# Patient Record
Sex: MALE | Race: WHITE | NOT HISPANIC OR LATINO
[De-identification: names, ages, dates, MRNs, and addresses within clinical notes are randomized per-mention and may not be internally consistent; named-entity substitution may affect disease eponyms.]

---

## 2017-03-01 ENCOUNTER — MED ADMIN CHARGE (OUTPATIENT)
Age: 52
End: 2017-03-01

## 2017-03-01 ENCOUNTER — APPOINTMENT (OUTPATIENT)
Dept: INTERNAL MEDICINE | Facility: CLINIC | Age: 52
End: 2017-03-01

## 2017-03-02 ENCOUNTER — MEDICATION RENEWAL (OUTPATIENT)
Age: 52
End: 2017-03-02

## 2017-03-02 VITALS
WEIGHT: 226 LBS | DIASTOLIC BLOOD PRESSURE: 94 MMHG | BODY MASS INDEX: 30.65 KG/M2 | RESPIRATION RATE: 14 BRPM | SYSTOLIC BLOOD PRESSURE: 142 MMHG | HEART RATE: 78 BPM

## 2017-03-03 ENCOUNTER — OTHER (OUTPATIENT)
Age: 52
End: 2017-03-03

## 2017-03-03 LAB
25(OH)D3 SERPL-MCNC: 8.2 NG/ML
ALBUMIN SERPL ELPH-MCNC: 4.7 G/DL
ALP BLD-CCNC: 112 U/L
ALT SERPL-CCNC: 68 U/L
ANION GAP SERPL CALC-SCNC: 16 MMOL/L
AST SERPL-CCNC: 78 U/L
BILIRUB SERPL-MCNC: 0.4 MG/DL
BUN SERPL-MCNC: 14 MG/DL
CALCIUM SERPL-MCNC: 9.9 MG/DL
CHLORIDE SERPL-SCNC: 104 MMOL/L
CHOLEST SERPL-MCNC: 201 MG/DL
CHOLEST/HDLC SERPL: 2.2 RATIO
CO2 SERPL-SCNC: 27 MMOL/L
CREAT SERPL-MCNC: 1 MG/DL
GLUCOSE SERPL-MCNC: 102 MG/DL
HBA1C MFR BLD HPLC: 6 %
HCV AB SER QL: NONREACTIVE
HCV S/CO RATIO: 0.19 S/CO
HDLC SERPL-MCNC: 90 MG/DL
LDLC SERPL CALC-MCNC: 85 MG/DL
POTASSIUM SERPL-SCNC: 4.4 MMOL/L
PROT SERPL-MCNC: 7.5 G/DL
PSA SERPL-MCNC: 0.39 NG/ML
SODIUM SERPL-SCNC: 147 MMOL/L
TRIGL SERPL-MCNC: 131 MG/DL

## 2017-03-22 ENCOUNTER — APPOINTMENT (OUTPATIENT)
Dept: INTERNAL MEDICINE | Facility: CLINIC | Age: 52
End: 2017-03-22

## 2017-03-23 VITALS — DIASTOLIC BLOOD PRESSURE: 92 MMHG | SYSTOLIC BLOOD PRESSURE: 142 MMHG

## 2017-04-12 ENCOUNTER — APPOINTMENT (OUTPATIENT)
Dept: INTERNAL MEDICINE | Facility: CLINIC | Age: 52
End: 2017-04-12

## 2017-05-03 ENCOUNTER — APPOINTMENT (OUTPATIENT)
Dept: INTERNAL MEDICINE | Facility: CLINIC | Age: 52
End: 2017-05-03

## 2017-05-03 VITALS — DIASTOLIC BLOOD PRESSURE: 90 MMHG | SYSTOLIC BLOOD PRESSURE: 150 MMHG

## 2017-05-03 DIAGNOSIS — J98.01 ACUTE BRONCHOSPASM: ICD-10-CM

## 2017-05-03 DIAGNOSIS — J06.9 ACUTE UPPER RESPIRATORY INFECTION, UNSPECIFIED: ICD-10-CM

## 2017-05-03 DIAGNOSIS — R74.0 NONSPECIFIC ELEVATION OF LEVELS OF TRANSAMINASE AND LACTIC ACID DEHYDROGENASE [LDH]: ICD-10-CM

## 2017-05-04 ENCOUNTER — APPOINTMENT (OUTPATIENT)
Dept: GASTROENTEROLOGY | Facility: CLINIC | Age: 52
End: 2017-05-04

## 2017-05-04 VITALS
HEIGHT: 72 IN | WEIGHT: 227 LBS | OXYGEN SATURATION: 95 % | SYSTOLIC BLOOD PRESSURE: 150 MMHG | TEMPERATURE: 98.4 F | HEART RATE: 100 BPM | BODY MASS INDEX: 30.75 KG/M2 | DIASTOLIC BLOOD PRESSURE: 90 MMHG | RESPIRATION RATE: 15 BRPM

## 2017-05-04 DIAGNOSIS — Z87.19 PERSONAL HISTORY OF OTHER DISEASES OF THE DIGESTIVE SYSTEM: ICD-10-CM

## 2017-05-04 DIAGNOSIS — K57.90 DIVERTICULOSIS OF INTESTINE, PART UNSPECIFIED, W/OUT PERFORATION OR ABSCESS W/OUT BLEEDING: ICD-10-CM

## 2017-05-04 DIAGNOSIS — M79.89 OTHER SPECIFIED SOFT TISSUE DISORDERS: ICD-10-CM

## 2017-05-04 LAB
ALBUMIN SERPL ELPH-MCNC: 4.4 G/DL
ALP BLD-CCNC: 121 U/L
ALT SERPL-CCNC: 48 U/L
AST SERPL-CCNC: 73 U/L
BILIRUB DIRECT SERPL-MCNC: 0.1 MG/DL
BILIRUB INDIRECT SERPL-MCNC: 0.1 MG/DL
BILIRUB SERPL-MCNC: 0.2 MG/DL
PROT SERPL-MCNC: 7.4 G/DL

## 2017-05-04 RX ORDER — ERGOCALCIFEROL 1.25 MG/1
1.25 MG CAPSULE, LIQUID FILLED ORAL
Qty: 8 | Refills: 0 | Status: DISCONTINUED | COMMUNITY
Start: 2017-03-03 | End: 2017-05-04

## 2017-05-04 RX ORDER — TRAMADOL HYDROCHLORIDE 50 MG/1
50 TABLET, COATED ORAL
Qty: 45 | Refills: 3 | Status: DISCONTINUED | COMMUNITY
Start: 2017-03-01 | End: 2017-05-04

## 2017-05-24 ENCOUNTER — APPOINTMENT (OUTPATIENT)
Dept: INTERNAL MEDICINE | Facility: CLINIC | Age: 52
End: 2017-05-24

## 2017-05-24 VITALS — SYSTOLIC BLOOD PRESSURE: 128 MMHG | DIASTOLIC BLOOD PRESSURE: 86 MMHG

## 2017-05-24 DIAGNOSIS — J30.9 ALLERGIC RHINITIS, UNSPECIFIED: ICD-10-CM

## 2017-06-12 ENCOUNTER — APPOINTMENT (OUTPATIENT)
Dept: ORTHOPEDIC SURGERY | Facility: CLINIC | Age: 52
End: 2017-06-12

## 2017-06-12 VITALS
DIASTOLIC BLOOD PRESSURE: 109 MMHG | HEIGHT: 71 IN | WEIGHT: 220 LBS | SYSTOLIC BLOOD PRESSURE: 158 MMHG | HEART RATE: 108 BPM | BODY MASS INDEX: 30.8 KG/M2

## 2017-06-12 DIAGNOSIS — M25.569 PAIN IN UNSPECIFIED KNEE: ICD-10-CM

## 2017-06-23 ENCOUNTER — APPOINTMENT (OUTPATIENT)
Dept: INTERNAL MEDICINE | Facility: CLINIC | Age: 52
End: 2017-06-23

## 2017-06-23 VITALS — SYSTOLIC BLOOD PRESSURE: 150 MMHG | DIASTOLIC BLOOD PRESSURE: 80 MMHG

## 2017-06-26 ENCOUNTER — APPOINTMENT (OUTPATIENT)
Dept: GASTROENTEROLOGY | Facility: HOSPITAL | Age: 52
End: 2017-06-26

## 2017-06-28 ENCOUNTER — APPOINTMENT (OUTPATIENT)
Dept: GASTROENTEROLOGY | Facility: AMBULATORY MEDICAL SERVICES | Age: 52
End: 2017-06-28

## 2017-07-11 ENCOUNTER — MEDICATION RENEWAL (OUTPATIENT)
Age: 52
End: 2017-07-11

## 2017-08-07 ENCOUNTER — MESSAGE (OUTPATIENT)
Age: 52
End: 2017-08-07

## 2017-08-23 ENCOUNTER — APPOINTMENT (OUTPATIENT)
Dept: GASTROENTEROLOGY | Facility: AMBULATORY MEDICAL SERVICES | Age: 52
End: 2017-08-23

## 2017-09-01 ENCOUNTER — MEDICATION RENEWAL (OUTPATIENT)
Age: 52
End: 2017-09-01

## 2017-09-26 ENCOUNTER — MEDICATION RENEWAL (OUTPATIENT)
Age: 52
End: 2017-09-26

## 2017-10-11 ENCOUNTER — MEDICATION RENEWAL (OUTPATIENT)
Age: 52
End: 2017-10-11

## 2017-10-17 ENCOUNTER — APPOINTMENT (OUTPATIENT)
Dept: PHYSICAL MEDICINE AND REHAB | Facility: CLINIC | Age: 52
End: 2017-10-17
Payer: COMMERCIAL

## 2017-10-17 VITALS
TEMPERATURE: 98.4 F | DIASTOLIC BLOOD PRESSURE: 94 MMHG | SYSTOLIC BLOOD PRESSURE: 159 MMHG | WEIGHT: 225 LBS | BODY MASS INDEX: 32.21 KG/M2 | HEIGHT: 70 IN | OXYGEN SATURATION: 97 % | HEART RATE: 92 BPM

## 2017-10-17 PROCEDURE — 99203 OFFICE O/P NEW LOW 30 MIN: CPT

## 2017-11-02 ENCOUNTER — RX RENEWAL (OUTPATIENT)
Age: 52
End: 2017-11-02

## 2017-11-03 ENCOUNTER — MEDICATION RENEWAL (OUTPATIENT)
Age: 52
End: 2017-11-03

## 2017-11-15 ENCOUNTER — APPOINTMENT (OUTPATIENT)
Dept: GASTROENTEROLOGY | Facility: AMBULATORY MEDICAL SERVICES | Age: 52
End: 2017-11-15

## 2017-11-21 ENCOUNTER — OTHER (OUTPATIENT)
Age: 52
End: 2017-11-21

## 2017-11-29 ENCOUNTER — MEDICATION RENEWAL (OUTPATIENT)
Age: 52
End: 2017-11-29

## 2017-11-29 ENCOUNTER — APPOINTMENT (OUTPATIENT)
Dept: INTERNAL MEDICINE | Facility: CLINIC | Age: 52
End: 2017-11-29

## 2017-11-30 ENCOUNTER — OTHER (OUTPATIENT)
Age: 52
End: 2017-11-30

## 2017-12-27 ENCOUNTER — RX RENEWAL (OUTPATIENT)
Age: 52
End: 2017-12-27

## 2018-01-26 ENCOUNTER — MEDICATION RENEWAL (OUTPATIENT)
Age: 53
End: 2018-01-26

## 2018-02-19 ENCOUNTER — OTHER (OUTPATIENT)
Age: 53
End: 2018-02-19

## 2018-03-08 ENCOUNTER — APPOINTMENT (OUTPATIENT)
Dept: INTERNAL MEDICINE | Facility: CLINIC | Age: 53
End: 2018-03-08
Payer: COMMERCIAL

## 2018-03-08 VITALS
SYSTOLIC BLOOD PRESSURE: 162 MMHG | RESPIRATION RATE: 14 BRPM | DIASTOLIC BLOOD PRESSURE: 94 MMHG | WEIGHT: 228 LBS | BODY MASS INDEX: 32.71 KG/M2 | HEART RATE: 88 BPM

## 2018-03-08 DIAGNOSIS — F10.20 ALCOHOL DEPENDENCE, UNCOMPLICATED: ICD-10-CM

## 2018-03-08 DIAGNOSIS — Z78.9 OTHER SPECIFIED HEALTH STATUS: ICD-10-CM

## 2018-03-08 DIAGNOSIS — M79.671 PAIN IN RIGHT FOOT: ICD-10-CM

## 2018-03-08 DIAGNOSIS — F17.200 NICOTINE DEPENDENCE, UNSPECIFIED, UNCOMPLICATED: ICD-10-CM

## 2018-03-08 DIAGNOSIS — M79.672 PAIN IN RIGHT FOOT: ICD-10-CM

## 2018-03-08 DIAGNOSIS — M54.5 LOW BACK PAIN: ICD-10-CM

## 2018-03-08 DIAGNOSIS — E66.9 OBESITY, UNSPECIFIED: ICD-10-CM

## 2018-03-08 DIAGNOSIS — Z87.891 PERSONAL HISTORY OF NICOTINE DEPENDENCE: ICD-10-CM

## 2018-03-08 DIAGNOSIS — Z12.11 ENCOUNTER FOR SCREENING FOR MALIGNANT NEOPLASM OF COLON: ICD-10-CM

## 2018-03-08 PROCEDURE — G0447 BEHAVIOR COUNSEL OBESITY 15M: CPT

## 2018-03-08 PROCEDURE — G0444 DEPRESSION SCREEN ANNUAL: CPT | Mod: 59

## 2018-03-08 PROCEDURE — 90688 IIV4 VACCINE SPLT 0.5 ML IM: CPT

## 2018-03-08 PROCEDURE — 99406 BEHAV CHNG SMOKING 3-10 MIN: CPT | Mod: 25

## 2018-03-08 PROCEDURE — 90472 IMMUNIZATION ADMIN EACH ADD: CPT

## 2018-03-08 PROCEDURE — 99396 PREV VISIT EST AGE 40-64: CPT | Mod: 25

## 2018-03-08 PROCEDURE — G0008: CPT

## 2018-03-08 PROCEDURE — G0442 ANNUAL ALCOHOL SCREEN 15 MIN: CPT | Mod: 59

## 2018-03-08 PROCEDURE — 90715 TDAP VACCINE 7 YRS/> IM: CPT

## 2018-03-08 RX ORDER — MELOXICAM 15 MG/1
15 TABLET ORAL DAILY
Qty: 60 | Refills: 1 | Status: DISCONTINUED | COMMUNITY
Start: 2017-06-12 | End: 2018-03-08

## 2018-03-08 RX ORDER — GABAPENTIN 300 MG/1
300 CAPSULE ORAL 3 TIMES DAILY
Qty: 90 | Refills: 1 | Status: DISCONTINUED | COMMUNITY
Start: 2017-10-17 | End: 2018-03-08

## 2018-03-08 RX ORDER — SODIUM SULFATE, POTASSIUM SULFATE, MAGNESIUM SULFATE 17.5; 3.13; 1.6 G/ML; G/ML; G/ML
17.5-3.13-1.6 SOLUTION, CONCENTRATE ORAL
Qty: 1 | Refills: 0 | Status: DISCONTINUED | COMMUNITY
Start: 2017-05-04 | End: 2018-03-08

## 2018-03-10 ENCOUNTER — MEDICATION RENEWAL (OUTPATIENT)
Age: 53
End: 2018-03-10

## 2018-03-11 LAB
25(OH)D3 SERPL-MCNC: 10.7 NG/ML
ALBUMIN SERPL ELPH-MCNC: 4.4 G/DL
ALP BLD-CCNC: 129 U/L
ALT SERPL-CCNC: 40 U/L
ANION GAP SERPL CALC-SCNC: 15 MMOL/L
AST SERPL-CCNC: 91 U/L
BILIRUB SERPL-MCNC: 0.3 MG/DL
BUN SERPL-MCNC: 11 MG/DL
CALCIUM SERPL-MCNC: 9.2 MG/DL
CHLORIDE SERPL-SCNC: 96 MMOL/L
CHOLEST SERPL-MCNC: 187 MG/DL
CHOLEST/HDLC SERPL: 2.9 RATIO
CO2 SERPL-SCNC: 27 MMOL/L
CREAT SERPL-MCNC: 0.75 MG/DL
GLUCOSE SERPL-MCNC: 85 MG/DL
HBA1C MFR BLD HPLC: 6 %
HDLC SERPL-MCNC: 64 MG/DL
LDLC SERPL CALC-MCNC: 99 MG/DL
POTASSIUM SERPL-SCNC: 3.9 MMOL/L
PROT SERPL-MCNC: 8 G/DL
PSA SERPL-MCNC: 0.44 NG/ML
SODIUM SERPL-SCNC: 138 MMOL/L
TRIGL SERPL-MCNC: 122 MG/DL

## 2018-03-13 ENCOUNTER — RESULT CHARGE (OUTPATIENT)
Age: 53
End: 2018-03-13

## 2018-03-13 DIAGNOSIS — E55.9 VITAMIN D DEFICIENCY, UNSPECIFIED: ICD-10-CM

## 2018-03-15 ENCOUNTER — MEDICATION RENEWAL (OUTPATIENT)
Age: 53
End: 2018-03-15

## 2018-04-04 ENCOUNTER — APPOINTMENT (OUTPATIENT)
Dept: INTERNAL MEDICINE | Facility: CLINIC | Age: 53
End: 2018-04-04
Payer: COMMERCIAL

## 2018-04-04 DIAGNOSIS — J01.90 ACUTE SINUSITIS, UNSPECIFIED: ICD-10-CM

## 2018-04-04 PROCEDURE — 99214 OFFICE O/P EST MOD 30 MIN: CPT

## 2018-04-05 VITALS — HEART RATE: 82 BPM | DIASTOLIC BLOOD PRESSURE: 78 MMHG | SYSTOLIC BLOOD PRESSURE: 150 MMHG

## 2018-04-09 ENCOUNTER — MEDICATION RENEWAL (OUTPATIENT)
Age: 53
End: 2018-04-09

## 2018-04-15 ENCOUNTER — MEDICATION RENEWAL (OUTPATIENT)
Age: 53
End: 2018-04-15

## 2018-04-16 ENCOUNTER — MEDICATION RENEWAL (OUTPATIENT)
Age: 53
End: 2018-04-16

## 2018-04-21 ENCOUNTER — MEDICATION RENEWAL (OUTPATIENT)
Age: 53
End: 2018-04-21

## 2018-04-24 ENCOUNTER — OTHER (OUTPATIENT)
Age: 53
End: 2018-04-24

## 2018-04-24 RX ORDER — HYDROCODONE BITARTRATE AND ACETAMINOPHEN 5; 300 MG/1; MG/1
5-300 TABLET ORAL
Qty: 42 | Refills: 0 | Status: DISCONTINUED | COMMUNITY
Start: 2018-04-04 | End: 2018-04-24

## 2018-04-24 RX ORDER — AMOXICILLIN AND CLAVULANATE POTASSIUM 875; 125 MG/1; MG/1
875-125 TABLET, COATED ORAL
Qty: 14 | Refills: 0 | Status: DISCONTINUED | COMMUNITY
Start: 2018-04-04 | End: 2018-04-24

## 2018-04-24 RX ORDER — TRAMADOL HYDROCHLORIDE AND ACETAMINOPHEN 37.5; 325 MG/1; MG/1
37.5-325 TABLET, FILM COATED ORAL
Qty: 45 | Refills: 0 | Status: DISCONTINUED | COMMUNITY
Start: 2018-03-10 | End: 2018-04-24

## 2018-04-25 ENCOUNTER — MEDICATION RENEWAL (OUTPATIENT)
Age: 53
End: 2018-04-25

## 2018-05-14 ENCOUNTER — MEDICATION RENEWAL (OUTPATIENT)
Age: 53
End: 2018-05-14

## 2018-05-31 ENCOUNTER — OTHER (OUTPATIENT)
Age: 53
End: 2018-05-31

## 2018-05-31 ENCOUNTER — APPOINTMENT (OUTPATIENT)
Dept: INTERNAL MEDICINE | Facility: CLINIC | Age: 53
End: 2018-05-31
Payer: COMMERCIAL

## 2018-05-31 VITALS — RESPIRATION RATE: 14 BRPM | SYSTOLIC BLOOD PRESSURE: 140 MMHG | DIASTOLIC BLOOD PRESSURE: 90 MMHG | HEART RATE: 78 BPM

## 2018-05-31 DIAGNOSIS — R60.9 EDEMA, UNSPECIFIED: ICD-10-CM

## 2018-05-31 PROCEDURE — 99213 OFFICE O/P EST LOW 20 MIN: CPT

## 2018-05-31 RX ORDER — OXYCODONE HYDROCHLORIDE 10 MG/1
10 TABLET, FILM COATED, EXTENDED RELEASE ORAL
Qty: 30 | Refills: 0 | Status: DISCONTINUED | COMMUNITY
Start: 2018-03-08 | End: 2018-05-31

## 2018-06-11 ENCOUNTER — MEDICATION RENEWAL (OUTPATIENT)
Age: 53
End: 2018-06-11

## 2018-06-11 RX ORDER — OXYCODONE HYDROCHLORIDE 30 MG/1
30 TABLET ORAL EVERY 4 HOURS
Qty: 84 | Refills: 0 | Status: DISCONTINUED | COMMUNITY
Start: 2018-05-31 | End: 2018-06-11

## 2018-06-12 ENCOUNTER — MEDICATION RENEWAL (OUTPATIENT)
Age: 53
End: 2018-06-12

## 2018-06-12 ENCOUNTER — APPOINTMENT (OUTPATIENT)
Dept: INTERNAL MEDICINE | Facility: CLINIC | Age: 53
End: 2018-06-12

## 2018-07-05 ENCOUNTER — OTHER (OUTPATIENT)
Age: 53
End: 2018-07-05

## 2018-07-07 ENCOUNTER — MEDICATION RENEWAL (OUTPATIENT)
Age: 53
End: 2018-07-07

## 2018-08-05 ENCOUNTER — MEDICATION RENEWAL (OUTPATIENT)
Age: 53
End: 2018-08-05

## 2018-08-27 ENCOUNTER — MEDICATION RENEWAL (OUTPATIENT)
Age: 53
End: 2018-08-27

## 2018-08-29 ENCOUNTER — OTHER (OUTPATIENT)
Age: 53
End: 2018-08-29

## 2018-08-31 ENCOUNTER — OTHER (OUTPATIENT)
Age: 53
End: 2018-08-31

## 2018-09-03 ENCOUNTER — EMERGENCY (EMERGENCY)
Facility: HOSPITAL | Age: 53
LOS: 1 days | Discharge: HOME | End: 2018-09-03
Attending: EMERGENCY MEDICINE
Payer: COMMERCIAL

## 2018-09-03 VITALS
RESPIRATION RATE: 18 BRPM | TEMPERATURE: 99 F | SYSTOLIC BLOOD PRESSURE: 141 MMHG | DIASTOLIC BLOOD PRESSURE: 82 MMHG | OXYGEN SATURATION: 98 % | HEART RATE: 107 BPM | WEIGHT: 225.09 LBS

## 2018-09-03 VITALS
RESPIRATION RATE: 17 BRPM | OXYGEN SATURATION: 100 % | HEART RATE: 104 BPM | DIASTOLIC BLOOD PRESSURE: 105 MMHG | SYSTOLIC BLOOD PRESSURE: 155 MMHG

## 2018-09-03 LAB
ALBUMIN SERPL ELPH-MCNC: 4.9 G/DL — SIGNIFICANT CHANGE UP (ref 3.3–5)
ALP SERPL-CCNC: 151 U/L — HIGH (ref 40–120)
ALT FLD-CCNC: 48 U/L — HIGH (ref 10–45)
ANION GAP SERPL CALC-SCNC: 18 MMOL/L — HIGH (ref 5–17)
APTT BLD: 30.7 SEC — SIGNIFICANT CHANGE UP (ref 27.5–37.4)
AST SERPL-CCNC: 101 U/L — HIGH (ref 10–40)
BILIRUB SERPL-MCNC: 0.6 MG/DL — SIGNIFICANT CHANGE UP (ref 0.2–1.2)
BLD GP AB SCN SERPL QL: NEGATIVE — SIGNIFICANT CHANGE UP
BUN SERPL-MCNC: 8 MG/DL — SIGNIFICANT CHANGE UP (ref 7–23)
CALCIUM SERPL-MCNC: 9.4 MG/DL — SIGNIFICANT CHANGE UP (ref 8.4–10.5)
CHLORIDE SERPL-SCNC: 98 MMOL/L — SIGNIFICANT CHANGE UP (ref 96–108)
CO2 SERPL-SCNC: 25 MMOL/L — SIGNIFICANT CHANGE UP (ref 22–31)
CREAT SERPL-MCNC: 0.69 MG/DL — SIGNIFICANT CHANGE UP (ref 0.5–1.3)
GLUCOSE SERPL-MCNC: 111 MG/DL — HIGH (ref 70–99)
HCT VFR BLD CALC: 45.6 % — SIGNIFICANT CHANGE UP (ref 39–50)
HGB BLD-MCNC: 15.5 G/DL — SIGNIFICANT CHANGE UP (ref 13–17)
INR BLD: 1.05 RATIO — SIGNIFICANT CHANGE UP (ref 0.88–1.16)
MCHC RBC-ENTMCNC: 33.7 PG — SIGNIFICANT CHANGE UP (ref 27–34)
MCHC RBC-ENTMCNC: 34 GM/DL — SIGNIFICANT CHANGE UP (ref 32–36)
MCV RBC AUTO: 99.3 FL — SIGNIFICANT CHANGE UP (ref 80–100)
PLATELET # BLD AUTO: 189 K/UL — SIGNIFICANT CHANGE UP (ref 150–400)
POTASSIUM SERPL-MCNC: 4.1 MMOL/L — SIGNIFICANT CHANGE UP (ref 3.5–5.3)
POTASSIUM SERPL-SCNC: 4.1 MMOL/L — SIGNIFICANT CHANGE UP (ref 3.5–5.3)
PROT SERPL-MCNC: 8.5 G/DL — HIGH (ref 6–8.3)
PROTHROM AB SERPL-ACNC: 11.4 SEC — SIGNIFICANT CHANGE UP (ref 9.8–12.7)
RBC # BLD: 4.59 M/UL — SIGNIFICANT CHANGE UP (ref 4.2–5.8)
RBC # FLD: 12.4 % — SIGNIFICANT CHANGE UP (ref 10.3–14.5)
RH IG SCN BLD-IMP: POSITIVE — SIGNIFICANT CHANGE UP
SODIUM SERPL-SCNC: 141 MMOL/L — SIGNIFICANT CHANGE UP (ref 135–145)
WBC # BLD: 9.7 K/UL — SIGNIFICANT CHANGE UP (ref 3.8–10.5)
WBC # FLD AUTO: 9.7 K/UL — SIGNIFICANT CHANGE UP (ref 3.8–10.5)

## 2018-09-03 PROCEDURE — 99283 EMERGENCY DEPT VISIT LOW MDM: CPT

## 2018-09-03 PROCEDURE — 86850 RBC ANTIBODY SCREEN: CPT

## 2018-09-03 PROCEDURE — 86901 BLOOD TYPING SEROLOGIC RH(D): CPT

## 2018-09-03 PROCEDURE — 99284 EMERGENCY DEPT VISIT MOD MDM: CPT

## 2018-09-03 PROCEDURE — 85730 THROMBOPLASTIN TIME PARTIAL: CPT

## 2018-09-03 PROCEDURE — 86900 BLOOD TYPING SEROLOGIC ABO: CPT

## 2018-09-03 PROCEDURE — 85027 COMPLETE CBC AUTOMATED: CPT

## 2018-09-03 PROCEDURE — 36415 COLL VENOUS BLD VENIPUNCTURE: CPT

## 2018-09-03 PROCEDURE — 80053 COMPREHEN METABOLIC PANEL: CPT

## 2018-09-03 PROCEDURE — 85610 PROTHROMBIN TIME: CPT

## 2018-09-03 RX ORDER — OXYCODONE HYDROCHLORIDE 5 MG/1
20 TABLET ORAL ONCE
Qty: 0 | Refills: 0 | Status: DISCONTINUED | OUTPATIENT
Start: 2018-09-03 | End: 2018-09-03

## 2018-09-03 RX ORDER — DIPHENHYDRAMINE HCL 50 MG
50 CAPSULE ORAL ONCE
Qty: 0 | Refills: 0 | Status: COMPLETED | OUTPATIENT
Start: 2018-09-03 | End: 2018-09-03

## 2018-09-03 RX ADMIN — OXYCODONE HYDROCHLORIDE 20 MILLIGRAM(S): 5 TABLET ORAL at 17:23

## 2018-09-03 RX ADMIN — Medication 50 MILLIGRAM(S): at 17:23

## 2018-09-03 RX ADMIN — OXYCODONE HYDROCHLORIDE 20 MILLIGRAM(S): 5 TABLET ORAL at 17:52

## 2018-09-03 NOTE — ED PROVIDER NOTE - PLAN OF CARE
1. follow up with GI doctor this week  2. follow up with PMD in 24-48 hours.  3. sitz baths every 4 hours as needed for pain relief, increase fluid and fiber in diet, tucks pads after BMs for pain relief.  4. if you are having increased pain, bleeding, fatigue, dizziness, fevers then return to ED

## 2018-09-03 NOTE — ED SUB INTERN NOTE - MEDICAL DECISION MAKING DETAILS
Mr. Paulino is a 52 yo male who presents with a 1 day history of bright red blood per rectum. Given physical exam demonstrating hemorrhoids, this is most likely cause for bleeding. Hgb is 15.5 so worried less for occult bleeding such as malignancy. Lack of abdominal pain is less suggestive of diverticulitis.    CBC, CMP, PT/INR

## 2018-09-03 NOTE — ED ADULT NURSE NOTE - OBJECTIVE STATEMENT
53M pt AxOx3 ambulatory to ED c/o rectal bleeding while having BM today. Pt states bleeding only occurred during BM and he was straining. Pt denies abd pain/N/V/D. PMHx HTN, sciatica. On assessment, pt is well in appearance, admits to being anxious while being in ED. Resp even, unlabored. Abd soft/NT/ND. NAD noted. . #20G RAC, labs drawn and sent. MD at bedside for eval. Daughter at bedside. Will continue to monitor.

## 2018-09-03 NOTE — ED PROVIDER NOTE - OBJECTIVE STATEMENT
Mr. Paulino is a 54 yo male who presents with a 1 day history of bright red blood per rectum. Reports having a loose bowel movement today and noticed drops of blood in the water and on the toilet paper. Did not see any blood in the stool. Had one loose bowel movement yesterday with no noticeable blood. NO prior history of GI bleeds. Pt reports drinking a 6 pack of beer a day. Denies use of chronic NSAIDs. No N/V or constipation. Denies headache, chest pain, SOB, and abdominal pain. Had colonoscopy 6 years ago and was told he has diverticulosis. Mr. Paulino is a 54 yo male who presents with a 1 day history of bright red blood per rectum. Reports having a loose bowel movement today and noticed drops of blood in the water and on the toilet paper. Did not see any blood in the stool. Had one loose bowel movement yesterday with no noticeable blood. NO prior history of GI bleeds. Pt reports drinking a 6 pack of beer a day. Denies use of chronic NSAIDs. No N/V or constipation, no abdominal pain. Denies headache, chest pain, SOB, and abdominal pain. Had colonoscopy 6 years ago and was told he has diverticulosis.

## 2018-09-03 NOTE — ED PROVIDER NOTE - PROGRESS NOTE DETAILS
pt clearly anxious which could account for tachycardia, pt is otherwise stable no dizziness, fatgiue, weakness, is eager to go home. pt clearly anxious which could account for tachycardia, pt is otherwise stable no dizziness, fatgiue, weakness, is eager to go home.    ATTENDING MD [place]: pt well appearing. abs stable, hemorrhoidal bleed. Do not suspect major bleed given minimal blood on exam and obviously bleeding hemorrhoids. PT advised on abnormal LFTs, need for PCP/GI f/u. suspect from regular drinking, but have no clinical concern for variceal bleed or UGIB. pt appears stable for DC. advised on warning signs and sympmtomf or which to return to the ED.

## 2018-09-03 NOTE — ED PROVIDER NOTE - CARE PLAN
Assessment and plan of treatment:	1. follow up with GI doctor this week  2. follow up with PMD in 24-48 hours.  3. sitz baths every 4 hours as needed for pain relief, increase fluid and fiber in diet, tucks pads after BMs for pain relief.  4. if you are having increased pain, bleeding, fatigue, dizziness, fevers then return to ED Principal Discharge DX:	Bleeding hemorrhoids  Assessment and plan of treatment:	1. follow up with GI doctor this week  2. follow up with PMD in 24-48 hours.  3. sitz baths every 4 hours as needed for pain relief, increase fluid and fiber in diet, tucks pads after BMs for pain relief.  4. if you are having increased pain, bleeding, fatigue, dizziness, fevers then return to ED

## 2018-09-03 NOTE — ED SUB INTERN NOTE - OBJECTIVE STATEMENT FT
Mr. Paulino is a Mr. Paulino is a 54 yo male who presents with a 1 day history of bright red blood per rectum. Reports having a loose bowel movement today and noticed drops of blood in the water and on the toilet paper. Did not see any blood in the stool. Had one loose bowel movement yesterday with no noticeable blood. NO prior history of GI bleeds. Pt reports drinking a 6 pack of beer a day. Denies use of chronic NSAIDs. No N/V or constipation. Denies headache, chest pain, SOB, and abdominal pain. Had colonoscopy 6 years ago and was told he has diverticulosis.

## 2018-09-03 NOTE — ED ADULT NURSE NOTE - NSIMPLEMENTINTERV_GEN_ALL_ED
Implemented All Universal Safety Interventions:  [place] to call system. Call bell, personal items and telephone within reach. Instruct patient to call for assistance. Room bathroom lighting operational. Non-slip footwear when patient is off stretcher. Physically safe environment: no spills, clutter or unnecessary equipment. Stretcher in lowest position, wheels locked, appropriate side rails in place.

## 2018-09-03 NOTE — ED PROVIDER NOTE - ATTENDING CONTRIBUTION TO CARE
ATTENDING MD: I, Israel Fragoso, have seen and evaluated this patient with the advance practice clinician.  I have discussed the history, exam ,and aspects of care with the APC.  I have reviewed the APC's note. I agree with the findings  unless other wise stated in the HPI, PE, MDM, and progress notes.    Charts made in real time. Please forgive typos.     VITALS: reviewed  GEN: NAD, very nervous/anxious appearing not otherwise in distress, A & O x 4  HEAD/EYES: NCAT, PERRL, EOMI, anicteric sclerae, no conjunctival pallor  ENT: mucus membranes moist, oropharynx WNL, trachea midline, no JVD  RESP: lungs CTA with equal breath sounds bilaterally, chest wall nontender and atraumatic  CV: heart with slightly tachycardic, reg rhythm S1, S2, no murmur; distal pulses intact and symmetric bilaterally  ABDOMEN: normoactive bowel sounds, soft, nondistended, nontender, no palpable masses, no sequellae of cirrhosis  RECTAL: large distended external hemorrhoid which red blood squeezed from. ARON is nontende riwth seeral internal hemorrhoids, trace dark red blood and brown stool on exam.  : no CVAT  MSK: extremities atraumatic and nontender, no edema, no asymmetry.   SKIN: warm, dry, no rash, no bruising, no cyanosis. color appropriate for ethnicity  NEURO: alert, mentating appropriately, no facial asymmetry. gross sensation, motor, coordination are intact  PSYCH: Affect appropriate     MDM: probable hemorrhoidal bleed, will check CBC> Pt very anxious as wife  of major GI bleed from long-term medication use. Suspect tachycardia is from anxiety as pt appears well, is otherwise asymptomatic. Will give home chronic pain meds and a benadryl for anxiety. No clinical concern for UGIB given lack of pain, hx of prior, and no stigmata of cirrhosis.

## 2018-09-04 ENCOUNTER — CHART COPY (OUTPATIENT)
Age: 53
End: 2018-09-04

## 2018-09-10 ENCOUNTER — OTHER (OUTPATIENT)
Age: 53
End: 2018-09-10

## 2018-09-10 RX ORDER — KETOROLAC TROMETHAMINE 10 MG/1
10 TABLET, FILM COATED ORAL 3 TIMES DAILY
Qty: 15 | Refills: 0 | Status: DISCONTINUED | COMMUNITY
Start: 2018-07-05 | End: 2018-09-10

## 2018-09-10 RX ORDER — OXYCODONE 20 MG/1
20 TABLET ORAL EVERY 4 HOURS
Qty: 180 | Refills: 0 | Status: DISCONTINUED | COMMUNITY
Start: 2017-03-22 | End: 2018-09-10

## 2018-09-13 ENCOUNTER — APPOINTMENT (OUTPATIENT)
Dept: GASTROENTEROLOGY | Facility: CLINIC | Age: 53
End: 2018-09-13
Payer: COMMERCIAL

## 2018-09-13 ENCOUNTER — CHART COPY (OUTPATIENT)
Age: 53
End: 2018-09-13

## 2018-09-13 VITALS
WEIGHT: 236 LBS | RESPIRATION RATE: 16 BRPM | HEIGHT: 70 IN | HEART RATE: 105 BPM | SYSTOLIC BLOOD PRESSURE: 120 MMHG | OXYGEN SATURATION: 98 % | BODY MASS INDEX: 33.79 KG/M2 | DIASTOLIC BLOOD PRESSURE: 76 MMHG | TEMPERATURE: 98 F

## 2018-09-13 DIAGNOSIS — R58 HEMORRHAGE, NOT ELSEWHERE CLASSIFIED: ICD-10-CM

## 2018-09-13 DIAGNOSIS — Z12.11 ENCOUNTER FOR SCREENING FOR MALIGNANT NEOPLASM OF COLON: ICD-10-CM

## 2018-09-13 PROBLEM — M54.30 SCIATICA, UNSPECIFIED SIDE: Chronic | Status: ACTIVE | Noted: 2018-09-03

## 2018-09-13 PROBLEM — I10 ESSENTIAL (PRIMARY) HYPERTENSION: Chronic | Status: ACTIVE | Noted: 2018-09-03

## 2018-09-13 PROCEDURE — 99214 OFFICE O/P EST MOD 30 MIN: CPT

## 2018-09-24 ENCOUNTER — MEDICATION RENEWAL (OUTPATIENT)
Age: 53
End: 2018-09-24

## 2018-09-24 DIAGNOSIS — G47.00 INSOMNIA, UNSPECIFIED: ICD-10-CM

## 2018-11-07 ENCOUNTER — APPOINTMENT (OUTPATIENT)
Dept: GASTROENTEROLOGY | Facility: AMBULATORY MEDICAL SERVICES | Age: 53
End: 2018-11-07
Payer: COMMERCIAL

## 2018-11-07 PROCEDURE — 45385 COLONOSCOPY W/LESION REMOVAL: CPT

## 2018-11-23 ENCOUNTER — OTHER (OUTPATIENT)
Age: 53
End: 2018-11-23

## 2019-02-28 ENCOUNTER — APPOINTMENT (OUTPATIENT)
Dept: INTERNAL MEDICINE | Facility: CLINIC | Age: 54
End: 2019-02-28
Payer: COMMERCIAL

## 2019-02-28 ENCOUNTER — NON-APPOINTMENT (OUTPATIENT)
Age: 54
End: 2019-02-28

## 2019-02-28 VITALS
SYSTOLIC BLOOD PRESSURE: 120 MMHG | HEIGHT: 71 IN | DIASTOLIC BLOOD PRESSURE: 80 MMHG | TEMPERATURE: 98.6 F | BODY MASS INDEX: 35 KG/M2 | HEART RATE: 95 BPM | WEIGHT: 250 LBS | OXYGEN SATURATION: 95 %

## 2019-02-28 DIAGNOSIS — M54.16 RADICULOPATHY, LUMBAR REGION: ICD-10-CM

## 2019-02-28 DIAGNOSIS — R73.03 PREDIABETES.: ICD-10-CM

## 2019-02-28 DIAGNOSIS — Z23 ENCOUNTER FOR IMMUNIZATION: ICD-10-CM

## 2019-02-28 PROCEDURE — G0008: CPT

## 2019-02-28 PROCEDURE — G0447 BEHAVIOR COUNSEL OBESITY 15M: CPT

## 2019-02-28 PROCEDURE — G0444 DEPRESSION SCREEN ANNUAL: CPT

## 2019-02-28 PROCEDURE — 99386 PREV VISIT NEW AGE 40-64: CPT | Mod: 25

## 2019-02-28 PROCEDURE — 90674 CCIIV4 VAC NO PRSV 0.5 ML IM: CPT

## 2019-02-28 PROCEDURE — 93000 ELECTROCARDIOGRAM COMPLETE: CPT

## 2019-02-28 RX ORDER — DULOXETINE HYDROCHLORIDE 30 MG/1
30 CAPSULE, DELAYED RELEASE PELLETS ORAL
Qty: 90 | Refills: 3 | Status: DISCONTINUED | COMMUNITY
Start: 2018-05-31 | End: 2019-02-28

## 2019-02-28 RX ORDER — SODIUM SULFATE, POTASSIUM SULFATE, MAGNESIUM SULFATE 17.5; 3.13; 1.6 G/ML; G/ML; G/ML
17.5-3.13-1.6 SOLUTION, CONCENTRATE ORAL
Qty: 1 | Refills: 0 | Status: DISCONTINUED | COMMUNITY
Start: 2018-09-13 | End: 2019-02-28

## 2019-02-28 RX ORDER — FLURBIPROFEN 100 MG
100 TABLET ORAL TWICE DAILY
Qty: 20 | Refills: 0 | Status: DISCONTINUED | COMMUNITY
Start: 2018-09-10 | End: 2019-02-28

## 2019-02-28 NOTE — HEALTH RISK ASSESSMENT
[26-29] : 26-81 [No falls in past year] : Patient reported no falls in the past year [Patient reported colonoscopy was abnormal] : Patient reported colonoscopy was abnormal [HIV test declined] : HIV test declined [Hepatitis C test offered] : Hepatitis C test offered [None] : None [Employed] : employed [] :  [] : No [YearQuit] : 2019 [ColonoscopyDate] : 11/18 [ColonoscopyComments] : - TA removed, repeat 3 years advised

## 2019-02-28 NOTE — COUNSELING
[Weight management counseling provided] : Weight management [Healthy eating counseling provided] : healthy eating [Activity counseling provided] : activity [Discussed Risks and Advised to Quit Smoking] : Discussed risks and advised to quit smoking [Discussed Cessation Strategies] : cessation strategies were discussed [Quit Smoking] : Quit Smoking [ - Behavioral Counseling for Obesity (Face-to-Face for 15 Minutes)] : Behavioral Counseling for Obesity (Face-to-Face for 15 Minutes) [ - Annual Depression Screening] : Annual Depression Screening

## 2019-02-28 NOTE — DATA REVIEWED
[FreeTextEntry1] : EKG - NSR @ 96, nml axis, possible LAE, no ST or t wave significant abnormalities

## 2019-02-28 NOTE — PLAN
[FreeTextEntry1] : discussed w pt\par \par reviewed his prior hx and prior labs \par \par discussed in detail for 15 min re concern for worsening obesity and need for diet control, exercise weight loss. discussed options such as mediterranean diet, weight watchers, [facility] weight management center referral. he plans to implement strategies discussed and declines formal nutritional consult for now. \par \par cont current rx for HTN\par \par discussed chronic lumbar pain at length. he may need spinal imaging but he feels he cannot undergo MRI due to claustrophobia. may consider CT. referred to pain management. advised will not renew any opioids at this time. consider PT. \par \par counseled on complete smoking cessation, he quit few weeks ago, advised on importance and risks. may consider CT chest screening for lung cancer in the future \par \par influenza vaccine discussed and administered today\par \par colonoscopy UTD\par \par check routine labs \par \par RTO 3 months for f/u of obesity management, HTN, IFG , smoking and chronic lumbar pain or call earlier prn \par

## 2019-02-28 NOTE — PHYSICAL EXAM
[No Acute Distress] : no acute distress [Well Nourished] : well nourished [Well Developed] : well developed [Well-Appearing] : well-appearing [Normal Voice/Communication] : normal voice/communication [Normal Sclera/Conjunctiva] : normal sclera/conjunctiva [PERRL] : pupils equal round and reactive to light [Normal Outer Ear/Nose] : the outer ears and nose were normal in appearance [Normal Oropharynx] : the oropharynx was normal [Normal TMs] : both tympanic membranes were normal [No JVD] : no jugular venous distention [Supple] : supple [No Lymphadenopathy] : no lymphadenopathy [Thyroid Normal, No Nodules] : the thyroid was normal and there were no nodules present [No Respiratory Distress] : no respiratory distress  [Clear to Auscultation] : lungs were clear to auscultation bilaterally [No Accessory Muscle Use] : no accessory muscle use [Normal Rate] : normal rate  [Regular Rhythm] : with a regular rhythm [Normal S1, S2] : normal S1 and S2 [No Murmur] : no murmur heard [No Carotid Bruits] : no carotid bruits [No Abdominal Bruit] : a ~M bruit was not heard ~T in the abdomen [No Varicosities] : no varicosities [Pedal Pulses Present] : the pedal pulses are present [No Edema] : there was no peripheral edema [No Extremity Clubbing/Cyanosis] : no extremity clubbing/cyanosis [Soft] : abdomen soft [Non Tender] : non-tender [Non-distended] : non-distended [No HSM] : no HSM [Normal Bowel Sounds] : normal bowel sounds [Declined Rectal Exam] : declined rectal exam [Normal Supraclavicular Nodes] : no supraclavicular lymphadenopathy [Normal Posterior Cervical Nodes] : no posterior cervical lymphadenopathy [Normal Anterior Cervical Nodes] : no anterior cervical lymphadenopathy [No CVA Tenderness] : no CVA  tenderness [No Spinal Tenderness] : no spinal tenderness [No Joint Swelling] : no joint swelling [Grossly Normal Strength/Tone] : grossly normal strength/tone [No Rash] : no rash [Normal Gait] : normal gait [Coordination Grossly Intact] : coordination grossly intact [No Focal Deficits] : no focal deficits [Normal Affect] : the affect was normal [Alert and Oriented x3] : oriented to person, place, and time [Normal Mood] : the mood was normal [Normal Insight/Judgement] : insight and judgment were intact [de-identified] : massively obese  [de-identified] : lipoma mid upper back

## 2019-02-28 NOTE — HISTORY OF PRESENT ILLNESS
[de-identified] : 52 y/o man presents for initial visit to establish primary care w new internist. he feels well overall currently. previous workups with his PMD Dr Bailey reviewed. \par \par chronic lumbar pain w radiculopathy on R side, has taken narcotics in the past and had issues with dependence. also has had excessive alcohol intake problems in the past. has tried Lyrica and duloxetine but has not had significant relief and his PMD declined to prescribe additional narcotics. he saw pain management 2 years ago - but he cannot tolerate MRI due to claustrophobia including stand up/open MRI. \par HTN controlled on rx\par worsening obesity over past few months - he is surprised that he has gained significant weight\par IFG on diet control \par \par family hx of colon cancer and colonoscopy recently updated in 2018 w Dr Reeder, benign polyp removed \par \par long hx of heavy smoking - however he stopped completely on his own 3 weeks ago and is determined to quit permanently. declines rx.

## 2019-03-01 LAB
APPEARANCE: CLEAR
BILIRUBIN URINE: NEGATIVE
BLOOD URINE: NEGATIVE
COLOR: NORMAL
GLUCOSE QUALITATIVE U: NEGATIVE
KETONES URINE: NEGATIVE
LEUKOCYTE ESTERASE URINE: NEGATIVE
NITRITE URINE: NEGATIVE
PH URINE: 6
PROTEIN URINE: NEGATIVE
SPECIFIC GRAVITY URINE: 1.01
UROBILINOGEN URINE: NORMAL

## 2019-03-05 ENCOUNTER — OTHER (OUTPATIENT)
Age: 54
End: 2019-03-05

## 2019-03-05 ENCOUNTER — APPOINTMENT (OUTPATIENT)
Dept: INTERNAL MEDICINE | Facility: CLINIC | Age: 54
End: 2019-03-05
Payer: COMMERCIAL

## 2019-03-05 PROCEDURE — 36415 COLL VENOUS BLD VENIPUNCTURE: CPT

## 2019-03-19 LAB
ALBUMIN SERPL ELPH-MCNC: 4.9 G/DL
ALP BLD-CCNC: 151 U/L
ALT SERPL-CCNC: 44 U/L
ANION GAP SERPL CALC-SCNC: 19 MMOL/L
AST SERPL-CCNC: 107 U/L
BASOPHILS # BLD AUTO: 0.07 K/UL
BASOPHILS NFR BLD AUTO: 0.7 %
BILIRUB SERPL-MCNC: 0.9 MG/DL
BUN SERPL-MCNC: 11 MG/DL
CALCIUM SERPL-MCNC: 9.4 MG/DL
CHLORIDE SERPL-SCNC: 97 MMOL/L
CHOLEST SERPL-MCNC: 201 MG/DL
CHOLEST/HDLC SERPL: 2.9 RATIO
CO2 SERPL-SCNC: 25 MMOL/L
CREAT SERPL-MCNC: 0.71 MG/DL
EOSINOPHIL # BLD AUTO: 0.09 K/UL
EOSINOPHIL NFR BLD AUTO: 0.9 %
GLUCOSE SERPL-MCNC: 113 MG/DL
HBA1C MFR BLD HPLC: 6.6 %
HCT VFR BLD CALC: 47.2 %
HDLC SERPL-MCNC: 70 MG/DL
HGB BLD-MCNC: 15.4 G/DL
IMM GRANULOCYTES NFR BLD AUTO: 0.4 %
LDLC SERPL CALC-MCNC: 108 MG/DL
LYMPHOCYTES # BLD AUTO: 1.5 K/UL
LYMPHOCYTES NFR BLD AUTO: 15.2 %
MAN DIFF?: NORMAL
MCHC RBC-ENTMCNC: 32.6 GM/DL
MCHC RBC-ENTMCNC: 32.6 PG
MCV RBC AUTO: 100 FL
MONOCYTES # BLD AUTO: 1.16 K/UL
MONOCYTES NFR BLD AUTO: 11.7 %
NEUTROPHILS # BLD AUTO: 7.04 K/UL
NEUTROPHILS NFR BLD AUTO: 71.1 %
PLATELET # BLD AUTO: 160 K/UL
POTASSIUM SERPL-SCNC: 3.5 MMOL/L
PROT SERPL-MCNC: 8.4 G/DL
PSA SERPL-MCNC: 0.28 NG/ML
RBC # BLD: 4.72 M/UL
RBC # FLD: 14.4 %
SODIUM SERPL-SCNC: 141 MMOL/L
T4 FREE SERPL-MCNC: 1.2 NG/DL
TRIGL SERPL-MCNC: 116 MG/DL
TSH SERPL-ACNC: 4.28 UIU/ML
WBC # FLD AUTO: 9.9 K/UL

## 2019-05-08 ENCOUNTER — LABORATORY RESULT (OUTPATIENT)
Age: 54
End: 2019-05-08

## 2019-05-08 ENCOUNTER — APPOINTMENT (OUTPATIENT)
Dept: INTERNAL MEDICINE | Facility: CLINIC | Age: 54
End: 2019-05-08
Payer: COMMERCIAL

## 2019-05-08 VITALS
SYSTOLIC BLOOD PRESSURE: 140 MMHG | TEMPERATURE: 99.2 F | BODY MASS INDEX: 35 KG/M2 | HEIGHT: 71 IN | HEART RATE: 112 BPM | OXYGEN SATURATION: 98 % | WEIGHT: 250 LBS | DIASTOLIC BLOOD PRESSURE: 86 MMHG

## 2019-05-08 DIAGNOSIS — R10.13 EPIGASTRIC PAIN: ICD-10-CM

## 2019-05-08 PROCEDURE — 99214 OFFICE O/P EST MOD 30 MIN: CPT | Mod: 25

## 2019-05-08 PROCEDURE — 36415 COLL VENOUS BLD VENIPUNCTURE: CPT

## 2019-05-08 NOTE — REVIEW OF SYSTEMS
[Fever] : no fever [Chills] : no chills [Fatigue] : no fatigue [Chest Pain] : no chest pain [Lower Ext Edema] : no lower extremity edema [Shortness Of Breath] : no shortness of breath [Abdominal Pain] : abdominal pain [Nausea] : no nausea [Constipation] : no constipation [Diarrhea] : no diarrhea [Heartburn] : no heartburn [Melena] : no melena [Dysuria] : no dysuria [Hematuria] : no hematuria [Frequency] : no frequency [Skin Rash] : no skin rash [Headache] : no headache [Dizziness] : no dizziness

## 2019-05-08 NOTE — ASSESSMENT
[FreeTextEntry1] : Acute epigastric pain x 1 day. He started drinking heavily again consisting of 6-7 martini's daily. DDx includes pancreatitis, gastritis, PUD, and cholelithiasis. As he does not appear toxic on exam, will find check labs including CBC, CMP, ESR, CRP, amylase, and lipase. Start trial of Omeprazole 40 mg QAM. Strongly advised to taper off the alcohol intake. If pain intensifies, will send for CT A/P for further evaluation. He may also go to the emergency room if symptomatically worsens. \par \par BP acceptable on Norvasc. Low salt diet. Weight loss. \par \par F/u with Dr. Clancy.

## 2019-05-08 NOTE — HISTORY OF PRESENT ILLNESS
[FreeTextEntry8] : Patient comes for an acute visit. \par \par He is here for evaluation of abdominal pain. \par \par He developed epigastric pain this morning after drinking cup of coffee. Denies radiation to back. Denies fever, chills, N/V, or constipation. Last BM this morning and described as "loose." He has no change in appetite. No recent travel or sick contacts. He drinks 5 glasses of earl after work daily. He has had elevated more elevated AST level on recent blood work.

## 2019-05-09 ENCOUNTER — APPOINTMENT (OUTPATIENT)
Dept: INTERNAL MEDICINE | Facility: CLINIC | Age: 54
End: 2019-05-09

## 2019-05-11 ENCOUNTER — EMERGENCY (EMERGENCY)
Facility: HOSPITAL | Age: 54
LOS: 1 days | Discharge: HOME | End: 2019-05-11
Attending: EMERGENCY MEDICINE
Payer: COMMERCIAL

## 2019-05-11 VITALS
OXYGEN SATURATION: 95 % | HEIGHT: 71 IN | HEART RATE: 116 BPM | SYSTOLIC BLOOD PRESSURE: 150 MMHG | WEIGHT: 240.08 LBS | RESPIRATION RATE: 20 BRPM | DIASTOLIC BLOOD PRESSURE: 97 MMHG

## 2019-05-11 VITALS
RESPIRATION RATE: 20 BRPM | TEMPERATURE: 99 F | DIASTOLIC BLOOD PRESSURE: 84 MMHG | OXYGEN SATURATION: 97 % | SYSTOLIC BLOOD PRESSURE: 129 MMHG | HEART RATE: 103 BPM

## 2019-05-11 PROCEDURE — 99282 EMERGENCY DEPT VISIT SF MDM: CPT | Mod: 25

## 2019-05-11 PROCEDURE — 99283 EMERGENCY DEPT VISIT LOW MDM: CPT | Mod: 25

## 2019-05-11 PROCEDURE — 30901 CONTROL OF NOSEBLEED: CPT

## 2019-05-11 PROCEDURE — 30901 CONTROL OF NOSEBLEED: CPT | Mod: RT

## 2019-05-11 NOTE — ED ADULT NURSE NOTE - OBJECTIVE STATEMENT
53 y/o male PMH HTN presents to ED c/o epistaxis since this afternoon. Pt states nosebleed began suddenly this afternoon, was able to control bleeding at the time. A few hours later, nosebleed returned and he was unable to stop bleeding. Upon arrival, pt is bleeding from R nare. He denies SOB, feeling like he is swallowing/choking on blood, pain, SOB, chest pain, headache, visual changes. NP Yudelka and MD Diop at bedside packing nostril. Son at bedside.

## 2019-05-11 NOTE — ED PROVIDER NOTE - CLINICAL SUMMARY MEDICAL DECISION MAKING FREE TEXT BOX
DDx: epistaxis. Will provide pressure and observe, may progress to silver nitrate cauterization if bleeding continues. Follow up with ENT.  ATTG: Dr. Diop

## 2019-05-11 NOTE — ED PROVIDER NOTE - PROGRESS NOTE DETAILS
No more bleeding more than 30minutes after cauterization with silvernitrate. No more bleeding more than 30minutes after cauterization with silver nitrate.

## 2019-05-11 NOTE — ED PROVIDER NOTE - NSFOLLOWUPINSTRUCTIONS_ED_ALL_ED_FT
Hydrate.  Do not blow your nose.  Moisturize both nostrils using Vaseline or Bacitracin twice a day.  Keep continue your current medications.  Follow up with ENT Dr. Wagner or Dr. Buenrostro for reevaluation in 2-3days, call Monday for an appointment.  Return for any concerns or worsening symptoms.

## 2019-05-11 NOTE — ED PROVIDER NOTE - CARE PROVIDER_API CALL
Ezra Buenrostro)  Otolaryngology  [address] [place] [address], [address]  [place] [address]  Phone: [phone]  Fax: [phone]  Follow Up Time:     Houston Wagner)  Otolaryngology  [address] [place] [address], [address]  [place] [address]  Phone: [phone]  Fax: [phone]  Follow Up Time:

## 2019-05-11 NOTE — ED PROVIDER NOTE - OBJECTIVE STATEMENT
53yo male pt with PMHx of HTN, ambulatory c/o right nasal bleeding since this morning. Pt stated the bleeding started after rubbing his nose and it resolved itself. He notice active bleeding again this evening after he poked the nostril. Denies taking AC or ASA. Denies prev. nasal bleeding. Denies fever, chills or recent cough/ congestion. Denies headache, dizziness or N/V. Denies sensory changes or weakness to extremities. Denies CP/SOB/ABD pain.

## 2019-05-11 NOTE — ED PROVIDER NOTE - PHYSICAL EXAMINATION
NAD, Hypertensive and Tachycardia. No sinus tender. + Right sided epistaxis from ant septal area, no post nasal bleeding. Neck supple without tenderness. Lungs clear. Neuro- intact.

## 2019-05-11 NOTE — ED PROCEDURE NOTE - ATTENDING CONTRIBUTION TO CARE
I supervised the procedure with the NP. I reviewed the NP's note and agree with the documented findings.

## 2019-05-15 LAB
ALBUMIN SERPL ELPH-MCNC: 4.3 G/DL
ALP BLD-CCNC: 179 U/L
ALT SERPL-CCNC: 48 U/L
AMYLASE/CREAT SERPL: 52 U/L
ANION GAP SERPL CALC-SCNC: 17 MMOL/L
AST SERPL-CCNC: 137 U/L
BASOPHILS # BLD AUTO: 0.06 K/UL
BASOPHILS NFR BLD AUTO: 0.7 %
BILIRUB SERPL-MCNC: 0.9 MG/DL
BUN SERPL-MCNC: 9 MG/DL
CALCIUM SERPL-MCNC: 9.5 MG/DL
CHLORIDE SERPL-SCNC: 96 MMOL/L
CO2 SERPL-SCNC: 25 MMOL/L
CREAT SERPL-MCNC: 0.61 MG/DL
CRP SERPL-MCNC: 3.24 MG/DL
EOSINOPHIL # BLD AUTO: 0.1 K/UL
EOSINOPHIL NFR BLD AUTO: 1.2 %
ERYTHROCYTE [SEDIMENTATION RATE] IN BLOOD BY WESTERGREN METHOD: 80 MM/HR
GLUCOSE SERPL-MCNC: 110 MG/DL
HCT VFR BLD CALC: 43.9 %
HGB BLD-MCNC: 14 G/DL
IMM GRANULOCYTES NFR BLD AUTO: 0.4 %
LPL SERPL-CCNC: 22 U/L
LYMPHOCYTES # BLD AUTO: 1.21 K/UL
LYMPHOCYTES NFR BLD AUTO: 14.7 %
MAN DIFF?: NORMAL
MCHC RBC-ENTMCNC: 31.9 GM/DL
MCHC RBC-ENTMCNC: 33.7 PG
MCV RBC AUTO: 105.5 FL
MONOCYTES # BLD AUTO: 0.88 K/UL
MONOCYTES NFR BLD AUTO: 10.7 %
NEUTROPHILS # BLD AUTO: 5.93 K/UL
NEUTROPHILS NFR BLD AUTO: 72.3 %
PLATELET # BLD AUTO: 191 K/UL
POTASSIUM SERPL-SCNC: 3.7 MMOL/L
PROT SERPL-MCNC: 8.2 G/DL
RBC # BLD: 4.16 M/UL
RBC # FLD: 14.2 %
SODIUM SERPL-SCNC: 138 MMOL/L
WBC # FLD AUTO: 8.21 K/UL

## 2019-05-16 ENCOUNTER — APPOINTMENT (OUTPATIENT)
Dept: INTERNAL MEDICINE | Facility: CLINIC | Age: 54
End: 2019-05-16
Payer: COMMERCIAL

## 2019-05-16 VITALS
DIASTOLIC BLOOD PRESSURE: 80 MMHG | OXYGEN SATURATION: 94 % | HEART RATE: 106 BPM | BODY MASS INDEX: 35 KG/M2 | TEMPERATURE: 98.1 F | SYSTOLIC BLOOD PRESSURE: 134 MMHG | WEIGHT: 250 LBS | HEIGHT: 71 IN

## 2019-05-16 DIAGNOSIS — F43.9 REACTION TO SEVERE STRESS, UNSPECIFIED: ICD-10-CM

## 2019-05-16 DIAGNOSIS — R04.0 EPISTAXIS: ICD-10-CM

## 2019-05-16 DIAGNOSIS — R70.0 ELEVATED ERYTHROCYTE SEDIMENTATION RATE: ICD-10-CM

## 2019-05-16 DIAGNOSIS — R19.06 EPIGASTRIC SWELLING, MASS OR LUMP: ICD-10-CM

## 2019-05-16 PROCEDURE — 99215 OFFICE O/P EST HI 40 MIN: CPT | Mod: 25

## 2019-05-16 PROCEDURE — 36415 COLL VENOUS BLD VENIPUNCTURE: CPT

## 2019-05-16 PROCEDURE — 99408 AUDIT/DAST 15-30 MIN: CPT

## 2019-05-16 RX ORDER — PREGABALIN 50 MG/1
50 CAPSULE ORAL
Qty: 60 | Refills: 3 | Status: DISCONTINUED | COMMUNITY
Start: 2018-06-11 | End: 2019-05-16

## 2019-05-16 NOTE — REVIEW OF SYSTEMS
[Nosebleeds] : nosebleeds [Negative] : Heme/Lymph [Fever] : no fever [Chills] : no chills [Nasal Discharge] : no nasal discharge [Chest Pain] : no chest pain [Nausea] : no nausea [Diarrhea] : no diarrhea [Vomiting] : no vomiting [Melena] : no melena [Dysuria] : no dysuria [Joint Swelling] : no joint swelling [Back Pain] : no back pain

## 2019-05-16 NOTE — PHYSICAL EXAM
[No Acute Distress] : no acute distress [Normal Voice/Communication] : normal voice/communication [No Respiratory Distress] : no respiratory distress  [Clear to Auscultation] : lungs were clear to auscultation bilaterally [No Accessory Muscle Use] : no accessory muscle use [Soft] : abdomen soft [Normal Bowel Sounds] : normal bowel sounds [Normal Supraclavicular Nodes] : no supraclavicular lymphadenopathy [Normal Posterior Cervical Nodes] : no posterior cervical lymphadenopathy [Normal Anterior Cervical Nodes] : no anterior cervical lymphadenopathy [No CVA Tenderness] : no CVA  tenderness [Grossly Normal Strength/Tone] : grossly normal strength/tone [Normal Gait] : normal gait [Normal Mood] : the mood was normal [Normal Insight/Judgement] : insight and judgment were intact [de-identified] : possible mild scleral icterus  [de-identified] : massively obese abd. non tender , noted significantly enlarged liver. also noted a firm mass/lump in mid epigastric region

## 2019-05-16 NOTE — HISTORY OF PRESENT ILLNESS
[FreeTextEntry8] : presents for f/u visit to review recent labs done w Dr Munguia last week after episode of abdominal pain. he reports his pain improved after one dose of omeprazole, has resolved. \par he confirms he is back to drinking at least 3-4 drinks/margerhitas per night. he is drinking currently to deal w stress and anxiety from work.\par he has known significant fatty liver for many years, prior abd US 7 years ago showed this as well as prior abd CT. \par \par labs showed normal amylase and lipase. moderate transaminase elevations and alk phos trending higher than recent baseline noted. CBC w normal Hgb w elevated MCV, normal platelets. \par noted highly elevated ESR level of 80 \par colonoscopy completed in 11/8 w Dr Reeder, benign polyp \par \par he reports a visit to [facility] ER 4-5 days ago w persistent nosebleed. cauterized in the ER and it improved but he notes multiple repeat episodes at home over past few days. no active bleeding currently. \par \par HTN controlled on rx

## 2019-05-16 NOTE — ASSESSMENT
[FreeTextEntry1] : discussed in detail w pt \par \par reviewed labs in detail\par chronic transaminase elevation w alk phos trending up \par noted highly elevated ESR w normal amylase lipase \par his epigastric pain has improved w PPI \par he may have a gastric ulcer or gastritis and would advise GI evaluation for consideration of EGD \par due to his alcoholism and continued alcohol use and recent epistaxis, he should evaluate this further. need to check PT/INR to ensure no coagulopathy due to liver disease. he is not anemic to suggest GI bleeding. \par recheck CBC and ESR. I am concerned about a possible mass/lump in mid abdomen, though his body habitus makes it difficult to assess accurately. \par will therefore order CT abd w contrast for further evaluation and to check liver status. enlarged on exam. check AFP level as well due to his elevated ESR. \par \par counseled at length for 15 min re his continued alcohol use and risks to his health . he is aware of all alcohol counseling options, AA, etc. he says he drinks due to stress after work and requests rx to help w this. explained most rx would not be compatible w his liver issues. suggested though he can try hydroxyzine for anxiety/stress which lowell not affect liver function \par \par advised appt w GI and ENT , referrals given \par \par f/u in 1-2 weeks for review of his progress, will review his labs and CT abd when available. call earlier prn if any worsening concerns 
same name as above

## 2019-05-16 NOTE — COUNSELING
[Weight management counseling provided] : Weight management [ - Behavioral Counseling for Alcohol Misuse (Face-to-Face for 15 Minutes)] : Behavioral Counseling for Alcohol Misuse (Face-to-Face for 15 Minutes)

## 2019-05-24 ENCOUNTER — FORM ENCOUNTER (OUTPATIENT)
Age: 54
End: 2019-05-24

## 2019-05-25 ENCOUNTER — APPOINTMENT (OUTPATIENT)
Dept: CT IMAGING | Facility: IMAGING CENTER | Age: 54
End: 2019-05-25
Payer: COMMERCIAL

## 2019-05-25 ENCOUNTER — OUTPATIENT (OUTPATIENT)
Dept: OUTPATIENT SERVICES | Facility: HOSPITAL | Age: 54
LOS: 1 days | End: 2019-05-25
Payer: COMMERCIAL

## 2019-05-25 DIAGNOSIS — R04.0 EPISTAXIS: ICD-10-CM

## 2019-05-25 DIAGNOSIS — R19.06 EPIGASTRIC SWELLING, MASS OR LUMP: ICD-10-CM

## 2019-05-25 DIAGNOSIS — K70.0 ALCOHOLIC FATTY LIVER: ICD-10-CM

## 2019-05-25 PROCEDURE — 74177 CT ABD & PELVIS W/CONTRAST: CPT | Mod: 26

## 2019-05-25 PROCEDURE — 74177 CT ABD & PELVIS W/CONTRAST: CPT

## 2019-05-30 ENCOUNTER — APPOINTMENT (OUTPATIENT)
Dept: INTERNAL MEDICINE | Facility: CLINIC | Age: 54
End: 2019-05-30
Payer: COMMERCIAL

## 2019-05-30 VITALS
WEIGHT: 250 LBS | HEIGHT: 71 IN | HEART RATE: 106 BPM | SYSTOLIC BLOOD PRESSURE: 154 MMHG | TEMPERATURE: 98.8 F | OXYGEN SATURATION: 97 % | DIASTOLIC BLOOD PRESSURE: 80 MMHG | BODY MASS INDEX: 35 KG/M2

## 2019-05-30 DIAGNOSIS — K70.30 ALCOHOLIC CIRRHOSIS OF LIVER W/OUT ASCITES: ICD-10-CM

## 2019-05-30 DIAGNOSIS — K52.3 INDETERMINATE COLITIS: ICD-10-CM

## 2019-05-30 LAB
AFP-TM SERPL-MCNC: 2.4 NG/ML
ALBUMIN SERPL ELPH-MCNC: 4.4 G/DL
ALP BLD-CCNC: 166 U/L
ALT SERPL-CCNC: 30 U/L
ANION GAP SERPL CALC-SCNC: 18 MMOL/L
AST SERPL-CCNC: 97 U/L
BASOPHILS # BLD AUTO: 0.05 K/UL
BASOPHILS NFR BLD AUTO: 0.6 %
BILIRUB SERPL-MCNC: 0.7 MG/DL
BUN SERPL-MCNC: 9 MG/DL
CALCIUM SERPL-MCNC: 9.5 MG/DL
CHLORIDE SERPL-SCNC: 100 MMOL/L
CO2 SERPL-SCNC: 26 MMOL/L
CREAT SERPL-MCNC: 0.75 MG/DL
CRP SERPL-MCNC: 3.07 MG/DL
EOSINOPHIL # BLD AUTO: 0.08 K/UL
EOSINOPHIL NFR BLD AUTO: 1 %
ERYTHROCYTE [SEDIMENTATION RATE] IN BLOOD BY WESTERGREN METHOD: 76 MM/HR
GGT SERPL-CCNC: 427 U/L
GLUCOSE SERPL-MCNC: 105 MG/DL
HCT VFR BLD CALC: 41.4 %
HGB BLD-MCNC: 13.3 G/DL
IMM GRANULOCYTES NFR BLD AUTO: 0.4 %
INR PPP: 1.14 RATIO
LYMPHOCYTES # BLD AUTO: 1.25 K/UL
LYMPHOCYTES NFR BLD AUTO: 15.2 %
MAN DIFF?: NORMAL
MCHC RBC-ENTMCNC: 32.1 GM/DL
MCHC RBC-ENTMCNC: 33.7 PG
MCV RBC AUTO: 104.8 FL
MONOCYTES # BLD AUTO: 0.8 K/UL
MONOCYTES NFR BLD AUTO: 9.7 %
NEUTROPHILS # BLD AUTO: 6 K/UL
NEUTROPHILS NFR BLD AUTO: 73.1 %
PLATELET # BLD AUTO: 172 K/UL
POTASSIUM SERPL-SCNC: 3.7 MMOL/L
PROT SERPL-MCNC: 8.1 G/DL
PT BLD: 12.9 SEC
RBC # BLD: 3.95 M/UL
RBC # FLD: 14.7 %
SODIUM SERPL-SCNC: 143 MMOL/L
WBC # FLD AUTO: 8.21 K/UL

## 2019-05-30 PROCEDURE — 99215 OFFICE O/P EST HI 40 MIN: CPT

## 2019-06-02 NOTE — REVIEW OF SYSTEMS
[Fever] : no fever [Fatigue] : no fatigue [Chills] : no chills [Nausea] : no nausea [Diarrhea] : no diarrhea [Vomiting] : no vomiting [Heartburn] : no heartburn [Melena] : no melena [Dysuria] : no dysuria [Anxiety] : anxiety [Depression] : no depression [Negative] : Heme/Lymph

## 2019-06-02 NOTE — ASSESSMENT
[FreeTextEntry1] : discussed w pt in detail \par reviewed all lab and imaging results \par \par counseled and educated on finding of cirrhotic features of liver likely due to alcohol use. advised on absolute need for cessation of alcohol use. he is aware of all supportive programs for this but declines. explained risks of further liver damage w continued alcohol use. long discussion re implication of liver cirrhosis. \par noted small liver lesion, would plan to order MRI but he advises he cannot tolerate MRI due to claustrophobia. may consider open MRI but unclear if abdominal images will be adequate. \par will refer to hepatology for initial consult, consider MRI imaging. gave options for hepatology consult. \par noted AFP normal as well as INR. \par \par unclear if the finding of mild colitis is contributing to abd bloating or discomfort. no diarrhea, has normal bowel movements currently. will give course of ciprofloxacin for 7 days only and monitor for response. he will need further GI/hepatology eval and advised to make appt ASAP. \par cont current rx \par advised he may try low dose hydroxyzine prn for anxiety, has not tried it as yet. \par \par RTO 2 weeks for f/u for monitoring of his progress and hepatology recommendations , call or return earlier prn if any new concerns

## 2019-06-02 NOTE — HISTORY OF PRESENT ILLNESS
[de-identified] : presents for f/u visit for review in detail of recent labs and CT imaging. as suspected his liver is significantly enlarged and there are definitive cirrhotic features, splenomegaly also noted on CT. also noted mild colitis. a small indeterminate liver lesion was also noted, MRI is considered for further characterization. \par he reports his upper abd pain improved but has persistent bloating sensation and girth of his abdomen. there was minimal ascites noted on CT. platelets, INR and AFP were normal on lab testing, these issues were explained to pt. \par he has been trying to be completely abstinent from any alcohol use but he says this is difficult. he is aware for all supportive programs such as AA and others.\par HTN controlled on rx

## 2019-06-02 NOTE — PHYSICAL EXAM
[No Acute Distress] : no acute distress [Normal Voice/Communication] : normal voice/communication [Supple] : supple [No JVD] : no jugular venous distention [No Respiratory Distress] : no respiratory distress  [Clear to Auscultation] : lungs were clear to auscultation bilaterally [No Accessory Muscle Use] : no accessory muscle use [Normal Rate] : normal rate  [Normal S1, S2] : normal S1 and S2 [Regular Rhythm] : with a regular rhythm [No Murmur] : no murmur heard [No Carotid Bruits] : no carotid bruits [No Edema] : there was no peripheral edema [Normal Supraclavicular Nodes] : no supraclavicular lymphadenopathy [Normal Posterior Cervical Nodes] : no posterior cervical lymphadenopathy [Normal Anterior Cervical Nodes] : no anterior cervical lymphadenopathy [Grossly Normal Strength/Tone] : grossly normal strength/tone [Normal Gait] : normal gait [Normal Insight/Judgement] : insight and judgment were intact [Alert and Oriented x3] : oriented to person, place, and time [Normal Mood] : the mood was normal [de-identified] : no scleral icterus

## 2019-06-03 ENCOUNTER — MEDICATION RENEWAL (OUTPATIENT)
Age: 54
End: 2019-06-03

## 2019-06-20 ENCOUNTER — APPOINTMENT (OUTPATIENT)
Dept: INTERNAL MEDICINE | Facility: CLINIC | Age: 54
End: 2019-06-20

## 2019-06-25 ENCOUNTER — APPOINTMENT (OUTPATIENT)
Dept: OTOLARYNGOLOGY | Facility: CLINIC | Age: 54
End: 2019-06-25

## 2019-10-14 ENCOUNTER — INPATIENT (INPATIENT)
Facility: HOSPITAL | Age: 54
LOS: 4 days | Discharge: HOME | DRG: 897 | End: 2019-10-19
Attending: STUDENT IN AN ORGANIZED HEALTH CARE EDUCATION/TRAINING PROGRAM | Admitting: HOSPITALIST
Payer: COMMERCIAL

## 2019-10-14 VITALS
WEIGHT: 177.91 LBS | TEMPERATURE: 98 F | SYSTOLIC BLOOD PRESSURE: 161 MMHG | DIASTOLIC BLOOD PRESSURE: 96 MMHG | RESPIRATION RATE: 18 BRPM | HEART RATE: 105 BPM | HEIGHT: 71 IN | OXYGEN SATURATION: 94 %

## 2019-10-14 DIAGNOSIS — R41.82 ALTERED MENTAL STATUS, UNSPECIFIED: ICD-10-CM

## 2019-10-14 LAB
ALBUMIN SERPL ELPH-MCNC: 4.5 G/DL — SIGNIFICANT CHANGE UP (ref 3.3–5)
ALP SERPL-CCNC: 245 U/L — HIGH (ref 40–120)
ALT FLD-CCNC: 26 U/L — SIGNIFICANT CHANGE UP (ref 10–45)
AMMONIA BLD-MCNC: 57 UMOL/L — HIGH (ref 11–55)
ANION GAP SERPL CALC-SCNC: 25 MMOL/L — HIGH (ref 5–17)
APPEARANCE UR: CLEAR — SIGNIFICANT CHANGE UP
AST SERPL-CCNC: 141 U/L — HIGH (ref 10–40)
BACTERIA # UR AUTO: NEGATIVE — SIGNIFICANT CHANGE UP
BILIRUB SERPL-MCNC: 1.2 MG/DL — SIGNIFICANT CHANGE UP (ref 0.2–1.2)
BILIRUB UR-MCNC: NEGATIVE — SIGNIFICANT CHANGE UP
BUN SERPL-MCNC: 8 MG/DL — SIGNIFICANT CHANGE UP (ref 7–23)
CALCIUM SERPL-MCNC: 8.7 MG/DL — SIGNIFICANT CHANGE UP (ref 8.4–10.5)
CHLORIDE SERPL-SCNC: 95 MMOL/L — LOW (ref 96–108)
CO2 SERPL-SCNC: 25 MMOL/L — SIGNIFICANT CHANGE UP (ref 22–31)
COLOR SPEC: YELLOW — SIGNIFICANT CHANGE UP
CREAT SERPL-MCNC: 0.66 MG/DL — SIGNIFICANT CHANGE UP (ref 0.5–1.3)
DIFF PNL FLD: ABNORMAL
EPI CELLS # UR: 0 — SIGNIFICANT CHANGE UP
ETHANOL SERPL-MCNC: 452 MG/DL — HIGH (ref 0–10)
GAS PNL BLDV: SIGNIFICANT CHANGE UP
GLUCOSE SERPL-MCNC: 112 MG/DL — HIGH (ref 70–99)
GLUCOSE UR QL: NEGATIVE — SIGNIFICANT CHANGE UP
HCT VFR BLD CALC: 45.8 % — SIGNIFICANT CHANGE UP (ref 39–50)
HGB BLD-MCNC: 16 G/DL — SIGNIFICANT CHANGE UP (ref 13–17)
HYALINE CASTS # UR AUTO: 0 /LPF — SIGNIFICANT CHANGE UP (ref 0–7)
KETONES UR-MCNC: NEGATIVE — SIGNIFICANT CHANGE UP
LEUKOCYTE ESTERASE UR-ACNC: NEGATIVE — SIGNIFICANT CHANGE UP
MAGNESIUM SERPL-MCNC: 2.3 MG/DL — SIGNIFICANT CHANGE UP (ref 1.6–2.6)
MCHC RBC-ENTMCNC: 33.5 PG — SIGNIFICANT CHANGE UP (ref 27–34)
MCHC RBC-ENTMCNC: 34.9 GM/DL — SIGNIFICANT CHANGE UP (ref 32–36)
MCV RBC AUTO: 96 FL — SIGNIFICANT CHANGE UP (ref 80–100)
NITRITE UR-MCNC: NEGATIVE — SIGNIFICANT CHANGE UP
NRBC # BLD: 0 /100 WBCS — SIGNIFICANT CHANGE UP (ref 0–0)
OSMOLALITY SERPL: 400 MOSMOL/KG — HIGH (ref 275–300)
OSMOLALITY UR: 368 MOS/KG — SIGNIFICANT CHANGE UP (ref 300–900)
PH UR: 6.5 — SIGNIFICANT CHANGE UP (ref 5–8)
PHOSPHATE SERPL-MCNC: 3.4 MG/DL — SIGNIFICANT CHANGE UP (ref 2.5–4.5)
PLATELET # BLD AUTO: SIGNIFICANT CHANGE UP (ref 150–400)
POTASSIUM SERPL-MCNC: 4.3 MMOL/L — SIGNIFICANT CHANGE UP (ref 3.5–5.3)
POTASSIUM SERPL-SCNC: 4.3 MMOL/L — SIGNIFICANT CHANGE UP (ref 3.5–5.3)
PROT SERPL-MCNC: 8.9 G/DL — HIGH (ref 6–8.3)
PROT UR-MCNC: 100 — SIGNIFICANT CHANGE UP
RBC # BLD: 4.77 M/UL — SIGNIFICANT CHANGE UP (ref 4.2–5.8)
RBC # FLD: 15.3 % — HIGH (ref 10.3–14.5)
RBC CASTS # UR COMP ASSIST: 12 /HPF — HIGH (ref 0–4)
SODIUM SERPL-SCNC: 145 MMOL/L — SIGNIFICANT CHANGE UP (ref 135–145)
SP GR SPEC: 1.01 — SIGNIFICANT CHANGE UP (ref 1.01–1.02)
UROBILINOGEN FLD QL: ABNORMAL
WBC # BLD: 7.13 K/UL — SIGNIFICANT CHANGE UP (ref 3.8–10.5)
WBC # FLD AUTO: 7.13 K/UL — SIGNIFICANT CHANGE UP (ref 3.8–10.5)
WBC UR QL: 1 /HPF — SIGNIFICANT CHANGE UP (ref 0–5)

## 2019-10-14 PROCEDURE — 99223 1ST HOSP IP/OBS HIGH 75: CPT | Mod: GC

## 2019-10-14 PROCEDURE — 99285 EMERGENCY DEPT VISIT HI MDM: CPT

## 2019-10-14 PROCEDURE — 72125 CT NECK SPINE W/O DYE: CPT | Mod: 26

## 2019-10-14 PROCEDURE — 93010 ELECTROCARDIOGRAM REPORT: CPT | Mod: 59

## 2019-10-14 PROCEDURE — 70450 CT HEAD/BRAIN W/O DYE: CPT | Mod: 26

## 2019-10-14 RX ORDER — FOLIC ACID 0.8 MG
1 TABLET ORAL DAILY
Refills: 0 | Status: DISCONTINUED | OUTPATIENT
Start: 2019-10-14 | End: 2019-10-19

## 2019-10-14 RX ORDER — SODIUM CHLORIDE 9 MG/ML
1000 INJECTION, SOLUTION INTRAVENOUS
Refills: 0 | Status: DISCONTINUED | OUTPATIENT
Start: 2019-10-14 | End: 2019-10-16

## 2019-10-14 RX ORDER — THIAMINE MONONITRATE (VIT B1) 100 MG
500 TABLET ORAL DAILY
Refills: 0 | Status: COMPLETED | OUTPATIENT
Start: 2019-10-14 | End: 2019-10-17

## 2019-10-14 RX ORDER — ACETAMINOPHEN 500 MG
650 TABLET ORAL ONCE
Refills: 0 | Status: COMPLETED | OUTPATIENT
Start: 2019-10-14 | End: 2019-10-14

## 2019-10-14 RX ORDER — SODIUM CHLORIDE 9 MG/ML
1000 INJECTION, SOLUTION INTRAVENOUS ONCE
Refills: 0 | Status: COMPLETED | OUTPATIENT
Start: 2019-10-14 | End: 2019-10-14

## 2019-10-14 RX ADMIN — Medication 100 MILLIGRAM(S): at 23:19

## 2019-10-14 RX ADMIN — SODIUM CHLORIDE 1000 MILLILITER(S): 9 INJECTION, SOLUTION INTRAVENOUS at 21:48

## 2019-10-14 RX ADMIN — Medication 650 MILLIGRAM(S): at 23:13

## 2019-10-14 RX ADMIN — SODIUM CHLORIDE 200 MILLILITER(S): 9 INJECTION, SOLUTION INTRAVENOUS at 20:14

## 2019-10-14 NOTE — H&P ADULT - PROBLEM SELECTOR PLAN 5
- EKG shows twi in anterolateral leads (v2-v5)  - not endorsing chest pain currently or chest pain/dyspnea w/ exertion   - outpatient f/u   - f/u A1c and lipid profile in AM - EKG shows twi in anterolateral leads (v2-v5)  - not endorsing chest pain currently or chest pain/dyspnea w/ exertion   -first trop negative. trend second set.   - outpatient f/u for possible stress test if negative.   - f/u A1c and lipid profile in AM - EKG shows twi in anterolateral leads (v2-v5)  - not endorsing chest pain currently or chest pain/dyspnea w/ exertion   -trend cardiac enzymes  - outpatient f/u for possible stress test if negative.   - f/u A1c and lipid profile in AM

## 2019-10-14 NOTE — ED PROVIDER NOTE - OBJECTIVE STATEMENT
53yo M with hx of ETOH abuse, oxycodone abuse presents with altered mental status. Was in normal state of health on Friday. Over the last 3 days, patient has been becoming increasingly slow to respond and looks "sleepy" per family members. Has been drinking 1 pint a day since 2012. Has not drank any more than usual. Usually drinks 1 pint and still is able to hold a conversation. Unable to do so today. Signs of head trauma, patient denies falling.

## 2019-10-14 NOTE — ED ADULT NURSE NOTE - OBJECTIVE STATEMENT
as per pt family, pt "is not acting like himself. he lives with my brother and my grandmother and my brother told me he has been off for the past couple days. today when I went to go see him, he wasn't responsive to what I was saying and wasn't responding to what I was saying. he has in the past abused alcohol and oxycodone in the past." pt AOx2 at present. pt denies any complaints at present. pt following commands appropriately. pt family states, "he had a fall, I don't know when but he has a scar on his forehead"

## 2019-10-14 NOTE — H&P ADULT - HISTORY OF PRESENT ILLNESS
55yo M with hx of ETOH abuse, oxycodone abuse presents with altered mental status. Pt has a hx of ETOH withdrawal and DT requiring MICU in 2013.      Pt follows w/ Dr. Louise Clancy outpatient who has been attempting to manage his ETOH abuse; ordered an CT abd/pelvis w/ IV contrast which was completed on 05/25/19 and was significant for hepatic cirrhosis with gross hepatic enlargement and evidence of portal hypertension as well as splenomegaly.  There was also an indeterminate lesion in segment 7 of the liver for which further characterization by MRI was suggested and mild diffuse colitis.  Pt was unable to tolerate MRI b/c of claustrophobia. AFP tested outpatient and neg.  Pt was referred to hepatology for initial consult 55yo M with hx of ETOH abuse w/ withdrawal and DT requiring MICU in 2013 now w/ cirrhosis and portal HTN, past oxycodone abuse now resolved who presents with altered mental status. Pt accompanied by his 2 children.  The daughter reports that she had tried calling him on the phone but did not get an answer prompting her to go check on him; when she arrived to his house she found him unresponsive, intoxicated and starring blankly prompting her to take him the ED.  On the way to the ED he was in and out of consciousness.  Per pt, he fell and hit his head yesterday and is endorsing a headache but denies visual changes, N/V.  Pt reports drinking a 1/5th of tequilla a day, used to drink beer but has stopped and used to abuse oxy that was prescribed to him for sciatica but he has stopped that 2 years ago.  Pt's drinking problem started after his wife passed away in 2012 and he admits that depression is a component of his drinking problem along w/ job stress as a .        Pt follows w/ Dr. Louise Clancy outpatient who has been attempting to manage his ETOH abuse; ordered an CT abd/pelvis w/ IV contrast which was completed on 05/25/19 and was significant for hepatic cirrhosis with gross hepatic enlargement and evidence of portal hypertension as well as splenomegaly.  There was also an indeterminate lesion in segment 7 of the liver for which further characterization by MRI was suggested and mild diffuse colitis.  Pt was unable to tolerate MRI b/c of claustrophobia. AFP tested outpatient and neg.  Pt was referred to hepatology for initial consult but has not gone yet.    ROS neg for infectious symptoms including cough, sore throat, diarrhea, dysuria.  Pt denies ever experiencing hematemesis, hematochezia, melena or hematuria.

## 2019-10-14 NOTE — H&P ADULT - NSICDXPASTMEDICALHX_GEN_ALL_CORE_FT
PAST MEDICAL HISTORY:  Active smoker     ETOH abuse     Grief reaction     HTN (hypertension)     Sciatica

## 2019-10-14 NOTE — ED PROVIDER NOTE - CLINICAL SUMMARY MEDICAL DECISION MAKING FREE TEXT BOX
r/o SDH, unknown falls in the past - laceration on forehead. r/o alcoholic vs starvation ketoacidosis vs liver failure.

## 2019-10-14 NOTE — H&P ADULT - NSHPSOCIALHISTORY_GEN_ALL_CORE
lives w/ son and mother.  Drinks 1/5th of alcohol a day; started after wife passed away in 2012.  Used to abuse oxycodone that was prescribed for sciatica but quit 2 years ago.  Never IV drug abuse.  Works a

## 2019-10-14 NOTE — ED ADULT TRIAGE NOTE - CHIEF COMPLAINT QUOTE
AMS. patient is an alcoholic. daughter states patient has not been answering her calls in 4 days. went to home today and patient was altered. patient states he drank 1 pint tequila today AMS. patient is an alcoholic. daughter states patient has not been answering her calls in 4 days. went to home today and patient was altered. patient states he drank 1 pint tequila today. laceration noted to forehead. patient states he might have fallen yesterday at work as a . denies depression, SI, HI AMS. patient is an alcoholic. daughter states patient has not been answering her calls in 4 days. went to home today and patient was altered. patient states he drank 1 pint tequila today. laceration noted to forehead. patient states he might have fallen yesterday at work as a . denies depression, SI, HI. FS= 98 in ED

## 2019-10-14 NOTE — H&P ADULT - NSHPPHYSICALEXAM_GEN_ALL_CORE
Vital Signs Last 24 Hrs  T(C): 37.2 (14 Oct 2019 22:41), Max: 37.2 (14 Oct 2019 22:41)  T(F): 99 (14 Oct 2019 22:41), Max: 99 (14 Oct 2019 22:41)  HR: 101 (14 Oct 2019 22:41) (92 - 105)  BP: 135/87 (14 Oct 2019 22:41) (124/77 - 161/96)  BP(mean): --  RR: 20 (14 Oct 2019 22:41) (18 - 20)  SpO2: 94% (14 Oct 2019 22:41) (94% - 95%)    I&O's Summary      PHYSICAL EXAM:  GENERAL: NAD, well-developed  HEAD:  Atraumatic, Normocephalic  EYES: EOMI, PERRLA, conjunctiva and sclera clear  NECK: Supple, No JVD  CHEST/LUNG: Clear to auscultation bilaterally; No wheeze  HEART: Regular rate and rhythm; No murmurs, rubs, or gallops  ABDOMEN: Soft, Nontender, Nondistended; Bowel sounds present  EXTREMITIES:  2+ Peripheral Pulses, No clubbing, cyanosis, or edema  PSYCH: AAOx3  NEUROLOGY: non-focal  SKIN: No rashes or lesions Vital Signs Last 24 Hrs  T(C): 37.2 (14 Oct 2019 22:41), Max: 37.2 (14 Oct 2019 22:41)  T(F): 99 (14 Oct 2019 22:41), Max: 99 (14 Oct 2019 22:41)  HR: 101 (14 Oct 2019 22:41) (92 - 105)  BP: 135/87 (14 Oct 2019 22:41) (124/77 - 161/96)  BP(mean): --  RR: 20 (14 Oct 2019 22:41) (18 - 20)  SpO2: 94% (14 Oct 2019 22:41) (94% - 95%)    I&O's Summary      PHYSICAL EXAM:  GENERAL: NAD, "hung over"  HEAD:  Atraumatic, Normocephalic  EYES: EOMI, PERRLA, conjunctiva and sclera clear  NECK: Supple, No JVD  CHEST/LUNG: Clear to auscultation bilaterally; No wheeze  HEART: Regular rate and rhythm; No murmurs, rubs, or gallops  ABDOMEN: Soft, Nontender, distended w/ + fluid wave and dullness to percussion; Bowel sounds present  EXTREMITIES:  2+ Peripheral Pulses, No clubbing, cyanosis, or edema  PSYCH: normal affect   NEUROLOGY: non-focal, AAO x 3, no asterixes, no tremor   SKIN: small scratch on forehead

## 2019-10-14 NOTE — ED PROVIDER NOTE - ATTENDING CONTRIBUTION TO CARE
55 yo brought in for AMS. Patient has a history of known alcohol and opioid abuse. No fevers. Patient has been living at home for a little while family has been a away and when they returned they found him to be grossly confused. 55 yo brought in for AMS. Patient has a history of known alcohol and opioid abuse. No fevers. Patient has been living at home for a little while family has been a away and when they returned they found him to be grossly confused.  concern for alcohol intoxication from abuse  no si/hi as per family and patient   will get iv, cbc, cmp, alcohol level, c/s, ekg, ct head, social work, and likely admit  Will follow up on labs, analgesia, imaging, reassess and disposition to the inpatient team as clinically indicated.   Based on patient's history and physical exam, as well as the results of today's workup, I feel that patient warrants admission to the hospital for further workup/evaluation and continued management. I discussed the findings of today's workup with the patient and addressed the patient's questions and concerns. The patient was agreeable with admission. Our team spoke with the inpatient receiving team who accepted the patient for admission and subsequently took over the patient's care at the time of admission.

## 2019-10-14 NOTE — H&P ADULT - PROBLEM SELECTOR PLAN 3
- Decompensated; evidence of ascites on CT abd in May and + fluid wave on physical exam  - abdominal US ordered  - hepatology consult placed   - f/u hep C screening

## 2019-10-14 NOTE — H&P ADULT - NSHPLABSRESULTS_GEN_ALL_CORE
LABS:  CAPILLARY BLOOD GLUCOSE      POCT Blood Glucose.: 125 mg/dL (14 Oct 2019 18:53)  POCT Blood Glucose.: 98 mg/dL (14 Oct 2019 18:33)                          16.0   7.13  )-----------( Clumped    ( 14 Oct 2019 19:43 )             45.8     Auto Eosinophil # x     / Auto Eosinophil % x     / Auto Neutrophil # x     / Auto Neutrophil % x     / BANDS % x        Hgb Trend: 16.0<--  10-14    145  |  95<L>  |  8   ----------------------------<  112<H>  4.3   |  25  |  0.66    Ca    8.7      14 Oct 2019 19:43  Mg     2.3     10-14  Phos  3.4     10-14  TPro  8.9<H>  /  Alb  4.5  /  TBili  1.2  /  DBili  0.1  /  AST  141<H>  /  ALT  26  /  AlkPhos  245<H>  10-14    Creatinine Trend: 0.66<--      Urinalysis Basic - ( 14 Oct 2019 21:07 )    Color: Yellow / Appearance: Clear / S.011 / pH: x  Gluc: x / Ketone: Negative  / Bili: Negative / Urobili: 3 mg/dL   Blood: x / Protein: 100 / Nitrite: Negative   Leuk Esterase: Negative / RBC: 12 /hpf / WBC 1 /HPF   Sq Epi: x / Non Sq Epi: 0 / Bacteria: Negative    Ammonia, Serum: 57 umol/L (10.14.19 @ 20:24)    Alcohol, Blood: 396: TOXIC CONCENTRATIONS:  Flushing, Slowing of  Reflexes, Impaired Visual Acuity:     Depression of CNS:    > 100  Fatalities Reported:       > 400  These ranges are intended as general guidelines.  Alcohol metabolism can vary widely among individuals.              This test is approved for clinical and not for forensic  purposes. mg/dL (13 @ 22:08)    Urine tox screen negative     < from: CT Abdomen and Pelvis w/ IV Cont (19 @ 13:44) >          ABG:   VBG: ( 21:07 ) - VBG - pH: 7.44  | pCO2: 44    | pO2: 62    | Lactate: 4.3    IMPRESSION:     Hepatic cirrhosis with gross hepatic enlargement and evidence of portal   hypertension.    Indeterminate lesion in segment 7 of the liver for which further   characterization by MRI is suggested.    Mild diffuse colitis.      < end of copied text >    < from: CT Head No Cont (10.14.19 @ 20:08) >    IMPRESSION:    CT brain: No acute intracranial hemorrhage or mass effect. No displaced   calvarial fracture.    CT cervical spine: No acute fracture to maxillofacial dictation. No   prevertebral soft tissue swelling. Degenerative changes.    < end of copied text > LABS:  CAPILLARY BLOOD GLUCOSE      POCT Blood Glucose.: 125 mg/dL (14 Oct 2019 18:53)  POCT Blood Glucose.: 98 mg/dL (14 Oct 2019 18:33)                          16.0   7.13  )-----------( Clumped    ( 14 Oct 2019 19:43 )             45.8     Auto Eosinophil # x     / Auto Eosinophil % x     / Auto Neutrophil # x     / Auto Neutrophil % x     / BANDS % x        Hgb Trend: 16.0<--  10-14    145  |  95<L>  |  8   ----------------------------<  112<H>  4.3   |  25  |  0.66    Ca    8.7      14 Oct 2019 19:43  Mg     2.3     10-14  Phos  3.4     10-14  TPro  8.9<H>  /  Alb  4.5  /  TBili  1.2  /  DBili  0.1  /  AST  141<H>  /  ALT  26  /  AlkPhos  245<H>  10-14    Creatinine Trend: 0.66<--      Urinalysis Basic - ( 14 Oct 2019 21:07 )    Color: Yellow / Appearance: Clear / S.011 / pH: x  Gluc: x / Ketone: Negative  / Bili: Negative / Urobili: 3 mg/dL   Blood: x / Protein: 100 / Nitrite: Negative   Leuk Esterase: Negative / RBC: 12 /hpf / WBC 1 /HPF   Sq Epi: x / Non Sq Epi: 0 / Bacteria: Negative    Ammonia, Serum: 57 umol/L (10.14.19 @ 20:24)    Alcohol, Blood: 396: TOXIC CONCENTRATIONS:  Flushing, Slowing of  Reflexes, Impaired Visual Acuity:     Depression of CNS:    > 100  Fatalities Reported:       > 400  These ranges are intended as general guidelines.  Alcohol metabolism can vary widely among individuals.              This test is approved for clinical and not for forensic  purposes. mg/dL (13 @ 22:08)    Urine tox screen negative     < from: CT Abdomen and Pelvis w/ IV Cont (19 @ 13:44) >          ABG:   VBG: ( 21:07 ) - VBG - pH: 7.44  | pCO2: 44    | pO2: 62    | Lactate: 4.3    IMPRESSION:     Hepatic cirrhosis with gross hepatic enlargement and evidence of portal   hypertension.    Indeterminate lesion in segment 7 of the liver for which further   characterization by MRI is suggested.    Mild diffuse colitis.      < end of copied text >    < from: CT Head No Cont (10.14.19 @ 20:08) >    IMPRESSION:    CT brain: No acute intracranial hemorrhage or mass effect. No displaced   calvarial fracture.    CT cervical spine: No acute fracture to maxillofacial dictation. No   prevertebral soft tissue swelling. Degenerative changes.    < end of copied text >    EKG: normal rate and rhythm, TX int < 200, , Qtc 502, TWI V2-V5

## 2019-10-14 NOTE — ED PROVIDER NOTE - PHYSICAL EXAMINATION
Gen: Slow to respond, 1cm old laceration over forehead  Head: NCAT  HEENT: PERRL, MMM, normal conjunctiva, anicteric, neck supple  Lung: CTAB, no adventitious sounds  CV: RRR, no murmurs, rubs or gallops  Abd: soft, NTND, no rebound or guarding, no CVAT; +fluid wave  MSK: No edema, no visible deformities  Neuro: No focal neurologic deficits. CN II-XII grossly intact. 5/5 strength and normal sensation in all extremities.  Skin: Warm and dry, no evidence of rash

## 2019-10-14 NOTE — H&P ADULT - NSHPREVIEWOFSYSTEMS_GEN_ALL_CORE
REVIEW OF SYSTEMS:  CONSTITUTIONAL: No fever/chills, weight loss, or fatigue  EYES: No eye pain, visual disturbances, or discharge  ENMT:  No difficulty hearing, tinnitus, vertigo; No sinus or throat pain  NECK: No pain or stiffness  RESPIRATORY: No cough, wheezing, chills or hemoptysis; No shortness of breath  CARDIOVASCULAR: No chest pain, palpitations, dizziness, or leg swelling  GASTROINTESTINAL: No abdominal or epigastric pain. No diarrhea or constipation. No nausea, vomiting, or hematemesis. No melena or hematochezia.  GENITOURINARY: No dysuria, frequency, hematuria, or incontinence  NEUROLOGICAL: No headaches, memory loss, loss of strength, numbness, or tremors  MUSCULOSKELETAL: No joint pain or swelling; No muscle, back, or extremity pain  SKIN: No itching, burning, rashes, or lesions   PSYCHIATRIC: No depression, anxiety, mood swings, or difficulty sleeping REVIEW OF SYSTEMS:  CONSTITUTIONAL: No fever/chills, weight loss, or fatigue  EYES: No eye pain, visual disturbances, or discharge  ENMT:  No difficulty hearing, tinnitus, vertigo; No sinus or throat pain  NECK: No pain or stiffness  RESPIRATORY: No cough, wheezing, chills or hemoptysis; No shortness of breath  CARDIOVASCULAR: No chest pain, palpitations, dizziness, or leg swelling  GASTROINTESTINAL: No abdominal or epigastric pain. No diarrhea or constipation. No nausea, vomiting, or hematemesis. No melena or hematochezia.  GENITOURINARY: No dysuria, frequency, hematuria, or incontinence  NEUROLOGICAL: + headache, no memory loss, loss of strength, numbness, or tremors  MUSCULOSKELETAL: No joint pain or swelling; No muscle, back, or extremity pain  SKIN: No itching, burning, rashes, or lesions   PSYCHIATRIC: + depression and anxiety

## 2019-10-14 NOTE — ED ADULT NURSE NOTE - CHIEF COMPLAINT QUOTE
AMS. patient is an alcoholic. daughter states patient has not been answering her calls in 4 days. went to home today and patient was altered. patient states he drank 1 pint tequila today. laceration noted to forehead. patient states he might have fallen yesterday at work as a . denies depression, SI, HI. FS= 98 in ED

## 2019-10-14 NOTE — H&P ADULT - ASSESSMENT
53yo M with hx of ETOH abuse w/ withdrawal and DT requiring MICU in 2013 now w/ cirrhosis and portal HTN, past oxycodone abuse now resolved admitted for CIWA protocol

## 2019-10-14 NOTE — H&P ADULT - PROBLEM SELECTOR PLAN 2
- social work consult  - consider starting on anti-depressants if pt amenable - social work consult; pt endorses some depressive sx that started after the death of spouse.   - consider starting on anti-depressants if pt amenable

## 2019-10-14 NOTE — ED PROVIDER NOTE - CARE PLAN
Principal Discharge DX:	Altered mental status Principal Discharge DX:	Alcohol withdrawal  Secondary Diagnosis:	Alcohol abuse  Secondary Diagnosis:	Altered mental status associated with intoxication

## 2019-10-14 NOTE — H&P ADULT - PROBLEM SELECTOR PLAN 1
- s/p 1 dose of librium in ED  - started on Ativan high risk taper b/c of cirrhosis   -  thiamine x 3 days then PO daily, c/w folic acid and multi-vitamin daily   - social work consult

## 2019-10-14 NOTE — H&P ADULT - PROBLEM SELECTOR PLAN 6
- DVT ppx: lovenox 40 qd  - Diet: DASH/TLC     Shelbie Lam MD  Internal Medicine, PGY-2  Night Admit, [phone]

## 2019-10-15 DIAGNOSIS — R94.31 ABNORMAL ELECTROCARDIOGRAM [ECG] [EKG]: ICD-10-CM

## 2019-10-15 DIAGNOSIS — F43.21 ADJUSTMENT DISORDER WITH DEPRESSED MOOD: ICD-10-CM

## 2019-10-15 DIAGNOSIS — F10.239 ALCOHOL DEPENDENCE WITH WITHDRAWAL, UNSPECIFIED: ICD-10-CM

## 2019-10-15 DIAGNOSIS — F10.10 ALCOHOL ABUSE, UNCOMPLICATED: ICD-10-CM

## 2019-10-15 DIAGNOSIS — D69.6 THROMBOCYTOPENIA, UNSPECIFIED: ICD-10-CM

## 2019-10-15 DIAGNOSIS — Z29.9 ENCOUNTER FOR PROPHYLACTIC MEASURES, UNSPECIFIED: ICD-10-CM

## 2019-10-15 DIAGNOSIS — K70.30 ALCOHOLIC CIRRHOSIS OF LIVER WITHOUT ASCITES: ICD-10-CM

## 2019-10-15 DIAGNOSIS — F33.8 OTHER RECURRENT DEPRESSIVE DISORDERS: ICD-10-CM

## 2019-10-15 DIAGNOSIS — I10 ESSENTIAL (PRIMARY) HYPERTENSION: ICD-10-CM

## 2019-10-15 LAB
AFP-TM SERPL-MCNC: 2.9 NG/ML — SIGNIFICANT CHANGE UP
ALBUMIN SERPL ELPH-MCNC: 3.9 G/DL — SIGNIFICANT CHANGE UP (ref 3.3–5)
ALBUMIN SERPL ELPH-MCNC: 4 G/DL — SIGNIFICANT CHANGE UP (ref 3.3–5)
ALP SERPL-CCNC: 209 U/L — HIGH (ref 40–120)
ALP SERPL-CCNC: 216 U/L — HIGH (ref 40–120)
ALT FLD-CCNC: 21 U/L — SIGNIFICANT CHANGE UP (ref 10–45)
ALT FLD-CCNC: 24 U/L — SIGNIFICANT CHANGE UP (ref 10–45)
ANION GAP SERPL CALC-SCNC: 23 MMOL/L — HIGH (ref 5–17)
ANION GAP SERPL CALC-SCNC: 23 MMOL/L — HIGH (ref 5–17)
APTT BLD: 33.8 SEC — SIGNIFICANT CHANGE UP (ref 27.5–36.3)
AST SERPL-CCNC: 108 U/L — HIGH (ref 10–40)
AST SERPL-CCNC: 109 U/L — HIGH (ref 10–40)
BILIRUB DIRECT SERPL-MCNC: 0.8 MG/DL — HIGH (ref 0–0.2)
BILIRUB INDIRECT FLD-MCNC: 0.9 MG/DL — SIGNIFICANT CHANGE UP (ref 0.2–1)
BILIRUB SERPL-MCNC: 1.6 MG/DL — HIGH (ref 0.2–1.2)
BILIRUB SERPL-MCNC: 1.7 MG/DL — HIGH (ref 0.2–1.2)
BUN SERPL-MCNC: 10 MG/DL — SIGNIFICANT CHANGE UP (ref 7–23)
BUN SERPL-MCNC: 9 MG/DL — SIGNIFICANT CHANGE UP (ref 7–23)
CALCIUM SERPL-MCNC: 8.2 MG/DL — LOW (ref 8.4–10.5)
CALCIUM SERPL-MCNC: 8.3 MG/DL — LOW (ref 8.4–10.5)
CEA SERPL-MCNC: 3.9 NG/ML — HIGH (ref 0–3.8)
CHLORIDE SERPL-SCNC: 93 MMOL/L — LOW (ref 96–108)
CHLORIDE SERPL-SCNC: 94 MMOL/L — LOW (ref 96–108)
CHOLEST SERPL-MCNC: 168 MG/DL — SIGNIFICANT CHANGE UP (ref 10–199)
CK MB BLD-MCNC: 1.5 % — SIGNIFICANT CHANGE UP (ref 0–3.5)
CK MB CFR SERPL CALC: 2 NG/ML — SIGNIFICANT CHANGE UP (ref 0–6.7)
CK SERPL-CCNC: 132 U/L — SIGNIFICANT CHANGE UP (ref 30–200)
CO2 SERPL-SCNC: 20 MMOL/L — LOW (ref 22–31)
CO2 SERPL-SCNC: 25 MMOL/L — SIGNIFICANT CHANGE UP (ref 22–31)
CREAT SERPL-MCNC: 0.61 MG/DL — SIGNIFICANT CHANGE UP (ref 0.5–1.3)
CREAT SERPL-MCNC: 0.68 MG/DL — SIGNIFICANT CHANGE UP (ref 0.5–1.3)
GAS PNL BLDV: SIGNIFICANT CHANGE UP
GGT SERPL-CCNC: 641 U/L — HIGH (ref 9–50)
GLUCOSE SERPL-MCNC: 91 MG/DL — SIGNIFICANT CHANGE UP (ref 70–99)
GLUCOSE SERPL-MCNC: 92 MG/DL — SIGNIFICANT CHANGE UP (ref 70–99)
HBA1C BLD-MCNC: 5.7 % — HIGH (ref 4–5.6)
HCT VFR BLD CALC: 40.1 % — SIGNIFICANT CHANGE UP (ref 39–50)
HCV AB S/CO SERPL IA: 0.19 S/CO — SIGNIFICANT CHANGE UP (ref 0–0.99)
HCV AB SERPL-IMP: SIGNIFICANT CHANGE UP
HDLC SERPL-MCNC: 84 MG/DL — SIGNIFICANT CHANGE UP
HGB BLD-MCNC: 13.7 G/DL — SIGNIFICANT CHANGE UP (ref 13–17)
INR BLD: 1.14 RATIO — SIGNIFICANT CHANGE UP (ref 0.88–1.16)
LIPID PNL WITH DIRECT LDL SERPL: 67 MG/DL — SIGNIFICANT CHANGE UP
MAGNESIUM SERPL-MCNC: 1.8 MG/DL — SIGNIFICANT CHANGE UP (ref 1.6–2.6)
MCHC RBC-ENTMCNC: 33.4 PG — SIGNIFICANT CHANGE UP (ref 27–34)
MCHC RBC-ENTMCNC: 34.2 GM/DL — SIGNIFICANT CHANGE UP (ref 32–36)
MCV RBC AUTO: 97.8 FL — SIGNIFICANT CHANGE UP (ref 80–100)
PHOSPHATE SERPL-MCNC: 2.5 MG/DL — SIGNIFICANT CHANGE UP (ref 2.5–4.5)
PLATELET # BLD AUTO: 64 K/UL — LOW (ref 150–400)
POTASSIUM SERPL-MCNC: 3.1 MMOL/L — LOW (ref 3.5–5.3)
POTASSIUM SERPL-MCNC: 3.4 MMOL/L — LOW (ref 3.5–5.3)
POTASSIUM SERPL-SCNC: 3.1 MMOL/L — LOW (ref 3.5–5.3)
POTASSIUM SERPL-SCNC: 3.4 MMOL/L — LOW (ref 3.5–5.3)
PROT SERPL-MCNC: 7.5 G/DL — SIGNIFICANT CHANGE UP (ref 6–8.3)
PROT SERPL-MCNC: 7.6 G/DL — SIGNIFICANT CHANGE UP (ref 6–8.3)
PROTHROM AB SERPL-ACNC: 13.1 SEC — HIGH (ref 10–12.9)
RBC # BLD: 4.1 M/UL — LOW (ref 4.2–5.8)
RBC # FLD: 15.4 % — HIGH (ref 10.3–14.5)
SODIUM SERPL-SCNC: 137 MMOL/L — SIGNIFICANT CHANGE UP (ref 135–145)
SODIUM SERPL-SCNC: 141 MMOL/L — SIGNIFICANT CHANGE UP (ref 135–145)
TOTAL CHOLESTEROL/HDL RATIO MEASUREMENT: 2 RATIO — LOW (ref 3.4–9.6)
TRIGL SERPL-MCNC: 83 MG/DL — SIGNIFICANT CHANGE UP (ref 10–149)
TROPONIN T, HIGH SENSITIVITY RESULT: 10 NG/L — SIGNIFICANT CHANGE UP (ref 0–51)
TROPONIN T, HIGH SENSITIVITY RESULT: 11 NG/L — SIGNIFICANT CHANGE UP (ref 0–51)
WBC # BLD: 7.04 K/UL — SIGNIFICANT CHANGE UP (ref 3.8–10.5)
WBC # FLD AUTO: 7.04 K/UL — SIGNIFICANT CHANGE UP (ref 3.8–10.5)

## 2019-10-15 PROCEDURE — 99221 1ST HOSP IP/OBS SF/LOW 40: CPT

## 2019-10-15 PROCEDURE — 76705 ECHO EXAM OF ABDOMEN: CPT | Mod: 26

## 2019-10-15 PROCEDURE — 90792 PSYCH DIAG EVAL W/MED SRVCS: CPT

## 2019-10-15 RX ORDER — ENOXAPARIN SODIUM 100 MG/ML
40 INJECTION SUBCUTANEOUS DAILY
Refills: 0 | Status: DISCONTINUED | OUTPATIENT
Start: 2019-10-15 | End: 2019-10-15

## 2019-10-15 RX ORDER — ALBUTEROL 90 UG/1
2 AEROSOL, METERED ORAL EVERY 6 HOURS
Refills: 0 | Status: DISCONTINUED | OUTPATIENT
Start: 2019-10-15 | End: 2019-10-19

## 2019-10-15 RX ORDER — POTASSIUM CHLORIDE 20 MEQ
20 PACKET (EA) ORAL ONCE
Refills: 0 | Status: COMPLETED | OUTPATIENT
Start: 2019-10-15 | End: 2019-10-15

## 2019-10-15 RX ORDER — ACETAMINOPHEN 500 MG
650 TABLET ORAL ONCE
Refills: 0 | Status: COMPLETED | OUTPATIENT
Start: 2019-10-15 | End: 2019-10-19

## 2019-10-15 RX ORDER — MAGNESIUM OXIDE 400 MG ORAL TABLET 241.3 MG
400 TABLET ORAL
Refills: 0 | Status: COMPLETED | OUTPATIENT
Start: 2019-10-15 | End: 2019-10-16

## 2019-10-15 RX ADMIN — Medication 105 MILLIGRAM(S): at 12:58

## 2019-10-15 RX ADMIN — Medication 2 MILLIGRAM(S): at 02:32

## 2019-10-15 RX ADMIN — Medication 20 MILLIEQUIVALENT(S): at 09:57

## 2019-10-15 RX ADMIN — Medication 2 MILLIGRAM(S): at 21:57

## 2019-10-15 RX ADMIN — Medication 2 MILLIGRAM(S): at 15:33

## 2019-10-15 RX ADMIN — Medication 1 TABLET(S): at 12:44

## 2019-10-15 RX ADMIN — Medication 1 MILLIGRAM(S): at 23:22

## 2019-10-15 RX ADMIN — SODIUM CHLORIDE 200 MILLILITER(S): 9 INJECTION, SOLUTION INTRAVENOUS at 21:58

## 2019-10-15 RX ADMIN — Medication 2 MILLIGRAM(S): at 14:36

## 2019-10-15 RX ADMIN — ALBUTEROL 2 PUFF(S): 90 AEROSOL, METERED ORAL at 01:51

## 2019-10-15 RX ADMIN — Medication 2 MILLIGRAM(S): at 06:03

## 2019-10-15 RX ADMIN — Medication 2 MILLIGRAM(S): at 18:10

## 2019-10-15 RX ADMIN — Medication 1 MILLIGRAM(S): at 12:44

## 2019-10-15 RX ADMIN — Medication 650 MILLIGRAM(S): at 00:15

## 2019-10-15 RX ADMIN — SODIUM CHLORIDE 200 MILLILITER(S): 9 INJECTION, SOLUTION INTRAVENOUS at 20:46

## 2019-10-15 RX ADMIN — Medication 1 MILLIGRAM(S): at 01:07

## 2019-10-15 RX ADMIN — Medication 2 MILLIGRAM(S): at 09:57

## 2019-10-15 RX ADMIN — Medication 1 MILLIGRAM(S): at 19:59

## 2019-10-15 NOTE — BEHAVIORAL HEALTH ASSESSMENT NOTE - SUMMARY
54  year old man hx of alcoholism no official psychiatric treatment , no hx of aggression, no hx of suicide admitted with altered mental status secondary to ETOH intoxication.  consulted for depression  patient's depression has been intermittent for years, he denies depressive sx in the absence of ETOH use  his issues would be best addressed by entering substance treatment.  agree with ativan taper and CIWA protocol  refer to outpatient substance abuse

## 2019-10-15 NOTE — PROGRESS NOTE ADULT - PROBLEM SELECTOR PLAN 7
- DVT ppx: SCD   - Diet: DASH/TLC     Shelbie Lam MD  Internal Medicine, PGY-2  Night Admit, [phone]

## 2019-10-15 NOTE — BEHAVIORAL HEALTH ASSESSMENT NOTE - RISK ASSESSMENT
Low Acute Suicide Risk elevated chronic risk due to substance abuse and depression. acute risk due to medical problems. hwoever no personal or family hx of suicide attempts denies suicidal ideation has supportive family no guns future oriented. assessed to not be at imminent risk of harm to self or others

## 2019-10-15 NOTE — BEHAVIORAL HEALTH ASSESSMENT NOTE - NSBHCHARTREVIEWIMAGING_PSY_A_CORE FT
IMPRESSION:    CT brain: No acute intracranial hemorrhage or mass effect. No displaced   calvarial fracture.    CT cervical spine: No acute fracture to maxillofacial dictation. No   prevertebral soft tissue swelling. Degenerative changes.

## 2019-10-15 NOTE — PROGRESS NOTE ADULT - PROBLEM SELECTOR PLAN 5
- EKG shows twi in anterolateral leads (v2-v5)  - not endorsing chest pain currently or chest pain/dyspnea w/ exertion   -trend cardiac enzymes  - outpatient f/u for possible stress test if negative.   - A1c = 5.7 - EKG shows twi in anterolateral leads (v2-v5)  - not endorsing chest pain currently or chest pain/dyspnea w/ exertion   -Trop : 11-->10   - outpatient f/u for possible stress test

## 2019-10-15 NOTE — PROGRESS NOTE ADULT - PROBLEM SELECTOR PLAN 1
- s/p 1 dose of librium in ED  - CW Ativan high risk taper b/c of cirrhosis ( currently on Ativan 2 mg oral every 4 hours today)   -  thiamine x 3 days then PO daily, c/w folic acid and multi-vitamin daily   - social work consult  - repeat serum Lactate - s/p 1 dose of librium in ED  - CW Ativan high risk taper b/c of cirrhosis ( currently on Ativan 2 mg oral every 4 hours today)   -  thiamine x 3 days then PO daily, c/w folic acid and multi-vitamin daily   - social work consult  - repeat serum Lactate  - f/up PT /INR

## 2019-10-15 NOTE — PROGRESS NOTE ADULT - ATTENDING COMMENTS
Sadie Narvaez   HOspitalist    Mild febrile episode of 100.1 with no related symptoms  which is most likely from his ETOH/ withdrawal.  Please monitor for any fever recurrence and if pt spikes high grade fever , please [facility]     Sadie Narvaez   HOspitalist   [phone]

## 2019-10-15 NOTE — CONSULT NOTE ADULT - ASSESSMENT
Discharge Instructions:    Ted Felton is a 2 day old  infant, delivered at Gestational Age: 39w6d.    discharged to home, accompanied by mom and dad.    If you have any questions about your baby, please call your baby's doctor    Do not give your baby any medications unless directed by your Baby's Doctor        Baby Teaching    Baby Car Seat  Make sure you have a car seat for the baby. Car seat faces towards the back of the car. If your car seat is not new, make sure it was never in an accident. The car seat should not move more than 1 inch when attached in seat of car.    Safety  The bulb syringe is to clean out the nose and the mouth. You deflate it an place in the side of the mouth and let go. If you catch the side of the cheek, it's OK. Just don't place it straight down the throat because the baby will choke. For the nose - deflate it and insert in nose and then let go. Squeeze out after each use. The bulb syringe may be cleaned with warm soapy water.     No pillows or large stuffed toys in infant's bed. Place infant on back to sleep. May place infant on tummy if infant is awake and you are right by them. If infant falls asleep, turn the infant on back. No smoking around infant - this increases the chance of SIDS. Never ever shake, throw, or punch the baby.    Getting to Know Baby  Baby's head may have a cone shape and bruising on it. This will disappear in the first week or two. The soft spot will close around 12-18 months. The hands and feet can be bluish at times - poor circulation is normal. The hair on their ear and shoulders will wear away. The baby can have a  rash - whiteheads and red blotchy areas - normal. The little white dots on the nose are blocked pores and will go away. Sneezing is normal - they breathe through their nose and this is their way to clear the nose out. The baby does not have a cold. Hiccups will go away on their own. Spitting up is normal. Projectile  Impression:    53 y/o M w/ hx of opioid dependence and EtOH dependence with cirrhosis likely due to alcohol dependence, presenting with altered mental status in setting of alcohol intoxication, and found to be in alcohol withdrawal.    # Cirrhosis: Likely due to alcohol use  Varices: No recent EGD, underwent EGD in 2009 with no varices  Ascites: Small ascites on abdominal U/S on 10/15/19  HE:   HCC: Indeterminate liver lesion on CT A/P w/ IV contrast in 5/2019  MELD-Na: 10 on 10/15/19  # EtOH dependence c/b withdrawal and DTs  # Opioid dependence    Recommendations:  - Check LANRE, AMA, ASMA, alpha 1 antitrypsin, ceruloplasmin, soluble liver antigen, anti LKM-1 Ab, immunoglobulin panel, iron studies, and ferritin  - Check Hepatitis A IgM, Hep B Surface Ag, Hep B Surface Ab, Hep B Core IgM, Hep B Core Ab  - Check triple phase CT A/P w/ IV contrast (can be done as outpatient)  - Will need repeat EGD as outpatient  - Management of alcohol withdrawal per primary team    Corey Garcia MD  Gastroenterology Fellow  Pager number: [phone] / [phone]    Please call Hepatology ([phone]) if there are any additional questions or concerns. Please call on-call GI fellow after 5pm and before 8am, and on weekends. vomiting is not normal. Baby can have vernix on their neck, crease on their legs and labia and it will absorb. Babies who are , , or  can have purplish spots on the buttocks called Mexican spots. These will usually go away by 4 years of age. Stork bites are red blotchy spots on eyelids, forehead or back of neck and go away by 2 years of age. Baby should have 1 wet diaper the first day, 2 wet diapers the second day, etc. By the fifth day, baby should have 5 to 6 wet diapers a day or more. Poop will go from black to brown to green to yellow. May look like it has seeds in it. May have 1 BM or more a day.     Baby Bath  Babies can have a bath every day or every other day - whatever you choose. Wash the face with plain water using a washcloth. Wipe the eyes from the inside out, changing spot on the washcloth every time you wipe out. Wipe off the face and then the ears. Try to get inside the ears with the washcloth. No Qtip in ear as it could puncture the eardrum. Wrap baby up and hold baby in a football hold. Take the infant to the sink. Wet baby's head with warm water (never place infant's head under running water; temperature could change). Add soap to head, suds up and rinse with washcloth. Place infant in bed. Add soap to wash cloth and extend neck to wash in creases of neck - wash arms and under arms - extend fingers down to open hand and wash down abdomen - then rinse same way. Turn the infant on stomach. Then add soap and wash top of neck to top of buttock and rinse. Turn the infant over. Add soap and wash legs, in between toes and creases of legs. Boys clean scrotum and around it. Girls spread labia apart and clean down one side, the other side and straight down the middle.  feet and wash butocks - rinse the same way. Always wash butt last.      Boys, if they are circumcised, you will gently clean the head of the penis with a wet cotton ball or tissue after a day of healing.   Place Vaseline on penis for 4-7 days. When it starts to heal, there will be a yellow film on the head of the penis - leave alone - healing. Any bleeding call your doctor.      The umbilical cord is not cleaned with alcohol any more. Just leave it alone. Will fall off in 1-4 weeks. Any oozing call the doctor.     Baby Temperature   If baby's temperature is 100.0 or higher, call your doctor. Only take baby's temperature when baby is sick. Tell doctor how the temperature was taken.     Jaundice  Infants can have a yellowish complexion - often times the baby's liver is a little immature - normal. A blood test will be taken. Feed frequently. Can last up to 1 week. If infant has jaundice and the count becomes very high, this could cause brain damage. If you have any questions about jaundice, notify your doctor.     Comfort Baby  If the baby is fussy - change the baby, feed the baby, and hold the baby. Try to rest when the baby sleeps. Just remember that the baby will be up in the middle of the night and they don't always go back to sleep.       Breast Feeding  Your breast milk usually comes in 2 to 4 days. They receive the colostrum first. Then in 3 to 4 days the mature milk comes in. You should nurse your baby every 2 to 3 hours until your milk comes in. Wake baby up if sleeping more than 4 hours without nursing. There is no time limit on how long you nurse on each breast. Sometimes baby only eats on one breast. Try to start on the side that the baby did not nurse well on.     Make sure you eat a well balanced diet. What makes you gassy will make the baby gassy. Stay away from a lot of caffeine - pop, and chocolate. A little bit is OK, but not a lot.     Sore nipples can occur due to improper postioning of the baby on the breast. If you are nursing and your nipple is very sore, remove the baby and place the baby back on the breast. Try Lanolin Cream. If no relief, call the Lactation Consultant.     A few problems that  could arise with nursing include becoming engorged.Your breasts will become very firm and you may have difficulty getting the baby on the breast. You may have to express some milk first, and then place the baby on the breast. Usually occurs 3 to 4 days after birth and last 48 hours. Another problem is a blocked duct. That is when you develop a lump on your breast and the breast milk cannot flow through. You will need to apply a hot pack for 20 minutes (warm moist compress) and then place the baby to breast. Try to massage the lump out while nursing. The last problem is a breast infection called Mastitis. Your breast could become reddened, the nipple could become so tender that it's hard to allow the baby to nurse on it, and the nipple could also become reddened. You may have fever and chills and just feel awful. You need to continue nursing and call your doctor for a prescription.     If you store breast milk in the refrigerator, you can leave it there for 5 to 7 days. If you store it in the freezer, you can store it for 6 months. Always label it with date and time. When using to feed the baby, use the oldest breast milk first. Never microwave the milk to thaw it out. Instead place it in a cup or bowl of hot water.       Call the doctor if:  · Fever of 100.0 degrees F or above  · Forceful vomiting (not spitting up)  · Several feedings when infant does not suck  · Watery, runny stools (with mucous, blood or foul odor)  · Infant injury (fall from bed or table, dropped or severely shaken)  · Constant crying  · Any unusual rash  · Yellow color of the skin or eyes  · Has less than 5 wet diapers in a 24 hour period of time after 5 days old  · No stool for 48 hours  · Redness, drainage or foul odor from the umbilical cord and/or circumcision site        Special instructions: In case you need to call the doctor about any of the above:    · Take baby's temperature and write it down  · Know how much and how many feedings the  baby has had that day  · Note amount, color and consistency of urine and stools  · Note any changes in the infants behavior such as being very sleepy, very fussy or less active      Date and time of Delivery: 2019  6:45 AM     Delivery Method: , Spontaneous [258]          APGAR'S  One minute Five minutes   Skin color:       Heart rate:       Grimace:       Muscle tone:       Breathing:       Totals: 9  9      Feeding method: Natural Human Milk  Infant Blood Type: A POSITIVE  Bilirubin No results found for: BILIRUBIN   [place] Screen done: Done   Immunizations:   Most Recent Immunizations   Administered Date(s) Administered   • None   Pended Date(s) Pended   • Hep B, adolescent or pediatric 2019   • Hepatitis B Immune Globulin 2019      [place] Hearing Test Machine: Auditory Brainstem Response (Algo) (19)  [place] Hearing Test Results: Pass L;Refer R (19)  Birth Weight: 7 lb 4 oz (3289 g)    Discharge weight: Weight: 3124 g  Discharge Date: 2019    Help after you leave the hospital:    Ideas for help at home: friends, family members, neighbors, and members of your talib community are all there to help you.        .     Impression:    53 y/o M w/ hx of opioid dependence and EtOH dependence with cirrhosis likely due to alcohol dependence, presenting with altered mental status in setting of alcohol intoxication, and found to be in alcohol withdrawal.    # Cirrhosis: Likely due to alcohol use  Varices: No recent EGD, underwent EGD in 2009 with no varices  Ascites: Small ascites on abdominal U/S on 10/15/19  HE: AAO x 3, no asterixis  HCC: Indeterminate liver lesion on CT A/P w/ IV contrast in 5/2019  MELD-Na: 10 on 10/15/19  # EtOH dependence c/b withdrawal and DTs  # Opioid dependence    Recommendations:  - Check LANRE, AMA, ASMA, alpha 1 antitrypsin, ceruloplasmin, soluble liver antigen, anti LKM-1 Ab, immunoglobulin panel, iron studies, and ferritin  - Check Hepatitis A IgM, Hep B Surface Ag, Hep B Surface Ab, Hep B Core IgM, Hep B Core Ab  - Check triple phase CT A/P w/ IV contrast (can be done as outpatient)  - Will need repeat EGD as outpatient  - Perform diagnostic paracentesis )Check cell count, albumin, protein, LDH, glucose, gram stain and culture) if there is a suitable pocket  - Management of alcohol withdrawal per primary team  - Alcohol rehab    Corey Garcia MD  Gastroenterology Fellow  Pager number: [phone] / [phone]    Please call Hepatology ([phone]) if there are any additional questions or concerns. Please call on-call GI fellow after 5pm and before 8am, and on weekends. Impression:    53 y/o M w/ hx of opioid dependence and EtOH dependence with cirrhosis likely due to alcohol dependence, presenting with altered mental status in setting of alcohol intoxication, and found to be in alcohol withdrawal.    # Cirrhosis: Likely due to alcohol use  Varices: No recent EGD, underwent EGD in 2009 with no varices  Ascites: Small ascites on abdominal U/S on 10/15/19  HE: AAO x 3, no asterixis  HCC: Indeterminate liver lesion on CT A/P w/ IV contrast in 5/2019  MELD-Na: 10 on 10/15/19  # Abnormal liver enzymes: Likely elevated AST due to alcohol use. DF 2.1 on 10/15/19.  # EtOH dependence c/b withdrawal and DTs  # Opioid dependence    Recommendations:  - F/U LANRE, AMA, ASMA, alpha 1 antitrypsin, ceruloplasmin, soluble liver antigen, anti LKM-1 Ab, immunoglobulin panel, iron studies, and ferritin  - F/U heck Hepatitis A IgM, Hep B Surface Ag, Hep B Surface Ab, Hep B Core IgM, Hep B Core Ab  - Check triple phase CT A/P w/ IV contrast vs repeat MRI abdomen w/ IV contrast (can be done as outpatient)  - Will need repeat EGD as outpatient  - Can perform diagnostic paracentesis (Check cell count, albumin, protein, LDH, glucose, gram stain and culture) if there is a suitable pocket and patient has fevers/chills or abdominal pain  - Management of alcohol withdrawal per primary team  - Alcohol rehab  - Outpatient follow-up in Hepatology clinic upon discharge    Corey Garcia MD  Gastroenterology Fellow  Pager number: [phone] / [phone]    Please call Hepatology ([phone]) if there are any additional questions or concerns. Please call on-call GI fellow after 5pm and before 8am, and on weekends. Impression:    55 y/o M w/ hx of opioid dependence and EtOH dependence, overweight (BMI 27) with central obesity, cirrhosis and 4 cm indeterminate liver lesion found on CT in May, presenting with altered mental status in setting of alcohol intoxication, and found to be in alcohol withdrawal.     # Cirrhosis: Likely due to alcohol use, possibly also MORRISON; MELD-Na: 10 on 10/15/19  Varices: No recent EGD, underwent EGD in 2009 with no varices  Ascites: Small ascites on abdominal U/S on 10/15/19  HE: AAO x 3, no asterixis  HCC: Indeterminate liver lesion on CT A/P w/ IV contrast in 5/2019    # Abnormal liver enzymes: Likely elevated AST due to alcohol use. DF 2.1 on 10/15/19.  # EtOH dependence c/b withdrawal and DTs  # Opioid dependence    Recommendations:    - Check triple phase CT A/P w/ IV contrast vs repeat MRI abdomen w/ IV contrast (can be done as outpatient)  - Will need repeat EGD as outpatient  - Can perform diagnostic paracentesis (Check cell count, albumin, protein, LDH, glucose, gram stain and culture) if there is a suitable pocket and patient has fevers/chills or abdominal pain  - Management of alcohol withdrawal per primary team  - Alcohol rehab  - Outpatient follow-up in Hepatology clinic upon discharge    Corey Garcia MD  Gastroenterology Fellow  Pager number: [phone] / [phone]    Please call Hepatology ([phone]) if there are any additional questions or concerns. Please call on-call GI fellow after 5pm and before 8am, and on weekends.

## 2019-10-15 NOTE — BEHAVIORAL HEALTH ASSESSMENT NOTE - HPI (INCLUDE ILLNESS QUALITY, SEVERITY, DURATION, TIMING, CONTEXT, MODIFYING FACTORS, ASSOCIATED SIGNS AND SYMPTOMS)
54  year old man hx of alcoholism no official psychiatric treatment , no hx of aggression, no hx of suicide admitted with altered mental status secondary to ETOH intoxication.  consulted for depression  patient endorses depression only in context of ETOH use for several years, reports depression is intermittent associated with anhedonia denies worthlessness does endorse associated guilt. regarding sleep patterns states "I drink myself to sleep". he denies ever having suicidal or homicidal ideation denies AH and VH denies paranoia.     daughter states patient has been intermittently attending AA for years but never attended consistently. she reports patient made passive SI statements years ago when wife  but not since then and she is not concerned he is suicidal.She feels patient does need substance treatment and wants staff to encourage him to attend.

## 2019-10-15 NOTE — PROGRESS NOTE ADULT - PROBLEM SELECTOR PLAN 2
- social work consult; pt endorses some depressive sx that started after the death of spouse.   - consider starting on anti-depressants if pt amenable - social work consult; pt endorses some depressive sx that started after the death of spouse.   - Psych consult is called

## 2019-10-15 NOTE — BEHAVIORAL HEALTH ASSESSMENT NOTE - NSBHCHARTREVIEWVS_PSY_A_CORE FT
ICU Vital Signs Last 24 Hrs  T(C): 37.6 (15 Oct 2019 10:00), Max: 37.6 (15 Oct 2019 10:00)  T(F): 99.6 (15 Oct 2019 10:00), Max: 99.6 (15 Oct 2019 10:00)  HR: 108 (15 Oct 2019 10:00) (90 - 114)  BP: 133/71 (15 Oct 2019 10:00) (118/70 - 166/85)  BP(mean): --  ABP: --  ABP(mean): --  RR: 19 (15 Oct 2019 10:00) (18 - 21)  SpO2: 94% (15 Oct 2019 10:00) (92% - 95%)

## 2019-10-15 NOTE — CONSULT NOTE ADULT - SUBJECTIVE AND OBJECTIVE BOX
Chief Complaint: Confusion    HPI:    55 y/o M w/ hx of opioid dependence and EtOH dependence with cirrhosis likely due to alcohol dependence, presenting with altered mental status in setting of alcohol intoxication, and found to be in alcohol withdrawal; Hepatology consulted for work-up of cirrhosis.    The patient was noted to have findings concerning for cirrhosis on CT A/P in 2019, including hepatomegaly, splenomegaly, and an indeterminate lesion in the liver. The patient was not able to tolerate a follow-up MRI abdomen due to claustrophobia.    The patient currently denies fevers/chills, nausea/vomiting, and abdominal pain. The patient denies bloody emesis, coffee ground emesis, bloody stools, and black tarry stools.     The patient underwent upper endoscopy and colonoscopy in .     Allergies:  No Known Allergies    Home Medications:    None    Hospital Medications:  acetaminophen   Tablet .. 650 milliGRAM(s) Oral once PRN  ALBUTerol    90 MICROgram(s) HFA Inhaler 2 Puff(s) Inhalation every 6 hours PRN  folic acid 1 milliGRAM(s) Oral daily  LORazepam     Tablet 2 milliGRAM(s) Oral every 4 hours  LORazepam     Tablet   Oral   magnesium oxide 400 milliGRAM(s) Oral two times a day with meals  multivitamin 1 Tablet(s) Oral daily  multivitamin/thiamine/folic acid in sodium chloride 0.9% 1000 milliLiter(s) IV Continuous <Continuous>  thiamine IVPB 500 milliGRAM(s) IV Intermittent daily    PMHX/PSHX:  Sciatica  HTN (hypertension)  ETOH abuse  Grief reaction  Active smoker  S/P Repair of Inguinal Hernia    Family history:  No pertinent family history in first degree relatives    Denies family history of colon cancer/polyps, stomach cancer/polyps, pancreatic cancer/masses, liver cancer/disease, ovarian cancer and endometrial cancer.    Social History:     Tob: Denies  EtOH: Active drinker  Illicit Drugs: Denies    ROS:     General:  No wt loss, fevers, chills, night sweats, fatigue  Eyes:  Good vision, no reported pain  ENT:  No sore throat, pain, runny nose, dysphagia  CV:  No pain, palpitations, hypo/hypertension  Pulm:  No dyspnea, cough, tachypnea, wheezing  GI:  No pain, No nausea, No vomiting, No diarrhea, No constipation, No weight loss, No fever, No pruritis, No rectal bleeding, No tarry stools, No dysphagia,  :  No pain, bleeding, incontinence, nocturia  Muscle:  No pain, weakness  Neuro:  No weakness, tingling, memory problems  Psych:  No fatigue, insomnia, mood problems, depression  Endocrine:  No polyuria, polydipsia, cold/heat intolerance  Heme:  No petechiae, ecchymosis, easy bruisability  Skin:  No rash, tattoos, scars, edema    PHYSICAL EXAM:     GENERAL:  No acute distress  HEENT:  Normocephalic/atraumatic, no scleral icterus  CHEST:  Normal effort, no accessory muscle use  HEART:  Regular rate and rhythm, no murmurs/rubs/gallops  ABDOMEN:  Soft, non-tender, non-distended, normoactive bowel sounds  EXTREMITIES: No cyanosis, clubbing, or edema  SKIN:  No rash/erythema  NEURO:  Alert and oriented x 3, + tremors, + tongue fasciculations    Vital Signs:  Vital Signs Last 24 Hrs  T(C): 37.4 (15 Oct 2019 14:34), Max: 37.8 (15 Oct 2019 12:00)  T(F): 99.3 (15 Oct 2019 14:34), Max: 100.1 (15 Oct 2019 12:00)  HR: 109 (15 Oct 2019 14:34) (90 - 114)  BP: 158/80 (15 Oct 2019 14:34) (118/70 - 166/85)  BP(mean): --  RR: 18 (15 Oct 2019 14:34) (18 - 21)  SpO2: 94% (15 Oct 2019 14:34) (92% - 95%)  Daily Height in cm: 180.34 (14 Oct 2019 18:33)      LABS:                        13.7   7.04  )-----------( 64       ( 15 Oct 2019 08:55 )             40.1     Mean Cell Volume: 97.8 fl (10-15-19 @ 08:55)    10-15    137  |  94<L>  |  9   ----------------------------<  91  3.4<L>   |  20<L>  |  0.61    Ca    8.3<L>      15 Oct 2019 06:13  Phos  2.5     10-15  Mg     1.8     10-15    TPro  7.6  /  Alb  4.0  /  TBili  1.6<H>  /  DBili  x   /  AST  108<H>  /  ALT  24  /  AlkPhos  216<H>  10-15    LIVER FUNCTIONS - ( 15 Oct 2019 08:24 )  Alb: x     / Pro: x     / ALK PHOS: x     / ALT: x     / AST: x     / GGT: 641 U/L       PT/INR - ( 15 Oct 2019 12:24 )   PT: 13.1 sec;   INR: 1.14 ratio         PTT - ( 15 Oct 2019 12:24 )  PTT:33.8 sec  Urinalysis Basic - ( 14 Oct 2019 21:07 )    Color: Yellow / Appearance: Clear / S.011 / pH: x  Gluc: x / Ketone: Negative  / Bili: Negative / Urobili: 3 mg/dL   Blood: x / Protein: 100 / Nitrite: Negative   Leuk Esterase: Negative / RBC: 12 /hpf / WBC 1 /HPF   Sq Epi: x / Non Sq Epi: 0 / Bacteria: Negative      Amylase Serum--      Lipase serum--       [id number]                          13.7   7.04  )-----------( 64       ( 15 Oct 2019 08:55 )             40.1                         16.0   7.13  )-----------( Clumped    ( 14 Oct 2019 19:43 )             45.8       Imaging:    < from: US Abdomen Limited (10.15.19 @ 12:17) >    EXAM:  US ABDOMEN LIMITED                            PROCEDURE DATE:  10/15/2019            INTERPRETATION:  CLINICAL INFORMATION: Abnormal labs. Evaluate for   ascites.    COMPARISON: Correlation is made with prior abdominal CT dated 2019.    TECHNIQUE: All 4 quadrants were scanned..     FINDINGS:    Small volume ascites in the right upper quadrant.    IMPRESSION:     Small volume of ascites in the right upper quadrant..                      CHING GRADY M.D., ATTENDING RADIOLOGIST  This document has been electronically signed. Oct 15 2019 12:03PM      < end of copied text >

## 2019-10-15 NOTE — BEHAVIORAL HEALTH ASSESSMENT NOTE - NSBHCHARTREVIEWLAB_PSY_A_CORE FT
13.7   7.04  )-----------( 64       ( 15 Oct 2019 08:55 )             40.1     10-15    137  |  94<L>  |  9   ----------------------------<  91  3.4<L>   |  20<L>  |  0.61    Ca    8.3<L>      15 Oct 2019 06:13  Phos  2.5     10-15  Mg     1.8     10-15    TPro  7.6  /  Alb  4.0  /  TBili  1.6<H>  /  DBili  x   /  AST  108<H>  /  ALT  24  /  AlkPhos  216<H>  10-15    LIVER FUNCTIONS - ( 15 Oct 2019 08:24 )  Alb: x     / Pro: x     / ALK PHOS: x     / ALT: x     / AST: x     / GGT: 641 U/L         Urinalysis Basic - ( 14 Oct 2019 21:07 )    Color: Yellow / Appearance: Clear / S.011 / pH: x  Gluc: x / Ketone: Negative  / Bili: Negative / Urobili: 3 mg/dL   Blood: x / Protein: 100 / Nitrite: Negative   Leuk Esterase: Negative / RBC: 12 /hpf / WBC 1 /HPF   Sq Epi: x / Non Sq Epi: 0 / Bacteria: Negative

## 2019-10-16 ENCOUNTER — TRANSCRIPTION ENCOUNTER (OUTPATIENT)
Age: 54
End: 2019-10-16

## 2019-10-16 DIAGNOSIS — E87.8 OTHER DISORDERS OF ELECTROLYTE AND FLUID BALANCE, NOT ELSEWHERE CLASSIFIED: ICD-10-CM

## 2019-10-16 LAB
A1AT SERPL-MCNC: 200 MG/DL — SIGNIFICANT CHANGE UP (ref 90–200)
ANION GAP SERPL CALC-SCNC: 17 MMOL/L — SIGNIFICANT CHANGE UP (ref 5–17)
BUN SERPL-MCNC: 6 MG/DL — LOW (ref 7–23)
CALCIUM SERPL-MCNC: 8.7 MG/DL — SIGNIFICANT CHANGE UP (ref 8.4–10.5)
CERULOPLASMIN SERPL-MCNC: 28 MG/DL — SIGNIFICANT CHANGE UP (ref 15–30)
CHLORIDE SERPL-SCNC: 96 MMOL/L — SIGNIFICANT CHANGE UP (ref 96–108)
CO2 SERPL-SCNC: 26 MMOL/L — SIGNIFICANT CHANGE UP (ref 22–31)
CREAT SERPL-MCNC: 0.6 MG/DL — SIGNIFICANT CHANGE UP (ref 0.5–1.3)
FERRITIN SERPL-MCNC: 491 NG/ML — HIGH (ref 30–400)
GLUCOSE SERPL-MCNC: 114 MG/DL — HIGH (ref 70–99)
HAV IGM SER-ACNC: SIGNIFICANT CHANGE UP
HBV CORE AB SER-ACNC: SIGNIFICANT CHANGE UP
HBV CORE IGM SER-ACNC: SIGNIFICANT CHANGE UP
HBV SURFACE AB SER-ACNC: SIGNIFICANT CHANGE UP
HBV SURFACE AG SER-ACNC: SIGNIFICANT CHANGE UP
IGA FLD-MCNC: 915 MG/DL — HIGH (ref 84–499)
IGG FLD-MCNC: 1367 MG/DL — SIGNIFICANT CHANGE UP (ref 610–1660)
IGM SERPL-MCNC: 94 MG/DL — SIGNIFICANT CHANGE UP (ref 35–242)
IRON SATN MFR SERPL: 110 UG/DL — SIGNIFICANT CHANGE UP (ref 45–165)
IRON SATN MFR SERPL: 45 % — SIGNIFICANT CHANGE UP (ref 16–55)
KAPPA LC SER QL IFE: 10.39 MG/DL — HIGH (ref 0.33–1.94)
KAPPA/LAMBDA FREE LIGHT CHAIN RATIO, SERUM: 1.66 RATIO — HIGH (ref 0.26–1.65)
LACTATE BLDV-MCNC: 1.1 MMOL/L — SIGNIFICANT CHANGE UP (ref 0.7–2)
LAMBDA LC SER QL IFE: 6.27 MG/DL — HIGH (ref 0.57–2.63)
LDH SERPL L TO P-CCNC: 267 U/L — HIGH (ref 50–242)
MAGNESIUM SERPL-MCNC: 1.7 MG/DL — SIGNIFICANT CHANGE UP (ref 1.6–2.6)
MITOCHONDRIA AB SER-ACNC: SIGNIFICANT CHANGE UP
PHOSPHATE SERPL-MCNC: 1.6 MG/DL — LOW (ref 2.5–4.5)
POTASSIUM SERPL-MCNC: 2.9 MMOL/L — CRITICAL LOW (ref 3.5–5.3)
POTASSIUM SERPL-MCNC: 3.4 MMOL/L — LOW (ref 3.5–5.3)
POTASSIUM SERPL-SCNC: 2.9 MMOL/L — CRITICAL LOW (ref 3.5–5.3)
POTASSIUM SERPL-SCNC: 3.4 MMOL/L — LOW (ref 3.5–5.3)
PROT SERPL-MCNC: 7.4 G/DL — SIGNIFICANT CHANGE UP (ref 6–8.3)
PROT SERPL-MCNC: 7.4 G/DL — SIGNIFICANT CHANGE UP (ref 6–8.3)
SMOOTH MUSCLE AB SER-ACNC: ABNORMAL
SODIUM SERPL-SCNC: 139 MMOL/L — SIGNIFICANT CHANGE UP (ref 135–145)
TIBC SERPL-MCNC: 244 UG/DL — SIGNIFICANT CHANGE UP (ref 220–430)
UIBC SERPL-MCNC: 134 UG/DL — SIGNIFICANT CHANGE UP (ref 110–370)

## 2019-10-16 PROCEDURE — 99233 SBSQ HOSP IP/OBS HIGH 50: CPT

## 2019-10-16 PROCEDURE — 99222 1ST HOSP IP/OBS MODERATE 55: CPT | Mod: GC

## 2019-10-16 RX ORDER — ACETAMINOPHEN 500 MG
650 TABLET ORAL ONCE
Refills: 0 | Status: COMPLETED | OUTPATIENT
Start: 2019-10-16 | End: 2019-10-16

## 2019-10-16 RX ORDER — POTASSIUM CHLORIDE 20 MEQ
40 PACKET (EA) ORAL ONCE
Refills: 0 | Status: COMPLETED | OUTPATIENT
Start: 2019-10-16 | End: 2019-10-17

## 2019-10-16 RX ORDER — POTASSIUM CHLORIDE 20 MEQ
40 PACKET (EA) ORAL ONCE
Refills: 0 | Status: COMPLETED | OUTPATIENT
Start: 2019-10-16 | End: 2019-10-16

## 2019-10-16 RX ORDER — POTASSIUM PHOSPHATE, MONOBASIC POTASSIUM PHOSPHATE, DIBASIC 236; 224 MG/ML; MG/ML
30 INJECTION, SOLUTION INTRAVENOUS ONCE
Refills: 0 | Status: COMPLETED | OUTPATIENT
Start: 2019-10-16 | End: 2019-10-16

## 2019-10-16 RX ORDER — MAGNESIUM SULFATE 500 MG/ML
1 VIAL (ML) INJECTION DAILY
Refills: 0 | Status: DISCONTINUED | OUTPATIENT
Start: 2019-10-16 | End: 2019-10-19

## 2019-10-16 RX ORDER — POTASSIUM CHLORIDE 20 MEQ
10 PACKET (EA) ORAL
Refills: 0 | Status: COMPLETED | OUTPATIENT
Start: 2019-10-16 | End: 2019-10-16

## 2019-10-16 RX ADMIN — Medication 100 MILLIEQUIVALENT(S): at 12:22

## 2019-10-16 RX ADMIN — Medication 100 MILLIEQUIVALENT(S): at 11:03

## 2019-10-16 RX ADMIN — Medication 40 MILLIEQUIVALENT(S): at 09:27

## 2019-10-16 RX ADMIN — POTASSIUM PHOSPHATE, MONOBASIC POTASSIUM PHOSPHATE, DIBASIC 83.33 MILLIMOLE(S): 236; 224 INJECTION, SOLUTION INTRAVENOUS at 12:57

## 2019-10-16 RX ADMIN — MAGNESIUM OXIDE 400 MG ORAL TABLET 400 MILLIGRAM(S): 241.3 TABLET ORAL at 11:05

## 2019-10-16 RX ADMIN — Medication 1.5 MILLIGRAM(S): at 21:19

## 2019-10-16 RX ADMIN — Medication 100 MILLIEQUIVALENT(S): at 09:27

## 2019-10-16 RX ADMIN — Medication 650 MILLIGRAM(S): at 22:13

## 2019-10-16 RX ADMIN — Medication 2 MILLIGRAM(S): at 01:46

## 2019-10-16 RX ADMIN — Medication 1.5 MILLIGRAM(S): at 01:27

## 2019-10-16 RX ADMIN — Medication 1 TABLET(S): at 09:34

## 2019-10-16 RX ADMIN — Medication 1.5 MILLIGRAM(S): at 13:56

## 2019-10-16 RX ADMIN — Medication 100 GRAM(S): at 11:49

## 2019-10-16 RX ADMIN — Medication 105 MILLIGRAM(S): at 11:59

## 2019-10-16 RX ADMIN — Medication 1.5 MILLIGRAM(S): at 17:06

## 2019-10-16 RX ADMIN — Medication 650 MILLIGRAM(S): at 21:19

## 2019-10-16 RX ADMIN — Medication 1.5 MILLIGRAM(S): at 09:36

## 2019-10-16 RX ADMIN — Medication 1.5 MILLIGRAM(S): at 06:06

## 2019-10-16 RX ADMIN — SODIUM CHLORIDE 200 MILLILITER(S): 9 INJECTION, SOLUTION INTRAVENOUS at 02:57

## 2019-10-16 RX ADMIN — Medication 1 MILLIGRAM(S): at 09:34

## 2019-10-16 RX ADMIN — Medication 2 MILLIGRAM(S): at 06:58

## 2019-10-16 NOTE — PROGRESS NOTE ADULT - PROBLEM SELECTOR PLAN 2
- social work consult; pt endorses some depressive sx that started after the death of spouse.   - Psych rec is noted

## 2019-10-16 NOTE — PROGRESS NOTE ADULT - ATTENDING COMMENTS
Please monitor for any fever recurrence and if pt spikes high grade fever , please Panculture and call IR for a possible diagnostic Paracentesis.  CT of abd with IV contrast is ordered ( TRIPPLE PHASE) as per GI due to cirrhosis and subcapsular liver lesions.  Will discuss with GI about a abnormal Kappa/Lambda ratio , will order SPEP today and might get Hem/onc input if not related taught to be related to the Liver cirrhosis .  WIll also with GI if there is any role to work patient for ??? AMyloidosis     Sadie Narvaez   HOspitalist   [phone]

## 2019-10-16 NOTE — PROVIDER CONTACT NOTE (CRITICAL VALUE NOTIFICATION) - BACKGROUND
admitted for ETOH abuse on [facility] protocol PMH: ETOH abuse, narcotic use, HTN, sciatica

## 2019-10-16 NOTE — PROGRESS NOTE ADULT - PROBLEM SELECTOR PLAN 1
- s/p 1 dose of librium in ED  - CW Ativan high risk taper b/c of cirrhosis   -  thiamine x 3 days then PO daily ( to be changed on 10/17)  - c/w folic acid and multi-vitamin daily   - social work consult  - repeat serum Lactate  - f/up PT /INR in AM

## 2019-10-16 NOTE — PROGRESS NOTE ADULT - PROBLEM SELECTOR PLAN 5
- EKG shows twi in anterolateral leads (v2-v5)  - not endorsing chest pain currently or chest pain/dyspnea w/ exertion   -Trop : 11-->10   - outpatient f/u for possible stress test

## 2019-10-16 NOTE — PROGRESS NOTE ADULT - PROBLEM SELECTOR PLAN 7
- DVT ppx: SCD   - Diet: DASH/TLC     Shelbie Lam MD  Internal Medicine, PGY-2  Night Admit, [phone] Replenished   f/up repeat lab   Daughter is updated

## 2019-10-16 NOTE — DISCHARGE NOTE NURSING/CASE MANAGEMENT/SOCIAL WORK - PATIENT PORTAL LINK FT
You can access the FollowMyHealth Patient Portal offered by [facility] by registering at the following website: [email or web address]. By joining [facility]’s FollowMyHealth portal, you will also be able to view your health information using other applications (apps) compatible with our system.

## 2019-10-16 NOTE — DISCHARGE NOTE NURSING/CASE MANAGEMENT/SOCIAL WORK - NSDCPEWEB_GEN_ALL_CORE
[facility] website --- [email or web address] website --- [email or web address]

## 2019-10-17 LAB
% ALBUMIN: 51.7 % — SIGNIFICANT CHANGE UP
% ALPHA 1: 4.8 % — SIGNIFICANT CHANGE UP
% ALPHA 2: 10.1 % — SIGNIFICANT CHANGE UP
% BETA: 16.3 % — SIGNIFICANT CHANGE UP
% GAMMA: 17.1 % — SIGNIFICANT CHANGE UP
ALBUMIN SERPL ELPH-MCNC: 3.7 G/DL — SIGNIFICANT CHANGE UP (ref 3.3–5)
ALBUMIN SERPL ELPH-MCNC: 3.8 G/DL — SIGNIFICANT CHANGE UP (ref 3.6–5.5)
ALBUMIN/GLOB SERPL ELPH: 1.1 RATIO — SIGNIFICANT CHANGE UP
ALP SERPL-CCNC: 208 U/L — HIGH (ref 40–120)
ALPHA1 GLOB SERPL ELPH-MCNC: 0.4 G/DL — SIGNIFICANT CHANGE UP (ref 0.1–0.4)
ALPHA2 GLOB SERPL ELPH-MCNC: 0.7 G/DL — SIGNIFICANT CHANGE UP (ref 0.5–1)
ALT FLD-CCNC: 20 U/L — SIGNIFICANT CHANGE UP (ref 10–45)
ANA TITR SER: NEGATIVE — SIGNIFICANT CHANGE UP
ANION GAP SERPL CALC-SCNC: 17 MMOL/L — SIGNIFICANT CHANGE UP (ref 5–17)
APTT BLD: 35.4 SEC — SIGNIFICANT CHANGE UP (ref 27.5–36.3)
AST SERPL-CCNC: 101 U/L — HIGH (ref 10–40)
B-GLOBULIN SERPL ELPH-MCNC: 1.2 G/DL — HIGH (ref 0.5–1)
BASOPHILS # BLD AUTO: 0.03 K/UL — SIGNIFICANT CHANGE UP (ref 0–0.2)
BASOPHILS NFR BLD AUTO: 0.5 % — SIGNIFICANT CHANGE UP (ref 0–2)
BILIRUB SERPL-MCNC: 3.1 MG/DL — HIGH (ref 0.2–1.2)
BUN SERPL-MCNC: 6 MG/DL — LOW (ref 7–23)
CALCIUM SERPL-MCNC: 8.8 MG/DL — SIGNIFICANT CHANGE UP (ref 8.4–10.5)
CHLORIDE SERPL-SCNC: 96 MMOL/L — SIGNIFICANT CHANGE UP (ref 96–108)
CO2 SERPL-SCNC: 23 MMOL/L — SIGNIFICANT CHANGE UP (ref 22–31)
CREAT SERPL-MCNC: 0.59 MG/DL — SIGNIFICANT CHANGE UP (ref 0.5–1.3)
EOSINOPHIL # BLD AUTO: 0.05 K/UL — SIGNIFICANT CHANGE UP (ref 0–0.5)
EOSINOPHIL NFR BLD AUTO: 0.8 % — SIGNIFICANT CHANGE UP (ref 0–6)
GAMMA GLOBULIN: 1.3 G/DL — SIGNIFICANT CHANGE UP (ref 0.6–1.6)
GLUCOSE SERPL-MCNC: 96 MG/DL — SIGNIFICANT CHANGE UP (ref 70–99)
HCT VFR BLD CALC: 39.4 % — SIGNIFICANT CHANGE UP (ref 39–50)
HGB BLD-MCNC: 13.6 G/DL — SIGNIFICANT CHANGE UP (ref 13–17)
IMM GRANULOCYTES NFR BLD AUTO: 0.3 % — SIGNIFICANT CHANGE UP (ref 0–1.5)
INR BLD: 1.39 RATIO — HIGH (ref 0.88–1.16)
LYMPHOCYTES # BLD AUTO: 1.18 K/UL — SIGNIFICANT CHANGE UP (ref 1–3.3)
LYMPHOCYTES # BLD AUTO: 17.8 % — SIGNIFICANT CHANGE UP (ref 13–44)
MAGNESIUM SERPL-MCNC: 1.7 MG/DL — SIGNIFICANT CHANGE UP (ref 1.6–2.6)
MCHC RBC-ENTMCNC: 33.3 PG — SIGNIFICANT CHANGE UP (ref 27–34)
MCHC RBC-ENTMCNC: 34.5 GM/DL — SIGNIFICANT CHANGE UP (ref 32–36)
MCV RBC AUTO: 96.6 FL — SIGNIFICANT CHANGE UP (ref 80–100)
MONOCYTES # BLD AUTO: 0.56 K/UL — SIGNIFICANT CHANGE UP (ref 0–0.9)
MONOCYTES NFR BLD AUTO: 8.4 % — SIGNIFICANT CHANGE UP (ref 2–14)
NEUTROPHILS # BLD AUTO: 4.8 K/UL — SIGNIFICANT CHANGE UP (ref 1.8–7.4)
NEUTROPHILS NFR BLD AUTO: 72.2 % — SIGNIFICANT CHANGE UP (ref 43–77)
PHOSPHATE SERPL-MCNC: 2.5 MG/DL — SIGNIFICANT CHANGE UP (ref 2.5–4.5)
PLATELET # BLD AUTO: 49 K/UL — LOW (ref 150–400)
POTASSIUM SERPL-MCNC: 3.4 MMOL/L — LOW (ref 3.5–5.3)
POTASSIUM SERPL-SCNC: 3.4 MMOL/L — LOW (ref 3.5–5.3)
PROT PATTERN SERPL ELPH-IMP: SIGNIFICANT CHANGE UP
PROT SERPL-MCNC: 7.5 G/DL — SIGNIFICANT CHANGE UP (ref 6–8.3)
PROTHROM AB SERPL-ACNC: 16 SEC — HIGH (ref 10–13.1)
RBC # BLD: 4.08 M/UL — LOW (ref 4.2–5.8)
RBC # FLD: 15.7 % — HIGH (ref 10.3–14.5)
SODIUM SERPL-SCNC: 136 MMOL/L — SIGNIFICANT CHANGE UP (ref 135–145)
WBC # BLD: 6.64 K/UL — SIGNIFICANT CHANGE UP (ref 3.8–10.5)
WBC # FLD AUTO: 6.64 K/UL — SIGNIFICANT CHANGE UP (ref 3.8–10.5)

## 2019-10-17 PROCEDURE — 99233 SBSQ HOSP IP/OBS HIGH 50: CPT

## 2019-10-17 PROCEDURE — 74181 MRI ABDOMEN W/O CONTRAST: CPT | Mod: 26

## 2019-10-17 RX ORDER — MAGNESIUM SULFATE 500 MG/ML
2 VIAL (ML) INJECTION ONCE
Refills: 0 | Status: COMPLETED | OUTPATIENT
Start: 2019-10-17 | End: 2019-10-17

## 2019-10-17 RX ORDER — POTASSIUM CHLORIDE 20 MEQ
40 PACKET (EA) ORAL ONCE
Refills: 0 | Status: COMPLETED | OUTPATIENT
Start: 2019-10-17 | End: 2019-10-17

## 2019-10-17 RX ORDER — THIAMINE MONONITRATE (VIT B1) 100 MG
100 TABLET ORAL DAILY
Refills: 0 | Status: DISCONTINUED | OUTPATIENT
Start: 2019-10-18 | End: 2019-10-19

## 2019-10-17 RX ORDER — ACETAMINOPHEN 500 MG
650 TABLET ORAL ONCE
Refills: 0 | Status: COMPLETED | OUTPATIENT
Start: 2019-10-17 | End: 2019-10-17

## 2019-10-17 RX ADMIN — Medication 650 MILLIGRAM(S): at 18:26

## 2019-10-17 RX ADMIN — Medication 100 GRAM(S): at 11:06

## 2019-10-17 RX ADMIN — Medication 1 TABLET(S): at 11:12

## 2019-10-17 RX ADMIN — Medication 40 MILLIEQUIVALENT(S): at 23:18

## 2019-10-17 RX ADMIN — Medication 1 MILLIGRAM(S): at 14:02

## 2019-10-17 RX ADMIN — Medication 1 MILLIGRAM(S): at 05:30

## 2019-10-17 RX ADMIN — Medication 105 MILLIGRAM(S): at 11:38

## 2019-10-17 RX ADMIN — Medication 50 GRAM(S): at 23:19

## 2019-10-17 RX ADMIN — Medication 0.5 MILLIGRAM(S): at 23:52

## 2019-10-17 RX ADMIN — Medication 650 MILLIGRAM(S): at 19:26

## 2019-10-17 RX ADMIN — Medication 1 MILLIGRAM(S): at 09:19

## 2019-10-17 RX ADMIN — Medication 1 MILLIGRAM(S): at 01:00

## 2019-10-17 RX ADMIN — Medication 1 MILLIGRAM(S): at 11:12

## 2019-10-17 RX ADMIN — Medication 40 MILLIEQUIVALENT(S): at 01:00

## 2019-10-17 RX ADMIN — Medication 40 MILLIEQUIVALENT(S): at 14:03

## 2019-10-17 RX ADMIN — Medication 1 MILLIGRAM(S): at 18:08

## 2019-10-17 RX ADMIN — Medication 1 MILLIGRAM(S): at 21:22

## 2019-10-17 NOTE — PROGRESS NOTE ADULT - PROBLEM SELECTOR PLAN 5
- EKG shows twi in anterolateral leads (v2-v5)  - not endorsing chest pain currently or chest pain/dyspnea w/ exertion   - Trop : 11-->10   - outpatient f/u for possible stress test

## 2019-10-17 NOTE — PROGRESS NOTE ADULT - PROBLEM SELECTOR PLAN 1
- s/p 1 dose of librium in ED  - CW Ativan high risk taper b/c of cirrhosis   - s/p  thiamine x 3 days, now po thiamine  - c/w folic acid and multi-vitamin daily   - social work consult  - lactate 1.1

## 2019-10-18 LAB
ANION GAP SERPL CALC-SCNC: 17 MMOL/L — SIGNIFICANT CHANGE UP (ref 5–17)
BUN SERPL-MCNC: 9 MG/DL — SIGNIFICANT CHANGE UP (ref 7–23)
CALCIUM SERPL-MCNC: 8.9 MG/DL — SIGNIFICANT CHANGE UP (ref 8.4–10.5)
CHLORIDE SERPL-SCNC: 97 MMOL/L — SIGNIFICANT CHANGE UP (ref 96–108)
CO2 SERPL-SCNC: 20 MMOL/L — LOW (ref 22–31)
CREAT SERPL-MCNC: 0.62 MG/DL — SIGNIFICANT CHANGE UP (ref 0.5–1.3)
DRUG SCREEN, SERUM: SIGNIFICANT CHANGE UP
GLUCOSE BLDC GLUCOMTR-MCNC: 91 MG/DL — SIGNIFICANT CHANGE UP (ref 70–99)
GLUCOSE SERPL-MCNC: 88 MG/DL — SIGNIFICANT CHANGE UP (ref 70–99)
MAGNESIUM SERPL-MCNC: 2.3 MG/DL — SIGNIFICANT CHANGE UP (ref 1.6–2.6)
POTASSIUM SERPL-MCNC: 4 MMOL/L — SIGNIFICANT CHANGE UP (ref 3.5–5.3)
POTASSIUM SERPL-SCNC: 4 MMOL/L — SIGNIFICANT CHANGE UP (ref 3.5–5.3)
SODIUM SERPL-SCNC: 134 MMOL/L — LOW (ref 135–145)

## 2019-10-18 PROCEDURE — 74177 CT ABD & PELVIS W/CONTRAST: CPT | Mod: 26

## 2019-10-18 PROCEDURE — 99232 SBSQ HOSP IP/OBS MODERATE 35: CPT

## 2019-10-18 RX ORDER — AMLODIPINE BESYLATE 2.5 MG/1
5 TABLET ORAL DAILY
Refills: 0 | Status: DISCONTINUED | OUTPATIENT
Start: 2019-10-18 | End: 2019-10-19

## 2019-10-18 RX ADMIN — Medication 0.5 MILLIGRAM(S): at 02:02

## 2019-10-18 RX ADMIN — Medication 100 GRAM(S): at 11:51

## 2019-10-18 RX ADMIN — Medication 0.5 MILLIGRAM(S): at 06:24

## 2019-10-18 RX ADMIN — Medication 1 MILLIGRAM(S): at 11:52

## 2019-10-18 RX ADMIN — Medication 0.5 MILLIGRAM(S): at 17:04

## 2019-10-18 RX ADMIN — Medication 0.5 MILLIGRAM(S): at 21:30

## 2019-10-18 RX ADMIN — Medication 0.5 MILLIGRAM(S): at 13:32

## 2019-10-18 RX ADMIN — AMLODIPINE BESYLATE 5 MILLIGRAM(S): 2.5 TABLET ORAL at 17:04

## 2019-10-18 RX ADMIN — Medication 1 TABLET(S): at 11:52

## 2019-10-18 RX ADMIN — Medication 100 MILLIGRAM(S): at 11:52

## 2019-10-18 RX ADMIN — Medication 0.5 MILLIGRAM(S): at 09:25

## 2019-10-19 ENCOUNTER — TRANSCRIPTION ENCOUNTER (OUTPATIENT)
Age: 54
End: 2019-10-19

## 2019-10-19 VITALS
HEART RATE: 96 BPM | TEMPERATURE: 98 F | OXYGEN SATURATION: 97 % | RESPIRATION RATE: 18 BRPM | DIASTOLIC BLOOD PRESSURE: 74 MMHG | SYSTOLIC BLOOD PRESSURE: 135 MMHG

## 2019-10-19 LAB
ANION GAP SERPL CALC-SCNC: 17 MMOL/L — SIGNIFICANT CHANGE UP (ref 5–17)
BUN SERPL-MCNC: 10 MG/DL — SIGNIFICANT CHANGE UP (ref 7–23)
CALCIUM SERPL-MCNC: 9.2 MG/DL — SIGNIFICANT CHANGE UP (ref 8.4–10.5)
CHLORIDE SERPL-SCNC: 98 MMOL/L — SIGNIFICANT CHANGE UP (ref 96–108)
CO2 SERPL-SCNC: 20 MMOL/L — LOW (ref 22–31)
CREAT SERPL-MCNC: 0.66 MG/DL — SIGNIFICANT CHANGE UP (ref 0.5–1.3)
GLUCOSE SERPL-MCNC: 99 MG/DL — SIGNIFICANT CHANGE UP (ref 70–99)
HCT VFR BLD CALC: 39.5 % — SIGNIFICANT CHANGE UP (ref 39–50)
HGB BLD-MCNC: 13.4 G/DL — SIGNIFICANT CHANGE UP (ref 13–17)
MCHC RBC-ENTMCNC: 33.3 PG — SIGNIFICANT CHANGE UP (ref 27–34)
MCHC RBC-ENTMCNC: 33.9 GM/DL — SIGNIFICANT CHANGE UP (ref 32–36)
MCV RBC AUTO: 98 FL — SIGNIFICANT CHANGE UP (ref 80–100)
PLATELET # BLD AUTO: 75 K/UL — LOW (ref 150–400)
POTASSIUM SERPL-MCNC: 3.5 MMOL/L — SIGNIFICANT CHANGE UP (ref 3.5–5.3)
POTASSIUM SERPL-SCNC: 3.5 MMOL/L — SIGNIFICANT CHANGE UP (ref 3.5–5.3)
RBC # BLD: 4.03 M/UL — LOW (ref 4.2–5.8)
RBC # FLD: 15.7 % — HIGH (ref 10.3–14.5)
SODIUM SERPL-SCNC: 135 MMOL/L — SIGNIFICANT CHANGE UP (ref 135–145)
SOLUBLE LIVER IGG SER IA-ACNC: < 20.1 UNITS — SIGNIFICANT CHANGE UP
WBC # BLD: 5.37 K/UL — SIGNIFICANT CHANGE UP (ref 3.8–10.5)
WBC # FLD AUTO: 5.37 K/UL — SIGNIFICANT CHANGE UP (ref 3.8–10.5)

## 2019-10-19 PROCEDURE — 83550 IRON BINDING TEST: CPT

## 2019-10-19 PROCEDURE — 84484 ASSAY OF TROPONIN QUANT: CPT

## 2019-10-19 PROCEDURE — 83036 HEMOGLOBIN GLYCOSYLATED A1C: CPT

## 2019-10-19 PROCEDURE — 82962 GLUCOSE BLOOD TEST: CPT

## 2019-10-19 PROCEDURE — 80048 BASIC METABOLIC PNL TOTAL CA: CPT

## 2019-10-19 PROCEDURE — 86803 HEPATITIS C AB TEST: CPT

## 2019-10-19 PROCEDURE — 81332 SERPINA1 GENE: CPT

## 2019-10-19 PROCEDURE — 72125 CT NECK SPINE W/O DYE: CPT

## 2019-10-19 PROCEDURE — 84165 PROTEIN E-PHORESIS SERUM: CPT

## 2019-10-19 PROCEDURE — A9585: CPT

## 2019-10-19 PROCEDURE — 82803 BLOOD GASES ANY COMBINATION: CPT

## 2019-10-19 PROCEDURE — 70450 CT HEAD/BRAIN W/O DYE: CPT

## 2019-10-19 PROCEDURE — 86705 HEP B CORE ANTIBODY IGM: CPT

## 2019-10-19 PROCEDURE — 80053 COMPREHEN METABOLIC PANEL: CPT

## 2019-10-19 PROCEDURE — 84295 ASSAY OF SERUM SODIUM: CPT

## 2019-10-19 PROCEDURE — 83935 ASSAY OF URINE OSMOLALITY: CPT

## 2019-10-19 PROCEDURE — 83930 ASSAY OF BLOOD OSMOLALITY: CPT

## 2019-10-19 PROCEDURE — 86706 HEP B SURFACE ANTIBODY: CPT

## 2019-10-19 PROCEDURE — 82330 ASSAY OF CALCIUM: CPT

## 2019-10-19 PROCEDURE — 82140 ASSAY OF AMMONIA: CPT

## 2019-10-19 PROCEDURE — 82728 ASSAY OF FERRITIN: CPT

## 2019-10-19 PROCEDURE — 83520 IMMUNOASSAY QUANT NOS NONAB: CPT

## 2019-10-19 PROCEDURE — 86709 HEPATITIS A IGM ANTIBODY: CPT

## 2019-10-19 PROCEDURE — 84155 ASSAY OF PROTEIN SERUM: CPT

## 2019-10-19 PROCEDURE — 85610 PROTHROMBIN TIME: CPT

## 2019-10-19 PROCEDURE — 80061 LIPID PANEL: CPT

## 2019-10-19 PROCEDURE — 82378 CARCINOEMBRYONIC ANTIGEN: CPT

## 2019-10-19 PROCEDURE — 74177 CT ABD & PELVIS W/CONTRAST: CPT

## 2019-10-19 PROCEDURE — 82947 ASSAY GLUCOSE BLOOD QUANT: CPT

## 2019-10-19 PROCEDURE — 82390 ASSAY OF CERULOPLASMIN: CPT

## 2019-10-19 PROCEDURE — 84100 ASSAY OF PHOSPHORUS: CPT

## 2019-10-19 PROCEDURE — 94640 AIRWAY INHALATION TREATMENT: CPT

## 2019-10-19 PROCEDURE — 83615 LACTATE (LD) (LDH) ENZYME: CPT

## 2019-10-19 PROCEDURE — 99239 HOSP IP/OBS DSCHRG MGMT >30: CPT

## 2019-10-19 PROCEDURE — 76705 ECHO EXAM OF ABDOMEN: CPT

## 2019-10-19 PROCEDURE — 86704 HEP B CORE ANTIBODY TOTAL: CPT

## 2019-10-19 PROCEDURE — 83735 ASSAY OF MAGNESIUM: CPT

## 2019-10-19 PROCEDURE — 83605 ASSAY OF LACTIC ACID: CPT

## 2019-10-19 PROCEDURE — 80307 DRUG TEST PRSMV CHEM ANLYZR: CPT

## 2019-10-19 PROCEDURE — 83540 ASSAY OF IRON: CPT

## 2019-10-19 PROCEDURE — 74181 MRI ABDOMEN W/O CONTRAST: CPT

## 2019-10-19 PROCEDURE — 82553 CREATINE MB FRACTION: CPT

## 2019-10-19 PROCEDURE — 86038 ANTINUCLEAR ANTIBODIES: CPT

## 2019-10-19 PROCEDURE — 82784 ASSAY IGA/IGD/IGG/IGM EACH: CPT

## 2019-10-19 PROCEDURE — 82977 ASSAY OF GGT: CPT

## 2019-10-19 PROCEDURE — 87340 HEPATITIS B SURFACE AG IA: CPT

## 2019-10-19 PROCEDURE — 85730 THROMBOPLASTIN TIME PARTIAL: CPT

## 2019-10-19 PROCEDURE — 80076 HEPATIC FUNCTION PANEL: CPT

## 2019-10-19 PROCEDURE — 93005 ELECTROCARDIOGRAM TRACING: CPT

## 2019-10-19 PROCEDURE — 85014 HEMATOCRIT: CPT

## 2019-10-19 PROCEDURE — 86255 FLUORESCENT ANTIBODY SCREEN: CPT

## 2019-10-19 PROCEDURE — 82435 ASSAY OF BLOOD CHLORIDE: CPT

## 2019-10-19 PROCEDURE — 82565 ASSAY OF CREATININE: CPT

## 2019-10-19 PROCEDURE — 82550 ASSAY OF CK (CPK): CPT

## 2019-10-19 PROCEDURE — 96374 THER/PROPH/DIAG INJ IV PUSH: CPT

## 2019-10-19 PROCEDURE — 84132 ASSAY OF SERUM POTASSIUM: CPT

## 2019-10-19 PROCEDURE — 99285 EMERGENCY DEPT VISIT HI MDM: CPT | Mod: 25

## 2019-10-19 PROCEDURE — 86381 MITOCHONDRIAL ANTIBODY EACH: CPT

## 2019-10-19 PROCEDURE — 85027 COMPLETE CBC AUTOMATED: CPT

## 2019-10-19 PROCEDURE — 82105 ALPHA-FETOPROTEIN SERUM: CPT

## 2019-10-19 PROCEDURE — 82248 BILIRUBIN DIRECT: CPT

## 2019-10-19 PROCEDURE — 81001 URINALYSIS AUTO W/SCOPE: CPT

## 2019-10-19 RX ORDER — THIAMINE MONONITRATE (VIT B1) 100 MG
1 TABLET ORAL
Qty: 0 | Refills: 0 | DISCHARGE
Start: 2019-10-19

## 2019-10-19 RX ORDER — FOLIC ACID 0.8 MG
1 TABLET ORAL
Qty: 0 | Refills: 0 | DISCHARGE
Start: 2019-10-19

## 2019-10-19 RX ORDER — AMLODIPINE BESYLATE 2.5 MG/1
1 TABLET ORAL
Qty: 0 | Refills: 0 | DISCHARGE
Start: 2019-10-19

## 2019-10-19 RX ADMIN — Medication 100 GRAM(S): at 11:16

## 2019-10-19 RX ADMIN — Medication 1 MILLIGRAM(S): at 11:16

## 2019-10-19 RX ADMIN — AMLODIPINE BESYLATE 5 MILLIGRAM(S): 2.5 TABLET ORAL at 06:41

## 2019-10-19 RX ADMIN — Medication 650 MILLIGRAM(S): at 11:00

## 2019-10-19 RX ADMIN — Medication 0.5 MILLIGRAM(S): at 02:42

## 2019-10-19 RX ADMIN — Medication 0.5 MILLIGRAM(S): at 17:04

## 2019-10-19 RX ADMIN — Medication 100 MILLIGRAM(S): at 11:16

## 2019-10-19 RX ADMIN — Medication 650 MILLIGRAM(S): at 08:43

## 2019-10-19 RX ADMIN — Medication 1 TABLET(S): at 11:16

## 2019-10-19 NOTE — DISCHARGE NOTE PROVIDER - HOSPITAL COURSE
53yo M with hx of ETOH abuse w/ withdrawal and DT requiring MICU in 2013 now w/ cirrhosis and portal HTN, past oxycodone abuse now resolved admitted for [facility] protocol          Problem/Plan - 1:    ·  Problem: Alcohol withdrawal.  Plan: - s/p 1 dose of librium in ED    - started on Ativan high risk taper b/c of cirrhosis     -  thiamine x 3 days then PO daily, c/w folic acid and multi-vitamin daily     - social work consulted          Problem/Plan - 2:    ·  Problem: Grief reaction.  Plan: - social work consult; pt endorses some depressive sx that started after the death of spouse.     - consider starting on anti-depressants if pt amenable.          Problem/Plan - 3:    ·  Problem: Cirrhosis, alcoholic.  Plan: - Decompensated; evidence of ascites on CT abd in May and + fluid wave on physical exam    - abdominal US - Small volume of ascites in the right upper quadrant..    - hepatology consulted    -CT abdomen/pelvis - Hepatomegaly with subtle surface nodularity which may reflect cirrhosis.     Evidence of portal hypertension.    Mild heterogeneous parenchyma in the periphery of segment 7 as above     likely to represent a perfusional abnormality. Consider follow-up MRI     with contrast in 6 months.            Problem/Plan - 4:    ·  Problem: Hypertension.  Plan: - Pt was on Norvasc 5 but stopped taking; if blood pressures elevated can consider starting.          Problem/Plan - 5:    ·  Problem: Abnormal EKG.  Plan: - EKG shows twi in anterolateral leads (v2-v5)    - not endorsing chest pain currently or chest pain/dyspnea w/ exertion     -trend cardiac enzymes    - outpatient f/u for possible stress test if negative. 53yo M with hx of ETOH abuse w/ withdrawal and DT requiring MICU in 2013 now w/ cirrhosis and portal HTN, past oxycodone abuse now resolved admitted for [facility]WA protocol          Problem/Plan - 1:    ·  Problem: Alcohol withdrawal.  Plan: - s/p 1 dose of librium in ED    - started on Ativan high risk taper b/c of cirrhosis     -  thiamine x 3 days then PO daily, c/w folic acid and multi-vitamin daily     - social work consulted          Problem/Plan - 2:    ·  Problem: Grief reaction.  Plan: - social work consult; pt endorses some depressive sx that started after the death of spouse.     - consider starting on anti-depressants if pt amenable.          Problem/Plan - 3:    ·  Problem: Cirrhosis, alcoholic.  Plan: - Decompensated; evidence of ascites on CT abd in May and + fluid wave on physical exam    - abdominal US - Small volume of ascites in the right upper quadrant..    - hepatology consulted    -CT abdomen/pelvis - Hepatomegaly with subtle surface nodularity which may reflect cirrhosis.     Evidence of portal hypertension.    Mild heterogeneous parenchyma in the periphery of segment 7 as above     likely to represent a perfusional abnormality. Consider follow-up MRI     with contrast in 6 months.            Problem/Plan - 4:    ·  Problem: Hypertension.  Plan: - Pt was on Norvasc 5 but stopped taking; if blood pressures elevated can consider starting.          Problem/Plan - 5:    ·  Problem: Abnormal EKG.  Plan: - EKG shows twi in anterolateral leads (v2-v5)    - not endorsing chest pain currently or chest pain/dyspnea w/ exertion     -trend cardiac enzymes    - outpatient f/u for possible stress test if negative.         Stable for discharge with PMD and Hepatology follow up, as well as outpatient substance misuse program.

## 2019-10-19 NOTE — PROGRESS NOTE ADULT - PROBLEM SELECTOR PLAN 1
- s/p 1 dose of librium in ED  - CW Ativan high risk taper b/c of cirrhosis   - s/p  thiamine x 3 days, now po thiamine  - c/w folic acid and multi-vitamin daily   - social work consult  - Given CIWA scores low, and pt would like to go home, plan for dischrage today, hepatology ok with discharge

## 2019-10-19 NOTE — PROGRESS NOTE ADULT - PROBLEM SELECTOR PLAN 4
- Pt was on Norvasc 5 but stopped taking; if blood pressures elevated can consider starting  - BP is currently stable
-restarted norvasc 5mg (patient used to take it in the past). BPs 140-150s systolic
-pt on norvasc 5mg

## 2019-10-19 NOTE — PROGRESS NOTE ADULT - PROBLEM SELECTOR PLAN 3
- Decompensated; evidence of ascites on CT abd in May and + fluid wave on physical exam/ no tenderness on palpation  - abdominal US ordered  - F/UP hepatology consult   rec   - f/u hep C screening
- Decompensated; evidence of ascites on CT abd in May and + fluid wave on physical exam/ no tenderness on palpation  - abdominal US with small ascites / IR is to be called if thre is any abd pain , fever or chills for diagnostic Paracentesis   -  hepatology consult   rec is noted / F/p Tripple phase CT of ABd
- Decompensated; evidence of ascites on CT abd in May and + fluid wave on physical exam/ no tenderness on palpation  - abdominal US with small ascites / IR is to be called if thre is any abd pain , fever or chills for diagnostic Paracentesis   -  hepatology consult   rec is noted / F/p Tripple phase CT of ABd
- Decompensated; evidence of ascites on CT abd in May and + fluid wave on physical exam/ no tenderness on palpation  - abdominal US with small ascites / IR is to be called if thre is any abd pain , fever or chills for diagnostic Paracentesis   -  hepatology consult   rec is noted / F/p Tripple phase CT of ABd-ordered, but not urgent to be done inpatient as per hepatology
- Decompensated; evidence of ascites on CT abd in May and + fluid wave on physical exam/ no tenderness on palpation  - abdominal US with small ascites / IR is to be called if there is any abd pain , fever or chills for diagnostic Paracentesis   -  hepatology consult  - triple phase CT of abdomen recommended - results show suggestion of possible cirrhosis, portal hypertension, and recommend MRI follow up in 6 months; pt to f/u with hep in outpt setting

## 2019-10-19 NOTE — PROGRESS NOTE ADULT - REASON FOR ADMISSION
altered mental state, ETOH withdrawal
Alcohol Withdrawal
Alcohol intoxication
etoh withdrawal
etoh withdrawal

## 2019-10-19 NOTE — PROGRESS NOTE ADULT - ASSESSMENT
53yo M with hx of ETOH abuse w/ withdrawal and DT requiring MICU in 2013 now w/ cirrhosis and portal HTN, past oxycodone abuse now resolved admitted for CIWA protocol
55yo M with hx of ETOH abuse w/ withdrawal and DT requiring MICU in 2013 now w/ cirrhosis and portal HTN, past oxycodone abuse now resolved admitted for CIWA protocol
55yo M with hx of ETOH abuse w/ withdrawal and DT requiring MICU in 2013 now w/ cirrhosis and portal HTN, past oxycodone abuse now resolved admitted for [facility] protocol

## 2019-10-19 NOTE — DISCHARGE NOTE PROVIDER - NSDCFUADDAPPT_GEN_ALL_CORE_FT
You will need to follow up with a hepatologist within one week of discharge - please call [phone] to make an appointment.    You will need to follow up with your primary medical doctor within one week of discharge - please call to make an appointment.  At this appointment, you will need your platelet level checked.    Follow up with a substance misuse outpatient program from the list provided to you by the .

## 2019-10-19 NOTE — DISCHARGE NOTE PROVIDER - CARE PROVIDERS DIRECT ADDRESSES
[email or web address]

## 2019-10-19 NOTE — DISCHARGE NOTE PROVIDER - NSDCCPCAREPLAN_GEN_ALL_CORE_FT
PRINCIPAL DISCHARGE DIAGNOSIS  Diagnosis: Alcohol abuse  Assessment and Plan of Treatment:       SECONDARY DISCHARGE DIAGNOSES  Diagnosis: Cirrhosis, alcoholic  Assessment and Plan of Treatment: You will need to follow up with your hepatologist within one week of discharge - please call to make an appointment.    Diagnosis: Hypertension  Assessment and Plan of Treatment: Low salt diet  Activity as tolerated.  Take all medication as prescribed.  Follow up with your medical doctor for routine blood pressure monitoring at your next visit.  Notify your doctor if you have any of the following symptoms:   Dizziness, Lightheadedness, Blurry vision, Headache, Chest pain, Shortness of breath      Diagnosis: Thrombocytopenia  Assessment and Plan of Treatment: You will need to follow up with your primary medical doctor within one week of discharge - please call to make an appointment.  At this time, you will need a CBC drawn to monitor your platelets. PRINCIPAL DISCHARGE DIAGNOSIS  Diagnosis: Alcohol abuse  Assessment and Plan of Treatment: You were given substance misuse resources by the hospital .  Please follow up with one of the outpatient programs provided.      SECONDARY DISCHARGE DIAGNOSES  Diagnosis: Cirrhosis, alcoholic  Assessment and Plan of Treatment: You will need to follow up with your hepatologist [phone] within one week of discharge - please call to make an appointment.    Diagnosis: Hypertension  Assessment and Plan of Treatment: You will need to follow up with your primary medical doctor upon discharge - please call to make an appointment.  Low salt diet  Activity as tolerated.  Take all medication as prescribed.  Follow up with your medical doctor for routine blood pressure monitoring at your next visit.  Notify your doctor if you have any of the following symptoms:   Dizziness, Lightheadedness, Blurry vision, Headache, Chest pain, Shortness of breath      Diagnosis: Thrombocytopenia  Assessment and Plan of Treatment: You will need to follow up with your primary medical doctor within one week of discharge - please call to make an appointment.  At this time, you will need a CBC drawn to monitor your platelets. PRINCIPAL DISCHARGE DIAGNOSIS  Diagnosis: Alcohol abuse  Assessment and Plan of Treatment: You were given substance misuse resources by the hospital .  Please follow up with one of the outpatient programs provided.      SECONDARY DISCHARGE DIAGNOSES  Diagnosis: Cirrhosis, alcoholic  Assessment and Plan of Treatment: You will need to follow up with your hepatologist [phone] within one week of discharge - please call to make an appointment.    Diagnosis: Hypertension  Assessment and Plan of Treatment: You will need to follow up with your primary medical doctor upon discharge - please call to make an appointment.  Low salt diet  Activity as tolerated.  Take all medication as prescribed.  Follow up with your medical doctor for routine blood pressure monitoring at your next visit.  Notify your doctor if you have any of the following symptoms:   Dizziness, Lightheadedness, Blurry vision, Headache, Chest pain, Shortness of breath      Diagnosis: Thrombocytopenia  Assessment and Plan of Treatment: You will need to follow up with your primary medical doctor within one week of discharge - please call to make an appointment.  At this time, you will need a CBC drawn to monitor your platelets.

## 2019-10-19 NOTE — PROGRESS NOTE ADULT - PROBLEM SELECTOR PLAN 8
- DVT ppx: SCD   - Diet: DASH/TLC
- DVT ppx: SCD   - Diet: DASH/TLC     Shelbie Lam MD  Internal Medicine, PGY-2  Night Admit, [phone]

## 2019-10-19 NOTE — DISCHARGE NOTE PROVIDER - CARE PROVIDER_API CALL
Elliot Guerrero)  Internal Medicine  [address] [place] [address]  Phone: [phone]  Fax: [phone]  Follow Up Time:

## 2019-10-19 NOTE — PROGRESS NOTE ADULT - SUBJECTIVE AND OBJECTIVE BOX
Candice Sales MD  Division of Hospital Medicine  Pager [phone]  If no response or off hours page: [phone]  ---------------------------------------------------------    KWAME CARRINGTON  54y  Male      Patient is a 54y old  Male who presents with a chief complaint of Alcohol Withdrawal (16 Oct 2019 16:05)      INTERVAL HPI/OVERNIGHT EVENTS:  Patient w/ no current c/o of hallucinations, tremors, N/V. Feels anxious.         REVIEW OF SYSTEMS: 14 point ROS negative unless listed above    T(C): 36.7 (10-17-19 @ 12:20), Max: 37.7 (10-17-19 @ 00:29)  HR: 107 (10-17-19 @ 12:20) (107 - 115)  BP: 144/98 (10-17-19 @ 12:20) (140/79 - 150/80)  RR: 18 (10-17-19 @ 12:20) (16 - 18)  SpO2: 96% (10-17-19 @ 12:20) (93% - 96%)  Wt(kg): --Vital Signs Last 24 Hrs  T(C): 36.7 (17 Oct 2019 12:20), Max: 37.7 (17 Oct 2019 00:29)  T(F): 98 (17 Oct 2019 12:20), Max: 99.9 (17 Oct 2019 00:29)  HR: 107 (17 Oct 2019 12:20) (107 - 115)  BP: 144/98 (17 Oct 2019 12:20) (140/79 - 150/80)  BP(mean): --  RR: 18 (17 Oct 2019 12:20) (16 - 18)  SpO2: 96% (17 Oct 2019 12:20) (93% - 96%)    PHYSICAL EXAM:  GENERAL: NAD, well-groomed, well-developed  EYES:  conjunctiva and sclera clear  ENMT:  Moist mucous membranes  NECK: Supple  CHEST/LUNG: Clear to auscultation bilaterally; No rales, rhonchi, wheezing, or rubs  HEART: Regular rate and rhythm; No murmurs, rubs, or gallops  ABDOMEN: Soft, Nontender, Nondistended  EXTREMITIES: No clubbing, cyanosis, or edema  SKIN: No rashes or lesions  PSYCH: Alert & Oriented x3, no tremors seen today      Consultant(s) Notes Reviewed:  [x ] YES  [ ] NO  Care Discussed with Consultants/Other Providers [ x] YES-NP  [ ] NO    LABS:                        13.6   6.64  )-----------( 49       ( 17 Oct 2019 09:14 )             39.4     10-17    136  |  96  |  6<L>  ----------------------------<  96  3.4<L>   |  23  |  0.59    Ca    8.8      17 Oct 2019 06:55  Phos  2.5     10-17  Mg     1.7     10-17    TPro  7.5  /  Alb  3.7  /  TBili  3.1<H>  /  DBili  x   /  AST  101<H>  /  ALT  20  /  AlkPhos  208<H>  10-17    PT/INR - ( 17 Oct 2019 08:49 )   PT: 16.0 sec;   INR: 1.39 ratio         PTT - ( 17 Oct 2019 08:49 )  PTT:35.4 sec    CAPILLARY BLOOD GLUCOSE                RADIOLOGY & ADDITIONAL TESTS:    Imaging Personally Reviewed:  [ ] YES  [ ] NO
Candice Sales MD  Division of Hospital Medicine  Pager [phone]  If no response or off hours page: [phone]  ---------------------------------------------------------    KAWME CARRINGTON  54y  Male      Patient is a 54y old  Male who presents with a chief complaint of etoh withdrawal (17 Oct 2019 13:14)      INTERVAL HPI/OVERNIGHT EVENTS:  No acute events overnight. Patient still feels anxious, but no c/o of hallucination, tremors.         REVIEW OF SYSTEMS: 14 point ROS negative unless listed above    T(C): 36.7 (10-18-19 @ 12:00), Max: 37 (10-18-19 @ 01:20)  HR: 99 (10-18-19 @ 12:00) (99 - 109)  BP: 144/95 (10-18-19 @ 12:00) (128/82 - 149/97)  RR: 18 (10-18-19 @ 12:00) (16 - 18)  SpO2: 96% (10-18-19 @ 12:00) (93% - 96%)  Wt(kg): --Vital Signs Last 24 Hrs  T(C): 36.7 (18 Oct 2019 12:00), Max: 37 (18 Oct 2019 01:20)  T(F): 98 (18 Oct 2019 12:00), Max: 98.6 (18 Oct 2019 01:20)  HR: 99 (18 Oct 2019 12:00) (99 - 109)  BP: 144/95 (18 Oct 2019 12:00) (128/82 - 149/97)  BP(mean): --  RR: 18 (18 Oct 2019 12:00) (16 - 18)  SpO2: 96% (18 Oct 2019 12:00) (93% - 96%)    PHYSICAL EXAM:  GENERAL: NAD, well-groomed, well-developed  HEAD:  Atraumatic, Normocephalic  Eyes   : EOMI   NECK: Supple, normal thyroid  NERVOUS SYSTEM:  Alert & Oriented X3, Good concentration , no Asterixis   CHEST/LUNG: Clear to percussion bilaterally; No rales, rhonchi, wheezing, or rubs  HEART: Regular rate and rhythm; No murmurs, rubs, or gallops  ABDOMEN: Soft, Nontender, mild to moderate distension  EXTREMITIES: No clubbing, cyanosis, or edema    Consultant(s) Notes Reviewed:  [x ] YES  [ ] NO  Care Discussed with Consultants/Other Providers [ x] YES-NP  [ ] NO    LABS:                        13.6   6.64  )-----------( 49       ( 17 Oct 2019 09:14 )             39.4     10-18    134<L>  |  97  |  9   ----------------------------<  88  4.0   |  20<L>  |  0.62    Ca    8.9      18 Oct 2019 06:59  Phos  2.5     10-17  Mg     2.3     10-18    TPro  7.5  /  Alb  3.7  /  TBili  3.1<H>  /  DBili  x   /  AST  101<H>  /  ALT  20  /  AlkPhos  208<H>  10-17    PT/INR - ( 17 Oct 2019 08:49 )   PT: 16.0 sec;   INR: 1.39 ratio         PTT - ( 17 Oct 2019 08:49 )  PTT:35.4 sec    CAPILLARY BLOOD GLUCOSE      POCT Blood Glucose.: 91 mg/dL (18 Oct 2019 00:41)            RADIOLOGY & ADDITIONAL TESTS:    Imaging Personally Reviewed:  [ ] YES  [ ] NO
Patient is a 54y old  Male who presents with a chief complaint of Alcohol Withdrawal (15 Oct 2019 16:23)      INTERVAL HPI/OVERNIGHT EVENTS:    Patient was seen in AM and was mostly sleeping and offers no complaints.  Pt has received IV Ativan due to agitation.  NO fever .  NO GI bleed       Medications:MEDICATIONS  (STANDING):  folic acid 1 milliGRAM(s) Oral daily  LORazepam     Tablet 1.5 milliGRAM(s) Oral every 4 hours  LORazepam     Tablet   Oral   magnesium sulfate  IVPB 1 Gram(s) IV Intermittent daily  multivitamin 1 Tablet(s) Oral daily  thiamine IVPB 500 milliGRAM(s) IV Intermittent daily    MEDICATIONS  (PRN):  acetaminophen   Tablet .. 650 milliGRAM(s) Oral once PRN Temp greater or equal to 38C (100.4F)  ALBUTerol    90 MICROgram(s) HFA Inhaler 2 Puff(s) Inhalation every 6 hours PRN Bronchospasm      Allergies: Allergies    No Known Allergies        REVIEW OF SYSTEMS:  CONSTITUTIONAL: No fever  ENMT:   No sinus or throat pain  NECK: No pain or stiffness  RESPIRATORY: No cough,  No shortness of breath  CARDIOVASCULAR: No chest pain, palpitations, or leg swelling  GASTROINTESTINAL: No current abdominal or epigastric pain. No nausea or vomiting; No diarrhea   GENITOURINARY: No dysuria  NEUROLOGICAL: No headaches  PSYCHIATRIC: no hallucinations     T(C): 37.5 (10-16-19 @ 14:17), Max: 37.8 (10-15-19 @ 21:33)  HR: 110 (10-16-19 @ 14:17) (104 - 117)  BP: 140/79 (10-16-19 @ 14:17) (138/80 - 154/80)  RR: 18 (10-16-19 @ 14:17) (18 - 19)  SpO2: 96% (10-16-19 @ 14:17) (93% - 96%)  Wt(kg): --Vital Signs Last 24 Hrs  T(C): 37.5 (16 Oct 2019 14:17), Max: 37.8 (15 Oct 2019 21:33)  T(F): 99.5 (16 Oct 2019 14:17), Max: 100 (15 Oct 2019 21:33)  HR: 110 (16 Oct 2019 14:17) (104 - 117)  BP: 140/79 (16 Oct 2019 14:17) (138/80 - 154/80)  BP(mean): --  RR: 18 (16 Oct 2019 14:17) (18 - 19)  SpO2: 96% (16 Oct 2019 14:17) (93% - 96%)  I&O's Summary    16 Oct 2019 07:01  -  16 Oct 2019 16:06  --------------------------------------------------------  IN: 220 mL / OUT: 0 mL / NET: 220 mL        PHYSICAL EXAM:  GENERAL: NAD, well-groomed, well-developed  HEAD:  Atraumatic, Normocephalic  Eyes   : EOMI , no nystagmus   NECK: Supple, No JVD, Normal thyroid  NERVOUS SYSTEM:  Alert & Oriented X3, Good concentration , no Asterixis   CHEST/LUNG: Clear to percussion bilaterally; No rales, rhonchi, wheezing, or rubs  HEART: Regular rate and rhythm; No murmurs, rubs, or gallops  ABDOMEN: Soft, Nontender, very protruberant with dullness on percussion Bowel sounds present  EXTREMITIES:  2+ Peripheral Pulses, No clubbing, cyanosis, or edema    Consultant(s) Notes Reviewed:  [x ] YES  [ ] NO  Care Discussed with Consultants/Other Providers [ x] YES  [ ] NO  Name of Consultant  LABS:                        13.7   7.04  )-----------( 64       ( 15 Oct 2019 08:55 )             40.1     10-16    139  |  96  |  6<L>  ----------------------------<  114<H>  2.9<LL>   |  26  |  0.60    Ca    8.7      16 Oct 2019 06:51  Phos  1.6     10-16  Mg     1.7     10-16    TPro  7.6  /  Alb  4.0  /  TBili  1.6<H>  /  DBili  x   /  AST  108<H>  /  ALT  24  /  AlkPhos  216<H>  10-15    PT/INR - ( 15 Oct 2019 12:24 )   PT: 13.1 sec;   INR: 1.14 ratio         PTT - ( 15 Oct 2019 12:24 )  PTT:33.8 sec  Urinalysis Basic - ( 14 Oct 2019 21:07 )    Color: Yellow / Appearance: Clear / S.011 / pH: x  Gluc: x / Ketone: Negative  / Bili: Negative / Urobili: 3 mg/dL   Blood: x / Protein: 100 / Nitrite: Negative   Leuk Esterase: Negative / RBC: 12 /hpf / WBC 1 /HPF   Sq Epi: x / Non Sq Epi: 0 / Bacteria: Negative      CAPILLARY BLOOD GLUCOSE            Urinalysis Basic - ( 14 Oct 2019 21:07 )    Color: Yellow / Appearance: Clear / S.011 / pH: x  Gluc: x / Ketone: Negative  / Bili: Negative / Urobili: 3 mg/dL   Blood: x / Protein: 100 / Nitrite: Negative   Leuk Esterase: Negative / RBC: 12 /hpf / WBC 1 /HPF   Sq Epi: x / Non Sq Epi: 0 / Bacteria: Negative        RADIOLOGY & ADDITIONAL TESTS:  EKG :     Imaging Personally Reviewed:  [ ] YES  [ ] NO  HEALTH ISSUES - PROBLEM Dx:  Other recurrent depressive disorders  Alcohol abuse  Thrombocytopenia: Thrombocytopenia  Prophylactic measure: Prophylactic measure  Abnormal EKG: Abnormal EKG  Hypertension: Hypertension  Cirrhosis, alcoholic: Cirrhosis, alcoholic  Grief reaction: Grief reaction  Alcohol withdrawal: Alcohol withdrawal
Patient is a 54y old  Male who presents with a chief complaint of alcohol intoxication    INTERVAL HPI/OVERNIGHT EVENTS:    55yo M with hx of ETOH abuse w/ withdrawal and DT requiring MICU in  now w/ cirrhosis and portal HTN, past oxycodone abuse now resolved who was admitted to the hospital due to altered mental status.   Patient was found unresponsive at home and drunk by his daughters.  On the way to the ED he was in and out of consciousness.  Per pt, he fell and hit his head yesterday and is endorsing a headache but denies visual changes, N/V.  Pt reports drinking a 1/5th of tequilla a day, used to drink beer but has stopped and used to abuse oxy that was prescribed to him for sciatica but he has stopped that 2 years ago.  Pt's drinking problem started after his wife passed away in  and he admits that depression is a component of his drinking problem along w/ job stress as a .   CT Of head   was done with No acute intracranial hemorrhage or mass effect. No displaced calvarial fracture.  Patient is seen this AM , he is awake, alert and offers no complaints.        Medications:MEDICATIONS  (STANDING):  enoxaparin Injectable 40 milliGRAM(s) SubCutaneous daily  folic acid 1 milliGRAM(s) Oral daily  LORazepam     Tablet 2 milliGRAM(s) Oral every 4 hours  LORazepam     Tablet   Oral   magnesium oxide 400 milliGRAM(s) Oral two times a day with meals  multivitamin 1 Tablet(s) Oral daily  multivitamin/thiamine/folic acid in sodium chloride 0.9% 1000 milliLiter(s) (200 mL/Hr) IV Continuous <Continuous>  thiamine IVPB 500 milliGRAM(s) IV Intermittent daily    MEDICATIONS  (PRN):  ALBUTerol    90 MICROgram(s) HFA Inhaler 2 Puff(s) Inhalation every 6 hours PRN Bronchospasm      Allergies: Allergies    No Known Allergies    REVIEW OF SYSTEMS:  CONSTITUTIONAL: No fever  ENMT:   No sinus or throat pain  NECK: No pain or stiffness  RESPIRATORY: No cough,  No shortness of breath  CARDIOVASCULAR: No chest pain, palpitations, or leg swelling  GASTROINTESTINAL: No current abdominal or epigastric pain. No nausea or vomiting; No diarrhea   GENITOURINARY: No dysuria  NEUROLOGICAL: No headaches  PSYCHIATRIC: h/o  depression pos death of wife , no  anxiety ,no hallucinations       T(C): 37.6 (10-15-19 @ 10:00), Max: 37.6 (10-15-19 @ 10:00)  HR: 108 (10-15-19 @ 10:00) (90 - 114)  BP: 133/71 (10-15-19 @ 10:00) (118/70 - 166/85)  RR: 19 (10-15-19 @ 10:00) (18 - 21)  SpO2: 94% (10-15-19 @ 10:00) (92% - 95%)  Wt(kg): --Vital Signs Last 24 Hrs  T(C): 37.6 (15 Oct 2019 10:00), Max: 37.6 (15 Oct 2019 10:00)  T(F): 99.6 (15 Oct 2019 10:00), Max: 99.6 (15 Oct 2019 10:00)  HR: 108 (15 Oct 2019 10:00) (90 - 114)  BP: 133/71 (15 Oct 2019 10:00) (118/70 - 166/85)  BP(mean): --  RR: 19 (15 Oct 2019 10:00) (18 - 21)  SpO2: 94% (15 Oct 2019 10:00) (92% - 95%)  I&O's Summary        PHYSICAL EXAM:  GENERAL: NAD, well-groomed, well-developed  HEAD:  Atraumatic, Normocephalic  Eyes   : EOMI , no nystagmus   NECK: Supple, No JVD, Normal thyroid  NERVOUS SYSTEM:  Alert & Oriented X3, Good concentration , no Asterixis   CHEST/LUNG: Clear to percussion bilaterally; No rales, rhonchi, wheezing, or rubs  HEART: Regular rate and rhythm; No murmurs, rubs, or gallops  ABDOMEN: Soft, Nontender, very protruberant with dullness on percussion Bowel sounds present  EXTREMITIES:  2+ Peripheral Pulses, No clubbing, cyanosis, or edema    Consultant(s) Notes Reviewed:  [x ] YES  [ ] NO  Care Discussed with Consultants/Other Providers [ x] YES  [ ] NO  Name of Consultant  LABS:                        13.7   7.04  )-----------( 64       ( 15 Oct 2019 08:55 )             40.1     10-15    137  |  94<L>  |  9   ----------------------------<  91  3.4<L>   |  20<L>  |  0.61    Ca    8.3<L>      15 Oct 2019 06:13  Phos  2.5     10-15  Mg     1.8     10-15    TPro  7.6  /  Alb  4.0  /  TBili  1.6<H>  /  DBili  x   /  AST  108<H>  /  ALT  24  /  AlkPhos  216<H>  10-15      Urinalysis Basic - ( 14 Oct 2019 21:07 )    Color: Yellow / Appearance: Clear / S.011 / pH: x  Gluc: x / Ketone: Negative  / Bili: Negative / Urobili: 3 mg/dL   Blood: x / Protein: 100 / Nitrite: Negative   Leuk Esterase: Negative / RBC: 12 /hpf / WBC 1 /HPF   Sq Epi: x / Non Sq Epi: 0 / Bacteria: Negative      CAPILLARY BLOOD GLUCOSE      POCT Blood Glucose.: 125 mg/dL (14 Oct 2019 18:53)  POCT Blood Glucose.: 98 mg/dL (14 Oct 2019 18:33)        Urinalysis Basic - ( 14 Oct 2019 21:07 )    Color: Yellow / Appearance: Clear / S.011 / pH: x  Gluc: x / Ketone: Negative  / Bili: Negative / Urobili: 3 mg/dL   Blood: x / Protein: 100 / Nitrite: Negative   Leuk Esterase: Negative / RBC: 12 /hpf / WBC 1 /HPF   Sq Epi: x / Non Sq Epi: 0 / Bacteria: Negative        RADIOLOGY & ADDITIONAL TESTS:  EKG :     Imaging Personally Reviewed:  [ ] YES  [ ] NO  HEALTH ISSUES - PROBLEM Dx:  Prophylactic measure: Prophylactic measure  Abnormal EKG: Abnormal EKG  Hypertension: Hypertension  Cirrhosis, alcoholic: Cirrhosis, alcoholic  Grief reaction: Grief reaction  Alcohol withdrawal: Alcohol withdrawal
SUBJECTIVE:    No acute events overnight, afebrile, hds.  Pt denies any cp, sob, n/v, abd pn, tremulousness. But he does feel anxious.   Pt would like to go home.    VITAL SIGNS:    Vital Signs Last 24 Hrs  T(C): 36.7 (19 Oct 2019 14:54), Max: 37.3 (19 Oct 2019 04:00)  T(F): 98.1 (19 Oct 2019 14:54), Max: 99.2 (19 Oct 2019 04:00)  HR: 96 (19 Oct 2019 14:54) (96 - 103)  BP: 135/74 (19 Oct 2019 14:54) (119/73 - 162/84)  BP(mean): --  RR: 18 (19 Oct 2019 14:54) (17 - 18)  SpO2: 97% (19 Oct 2019 14:54) (96% - 97%)    PHYSICAL EXAM:     GENERAL: no acute distress  HEENT: NC/AT, EOMI, neck supple, MMM  RESPIRATORY: LCTAB/L, no rhonchi, rales, or wheezing  CARDIOVASCULAR: RRR, no murmurs, gallops, rubs  ABDOMINAL: soft, non-tender, non-distended, positive bowel sounds   EXTREMITIES: no clubbing, cyanosis, or edema  NEUROLOGICAL: alert and oriented x 3, non-focal  SKIN: no rashes or lesions   MUSCULOSKELETAL: no gross joint deformity                          13.4   5.37  )-----------( 75       ( 19 Oct 2019 10:21 )             39.5     10-19    135  |  98  |  10  ----------------------------<  99  3.5   |  20<L>  |  0.66    Ca    9.2      19 Oct 2019 07:29  Mg     2.3     10-18      RADIOLOGY:    CT abd/pelvis:    IMPRESSION:     Hepatomegaly with subtle surface nodularity which may reflect cirrhosis.   Evidence of portal hypertension.    Mild heterogeneous parenchyma in the periphery of segment 7 as above   likely to represent a perfusional abnormality. Consider follow-up MRI   with contrast in 6 months.          CAPILLARY BLOOD GLUCOSE          MEDICATIONS  (STANDING):  amLODIPine   Tablet 5 milliGRAM(s) Oral daily  folic acid 1 milliGRAM(s) Oral daily  LORazepam     Tablet 0.5 milliGRAM(s) Oral every 12 hours  LORazepam     Tablet   Oral   magnesium sulfate  IVPB 1 Gram(s) IV Intermittent daily  multivitamin 1 Tablet(s) Oral daily  thiamine 100 milliGRAM(s) Oral daily

## 2019-10-19 NOTE — CHART NOTE - NSCHARTNOTEFT_GEN_A_CORE
Request from Dr. Posey to facilitate patient discharge.  Medication reconciliation reviewed, revised, and resolved with Dr. Posey, who has medically cleared patient for discharge with follow up as advised.  Please refer to discharge note for detailed hospital course.

## 2019-10-19 NOTE — PROGRESS NOTE ADULT - PROBLEM SELECTOR PROBLEM 3
Cirrhosis, alcoholic

## 2019-10-19 NOTE — PROGRESS NOTE ADULT - PROBLEM SELECTOR PLAN 6
Mostly from ETOH and liver cirrhosis   No current bleed   Since Platelet is <100k / will hold chemical AC and CW SCD and monitor plt and for bleed   -continue to monitor
MOstly from ETOH and liver cirrhosis   NO current bleed   Since Platelet is <100k / will hold chemical AC and CW SCD and monitor plt and for bleed   F/UP PT INR and CBC in Am
MOstly from ETOH and liver cirrhosis   NO current bleed   Since Platelet is <100k / will hold chemical AC and start SCD and monitor plt and for bleed   F/UP PT INR
Mostly from ETOH and liver cirrhosis   No current bleed   Since Platelet is <100k / will hold chemical AC and CW SCD and monitor plt and for bleed   -continue to monitor
Mostly from ETOH and liver cirrhosis   No current bleed   Since Platelet is <100k / will hold chemical AC and CW SCD and monitor plt and for bleed   -continue to monitor

## 2019-11-08 ENCOUNTER — MEDICATION RENEWAL (OUTPATIENT)
Age: 54
End: 2019-11-08

## 2019-11-13 ENCOUNTER — APPOINTMENT (OUTPATIENT)
Dept: INTERNAL MEDICINE | Facility: CLINIC | Age: 54
End: 2019-11-13
Payer: COMMERCIAL

## 2019-11-13 VITALS
OXYGEN SATURATION: 98 % | DIASTOLIC BLOOD PRESSURE: 90 MMHG | HEIGHT: 71 IN | HEART RATE: 87 BPM | SYSTOLIC BLOOD PRESSURE: 150 MMHG | BODY MASS INDEX: 28.84 KG/M2 | TEMPERATURE: 98.3 F | WEIGHT: 206 LBS

## 2019-11-13 VITALS — DIASTOLIC BLOOD PRESSURE: 78 MMHG | SYSTOLIC BLOOD PRESSURE: 140 MMHG

## 2019-11-13 DIAGNOSIS — Z00.00 ENCOUNTER FOR GENERAL ADULT MEDICAL EXAMINATION W/OUT ABNORMAL FINDINGS: ICD-10-CM

## 2019-11-13 DIAGNOSIS — E66.9 OBESITY, UNSPECIFIED: ICD-10-CM

## 2019-11-13 PROCEDURE — G0444 DEPRESSION SCREEN ANNUAL: CPT | Mod: NC,59

## 2019-11-13 PROCEDURE — 90686 IIV4 VACC NO PRSV 0.5 ML IM: CPT

## 2019-11-13 PROCEDURE — 36415 COLL VENOUS BLD VENIPUNCTURE: CPT

## 2019-11-13 PROCEDURE — G0008: CPT

## 2019-11-13 PROCEDURE — 99396 PREV VISIT EST AGE 40-64: CPT | Mod: 25

## 2019-11-13 RX ORDER — ERGOCALCIFEROL 1.25 MG/1
1.25 MG CAPSULE, LIQUID FILLED ORAL
Qty: 8 | Refills: 1 | Status: DISCONTINUED | COMMUNITY
Start: 2018-03-13 | End: 2019-11-13

## 2019-11-13 RX ORDER — OMEPRAZOLE 40 MG/1
40 CAPSULE, DELAYED RELEASE ORAL DAILY
Qty: 30 | Refills: 2 | Status: DISCONTINUED | COMMUNITY
Start: 2019-05-08 | End: 2019-11-13

## 2019-11-13 RX ORDER — TRAZODONE HYDROCHLORIDE 100 MG/1
100 TABLET ORAL
Qty: 30 | Refills: 5 | Status: DISCONTINUED | COMMUNITY
Start: 2018-09-24 | End: 2019-11-13

## 2019-11-13 RX ORDER — CIPROFLOXACIN HYDROCHLORIDE 500 MG/1
500 TABLET, FILM COATED ORAL TWICE DAILY
Qty: 14 | Refills: 0 | Status: DISCONTINUED | COMMUNITY
Start: 2019-05-30 | End: 2019-11-13

## 2019-11-13 RX ORDER — PNV NO.95/FERROUS FUM/FOLIC AC 28MG-0.8MG
TABLET ORAL
Refills: 0 | Status: ACTIVE | COMMUNITY

## 2019-11-14 NOTE — PHYSICAL EXAM
[No Acute Distress] : no acute distress [Well Nourished] : well nourished [Well Developed] : well developed [Well-Appearing] : well-appearing [Normal Voice/Communication] : normal voice/communication [Normal Sclera/Conjunctiva] : normal sclera/conjunctiva [PERRL] : pupils equal round and reactive to light [Normal Outer Ear/Nose] : the outer ears and nose were normal in appearance [Normal Oropharynx] : the oropharynx was normal [Normal TMs] : both tympanic membranes were normal [No JVD] : no jugular venous distention [No Lymphadenopathy] : no lymphadenopathy [Supple] : supple [Thyroid Normal, No Nodules] : the thyroid was normal and there were no nodules present [No Respiratory Distress] : no respiratory distress  [No Accessory Muscle Use] : no accessory muscle use [Clear to Auscultation] : lungs were clear to auscultation bilaterally [Normal Rate] : normal rate  [Regular Rhythm] : with a regular rhythm [Normal S1, S2] : normal S1 and S2 [No Murmur] : no murmur heard [No Carotid Bruits] : no carotid bruits [No Abdominal Bruit] : a ~M bruit was not heard ~T in the abdomen [No Varicosities] : no varicosities [Pedal Pulses Present] : the pedal pulses are present [No Edema] : there was no peripheral edema [No Extremity Clubbing/Cyanosis] : no extremity clubbing/cyanosis [Soft] : abdomen soft [Non Tender] : non-tender [Non-distended] : non-distended [Normal Bowel Sounds] : normal bowel sounds [Declined Rectal Exam] : declined rectal exam [Normal Supraclavicular Nodes] : no supraclavicular lymphadenopathy [Normal Posterior Cervical Nodes] : no posterior cervical lymphadenopathy [Normal Anterior Cervical Nodes] : no anterior cervical lymphadenopathy [No Spinal Tenderness] : no spinal tenderness [No Joint Swelling] : no joint swelling [Grossly Normal Strength/Tone] : grossly normal strength/tone [No Rash] : no rash [Coordination Grossly Intact] : coordination grossly intact [No Focal Deficits] : no focal deficits [Normal Gait] : normal gait [Normal Affect] : the affect was normal [Alert and Oriented x3] : oriented to person, place, and time [Normal Insight/Judgement] : insight and judgment were intact [de-identified] : + hepatomegaly

## 2019-11-14 NOTE — ASSESSMENT
[FreeTextEntry1] : discussed w pt \par reviewed his recent hospitalization and early cirrhotic liver changes\par he has made a significant change in his life, stopped alcohol intake, participating in AA and seems determined to be successful \par he has lost significant weight w alcohol cessation mainly\par cont efforts at diet control, exercise, weight loss\par has upcoming appt w hepatology \par check full labs as below. monitor AFP level, normal in the past \par consider fibroscan w hepatology \par continued abstinence from alcohol encouraged\par cont current rx for HTN \par \par influenza vaccine discussed and administered today\par \par RTO 1 month for f/u on his progress or call earlier prn if any new concerns

## 2019-11-14 NOTE — REVIEW OF SYSTEMS
[Recent Change In Weight] : ~T recent weight change [Anxiety] : anxiety [Negative] : Neurological [Fever] : no fever [Chills] : no chills [Fatigue] : no fatigue [Suicidal] : not suicidal [Depression] : no depression

## 2019-11-14 NOTE — COUNSELING
[Hazards of at-risk alcohol use discussed] : Hazards of at-risk alcohol use discussed [Potential consequences of obesity discussed] : Potential consequences of obesity discussed [Encouraged to increase physical activity] : Encouraged to increase physical activity

## 2019-11-14 NOTE — HEALTH RISK ASSESSMENT
[No] : In the past 12 months have you used drugs other than those required for medical reasons? No [No falls in past year] : Patient reported no falls in the past year [Patient reported colonoscopy was normal] : Patient reported colonoscopy was normal [Hepatitis C test offered] : Hepatitis C test offered [Employed] : employed [] :  [ColonoscopyDate] : 11/18

## 2019-12-05 ENCOUNTER — APPOINTMENT (OUTPATIENT)
Dept: HEPATOLOGY | Facility: CLINIC | Age: 54
End: 2019-12-05

## 2019-12-05 LAB
AFP-TM SERPL-MCNC: 2.6 NG/ML
ALBUMIN SERPL ELPH-MCNC: 4.2 G/DL
ALP BLD-CCNC: 162 U/L
ALT SERPL-CCNC: 13 U/L
ANION GAP SERPL CALC-SCNC: 17 MMOL/L
APPEARANCE: CLEAR
AST SERPL-CCNC: 48 U/L
BASOPHILS # BLD AUTO: 0.05 K/UL
BASOPHILS NFR BLD AUTO: 0.6 %
BILIRUB SERPL-MCNC: 0.9 MG/DL
BILIRUBIN URINE: NEGATIVE
BLOOD URINE: NEGATIVE
BUN SERPL-MCNC: 7 MG/DL
CALCIUM SERPL-MCNC: 9.5 MG/DL
CHLORIDE SERPL-SCNC: 100 MMOL/L
CHOLEST SERPL-MCNC: 125 MG/DL
CHOLEST/HDLC SERPL: 3.4 RATIO
CO2 SERPL-SCNC: 23 MMOL/L
COLOR: COLORLESS
CREAT SERPL-MCNC: 0.67 MG/DL
EOSINOPHIL # BLD AUTO: 0.06 K/UL
EOSINOPHIL NFR BLD AUTO: 0.8 %
ESTIMATED AVERAGE GLUCOSE: 108 MG/DL
GLUCOSE QUALITATIVE U: NEGATIVE
GLUCOSE SERPL-MCNC: 93 MG/DL
HBA1C MFR BLD HPLC: 5.4 %
HBV SURFACE AB SER QL: NONREACTIVE
HBV SURFACE AG SER QL: NONREACTIVE
HCT VFR BLD CALC: 37.2 %
HCV AB SER QL: NONREACTIVE
HCV S/CO RATIO: 0.24 S/CO
HDLC SERPL-MCNC: 37 MG/DL
HGB BLD-MCNC: 12.2 G/DL
IMM GRANULOCYTES NFR BLD AUTO: 0.5 %
KETONES URINE: NEGATIVE
LDLC SERPL CALC-MCNC: 71 MG/DL
LEUKOCYTE ESTERASE URINE: NEGATIVE
LYMPHOCYTES # BLD AUTO: 1.49 K/UL
LYMPHOCYTES NFR BLD AUTO: 19.4 %
MAN DIFF?: NORMAL
MCHC RBC-ENTMCNC: 32.8 GM/DL
MCHC RBC-ENTMCNC: 33 PG
MCV RBC AUTO: 100.5 FL
MONOCYTES # BLD AUTO: 0.46 K/UL
MONOCYTES NFR BLD AUTO: 6 %
NEUTROPHILS # BLD AUTO: 5.6 K/UL
NEUTROPHILS NFR BLD AUTO: 72.7 %
NITRITE URINE: NEGATIVE
PH URINE: 6.5
PLATELET # BLD AUTO: 231 K/UL
POTASSIUM SERPL-SCNC: 3.4 MMOL/L
PROT SERPL-MCNC: 8.5 G/DL
PROTEIN URINE: NEGATIVE
PSA SERPL-MCNC: 0.19 NG/ML
RBC # BLD: 3.7 M/UL
RBC # FLD: 14.1 %
SODIUM SERPL-SCNC: 140 MMOL/L
SPECIFIC GRAVITY URINE: 1
T4 FREE SERPL-MCNC: 1.2 NG/DL
TRIGL SERPL-MCNC: 85 MG/DL
TSH SERPL-ACNC: 3.11 UIU/ML
UROBILINOGEN URINE: NORMAL
WBC # FLD AUTO: 7.7 K/UL

## 2019-12-17 ENCOUNTER — APPOINTMENT (OUTPATIENT)
Dept: INTERNAL MEDICINE | Facility: CLINIC | Age: 54
End: 2019-12-17
Payer: COMMERCIAL

## 2019-12-17 VITALS
DIASTOLIC BLOOD PRESSURE: 82 MMHG | HEIGHT: 69 IN | TEMPERATURE: 98.2 F | HEART RATE: 93 BPM | RESPIRATION RATE: 16 BRPM | OXYGEN SATURATION: 97 % | BODY MASS INDEX: 30.96 KG/M2 | WEIGHT: 209 LBS | SYSTOLIC BLOOD PRESSURE: 156 MMHG

## 2019-12-17 DIAGNOSIS — Z23 ENCOUNTER FOR IMMUNIZATION: ICD-10-CM

## 2019-12-17 DIAGNOSIS — K74.60 UNSPECIFIED CIRRHOSIS OF LIVER: ICD-10-CM

## 2019-12-17 PROCEDURE — 90739 HEPB VACC 2/4 DOSE ADULT IM: CPT

## 2019-12-17 PROCEDURE — 99214 OFFICE O/P EST MOD 30 MIN: CPT | Mod: 25

## 2019-12-17 PROCEDURE — G0010: CPT
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
## 2019-12-17 NOTE — ASSESSMENT
[FreeTextEntry1] : discussed w pt \par \par reviewed current labs w pt in detail \par noted improvement in HgbA1c w weight loss, diet control, alcohol cessation \par \par cont abstinence from alcohol, cont to attend meetings, he is confident and in good spirits \par \par AFP normal , previous liver imaging, MRI suggest mild-moderate cirrhosis w portal HTN \par he has appt scheduled w hepatology, will need fibroscan as well \par \par cont current rx for HTN, mild anxiety \par \par discussed Hep B vaccine recommendation, first dose of new Hep B option given today, return in 1 month for second \par \par RTO 2-3 months for f/u or earlier prn if any new concerns

## 2019-12-17 NOTE — PHYSICAL EXAM
[No Acute Distress] : no acute distress [Normal Voice/Communication] : normal voice/communication [No JVD] : no jugular venous distention [No Lymphadenopathy] : no lymphadenopathy [Normal] : normal rate, regular rhythm, normal S1 and S2 and no murmur heard [No Carotid Bruits] : no carotid bruits [No Edema] : there was no peripheral edema [Normal Posterior Cervical Nodes] : no posterior cervical lymphadenopathy [Normal Supraclavicular Nodes] : no supraclavicular lymphadenopathy [Normal Anterior Cervical Nodes] : no anterior cervical lymphadenopathy [No Focal Deficits] : no focal deficits [Normal Gait] : normal gait [Normal Affect] : the affect was normal [Alert and Oriented x3] : oriented to person, place, and time [Normal Mood] : the mood was normal

## 2020-01-01 ENCOUNTER — TRANSCRIPTION ENCOUNTER (OUTPATIENT)
Age: 55
End: 2020-01-01

## 2020-01-01 ENCOUNTER — INPATIENT (INPATIENT)
Facility: HOSPITAL | Age: 55
LOS: 13 days | Discharge: HOME | DRG: 896 | End: 2020-12-23
Attending: INTERNAL MEDICINE | Admitting: SPECIALIST
Payer: MEDICAID

## 2020-01-01 ENCOUNTER — NON-APPOINTMENT (OUTPATIENT)
Age: 55
End: 2020-01-01

## 2020-01-01 ENCOUNTER — APPOINTMENT (OUTPATIENT)
Dept: INTERNAL MEDICINE | Facility: CLINIC | Age: 55
End: 2020-01-01

## 2020-01-01 ENCOUNTER — MED ADMIN CHARGE (OUTPATIENT)
Age: 55
End: 2020-01-01

## 2020-01-01 ENCOUNTER — OUTPATIENT (OUTPATIENT)
Dept: OUTPATIENT SERVICES | Facility: HOSPITAL | Age: 55
LOS: 1 days | Discharge: STILL ADMITTED OR ADMINISTRATIVE | End: 2020-01-01

## 2020-01-01 ENCOUNTER — APPOINTMENT (OUTPATIENT)
Dept: INTERNAL MEDICINE | Facility: CLINIC | Age: 55
End: 2020-01-01
Payer: MEDICAID

## 2020-01-01 ENCOUNTER — APPOINTMENT (OUTPATIENT)
Dept: HEPATOLOGY | Facility: CLINIC | Age: 55
End: 2020-01-01

## 2020-01-01 ENCOUNTER — EMERGENCY (EMERGENCY)
Facility: HOSPITAL | Age: 55
LOS: 1 days | Discharge: HOME | End: 2020-01-01
Attending: EMERGENCY MEDICINE | Admitting: EMERGENCY MEDICINE
Payer: MEDICAID

## 2020-01-01 ENCOUNTER — INPATIENT (INPATIENT)
Facility: HOSPITAL | Age: 55
LOS: 11 days | Discharge: HOME | End: 2020-05-06
Attending: HOSPITALIST | Admitting: HOSPITALIST
Payer: MEDICAID

## 2020-01-01 VITALS
DIASTOLIC BLOOD PRESSURE: 87 MMHG | RESPIRATION RATE: 20 BRPM | SYSTOLIC BLOOD PRESSURE: 145 MMHG | HEART RATE: 78 BPM | TEMPERATURE: 99 F | OXYGEN SATURATION: 98 %

## 2020-01-01 VITALS
HEART RATE: 94 BPM | RESPIRATION RATE: 18 BRPM | OXYGEN SATURATION: 98 % | DIASTOLIC BLOOD PRESSURE: 76 MMHG | SYSTOLIC BLOOD PRESSURE: 163 MMHG | TEMPERATURE: 99 F

## 2020-01-01 VITALS
RESPIRATION RATE: 17 BRPM | DIASTOLIC BLOOD PRESSURE: 74 MMHG | HEART RATE: 91 BPM | TEMPERATURE: 98 F | OXYGEN SATURATION: 100 % | SYSTOLIC BLOOD PRESSURE: 140 MMHG

## 2020-01-01 VITALS
SYSTOLIC BLOOD PRESSURE: 160 MMHG | BODY MASS INDEX: 29.92 KG/M2 | WEIGHT: 202 LBS | HEIGHT: 69 IN | DIASTOLIC BLOOD PRESSURE: 90 MMHG | HEART RATE: 91 BPM | OXYGEN SATURATION: 98 % | TEMPERATURE: 98 F

## 2020-01-01 VITALS
RESPIRATION RATE: 16 BRPM | DIASTOLIC BLOOD PRESSURE: 95 MMHG | HEART RATE: 98 BPM | TEMPERATURE: 100 F | SYSTOLIC BLOOD PRESSURE: 151 MMHG | OXYGEN SATURATION: 96 %

## 2020-01-01 VITALS
RESPIRATION RATE: 19 BRPM | TEMPERATURE: 98 F | WEIGHT: 229.94 LBS | HEIGHT: 72 IN | HEART RATE: 98 BPM | SYSTOLIC BLOOD PRESSURE: 117 MMHG | OXYGEN SATURATION: 99 % | DIASTOLIC BLOOD PRESSURE: 78 MMHG

## 2020-01-01 VITALS
SYSTOLIC BLOOD PRESSURE: 139 MMHG | RESPIRATION RATE: 18 BRPM | DIASTOLIC BLOOD PRESSURE: 94 MMHG | OXYGEN SATURATION: 99 % | TEMPERATURE: 99 F | HEART RATE: 117 BPM

## 2020-01-01 DIAGNOSIS — I10 ESSENTIAL (PRIMARY) HYPERTENSION: ICD-10-CM

## 2020-01-01 DIAGNOSIS — F10.231 ALCOHOL DEPENDENCE WITH WITHDRAWAL DELIRIUM: ICD-10-CM

## 2020-01-01 DIAGNOSIS — A41.9 SEPSIS, UNSPECIFIED ORGANISM: ICD-10-CM

## 2020-01-01 DIAGNOSIS — K70.30 ALCOHOLIC CIRRHOSIS OF LIVER WITHOUT ASCITES: ICD-10-CM

## 2020-01-01 DIAGNOSIS — R74.0 NONSPECIFIC ELEVATION OF LEVELS OF TRANSAMINASE AND LACTIC ACID DEHYDROGENASE [LDH]: ICD-10-CM

## 2020-01-01 DIAGNOSIS — K92.1 MELENA: ICD-10-CM

## 2020-01-01 DIAGNOSIS — Z29.9 ENCOUNTER FOR PROPHYLACTIC MEASURES, UNSPECIFIED: ICD-10-CM

## 2020-01-01 DIAGNOSIS — G93.41 METABOLIC ENCEPHALOPATHY: ICD-10-CM

## 2020-01-01 DIAGNOSIS — F10.239 ALCOHOL DEPENDENCE WITH WITHDRAWAL, UNSPECIFIED: ICD-10-CM

## 2020-01-01 DIAGNOSIS — F10.20 ALCOHOL DEPENDENCE, UNCOMPLICATED: ICD-10-CM

## 2020-01-01 DIAGNOSIS — S06.5X9A TRAUMATIC SUBDURAL HEMORRHAGE WITH LOSS OF CONSCIOUSNESS OF UNSPECIFIED DURATION, INITIAL ENCOUNTER: ICD-10-CM

## 2020-01-01 DIAGNOSIS — F10.230 ALCOHOL DEPENDENCE WITH WITHDRAWAL, UNCOMPLICATED: ICD-10-CM

## 2020-01-01 DIAGNOSIS — Z02.9 ENCOUNTER FOR ADMINISTRATIVE EXAMINATIONS, UNSPECIFIED: ICD-10-CM

## 2020-01-01 DIAGNOSIS — N39.0 URINARY TRACT INFECTION, SITE NOT SPECIFIED: ICD-10-CM

## 2020-01-01 DIAGNOSIS — K74.60 UNSPECIFIED CIRRHOSIS OF LIVER: ICD-10-CM

## 2020-01-01 DIAGNOSIS — Z23 ENCOUNTER FOR IMMUNIZATION: ICD-10-CM

## 2020-01-01 DIAGNOSIS — R07.89 OTHER CHEST PAIN: ICD-10-CM

## 2020-01-01 DIAGNOSIS — A04.72 ENTEROCOLITIS DUE TO CLOSTRIDIUM DIFFICILE, NOT SPECIFIED AS RECURRENT: ICD-10-CM

## 2020-01-01 DIAGNOSIS — F10.11 ALCOHOL ABUSE, IN REMISSION: ICD-10-CM

## 2020-01-01 DIAGNOSIS — R19.7 DIARRHEA, UNSPECIFIED: ICD-10-CM

## 2020-01-01 DIAGNOSIS — F41.9 ANXIETY DISORDER, UNSPECIFIED: ICD-10-CM

## 2020-01-01 DIAGNOSIS — F41.1 GENERALIZED ANXIETY DISORDER: ICD-10-CM

## 2020-01-01 DIAGNOSIS — F10.232 ALCOHOL DEPENDENCE WITH WITHDRAWAL WITH PERCEPTUAL DISTURBANCE: ICD-10-CM

## 2020-01-01 DIAGNOSIS — I60.9 NONTRAUMATIC SUBARACHNOID HEMORRHAGE, UNSPECIFIED: ICD-10-CM

## 2020-01-01 DIAGNOSIS — K70.0 ALCOHOLIC FATTY LIVER: ICD-10-CM

## 2020-01-01 DIAGNOSIS — D64.9 ANEMIA, UNSPECIFIED: ICD-10-CM

## 2020-01-01 LAB
-  AMIKACIN: SIGNIFICANT CHANGE UP
-  AMOXICILLIN/CLAVULANIC ACID: SIGNIFICANT CHANGE UP
-  AMPICILLIN/SULBACTAM: SIGNIFICANT CHANGE UP
-  AMPICILLIN: SIGNIFICANT CHANGE UP
-  AZTREONAM: SIGNIFICANT CHANGE UP
-  CEFAZOLIN: SIGNIFICANT CHANGE UP
-  CEFEPIME: SIGNIFICANT CHANGE UP
-  CEFOXITIN: SIGNIFICANT CHANGE UP
-  CEFTRIAXONE: SIGNIFICANT CHANGE UP
-  CIPROFLOXACIN: SIGNIFICANT CHANGE UP
-  ERTAPENEM: SIGNIFICANT CHANGE UP
-  GENTAMICIN: SIGNIFICANT CHANGE UP
-  IMIPENEM: SIGNIFICANT CHANGE UP
-  LEVOFLOXACIN: SIGNIFICANT CHANGE UP
-  MEROPENEM: SIGNIFICANT CHANGE UP
-  NITROFURANTOIN: SIGNIFICANT CHANGE UP
-  PIPERACILLIN/TAZOBACTAM: SIGNIFICANT CHANGE UP
-  TIGECYCLINE: SIGNIFICANT CHANGE UP
-  TOBRAMYCIN: SIGNIFICANT CHANGE UP
-  TRIMETHOPRIM/SULFAMETHOXAZOLE: SIGNIFICANT CHANGE UP
A1C WITH ESTIMATED AVERAGE GLUCOSE RESULT: 5.4 % — SIGNIFICANT CHANGE UP (ref 4–5.6)
AFP-TM SERPL-MCNC: 1.9 NG/ML — SIGNIFICANT CHANGE UP
ALBUMIN SERPL ELPH-MCNC: 3.1 G/DL — LOW (ref 3.3–5)
ALBUMIN SERPL ELPH-MCNC: 3.2 G/DL — LOW (ref 3.3–5)
ALBUMIN SERPL ELPH-MCNC: 3.2 G/DL — LOW (ref 3.3–5)
ALBUMIN SERPL ELPH-MCNC: 3.3 G/DL — SIGNIFICANT CHANGE UP (ref 3.3–5)
ALBUMIN SERPL ELPH-MCNC: 3.4 G/DL — SIGNIFICANT CHANGE UP (ref 3.3–5)
ALBUMIN SERPL ELPH-MCNC: 3.5 G/DL — SIGNIFICANT CHANGE UP (ref 3.3–5)
ALBUMIN SERPL ELPH-MCNC: 3.6 G/DL — SIGNIFICANT CHANGE UP (ref 3.3–5)
ALBUMIN SERPL ELPH-MCNC: 3.7 G/DL — SIGNIFICANT CHANGE UP (ref 3.3–5)
ALBUMIN SERPL ELPH-MCNC: 3.8 G/DL — SIGNIFICANT CHANGE UP (ref 3.3–5)
ALBUMIN SERPL ELPH-MCNC: 3.8 G/DL — SIGNIFICANT CHANGE UP (ref 3.3–5)
ALBUMIN SERPL ELPH-MCNC: 4 G/DL — SIGNIFICANT CHANGE UP (ref 3.3–5)
ALBUMIN SERPL ELPH-MCNC: 4.1 G/DL — SIGNIFICANT CHANGE UP (ref 3.3–5)
ALBUMIN SERPL ELPH-MCNC: 4.4 G/DL — SIGNIFICANT CHANGE UP (ref 3.3–5)
ALP SERPL-CCNC: 123 U/L — HIGH (ref 40–120)
ALP SERPL-CCNC: 165 U/L — HIGH (ref 40–120)
ALP SERPL-CCNC: 172 U/L — HIGH (ref 40–120)
ALP SERPL-CCNC: 174 U/L — HIGH (ref 40–120)
ALP SERPL-CCNC: 175 U/L — HIGH (ref 40–120)
ALP SERPL-CCNC: 176 U/L — HIGH (ref 40–120)
ALP SERPL-CCNC: 182 U/L — HIGH (ref 40–120)
ALP SERPL-CCNC: 187 U/L — HIGH (ref 40–120)
ALP SERPL-CCNC: 191 U/L — HIGH (ref 40–120)
ALP SERPL-CCNC: 192 U/L — HIGH (ref 40–120)
ALP SERPL-CCNC: 193 U/L — HIGH (ref 40–120)
ALP SERPL-CCNC: 193 U/L — HIGH (ref 40–120)
ALP SERPL-CCNC: 195 U/L — HIGH (ref 40–120)
ALP SERPL-CCNC: 200 U/L — HIGH (ref 40–120)
ALP SERPL-CCNC: 204 U/L — HIGH (ref 40–120)
ALP SERPL-CCNC: 204 U/L — HIGH (ref 40–120)
ALP SERPL-CCNC: 205 U/L — HIGH (ref 40–120)
ALP SERPL-CCNC: 205 U/L — HIGH (ref 40–120)
ALP SERPL-CCNC: 206 U/L — HIGH (ref 40–120)
ALP SERPL-CCNC: 206 U/L — HIGH (ref 40–120)
ALP SERPL-CCNC: 207 U/L — HIGH (ref 40–120)
ALP SERPL-CCNC: 208 U/L — HIGH (ref 40–120)
ALP SERPL-CCNC: 209 U/L — HIGH (ref 40–120)
ALP SERPL-CCNC: 210 U/L — HIGH (ref 40–120)
ALP SERPL-CCNC: 214 U/L — HIGH (ref 40–120)
ALP SERPL-CCNC: 215 U/L — HIGH (ref 40–120)
ALP SERPL-CCNC: 217 U/L — HIGH (ref 40–120)
ALP SERPL-CCNC: 219 U/L — HIGH (ref 40–120)
ALP SERPL-CCNC: 222 U/L — HIGH (ref 40–120)
ALP SERPL-CCNC: 229 U/L — HIGH (ref 40–120)
ALT FLD-CCNC: 10 U/L — SIGNIFICANT CHANGE UP (ref 10–45)
ALT FLD-CCNC: 10 U/L — SIGNIFICANT CHANGE UP (ref 10–45)
ALT FLD-CCNC: 11 U/L — SIGNIFICANT CHANGE UP (ref 10–45)
ALT FLD-CCNC: 12 U/L — SIGNIFICANT CHANGE UP (ref 10–45)
ALT FLD-CCNC: 16 U/L — SIGNIFICANT CHANGE UP (ref 10–45)
ALT FLD-CCNC: 17 U/L — SIGNIFICANT CHANGE UP (ref 10–45)
ALT FLD-CCNC: 18 U/L — SIGNIFICANT CHANGE UP (ref 10–45)
ALT FLD-CCNC: 19 U/L — SIGNIFICANT CHANGE UP (ref 10–45)
ALT FLD-CCNC: 20 U/L — SIGNIFICANT CHANGE UP (ref 10–45)
ALT FLD-CCNC: 22 U/L — SIGNIFICANT CHANGE UP (ref 10–45)
ALT FLD-CCNC: 23 U/L — SIGNIFICANT CHANGE UP (ref 10–45)
ALT FLD-CCNC: 24 U/L — SIGNIFICANT CHANGE UP (ref 10–45)
ALT FLD-CCNC: 28 U/L — SIGNIFICANT CHANGE UP (ref 4–41)
ALT FLD-CCNC: 29 U/L — SIGNIFICANT CHANGE UP (ref 4–41)
ALT FLD-CCNC: 29 U/L — SIGNIFICANT CHANGE UP (ref 4–41)
ALT FLD-CCNC: 33 U/L — SIGNIFICANT CHANGE UP (ref 4–41)
ALT FLD-CCNC: 40 U/L — SIGNIFICANT CHANGE UP (ref 4–41)
ALT FLD-CCNC: 41 U/L — SIGNIFICANT CHANGE UP (ref 4–41)
ALT FLD-CCNC: 42 U/L — HIGH (ref 4–41)
ALT FLD-CCNC: 43 U/L — HIGH (ref 4–41)
ALT FLD-CCNC: 43 U/L — HIGH (ref 4–41)
ALT FLD-CCNC: 45 U/L — HIGH (ref 4–41)
ALT FLD-CCNC: 49 U/L — HIGH (ref 4–41)
ALT FLD-CCNC: 51 U/L — HIGH (ref 4–41)
ALT FLD-CCNC: 52 U/L — HIGH (ref 4–41)
ALT FLD-CCNC: 6 U/L — SIGNIFICANT CHANGE UP (ref 4–41)
AMMONIA BLD-MCNC: 33 UMOL/L — SIGNIFICANT CHANGE UP (ref 11–55)
AMMONIA BLD-MCNC: 38 UMOL/L — SIGNIFICANT CHANGE UP (ref 11–55)
AMMONIA BLD-MCNC: 39 UMOL/L — SIGNIFICANT CHANGE UP (ref 11–55)
AMMONIA BLD-MCNC: 49 UMOL/L — SIGNIFICANT CHANGE UP (ref 11–55)
AMMONIA BLD-MCNC: 51 UMOL/L — SIGNIFICANT CHANGE UP (ref 11–55)
AMPHET UR-MCNC: NEGATIVE — SIGNIFICANT CHANGE UP
AMPHET UR-MCNC: NEGATIVE — SIGNIFICANT CHANGE UP
ANION GAP SERPL CALC-SCNC: 10 MMOL/L — SIGNIFICANT CHANGE UP (ref 5–17)
ANION GAP SERPL CALC-SCNC: 11 MMOL/L — SIGNIFICANT CHANGE UP (ref 5–17)
ANION GAP SERPL CALC-SCNC: 12 MMOL/L — SIGNIFICANT CHANGE UP (ref 5–17)
ANION GAP SERPL CALC-SCNC: 13 MMO/L — SIGNIFICANT CHANGE UP (ref 7–14)
ANION GAP SERPL CALC-SCNC: 13 MMOL/L — SIGNIFICANT CHANGE UP (ref 5–17)
ANION GAP SERPL CALC-SCNC: 14 MMO/L — SIGNIFICANT CHANGE UP (ref 7–14)
ANION GAP SERPL CALC-SCNC: 15 MMO/L — HIGH (ref 7–14)
ANION GAP SERPL CALC-SCNC: 15 MMO/L — HIGH (ref 7–14)
ANION GAP SERPL CALC-SCNC: 15 MMOL/L — SIGNIFICANT CHANGE UP (ref 5–17)
ANION GAP SERPL CALC-SCNC: 16 MMO/L — HIGH (ref 7–14)
ANION GAP SERPL CALC-SCNC: 16 MMOL/L — SIGNIFICANT CHANGE UP (ref 5–17)
ANION GAP SERPL CALC-SCNC: 17 MMOL/L — SIGNIFICANT CHANGE UP (ref 5–17)
ANION GAP SERPL CALC-SCNC: 18 MMO/L — HIGH (ref 7–14)
ANION GAP SERPL CALC-SCNC: 20 MMOL/L — HIGH (ref 5–17)
ANION GAP SERPL CALC-SCNC: 21 MMO/L — HIGH (ref 7–14)
ANION GAP SERPL CALC-SCNC: 23 MMO/L — HIGH (ref 7–14)
ANION GAP SERPL CALC-SCNC: 28 MMO/L — HIGH (ref 7–14)
ANION GAP SERPL CALC-SCNC: 29 MMOL/L — HIGH (ref 5–17)
ANION GAP SERPL CALC-SCNC: 32 MMO/L — HIGH (ref 7–14)
APAP SERPL-MCNC: < 15 UG/ML — LOW (ref 15–25)
APPEARANCE UR: CLEAR — SIGNIFICANT CHANGE UP
APTT BLD: 30.6 SEC — SIGNIFICANT CHANGE UP (ref 27.5–35.5)
APTT BLD: 31.5 SEC — SIGNIFICANT CHANGE UP (ref 27.5–35.5)
APTT BLD: 32.3 SEC — SIGNIFICANT CHANGE UP (ref 27.5–35.5)
APTT BLD: 32.7 SEC — SIGNIFICANT CHANGE UP (ref 27.5–35.5)
APTT BLD: 33.3 SEC — SIGNIFICANT CHANGE UP (ref 27.5–35.5)
APTT BLD: 33.5 SEC — SIGNIFICANT CHANGE UP (ref 27.5–36.3)
APTT BLD: 33.5 SEC — SIGNIFICANT CHANGE UP (ref 27.5–36.3)
APTT BLD: 33.9 SEC — SIGNIFICANT CHANGE UP (ref 27.5–36.3)
APTT BLD: 34.6 SEC — SIGNIFICANT CHANGE UP (ref 27.5–36.3)
APTT BLD: 34.7 SEC — SIGNIFICANT CHANGE UP (ref 27.5–36.3)
APTT BLD: 34.9 SEC — SIGNIFICANT CHANGE UP (ref 27.5–36.3)
APTT BLD: 35.3 SEC — SIGNIFICANT CHANGE UP (ref 27.5–35.5)
APTT BLD: 36.5 SEC — HIGH (ref 27.5–36.3)
APTT BLD: 37 SEC — HIGH (ref 27.5–35.5)
APTT BLD: 37.9 SEC — HIGH (ref 27.5–35.5)
APTT BLD: 37.9 SEC — HIGH (ref 27.5–35.5)
APTT BLD: 39.2 SEC — HIGH (ref 27.5–35.5)
APTT BLD: 39.7 SEC — HIGH (ref 27.5–35.5)
APTT BLD: 41.5 SEC — HIGH (ref 27.5–35.5)
AST SERPL-CCNC: 102 U/L — HIGH (ref 4–40)
AST SERPL-CCNC: 113 U/L — HIGH (ref 10–40)
AST SERPL-CCNC: 120 U/L — HIGH (ref 10–40)
AST SERPL-CCNC: 126 U/L — HIGH (ref 10–40)
AST SERPL-CCNC: 143 U/L — HIGH (ref 4–40)
AST SERPL-CCNC: 146 U/L — HIGH (ref 4–40)
AST SERPL-CCNC: 147 U/L — HIGH (ref 10–40)
AST SERPL-CCNC: 147 U/L — HIGH (ref 4–40)
AST SERPL-CCNC: 151 U/L — HIGH (ref 4–40)
AST SERPL-CCNC: 202 U/L — HIGH (ref 4–40)
AST SERPL-CCNC: 235 U/L — HIGH (ref 4–40)
AST SERPL-CCNC: 24 U/L — SIGNIFICANT CHANGE UP (ref 10–40)
AST SERPL-CCNC: 26 U/L — SIGNIFICANT CHANGE UP (ref 10–40)
AST SERPL-CCNC: 26 U/L — SIGNIFICANT CHANGE UP (ref 4–40)
AST SERPL-CCNC: 273 U/L — HIGH (ref 4–40)
AST SERPL-CCNC: 281 U/L — HIGH (ref 4–40)
AST SERPL-CCNC: 296 U/L — HIGH (ref 4–40)
AST SERPL-CCNC: 33 U/L — SIGNIFICANT CHANGE UP (ref 10–40)
AST SERPL-CCNC: 41 U/L — HIGH (ref 10–40)
AST SERPL-CCNC: 43 U/L — HIGH (ref 10–40)
AST SERPL-CCNC: 44 U/L — HIGH (ref 10–40)
AST SERPL-CCNC: 44 U/L — HIGH (ref 10–40)
AST SERPL-CCNC: 45 U/L — HIGH (ref 10–40)
AST SERPL-CCNC: 47 U/L — HIGH (ref 10–40)
AST SERPL-CCNC: 55 U/L — HIGH (ref 10–40)
AST SERPL-CCNC: 58 U/L — HIGH (ref 10–40)
AST SERPL-CCNC: 60 U/L — HIGH (ref 10–40)
AST SERPL-CCNC: 64 U/L — HIGH (ref 10–40)
AST SERPL-CCNC: 78 U/L — HIGH (ref 10–40)
AST SERPL-CCNC: 80 U/L — HIGH (ref 4–40)
AST SERPL-CCNC: 82 U/L — HIGH (ref 10–40)
AST SERPL-CCNC: 87 U/L — HIGH (ref 4–40)
AST SERPL-CCNC: 90 U/L — HIGH (ref 10–40)
AST SERPL-CCNC: 91 U/L — HIGH (ref 4–40)
BACTERIA # UR AUTO: ABNORMAL
BACTERIA # UR AUTO: NEGATIVE — SIGNIFICANT CHANGE UP
BARBITURATES UR SCN-MCNC: NEGATIVE — SIGNIFICANT CHANGE UP
BARBITURATES UR SCN-MCNC: POSITIVE
BASE EXCESS BLDV CALC-SCNC: -0.2 MMOL/L — SIGNIFICANT CHANGE UP (ref -2–2)
BASOPHILS # BLD AUTO: 0 K/UL — SIGNIFICANT CHANGE UP (ref 0–0.2)
BASOPHILS # BLD AUTO: 0.03 K/UL — SIGNIFICANT CHANGE UP (ref 0–0.2)
BASOPHILS # BLD AUTO: 0.04 K/UL — SIGNIFICANT CHANGE UP (ref 0–0.2)
BASOPHILS # BLD AUTO: 0.05 K/UL — SIGNIFICANT CHANGE UP (ref 0–0.2)
BASOPHILS # BLD AUTO: 0.05 K/UL — SIGNIFICANT CHANGE UP (ref 0–0.2)
BASOPHILS # BLD AUTO: 0.13 K/UL — SIGNIFICANT CHANGE UP (ref 0–0.2)
BASOPHILS # BLD AUTO: 0.18 K/UL — SIGNIFICANT CHANGE UP (ref 0–0.2)
BASOPHILS NFR BLD AUTO: 0 % — SIGNIFICANT CHANGE UP (ref 0–2)
BASOPHILS NFR BLD AUTO: 0.4 % — SIGNIFICANT CHANGE UP (ref 0–2)
BASOPHILS NFR BLD AUTO: 0.9 % — SIGNIFICANT CHANGE UP (ref 0–2)
BASOPHILS NFR BLD AUTO: 1.2 % — SIGNIFICANT CHANGE UP (ref 0–2)
BASOPHILS NFR BLD AUTO: 1.3 % — SIGNIFICANT CHANGE UP (ref 0–2)
BASOPHILS NFR BLD AUTO: 1.5 % — SIGNIFICANT CHANGE UP (ref 0–2)
BASOPHILS NFR BLD AUTO: 1.8 % — SIGNIFICANT CHANGE UP (ref 0–2)
BASOPHILS NFR SPEC: 2 % — SIGNIFICANT CHANGE UP (ref 0–2)
BENZODIAZ UR-MCNC: NEGATIVE — SIGNIFICANT CHANGE UP
BENZODIAZ UR-MCNC: NEGATIVE — SIGNIFICANT CHANGE UP
BILIRUB DIRECT SERPL-MCNC: 3.1 MG/DL — HIGH (ref 0.1–0.2)
BILIRUB SERPL-MCNC: 0.4 MG/DL — SIGNIFICANT CHANGE UP (ref 0.2–1.2)
BILIRUB SERPL-MCNC: 0.5 MG/DL — SIGNIFICANT CHANGE UP (ref 0.2–1.2)
BILIRUB SERPL-MCNC: 0.6 MG/DL — SIGNIFICANT CHANGE UP (ref 0.2–1.2)
BILIRUB SERPL-MCNC: 0.7 MG/DL — SIGNIFICANT CHANGE UP (ref 0.2–1.2)
BILIRUB SERPL-MCNC: 0.8 MG/DL — SIGNIFICANT CHANGE UP (ref 0.2–1.2)
BILIRUB SERPL-MCNC: 1 MG/DL — SIGNIFICANT CHANGE UP (ref 0.2–1.2)
BILIRUB SERPL-MCNC: 1.3 MG/DL — HIGH (ref 0.2–1.2)
BILIRUB SERPL-MCNC: 1.4 MG/DL — HIGH (ref 0.2–1.2)
BILIRUB SERPL-MCNC: 1.6 MG/DL — HIGH (ref 0.2–1.2)
BILIRUB SERPL-MCNC: 3.2 MG/DL — HIGH (ref 0.2–1.2)
BILIRUB SERPL-MCNC: 3.5 MG/DL — HIGH (ref 0.2–1.2)
BILIRUB SERPL-MCNC: 3.6 MG/DL — HIGH (ref 0.2–1.2)
BILIRUB SERPL-MCNC: 4.1 MG/DL — HIGH (ref 0.2–1.2)
BILIRUB SERPL-MCNC: 4.1 MG/DL — HIGH (ref 0.2–1.2)
BILIRUB SERPL-MCNC: 4.2 MG/DL — HIGH (ref 0.2–1.2)
BILIRUB SERPL-MCNC: 5 MG/DL — HIGH (ref 0.2–1.2)
BILIRUB SERPL-MCNC: 5.1 MG/DL — HIGH (ref 0.2–1.2)
BILIRUB SERPL-MCNC: 5.2 MG/DL — HIGH (ref 0.2–1.2)
BILIRUB SERPL-MCNC: 5.5 MG/DL — HIGH (ref 0.2–1.2)
BILIRUB SERPL-MCNC: 5.6 MG/DL — HIGH (ref 0.2–1.2)
BILIRUB SERPL-MCNC: 5.7 MG/DL — HIGH (ref 0.2–1.2)
BILIRUB SERPL-MCNC: 6.1 MG/DL — HIGH (ref 0.2–1.2)
BILIRUB UR-MCNC: NEGATIVE — SIGNIFICANT CHANGE UP
BLD GP AB SCN SERPL QL: NEGATIVE — SIGNIFICANT CHANGE UP
BUN SERPL-MCNC: 10 MG/DL — SIGNIFICANT CHANGE UP (ref 7–23)
BUN SERPL-MCNC: 12 MG/DL — SIGNIFICANT CHANGE UP (ref 7–23)
BUN SERPL-MCNC: 13 MG/DL — SIGNIFICANT CHANGE UP (ref 7–23)
BUN SERPL-MCNC: 14 MG/DL — SIGNIFICANT CHANGE UP (ref 7–23)
BUN SERPL-MCNC: 15 MG/DL — SIGNIFICANT CHANGE UP (ref 7–23)
BUN SERPL-MCNC: 21 MG/DL — SIGNIFICANT CHANGE UP (ref 7–23)
BUN SERPL-MCNC: 23 MG/DL — SIGNIFICANT CHANGE UP (ref 7–23)
BUN SERPL-MCNC: 3 MG/DL — LOW (ref 7–23)
BUN SERPL-MCNC: 4 MG/DL — LOW (ref 7–23)
BUN SERPL-MCNC: 5 MG/DL — LOW (ref 7–23)
BUN SERPL-MCNC: 6 MG/DL — LOW (ref 7–23)
BUN SERPL-MCNC: 7 MG/DL — SIGNIFICANT CHANGE UP (ref 7–23)
BUN SERPL-MCNC: 8 MG/DL — SIGNIFICANT CHANGE UP (ref 7–23)
BUN SERPL-MCNC: <4 MG/DL — LOW (ref 7–23)
C DIFF GDH STL QL: POSITIVE — SIGNIFICANT CHANGE UP
C DIFF GDH STL QL: SIGNIFICANT CHANGE UP
CA-I SERPL-SCNC: 1.18 MMOL/L — SIGNIFICANT CHANGE UP (ref 1.12–1.3)
CALCIUM SERPL-MCNC: 7.7 MG/DL — LOW (ref 8.4–10.5)
CALCIUM SERPL-MCNC: 7.8 MG/DL — LOW (ref 8.4–10.5)
CALCIUM SERPL-MCNC: 7.9 MG/DL — LOW (ref 8.4–10.5)
CALCIUM SERPL-MCNC: 8.1 MG/DL — LOW (ref 8.4–10.5)
CALCIUM SERPL-MCNC: 8.1 MG/DL — LOW (ref 8.4–10.5)
CALCIUM SERPL-MCNC: 8.3 MG/DL — LOW (ref 8.4–10.5)
CALCIUM SERPL-MCNC: 8.4 MG/DL — SIGNIFICANT CHANGE UP (ref 8.4–10.5)
CALCIUM SERPL-MCNC: 8.5 MG/DL — SIGNIFICANT CHANGE UP (ref 8.4–10.5)
CALCIUM SERPL-MCNC: 8.5 MG/DL — SIGNIFICANT CHANGE UP (ref 8.4–10.5)
CALCIUM SERPL-MCNC: 8.6 MG/DL — SIGNIFICANT CHANGE UP (ref 8.4–10.5)
CALCIUM SERPL-MCNC: 8.8 MG/DL — SIGNIFICANT CHANGE UP (ref 8.4–10.5)
CALCIUM SERPL-MCNC: 8.9 MG/DL — SIGNIFICANT CHANGE UP (ref 8.4–10.5)
CALCIUM SERPL-MCNC: 9 MG/DL — SIGNIFICANT CHANGE UP (ref 8.4–10.5)
CALCIUM SERPL-MCNC: 9.1 MG/DL — SIGNIFICANT CHANGE UP (ref 8.4–10.5)
CALCIUM SERPL-MCNC: 9.2 MG/DL — SIGNIFICANT CHANGE UP (ref 8.4–10.5)
CALCIUM SERPL-MCNC: 9.5 MG/DL — SIGNIFICANT CHANGE UP (ref 8.4–10.5)
CALCIUM SERPL-MCNC: 9.8 MG/DL — SIGNIFICANT CHANGE UP (ref 8.4–10.5)
CANNABINOIDS UR-MCNC: NEGATIVE — SIGNIFICANT CHANGE UP
CHLORIDE BLDV-SCNC: 108 MMOL/L — SIGNIFICANT CHANGE UP (ref 96–108)
CHLORIDE SERPL-SCNC: 100 MMOL/L — SIGNIFICANT CHANGE UP (ref 96–108)
CHLORIDE SERPL-SCNC: 100 MMOL/L — SIGNIFICANT CHANGE UP (ref 98–107)
CHLORIDE SERPL-SCNC: 101 MMOL/L — SIGNIFICANT CHANGE UP (ref 96–108)
CHLORIDE SERPL-SCNC: 101 MMOL/L — SIGNIFICANT CHANGE UP (ref 98–107)
CHLORIDE SERPL-SCNC: 101 MMOL/L — SIGNIFICANT CHANGE UP (ref 98–107)
CHLORIDE SERPL-SCNC: 102 MMOL/L — SIGNIFICANT CHANGE UP (ref 96–108)
CHLORIDE SERPL-SCNC: 102 MMOL/L — SIGNIFICANT CHANGE UP (ref 96–108)
CHLORIDE SERPL-SCNC: 102 MMOL/L — SIGNIFICANT CHANGE UP (ref 98–107)
CHLORIDE SERPL-SCNC: 103 MMOL/L — SIGNIFICANT CHANGE UP (ref 96–108)
CHLORIDE SERPL-SCNC: 103 MMOL/L — SIGNIFICANT CHANGE UP (ref 98–107)
CHLORIDE SERPL-SCNC: 104 MMOL/L — SIGNIFICANT CHANGE UP (ref 96–108)
CHLORIDE SERPL-SCNC: 105 MMOL/L — SIGNIFICANT CHANGE UP (ref 96–108)
CHLORIDE SERPL-SCNC: 105 MMOL/L — SIGNIFICANT CHANGE UP (ref 96–108)
CHLORIDE SERPL-SCNC: 90 MMOL/L — LOW (ref 98–107)
CHLORIDE SERPL-SCNC: 94 MMOL/L — LOW (ref 98–107)
CHLORIDE SERPL-SCNC: 95 MMOL/L — LOW (ref 98–107)
CHLORIDE SERPL-SCNC: 96 MMOL/L — SIGNIFICANT CHANGE UP (ref 96–108)
CHLORIDE SERPL-SCNC: 97 MMOL/L — LOW (ref 98–107)
CHLORIDE SERPL-SCNC: 97 MMOL/L — SIGNIFICANT CHANGE UP (ref 96–108)
CHLORIDE SERPL-SCNC: 98 MMOL/L — SIGNIFICANT CHANGE UP (ref 96–108)
CHLORIDE SERPL-SCNC: 99 MMOL/L — SIGNIFICANT CHANGE UP (ref 96–108)
CHLORIDE SERPL-SCNC: 99 MMOL/L — SIGNIFICANT CHANGE UP (ref 96–108)
CHLORIDE SERPL-SCNC: 99 MMOL/L — SIGNIFICANT CHANGE UP (ref 98–107)
CHLORIDE SERPL-SCNC: 99 MMOL/L — SIGNIFICANT CHANGE UP (ref 98–107)
CO2 BLDV-SCNC: 26 MMOL/L — SIGNIFICANT CHANGE UP (ref 22–30)
CO2 SERPL-SCNC: 17 MMOL/L — LOW (ref 22–31)
CO2 SERPL-SCNC: 18 MMOL/L — LOW (ref 22–31)
CO2 SERPL-SCNC: 18 MMOL/L — LOW (ref 22–31)
CO2 SERPL-SCNC: 19 MMOL/L — LOW (ref 22–31)
CO2 SERPL-SCNC: 20 MMOL/L — LOW (ref 22–31)
CO2 SERPL-SCNC: 21 MMOL/L — LOW (ref 22–31)
CO2 SERPL-SCNC: 22 MMOL/L — SIGNIFICANT CHANGE UP (ref 22–31)
CO2 SERPL-SCNC: 23 MMOL/L — SIGNIFICANT CHANGE UP (ref 22–31)
CO2 SERPL-SCNC: 24 MMOL/L — SIGNIFICANT CHANGE UP (ref 22–31)
CO2 SERPL-SCNC: 25 MMOL/L — SIGNIFICANT CHANGE UP (ref 22–31)
CO2 SERPL-SCNC: 28 MMOL/L — SIGNIFICANT CHANGE UP (ref 22–31)
COCAINE METAB.OTHER UR-MCNC: NEGATIVE — SIGNIFICANT CHANGE UP
COCAINE METAB.OTHER UR-MCNC: NEGATIVE — SIGNIFICANT CHANGE UP
COLOR SPEC: SIGNIFICANT CHANGE UP
COLOR SPEC: YELLOW — SIGNIFICANT CHANGE UP
COLOR SPEC: YELLOW — SIGNIFICANT CHANGE UP
CREAT SERPL-MCNC: 0.46 MG/DL — LOW (ref 0.5–1.3)
CREAT SERPL-MCNC: 0.47 MG/DL — LOW (ref 0.5–1.3)
CREAT SERPL-MCNC: 0.51 MG/DL — SIGNIFICANT CHANGE UP (ref 0.5–1.3)
CREAT SERPL-MCNC: 0.52 MG/DL — SIGNIFICANT CHANGE UP (ref 0.5–1.3)
CREAT SERPL-MCNC: 0.53 MG/DL — SIGNIFICANT CHANGE UP (ref 0.5–1.3)
CREAT SERPL-MCNC: 0.55 MG/DL — SIGNIFICANT CHANGE UP (ref 0.5–1.3)
CREAT SERPL-MCNC: 0.56 MG/DL — SIGNIFICANT CHANGE UP (ref 0.5–1.3)
CREAT SERPL-MCNC: 0.58 MG/DL — SIGNIFICANT CHANGE UP (ref 0.5–1.3)
CREAT SERPL-MCNC: 0.59 MG/DL — SIGNIFICANT CHANGE UP (ref 0.5–1.3)
CREAT SERPL-MCNC: 0.59 MG/DL — SIGNIFICANT CHANGE UP (ref 0.5–1.3)
CREAT SERPL-MCNC: 0.61 MG/DL — SIGNIFICANT CHANGE UP (ref 0.5–1.3)
CREAT SERPL-MCNC: 0.61 MG/DL — SIGNIFICANT CHANGE UP (ref 0.5–1.3)
CREAT SERPL-MCNC: 0.62 MG/DL — SIGNIFICANT CHANGE UP (ref 0.5–1.3)
CREAT SERPL-MCNC: 0.63 MG/DL — SIGNIFICANT CHANGE UP (ref 0.5–1.3)
CREAT SERPL-MCNC: 0.63 MG/DL — SIGNIFICANT CHANGE UP (ref 0.5–1.3)
CREAT SERPL-MCNC: 0.64 MG/DL — SIGNIFICANT CHANGE UP (ref 0.5–1.3)
CREAT SERPL-MCNC: 0.64 MG/DL — SIGNIFICANT CHANGE UP (ref 0.5–1.3)
CREAT SERPL-MCNC: 0.66 MG/DL — SIGNIFICANT CHANGE UP (ref 0.5–1.3)
CREAT SERPL-MCNC: 0.67 MG/DL — SIGNIFICANT CHANGE UP (ref 0.5–1.3)
CREAT SERPL-MCNC: 0.68 MG/DL — SIGNIFICANT CHANGE UP (ref 0.5–1.3)
CREAT SERPL-MCNC: 0.7 MG/DL — SIGNIFICANT CHANGE UP (ref 0.5–1.3)
CREAT SERPL-MCNC: 0.71 MG/DL — SIGNIFICANT CHANGE UP (ref 0.5–1.3)
CREAT SERPL-MCNC: 0.72 MG/DL — SIGNIFICANT CHANGE UP (ref 0.5–1.3)
CREAT SERPL-MCNC: 0.72 MG/DL — SIGNIFICANT CHANGE UP (ref 0.5–1.3)
CREAT SERPL-MCNC: 0.74 MG/DL — SIGNIFICANT CHANGE UP (ref 0.5–1.3)
CREAT SERPL-MCNC: 0.77 MG/DL — SIGNIFICANT CHANGE UP (ref 0.5–1.3)
CREAT SERPL-MCNC: 0.79 MG/DL — SIGNIFICANT CHANGE UP (ref 0.5–1.3)
CREAT SERPL-MCNC: 0.82 MG/DL — SIGNIFICANT CHANGE UP (ref 0.5–1.3)
CREAT SERPL-MCNC: 0.84 MG/DL — SIGNIFICANT CHANGE UP (ref 0.5–1.3)
CULTURE RESULTS: NO GROWTH — SIGNIFICANT CHANGE UP
CULTURE RESULTS: SIGNIFICANT CHANGE UP
DIFF PNL FLD: NEGATIVE — SIGNIFICANT CHANGE UP
EOSINOPHIL # BLD AUTO: 0 K/UL — SIGNIFICANT CHANGE UP (ref 0–0.5)
EOSINOPHIL # BLD AUTO: 0.04 K/UL — SIGNIFICANT CHANGE UP (ref 0–0.5)
EOSINOPHIL # BLD AUTO: 0.04 K/UL — SIGNIFICANT CHANGE UP (ref 0–0.5)
EOSINOPHIL # BLD AUTO: 0.08 K/UL — SIGNIFICANT CHANGE UP (ref 0–0.5)
EOSINOPHIL # BLD AUTO: 0.09 K/UL — SIGNIFICANT CHANGE UP (ref 0–0.5)
EOSINOPHIL # BLD AUTO: 0.14 K/UL — SIGNIFICANT CHANGE UP (ref 0–0.5)
EOSINOPHIL # BLD AUTO: 0.17 K/UL — SIGNIFICANT CHANGE UP (ref 0–0.5)
EOSINOPHIL NFR BLD AUTO: 0 % — SIGNIFICANT CHANGE UP (ref 0–6)
EOSINOPHIL NFR BLD AUTO: 0.9 % — SIGNIFICANT CHANGE UP (ref 0–6)
EOSINOPHIL NFR BLD AUTO: 0.9 % — SIGNIFICANT CHANGE UP (ref 0–6)
EOSINOPHIL NFR BLD AUTO: 1 % — SIGNIFICANT CHANGE UP (ref 0–6)
EOSINOPHIL NFR BLD AUTO: 1.2 % — SIGNIFICANT CHANGE UP (ref 0–6)
EOSINOPHIL NFR BLD AUTO: 2.3 % — SIGNIFICANT CHANGE UP (ref 0–6)
EOSINOPHIL NFR BLD AUTO: 2.6 % — SIGNIFICANT CHANGE UP (ref 0–6)
EOSINOPHIL NFR FLD: 0 % — SIGNIFICANT CHANGE UP (ref 0–6)
EPI CELLS # UR: 0 /HPF — SIGNIFICANT CHANGE UP
EPI CELLS # UR: 1 /HPF — SIGNIFICANT CHANGE UP
ESTIMATED AVERAGE GLUCOSE: 108 MG/DL — SIGNIFICANT CHANGE UP (ref 68–114)
ETHANOL BLD-MCNC: 402 MG/DL — HIGH
ETHANOL BLD-MCNC: < 10 MG/DL — SIGNIFICANT CHANGE UP
ETHANOL SERPL-MCNC: 313 MG/DL — HIGH (ref 0–10)
GAS PNL BLDV: 139 MMOL/L — SIGNIFICANT CHANGE UP (ref 135–145)
GAS PNL BLDV: SIGNIFICANT CHANGE UP
GIANT PLATELETS BLD QL SMEAR: PRESENT — SIGNIFICANT CHANGE UP
GLUCOSE BLDC GLUCOMTR-MCNC: 104 MG/DL — HIGH (ref 70–99)
GLUCOSE BLDC GLUCOMTR-MCNC: 104 MG/DL — HIGH (ref 70–99)
GLUCOSE BLDC GLUCOMTR-MCNC: 114 MG/DL — HIGH (ref 70–99)
GLUCOSE BLDC GLUCOMTR-MCNC: 117 MG/DL — HIGH (ref 70–99)
GLUCOSE BLDC GLUCOMTR-MCNC: 117 MG/DL — HIGH (ref 70–99)
GLUCOSE BLDC GLUCOMTR-MCNC: 118 MG/DL — HIGH (ref 70–99)
GLUCOSE BLDC GLUCOMTR-MCNC: 128 MG/DL — HIGH (ref 70–99)
GLUCOSE BLDC GLUCOMTR-MCNC: 149 MG/DL — HIGH (ref 70–99)
GLUCOSE BLDV-MCNC: 113 MG/DL — HIGH (ref 70–99)
GLUCOSE SERPL-MCNC: 100 MG/DL — HIGH (ref 70–99)
GLUCOSE SERPL-MCNC: 101 MG/DL — HIGH (ref 70–99)
GLUCOSE SERPL-MCNC: 101 MG/DL — HIGH (ref 70–99)
GLUCOSE SERPL-MCNC: 102 MG/DL — HIGH (ref 70–99)
GLUCOSE SERPL-MCNC: 103 MG/DL — HIGH (ref 70–99)
GLUCOSE SERPL-MCNC: 105 MG/DL — HIGH (ref 70–99)
GLUCOSE SERPL-MCNC: 106 MG/DL — HIGH (ref 70–99)
GLUCOSE SERPL-MCNC: 108 MG/DL — HIGH (ref 70–99)
GLUCOSE SERPL-MCNC: 109 MG/DL — HIGH (ref 70–99)
GLUCOSE SERPL-MCNC: 110 MG/DL — HIGH (ref 70–99)
GLUCOSE SERPL-MCNC: 111 MG/DL — HIGH (ref 70–99)
GLUCOSE SERPL-MCNC: 112 MG/DL — HIGH (ref 70–99)
GLUCOSE SERPL-MCNC: 113 MG/DL — HIGH (ref 70–99)
GLUCOSE SERPL-MCNC: 116 MG/DL — HIGH (ref 70–99)
GLUCOSE SERPL-MCNC: 120 MG/DL — HIGH (ref 70–99)
GLUCOSE SERPL-MCNC: 125 MG/DL — HIGH (ref 70–99)
GLUCOSE SERPL-MCNC: 125 MG/DL — HIGH (ref 70–99)
GLUCOSE SERPL-MCNC: 131 MG/DL — HIGH (ref 70–99)
GLUCOSE SERPL-MCNC: 135 MG/DL — HIGH (ref 70–99)
GLUCOSE SERPL-MCNC: 137 MG/DL — HIGH (ref 70–99)
GLUCOSE SERPL-MCNC: 153 MG/DL — HIGH (ref 70–99)
GLUCOSE SERPL-MCNC: 154 MG/DL — HIGH (ref 70–99)
GLUCOSE SERPL-MCNC: 157 MG/DL — HIGH (ref 70–99)
GLUCOSE SERPL-MCNC: 163 MG/DL — HIGH (ref 70–99)
GLUCOSE SERPL-MCNC: 167 MG/DL — HIGH (ref 70–99)
GLUCOSE SERPL-MCNC: 169 MG/DL — HIGH (ref 70–99)
GLUCOSE SERPL-MCNC: 169 MG/DL — HIGH (ref 70–99)
GLUCOSE SERPL-MCNC: 170 MG/DL — HIGH (ref 70–99)
GLUCOSE SERPL-MCNC: 173 MG/DL — HIGH (ref 70–99)
GLUCOSE SERPL-MCNC: 87 MG/DL — SIGNIFICANT CHANGE UP (ref 70–99)
GLUCOSE SERPL-MCNC: 89 MG/DL — SIGNIFICANT CHANGE UP (ref 70–99)
GLUCOSE SERPL-MCNC: 95 MG/DL — SIGNIFICANT CHANGE UP (ref 70–99)
GLUCOSE UR QL: NEGATIVE — SIGNIFICANT CHANGE UP
HCO3 BLDV-SCNC: 25 MMOL/L — SIGNIFICANT CHANGE UP (ref 21–29)
HCT VFR BLD CALC: 25.8 % — LOW (ref 39–50)
HCT VFR BLD CALC: 26.2 % — LOW (ref 39–50)
HCT VFR BLD CALC: 27.2 % — LOW (ref 39–50)
HCT VFR BLD CALC: 27.3 % — LOW (ref 39–50)
HCT VFR BLD CALC: 27.6 % — LOW (ref 39–50)
HCT VFR BLD CALC: 27.6 % — LOW (ref 39–50)
HCT VFR BLD CALC: 27.8 % — LOW (ref 39–50)
HCT VFR BLD CALC: 28 % — LOW (ref 39–50)
HCT VFR BLD CALC: 28.5 % — LOW (ref 39–50)
HCT VFR BLD CALC: 28.6 % — LOW (ref 39–50)
HCT VFR BLD CALC: 28.7 % — LOW (ref 39–50)
HCT VFR BLD CALC: 29 % — LOW (ref 39–50)
HCT VFR BLD CALC: 29.2 % — LOW (ref 39–50)
HCT VFR BLD CALC: 29.3 % — LOW (ref 39–50)
HCT VFR BLD CALC: 29.7 % — LOW (ref 39–50)
HCT VFR BLD CALC: 29.8 % — LOW (ref 39–50)
HCT VFR BLD CALC: 30 % — LOW (ref 39–50)
HCT VFR BLD CALC: 30 % — LOW (ref 39–50)
HCT VFR BLD CALC: 30.2 % — LOW (ref 39–50)
HCT VFR BLD CALC: 30.2 % — LOW (ref 39–50)
HCT VFR BLD CALC: 30.3 % — LOW (ref 39–50)
HCT VFR BLD CALC: 30.4 % — LOW (ref 39–50)
HCT VFR BLD CALC: 30.9 % — LOW (ref 39–50)
HCT VFR BLD CALC: 31 % — LOW (ref 39–50)
HCT VFR BLD CALC: 31.3 % — LOW (ref 39–50)
HCT VFR BLD CALC: 31.6 % — LOW (ref 39–50)
HCT VFR BLD CALC: 32.1 % — LOW (ref 39–50)
HCT VFR BLD CALC: 32.5 % — LOW (ref 39–50)
HCT VFR BLD CALC: 32.8 % — LOW (ref 39–50)
HCT VFR BLD CALC: 33.7 % — LOW (ref 39–50)
HCT VFR BLD CALC: 37.3 % — LOW (ref 39–50)
HCT VFR BLD CALC: 41.4 % — SIGNIFICANT CHANGE UP (ref 39–50)
HCT VFR BLDA CALC: 39 % — SIGNIFICANT CHANGE UP (ref 39–50)
HEPARINASE TEG R TIME: 6.1 MIN — SIGNIFICANT CHANGE UP (ref 4.3–8.3)
HGB BLD CALC-MCNC: 12.6 G/DL — LOW (ref 13–17)
HGB BLD-MCNC: 10 G/DL — LOW (ref 13–17)
HGB BLD-MCNC: 10.1 G/DL — LOW (ref 13–17)
HGB BLD-MCNC: 10.2 G/DL — LOW (ref 13–17)
HGB BLD-MCNC: 10.2 G/DL — LOW (ref 13–17)
HGB BLD-MCNC: 10.3 G/DL — LOW (ref 13–17)
HGB BLD-MCNC: 10.3 G/DL — LOW (ref 13–17)
HGB BLD-MCNC: 10.5 G/DL — LOW (ref 13–17)
HGB BLD-MCNC: 10.5 G/DL — LOW (ref 13–17)
HGB BLD-MCNC: 10.6 G/DL — LOW (ref 13–17)
HGB BLD-MCNC: 11.1 G/DL — LOW (ref 13–17)
HGB BLD-MCNC: 11.2 G/DL — LOW (ref 13–17)
HGB BLD-MCNC: 12 G/DL — LOW (ref 13–17)
HGB BLD-MCNC: 12.1 G/DL — LOW (ref 13–17)
HGB BLD-MCNC: 13.5 G/DL — SIGNIFICANT CHANGE UP (ref 13–17)
HGB BLD-MCNC: 9 G/DL — LOW (ref 13–17)
HGB BLD-MCNC: 9 G/DL — LOW (ref 13–17)
HGB BLD-MCNC: 9.2 G/DL — LOW (ref 13–17)
HGB BLD-MCNC: 9.3 G/DL — LOW (ref 13–17)
HGB BLD-MCNC: 9.4 G/DL — LOW (ref 13–17)
HGB BLD-MCNC: 9.4 G/DL — LOW (ref 13–17)
HGB BLD-MCNC: 9.6 G/DL — LOW (ref 13–17)
HGB BLD-MCNC: 9.7 G/DL — LOW (ref 13–17)
HGB BLD-MCNC: 9.7 G/DL — LOW (ref 13–17)
HGB BLD-MCNC: 9.8 G/DL — LOW (ref 13–17)
HOROWITZ INDEX BLDV+IHG-RTO: 21 — SIGNIFICANT CHANGE UP
HYALINE CASTS # UR AUTO: 2 /LPF — SIGNIFICANT CHANGE UP (ref 0–2)
IMM GRANULOCYTES NFR BLD AUTO: 0.3 % — SIGNIFICANT CHANGE UP (ref 0–1.5)
IMM GRANULOCYTES NFR BLD AUTO: 0.4 % — SIGNIFICANT CHANGE UP (ref 0–1.5)
IMM GRANULOCYTES NFR BLD AUTO: 0.8 % — SIGNIFICANT CHANGE UP (ref 0–1.5)
IMM GRANULOCYTES NFR BLD AUTO: 1 % — SIGNIFICANT CHANGE UP (ref 0–1.5)
IMM GRANULOCYTES NFR BLD AUTO: 1.2 % — SIGNIFICANT CHANGE UP (ref 0–1.5)
INR BLD: 1.03 — SIGNIFICANT CHANGE UP (ref 0.88–1.17)
INR BLD: 1.11 RATIO — SIGNIFICANT CHANGE UP (ref 0.88–1.16)
INR BLD: 1.15 RATIO — SIGNIFICANT CHANGE UP (ref 0.88–1.16)
INR BLD: 1.18 RATIO — HIGH (ref 0.88–1.16)
INR BLD: 1.19 RATIO — HIGH (ref 0.88–1.16)
INR BLD: 1.21 RATIO — HIGH (ref 0.88–1.16)
INR BLD: 1.23 RATIO — HIGH (ref 0.88–1.16)
INR BLD: 1.26 RATIO — HIGH (ref 0.88–1.16)
INR BLD: 1.26 RATIO — HIGH (ref 0.88–1.16)
INR BLD: 1.3 — HIGH (ref 0.88–1.17)
INR BLD: 1.31 RATIO — HIGH (ref 0.88–1.16)
INR BLD: 1.31 — HIGH (ref 0.88–1.17)
INR BLD: 1.32 RATIO — HIGH (ref 0.88–1.16)
INR BLD: 1.32 RATIO — HIGH (ref 0.88–1.16)
INR BLD: 1.35 RATIO — HIGH (ref 0.88–1.16)
INR BLD: 1.35 — HIGH (ref 0.88–1.17)
INR BLD: 1.35 — HIGH (ref 0.88–1.17)
INR BLD: 1.36 — HIGH (ref 0.88–1.17)
INR BLD: 1.36 — HIGH (ref 0.88–1.17)
INR BLD: 1.41 — HIGH (ref 0.88–1.17)
KETONES UR-MCNC: NEGATIVE — SIGNIFICANT CHANGE UP
LACTATE BLDV-MCNC: 1.4 MMOL/L — SIGNIFICANT CHANGE UP (ref 0.7–2)
LEUKOCYTE ESTERASE UR-ACNC: ABNORMAL
LEUKOCYTE ESTERASE UR-ACNC: NEGATIVE — SIGNIFICANT CHANGE UP
LEUKOCYTE ESTERASE UR-ACNC: NEGATIVE — SIGNIFICANT CHANGE UP
LIDOCAIN IGE QN: 20.7 U/L — SIGNIFICANT CHANGE UP (ref 7–60)
LIDOCAIN IGE QN: 40 U/L — SIGNIFICANT CHANGE UP (ref 7–60)
LYMPHOCYTES # BLD AUTO: 0.9 K/UL — LOW (ref 1–3.3)
LYMPHOCYTES # BLD AUTO: 0.91 K/UL — LOW (ref 1–3.3)
LYMPHOCYTES # BLD AUTO: 0.99 K/UL — LOW (ref 1–3.3)
LYMPHOCYTES # BLD AUTO: 1.01 K/UL — SIGNIFICANT CHANGE UP (ref 1–3.3)
LYMPHOCYTES # BLD AUTO: 1.21 K/UL — SIGNIFICANT CHANGE UP (ref 1–3.3)
LYMPHOCYTES # BLD AUTO: 14.8 % — SIGNIFICANT CHANGE UP (ref 13–44)
LYMPHOCYTES # BLD AUTO: 16.6 % — SIGNIFICANT CHANGE UP (ref 13–44)
LYMPHOCYTES # BLD AUTO: 16.6 % — SIGNIFICANT CHANGE UP (ref 13–44)
LYMPHOCYTES # BLD AUTO: 2.2 K/UL — SIGNIFICANT CHANGE UP (ref 1–3.3)
LYMPHOCYTES # BLD AUTO: 2.31 K/UL — SIGNIFICANT CHANGE UP (ref 1–3.3)
LYMPHOCYTES # BLD AUTO: 22.5 % — SIGNIFICANT CHANGE UP (ref 13–44)
LYMPHOCYTES # BLD AUTO: 25.2 % — SIGNIFICANT CHANGE UP (ref 13–44)
LYMPHOCYTES # BLD AUTO: 25.3 % — SIGNIFICANT CHANGE UP (ref 13–44)
LYMPHOCYTES # BLD AUTO: 26.6 % — SIGNIFICANT CHANGE UP (ref 13–44)
LYMPHOCYTES NFR SPEC AUTO: 12 % — LOW (ref 13–44)
MAGNESIUM SERPL-MCNC: 1.6 MG/DL — SIGNIFICANT CHANGE UP (ref 1.6–2.6)
MAGNESIUM SERPL-MCNC: 1.7 MG/DL — SIGNIFICANT CHANGE UP (ref 1.6–2.6)
MAGNESIUM SERPL-MCNC: 1.8 MG/DL — SIGNIFICANT CHANGE UP (ref 1.6–2.6)
MAGNESIUM SERPL-MCNC: 1.9 MG/DL — SIGNIFICANT CHANGE UP (ref 1.6–2.6)
MAGNESIUM SERPL-MCNC: 2 MG/DL — SIGNIFICANT CHANGE UP (ref 1.6–2.6)
MAGNESIUM SERPL-MCNC: 2.1 MG/DL — SIGNIFICANT CHANGE UP (ref 1.6–2.6)
MAGNESIUM SERPL-MCNC: 2.2 MG/DL — SIGNIFICANT CHANGE UP (ref 1.6–2.6)
MAGNESIUM SERPL-MCNC: 2.3 MG/DL — SIGNIFICANT CHANGE UP (ref 1.6–2.6)
MAGNESIUM SERPL-MCNC: 2.4 MG/DL — SIGNIFICANT CHANGE UP (ref 1.6–2.6)
MANUAL SMEAR VERIFICATION: SIGNIFICANT CHANGE UP
MANUAL SMEAR VERIFICATION: SIGNIFICANT CHANGE UP
MCHC RBC-ENTMCNC: 31 PG — SIGNIFICANT CHANGE UP (ref 27–34)
MCHC RBC-ENTMCNC: 31.2 PG — SIGNIFICANT CHANGE UP (ref 27–34)
MCHC RBC-ENTMCNC: 31.3 PG — SIGNIFICANT CHANGE UP (ref 27–34)
MCHC RBC-ENTMCNC: 31.3 PG — SIGNIFICANT CHANGE UP (ref 27–34)
MCHC RBC-ENTMCNC: 31.5 PG — SIGNIFICANT CHANGE UP (ref 27–34)
MCHC RBC-ENTMCNC: 31.6 PG — SIGNIFICANT CHANGE UP (ref 27–34)
MCHC RBC-ENTMCNC: 31.7 PG — SIGNIFICANT CHANGE UP (ref 27–34)
MCHC RBC-ENTMCNC: 31.8 PG — SIGNIFICANT CHANGE UP (ref 27–34)
MCHC RBC-ENTMCNC: 31.9 PG — SIGNIFICANT CHANGE UP (ref 27–34)
MCHC RBC-ENTMCNC: 31.9 PG — SIGNIFICANT CHANGE UP (ref 27–34)
MCHC RBC-ENTMCNC: 32 PG — SIGNIFICANT CHANGE UP (ref 27–34)
MCHC RBC-ENTMCNC: 32.1 PG — SIGNIFICANT CHANGE UP (ref 27–34)
MCHC RBC-ENTMCNC: 32.2 GM/DL — SIGNIFICANT CHANGE UP (ref 32–36)
MCHC RBC-ENTMCNC: 32.3 PG — SIGNIFICANT CHANGE UP (ref 27–34)
MCHC RBC-ENTMCNC: 32.3 PG — SIGNIFICANT CHANGE UP (ref 27–34)
MCHC RBC-ENTMCNC: 32.4 GM/DL — SIGNIFICANT CHANGE UP (ref 32–36)
MCHC RBC-ENTMCNC: 32.4 PG — SIGNIFICANT CHANGE UP (ref 27–34)
MCHC RBC-ENTMCNC: 32.4 PG — SIGNIFICANT CHANGE UP (ref 27–34)
MCHC RBC-ENTMCNC: 32.5 PG — SIGNIFICANT CHANGE UP (ref 27–34)
MCHC RBC-ENTMCNC: 32.6 % — SIGNIFICANT CHANGE UP (ref 32–36)
MCHC RBC-ENTMCNC: 32.6 GM/DL — SIGNIFICANT CHANGE UP (ref 32–36)
MCHC RBC-ENTMCNC: 32.9 GM/DL — SIGNIFICANT CHANGE UP (ref 32–36)
MCHC RBC-ENTMCNC: 32.9 PG — SIGNIFICANT CHANGE UP (ref 27–34)
MCHC RBC-ENTMCNC: 33.1 % — SIGNIFICANT CHANGE UP (ref 32–36)
MCHC RBC-ENTMCNC: 33.1 GM/DL — SIGNIFICANT CHANGE UP (ref 32–36)
MCHC RBC-ENTMCNC: 33.3 % — SIGNIFICANT CHANGE UP (ref 32–36)
MCHC RBC-ENTMCNC: 33.3 GM/DL — SIGNIFICANT CHANGE UP (ref 32–36)
MCHC RBC-ENTMCNC: 33.3 GM/DL — SIGNIFICANT CHANGE UP (ref 32–36)
MCHC RBC-ENTMCNC: 33.3 PG — SIGNIFICANT CHANGE UP (ref 27–34)
MCHC RBC-ENTMCNC: 33.5 GM/DL — SIGNIFICANT CHANGE UP (ref 32–36)
MCHC RBC-ENTMCNC: 33.5 GM/DL — SIGNIFICANT CHANGE UP (ref 32–36)
MCHC RBC-ENTMCNC: 33.6 % — SIGNIFICANT CHANGE UP (ref 32–36)
MCHC RBC-ENTMCNC: 33.7 % — SIGNIFICANT CHANGE UP (ref 32–36)
MCHC RBC-ENTMCNC: 33.8 % — SIGNIFICANT CHANGE UP (ref 32–36)
MCHC RBC-ENTMCNC: 33.9 GM/DL — SIGNIFICANT CHANGE UP (ref 32–36)
MCHC RBC-ENTMCNC: 34 % — SIGNIFICANT CHANGE UP (ref 32–36)
MCHC RBC-ENTMCNC: 34.1 % — SIGNIFICANT CHANGE UP (ref 32–36)
MCHC RBC-ENTMCNC: 34.1 % — SIGNIFICANT CHANGE UP (ref 32–36)
MCHC RBC-ENTMCNC: 34.4 % — SIGNIFICANT CHANGE UP (ref 32–36)
MCHC RBC-ENTMCNC: 34.4 GM/DL — SIGNIFICANT CHANGE UP (ref 32–36)
MCHC RBC-ENTMCNC: 34.4 GM/DL — SIGNIFICANT CHANGE UP (ref 32–36)
MCHC RBC-ENTMCNC: 34.5 GM/DL — SIGNIFICANT CHANGE UP (ref 32–36)
MCHC RBC-ENTMCNC: 34.6 % — SIGNIFICANT CHANGE UP (ref 32–36)
MCHC RBC-ENTMCNC: 34.6 % — SIGNIFICANT CHANGE UP (ref 32–36)
MCHC RBC-ENTMCNC: 34.6 GM/DL — SIGNIFICANT CHANGE UP (ref 32–36)
MCHC RBC-ENTMCNC: 34.6 GM/DL — SIGNIFICANT CHANGE UP (ref 32–36)
MCHC RBC-ENTMCNC: 34.6 PG — HIGH (ref 27–34)
MCHC RBC-ENTMCNC: 34.9 GM/DL — SIGNIFICANT CHANGE UP (ref 32–36)
MCHC RBC-ENTMCNC: 35 GM/DL — SIGNIFICANT CHANGE UP (ref 32–36)
MCHC RBC-ENTMCNC: 35.2 GM/DL — SIGNIFICANT CHANGE UP (ref 32–36)
MCHC RBC-ENTMCNC: 35.6 GM/DL — SIGNIFICANT CHANGE UP (ref 32–36)
MCHC RBC-ENTMCNC: 37 % — HIGH (ref 32–36)
MCV RBC AUTO: 89.2 FL — SIGNIFICANT CHANGE UP (ref 80–100)
MCV RBC AUTO: 89.6 FL — SIGNIFICANT CHANGE UP (ref 80–100)
MCV RBC AUTO: 91 FL — SIGNIFICANT CHANGE UP (ref 80–100)
MCV RBC AUTO: 91.2 FL — SIGNIFICANT CHANGE UP (ref 80–100)
MCV RBC AUTO: 91.3 FL — SIGNIFICANT CHANGE UP (ref 80–100)
MCV RBC AUTO: 91.6 FL — SIGNIFICANT CHANGE UP (ref 80–100)
MCV RBC AUTO: 91.8 FL — SIGNIFICANT CHANGE UP (ref 80–100)
MCV RBC AUTO: 91.9 FL — SIGNIFICANT CHANGE UP (ref 80–100)
MCV RBC AUTO: 92 FL — SIGNIFICANT CHANGE UP (ref 80–100)
MCV RBC AUTO: 92.7 FL — SIGNIFICANT CHANGE UP (ref 80–100)
MCV RBC AUTO: 93.1 FL — SIGNIFICANT CHANGE UP (ref 80–100)
MCV RBC AUTO: 93.1 FL — SIGNIFICANT CHANGE UP (ref 80–100)
MCV RBC AUTO: 93.4 FL — SIGNIFICANT CHANGE UP (ref 80–100)
MCV RBC AUTO: 93.5 FL — SIGNIFICANT CHANGE UP (ref 80–100)
MCV RBC AUTO: 93.6 FL — SIGNIFICANT CHANGE UP (ref 80–100)
MCV RBC AUTO: 94.1 FL — SIGNIFICANT CHANGE UP (ref 80–100)
MCV RBC AUTO: 95.1 FL — SIGNIFICANT CHANGE UP (ref 80–100)
MCV RBC AUTO: 95.2 FL — SIGNIFICANT CHANGE UP (ref 80–100)
MCV RBC AUTO: 95.2 FL — SIGNIFICANT CHANGE UP (ref 80–100)
MCV RBC AUTO: 95.3 FL — SIGNIFICANT CHANGE UP (ref 80–100)
MCV RBC AUTO: 95.3 FL — SIGNIFICANT CHANGE UP (ref 80–100)
MCV RBC AUTO: 95.4 FL — SIGNIFICANT CHANGE UP (ref 80–100)
MCV RBC AUTO: 95.5 FL — SIGNIFICANT CHANGE UP (ref 80–100)
MCV RBC AUTO: 95.8 FL — SIGNIFICANT CHANGE UP (ref 80–100)
MCV RBC AUTO: 96.2 FL — SIGNIFICANT CHANGE UP (ref 80–100)
MCV RBC AUTO: 96.5 FL — SIGNIFICANT CHANGE UP (ref 80–100)
MCV RBC AUTO: 96.8 FL — SIGNIFICANT CHANGE UP (ref 80–100)
MCV RBC AUTO: 97.1 FL — SIGNIFICANT CHANGE UP (ref 80–100)
MCV RBC AUTO: 97.3 FL — SIGNIFICANT CHANGE UP (ref 80–100)
MCV RBC AUTO: 97.5 FL — SIGNIFICANT CHANGE UP (ref 80–100)
MCV RBC AUTO: 97.7 FL — SIGNIFICANT CHANGE UP (ref 80–100)
MCV RBC AUTO: 98.4 FL — SIGNIFICANT CHANGE UP (ref 80–100)
METHADONE UR-MCNC: NEGATIVE — SIGNIFICANT CHANGE UP
METHADONE UR-MCNC: NEGATIVE — SIGNIFICANT CHANGE UP
METHOD TYPE: SIGNIFICANT CHANGE UP
MONOCYTES # BLD AUTO: 0.46 K/UL — SIGNIFICANT CHANGE UP (ref 0–0.9)
MONOCYTES # BLD AUTO: 0.47 K/UL — SIGNIFICANT CHANGE UP (ref 0–0.9)
MONOCYTES # BLD AUTO: 0.56 K/UL — SIGNIFICANT CHANGE UP (ref 0–0.9)
MONOCYTES # BLD AUTO: 0.61 K/UL — SIGNIFICANT CHANGE UP (ref 0–0.9)
MONOCYTES # BLD AUTO: 0.62 K/UL — SIGNIFICANT CHANGE UP (ref 0–0.9)
MONOCYTES # BLD AUTO: 0.74 K/UL — SIGNIFICANT CHANGE UP (ref 0–0.9)
MONOCYTES # BLD AUTO: 0.98 K/UL — HIGH (ref 0–0.9)
MONOCYTES NFR BLD AUTO: 11.2 % — SIGNIFICANT CHANGE UP (ref 2–14)
MONOCYTES NFR BLD AUTO: 14 % — SIGNIFICANT CHANGE UP (ref 2–14)
MONOCYTES NFR BLD AUTO: 17.8 % — HIGH (ref 2–14)
MONOCYTES NFR BLD AUTO: 6.9 % — SIGNIFICANT CHANGE UP (ref 2–14)
MONOCYTES NFR BLD AUTO: 7.2 % — SIGNIFICANT CHANGE UP (ref 2–14)
MONOCYTES NFR BLD AUTO: 8.5 % — SIGNIFICANT CHANGE UP (ref 2–14)
MONOCYTES NFR BLD AUTO: 8.7 % — SIGNIFICANT CHANGE UP (ref 2–14)
MONOCYTES NFR BLD: 5 % — SIGNIFICANT CHANGE UP (ref 2–9)
MORPHOLOGY BLD-IMP: SIGNIFICANT CHANGE UP
NEUTROPHIL AB SER-ACNC: 80 % — HIGH (ref 43–77)
NEUTROPHILS # BLD AUTO: 1.78 K/UL — LOW (ref 1.8–7.4)
NEUTROPHILS # BLD AUTO: 2.31 K/UL — SIGNIFICANT CHANGE UP (ref 1.8–7.4)
NEUTROPHILS # BLD AUTO: 3.85 K/UL — SIGNIFICANT CHANGE UP (ref 1.8–7.4)
NEUTROPHILS # BLD AUTO: 5.12 K/UL — SIGNIFICANT CHANGE UP (ref 1.8–7.4)
NEUTROPHILS # BLD AUTO: 5.22 K/UL — SIGNIFICANT CHANGE UP (ref 1.8–7.4)
NEUTROPHILS # BLD AUTO: 5.25 K/UL — SIGNIFICANT CHANGE UP (ref 1.8–7.4)
NEUTROPHILS # BLD AUTO: 6.93 K/UL — SIGNIFICANT CHANGE UP (ref 1.8–7.4)
NEUTROPHILS NFR BLD AUTO: 52 % — SIGNIFICANT CHANGE UP (ref 43–77)
NEUTROPHILS NFR BLD AUTO: 57.6 % — SIGNIFICANT CHANGE UP (ref 43–77)
NEUTROPHILS NFR BLD AUTO: 60.2 % — SIGNIFICANT CHANGE UP (ref 43–77)
NEUTROPHILS NFR BLD AUTO: 66.7 % — SIGNIFICANT CHANGE UP (ref 43–77)
NEUTROPHILS NFR BLD AUTO: 69.6 % — SIGNIFICANT CHANGE UP (ref 43–77)
NEUTROPHILS NFR BLD AUTO: 70.8 % — SIGNIFICANT CHANGE UP (ref 43–77)
NEUTROPHILS NFR BLD AUTO: 71.9 % — SIGNIFICANT CHANGE UP (ref 43–77)
NEUTS BAND # BLD: 1 % — SIGNIFICANT CHANGE UP (ref 0–6)
NEUTS BAND # BLD: 7 % — SIGNIFICANT CHANGE UP (ref 0–8)
NITRITE UR-MCNC: NEGATIVE — SIGNIFICANT CHANGE UP
NITRITE UR-MCNC: NEGATIVE — SIGNIFICANT CHANGE UP
NITRITE UR-MCNC: POSITIVE
NRBC # BLD: 0 /100 WBCS — SIGNIFICANT CHANGE UP (ref 0–0)
NRBC # BLD: 0 /100WBC — SIGNIFICANT CHANGE UP
NRBC # FLD: 0 K/UL — SIGNIFICANT CHANGE UP (ref 0–0)
NRBC # FLD: 0.02 K/UL — SIGNIFICANT CHANGE UP (ref 0–0)
OB PNL STL: POSITIVE
OPIATES UR-MCNC: NEGATIVE — SIGNIFICANT CHANGE UP
OPIATES UR-MCNC: NEGATIVE — SIGNIFICANT CHANGE UP
ORGANISM # SPEC MICROSCOPIC CNT: SIGNIFICANT CHANGE UP
ORGANISM # SPEC MICROSCOPIC CNT: SIGNIFICANT CHANGE UP
OTHER CELLS CSF MANUAL: 6 ML/DL — LOW (ref 18–22)
OXYCODONE UR-MCNC: NEGATIVE — SIGNIFICANT CHANGE UP
OXYCODONE UR-MCNC: NEGATIVE — SIGNIFICANT CHANGE UP
PCO2 BLDV: 45 MMHG — SIGNIFICANT CHANGE UP (ref 35–50)
PCP SPEC-MCNC: SIGNIFICANT CHANGE UP
PCP UR-MCNC: NEGATIVE — SIGNIFICANT CHANGE UP
PCP UR-MCNC: NEGATIVE — SIGNIFICANT CHANGE UP
PH BLDV: 7.36 — SIGNIFICANT CHANGE UP (ref 7.35–7.45)
PH UR: 6 — SIGNIFICANT CHANGE UP (ref 5–8)
PH UR: 7 — SIGNIFICANT CHANGE UP (ref 5–8)
PH UR: 7.5 — SIGNIFICANT CHANGE UP (ref 5–8)
PHENOBARB SERPL-MCNC: 26.4 UG/ML — SIGNIFICANT CHANGE UP (ref 15–40)
PHENOBARB SERPL-MCNC: 28.6 UG/ML — SIGNIFICANT CHANGE UP (ref 15–40)
PHENOBARB SERPL-MCNC: 37.3 UG/ML — SIGNIFICANT CHANGE UP (ref 15–40)
PHENOBARB SERPL-MCNC: 39.8 UG/ML — SIGNIFICANT CHANGE UP (ref 15–40)
PHENOBARB SERPL-MCNC: 41.7 UG/ML — HIGH (ref 15–40)
PHENOBARB SERPL-MCNC: 41.7 UG/ML — HIGH (ref 15–40)
PHENOBARB SERPL-MCNC: 48.2 UG/ML — HIGH (ref 15–40)
PHENOBARB SERPL-MCNC: 7.6 UG/ML — LOW (ref 15–40)
PHENOBARB SERPL-MCNC: >55 UG/ML — CRITICAL HIGH (ref 15–40)
PHOSPHATE SERPL-MCNC: 1.8 MG/DL — LOW (ref 2.5–4.5)
PHOSPHATE SERPL-MCNC: 1.8 MG/DL — LOW (ref 2.5–4.5)
PHOSPHATE SERPL-MCNC: 2.1 MG/DL — LOW (ref 2.5–4.5)
PHOSPHATE SERPL-MCNC: 2.2 MG/DL — LOW (ref 2.5–4.5)
PHOSPHATE SERPL-MCNC: 2.4 MG/DL — LOW (ref 2.5–4.5)
PHOSPHATE SERPL-MCNC: 2.4 MG/DL — LOW (ref 2.5–4.5)
PHOSPHATE SERPL-MCNC: 2.5 MG/DL — SIGNIFICANT CHANGE UP (ref 2.5–4.5)
PHOSPHATE SERPL-MCNC: 2.7 MG/DL — SIGNIFICANT CHANGE UP (ref 2.5–4.5)
PHOSPHATE SERPL-MCNC: 2.8 MG/DL — SIGNIFICANT CHANGE UP (ref 2.5–4.5)
PHOSPHATE SERPL-MCNC: 2.9 MG/DL — SIGNIFICANT CHANGE UP (ref 2.5–4.5)
PHOSPHATE SERPL-MCNC: 3.1 MG/DL — SIGNIFICANT CHANGE UP (ref 2.5–4.5)
PHOSPHATE SERPL-MCNC: 3.1 MG/DL — SIGNIFICANT CHANGE UP (ref 2.5–4.5)
PHOSPHATE SERPL-MCNC: 3.2 MG/DL — SIGNIFICANT CHANGE UP (ref 2.5–4.5)
PHOSPHATE SERPL-MCNC: 3.2 MG/DL — SIGNIFICANT CHANGE UP (ref 2.5–4.5)
PHOSPHATE SERPL-MCNC: 3.3 MG/DL — SIGNIFICANT CHANGE UP (ref 2.5–4.5)
PHOSPHATE SERPL-MCNC: 3.4 MG/DL — SIGNIFICANT CHANGE UP (ref 2.5–4.5)
PHOSPHATE SERPL-MCNC: 3.5 MG/DL — SIGNIFICANT CHANGE UP (ref 2.5–4.5)
PHOSPHATE SERPL-MCNC: 3.5 MG/DL — SIGNIFICANT CHANGE UP (ref 2.5–4.5)
PHOSPHATE SERPL-MCNC: 3.6 MG/DL — SIGNIFICANT CHANGE UP (ref 2.5–4.5)
PHOSPHATE SERPL-MCNC: 3.7 MG/DL — SIGNIFICANT CHANGE UP (ref 2.5–4.5)
PHOSPHATE SERPL-MCNC: 3.7 MG/DL — SIGNIFICANT CHANGE UP (ref 2.5–4.5)
PHOSPHATE SERPL-MCNC: 3.8 MG/DL — SIGNIFICANT CHANGE UP (ref 2.5–4.5)
PHOSPHATE SERPL-MCNC: 4 MG/DL — SIGNIFICANT CHANGE UP (ref 2.5–4.5)
PHOSPHATE SERPL-MCNC: 4.2 MG/DL — SIGNIFICANT CHANGE UP (ref 2.5–4.5)
PHOSPHATE SERPL-MCNC: 4.3 MG/DL — SIGNIFICANT CHANGE UP (ref 2.5–4.5)
PHOSPHATE SERPL-MCNC: 4.3 MG/DL — SIGNIFICANT CHANGE UP (ref 2.5–4.5)
PHOSPHATE SERPL-MCNC: 4.6 MG/DL — HIGH (ref 2.5–4.5)
PHOSPHATE SERPL-MCNC: 4.6 MG/DL — HIGH (ref 2.5–4.5)
PLAT MORPH BLD: NORMAL — SIGNIFICANT CHANGE UP
PLATELET # BLD AUTO: 110 K/UL — LOW (ref 150–400)
PLATELET # BLD AUTO: 141 K/UL — LOW (ref 150–400)
PLATELET # BLD AUTO: 147 K/UL — LOW (ref 150–400)
PLATELET # BLD AUTO: 178 K/UL — SIGNIFICANT CHANGE UP (ref 150–400)
PLATELET # BLD AUTO: 179 K/UL — SIGNIFICANT CHANGE UP (ref 150–400)
PLATELET # BLD AUTO: 190 K/UL — SIGNIFICANT CHANGE UP (ref 150–400)
PLATELET # BLD AUTO: 22 K/UL — LOW (ref 150–400)
PLATELET # BLD AUTO: 226 K/UL — SIGNIFICANT CHANGE UP (ref 150–400)
PLATELET # BLD AUTO: 239 K/UL — SIGNIFICANT CHANGE UP (ref 150–400)
PLATELET # BLD AUTO: 267 K/UL — SIGNIFICANT CHANGE UP (ref 150–400)
PLATELET # BLD AUTO: 27 K/UL — LOW (ref 150–400)
PLATELET # BLD AUTO: 280 K/UL — SIGNIFICANT CHANGE UP (ref 150–400)
PLATELET # BLD AUTO: 31 K/UL — LOW (ref 150–400)
PLATELET # BLD AUTO: 32 K/UL — LOW (ref 150–400)
PLATELET # BLD AUTO: 332 K/UL — SIGNIFICANT CHANGE UP (ref 150–400)
PLATELET # BLD AUTO: 341 K/UL — SIGNIFICANT CHANGE UP (ref 150–400)
PLATELET # BLD AUTO: 38 K/UL — LOW (ref 150–400)
PLATELET # BLD AUTO: 42 K/UL — LOW (ref 150–400)
PLATELET # BLD AUTO: 430 K/UL — HIGH (ref 150–400)
PLATELET # BLD AUTO: 44 K/UL — LOW (ref 150–400)
PLATELET # BLD AUTO: 445 K/UL — HIGH (ref 150–400)
PLATELET # BLD AUTO: 45 K/UL — LOW (ref 150–400)
PLATELET # BLD AUTO: 47 K/UL — LOW (ref 150–400)
PLATELET # BLD AUTO: 49 K/UL — LOW (ref 150–400)
PLATELET # BLD AUTO: 495 K/UL — HIGH (ref 150–400)
PLATELET # BLD AUTO: 50 K/UL — LOW (ref 150–400)
PLATELET # BLD AUTO: 50 K/UL — LOW (ref 150–400)
PLATELET # BLD AUTO: 53 K/UL — LOW (ref 150–400)
PLATELET # BLD AUTO: 66 K/UL — LOW (ref 150–400)
PLATELET # BLD AUTO: 77 K/UL — LOW (ref 150–400)
PLATELET # BLD AUTO: 79 K/UL — LOW (ref 150–400)
PLATELET # BLD AUTO: 99 K/UL — LOW (ref 150–400)
PLATELET COUNT - ESTIMATE: SIGNIFICANT CHANGE UP
PLATELET MAPPING ACTF MAX AMPLITUDE: 17.5 MM — SIGNIFICANT CHANGE UP (ref 2–19)
PLATELET MAPPING ADP MAXIMUM AMPLITUDE: 22.5 MM (ref 45–69)
PLATELET MAPPING ADP PERCENT INHIBITION: 83.9 % (ref 0–17)
PLATELET MAPPING HKH MAXIMUM AMPLITUDE: 48.6 MM (ref 53–68)
PMV BLD: 11.1 FL — SIGNIFICANT CHANGE UP (ref 7–13)
PMV BLD: 11.1 FL — SIGNIFICANT CHANGE UP (ref 7–13)
PMV BLD: 11.3 FL — SIGNIFICANT CHANGE UP (ref 7–13)
PMV BLD: 11.4 FL — SIGNIFICANT CHANGE UP (ref 7–13)
PMV BLD: 11.6 FL — SIGNIFICANT CHANGE UP (ref 7–13)
PMV BLD: 11.8 FL — SIGNIFICANT CHANGE UP (ref 7–13)
PMV BLD: 11.8 FL — SIGNIFICANT CHANGE UP (ref 7–13)
PMV BLD: 12 FL — SIGNIFICANT CHANGE UP (ref 7–13)
PMV BLD: 12.1 FL — SIGNIFICANT CHANGE UP (ref 7–13)
PMV BLD: 12.2 FL — SIGNIFICANT CHANGE UP (ref 7–13)
PMV BLD: 12.2 FL — SIGNIFICANT CHANGE UP (ref 7–13)
PO2 BLDV: 26 MMHG — SIGNIFICANT CHANGE UP (ref 25–45)
POTASSIUM BLDV-SCNC: 3.6 MMOL/L — SIGNIFICANT CHANGE UP (ref 3.5–5.3)
POTASSIUM SERPL-MCNC: 2.9 MMOL/L — CRITICAL LOW (ref 3.5–5.3)
POTASSIUM SERPL-MCNC: 3 MMOL/L — LOW (ref 3.5–5.3)
POTASSIUM SERPL-MCNC: 3 MMOL/L — LOW (ref 3.5–5.3)
POTASSIUM SERPL-MCNC: 3.1 MMOL/L — LOW (ref 3.5–5.3)
POTASSIUM SERPL-MCNC: 3.2 MMOL/L — LOW (ref 3.5–5.3)
POTASSIUM SERPL-MCNC: 3.3 MMOL/L — LOW (ref 3.5–5.3)
POTASSIUM SERPL-MCNC: 3.4 MMOL/L — LOW (ref 3.5–5.3)
POTASSIUM SERPL-MCNC: 3.5 MMOL/L — SIGNIFICANT CHANGE UP (ref 3.5–5.3)
POTASSIUM SERPL-MCNC: 3.6 MMOL/L — SIGNIFICANT CHANGE UP (ref 3.5–5.3)
POTASSIUM SERPL-MCNC: 3.6 MMOL/L — SIGNIFICANT CHANGE UP (ref 3.5–5.3)
POTASSIUM SERPL-MCNC: 3.7 MMOL/L — SIGNIFICANT CHANGE UP (ref 3.5–5.3)
POTASSIUM SERPL-MCNC: 3.8 MMOL/L — SIGNIFICANT CHANGE UP (ref 3.5–5.3)
POTASSIUM SERPL-MCNC: 4 MMOL/L — SIGNIFICANT CHANGE UP (ref 3.5–5.3)
POTASSIUM SERPL-MCNC: 4 MMOL/L — SIGNIFICANT CHANGE UP (ref 3.5–5.3)
POTASSIUM SERPL-MCNC: 4.1 MMOL/L — SIGNIFICANT CHANGE UP (ref 3.5–5.3)
POTASSIUM SERPL-MCNC: 4.4 MMOL/L — SIGNIFICANT CHANGE UP (ref 3.5–5.3)
POTASSIUM SERPL-MCNC: 5 MMOL/L — SIGNIFICANT CHANGE UP (ref 3.5–5.3)
POTASSIUM SERPL-SCNC: 2.9 MMOL/L — CRITICAL LOW (ref 3.5–5.3)
POTASSIUM SERPL-SCNC: 3 MMOL/L — LOW (ref 3.5–5.3)
POTASSIUM SERPL-SCNC: 3 MMOL/L — LOW (ref 3.5–5.3)
POTASSIUM SERPL-SCNC: 3.1 MMOL/L — LOW (ref 3.5–5.3)
POTASSIUM SERPL-SCNC: 3.2 MMOL/L — LOW (ref 3.5–5.3)
POTASSIUM SERPL-SCNC: 3.3 MMOL/L — LOW (ref 3.5–5.3)
POTASSIUM SERPL-SCNC: 3.4 MMOL/L — LOW (ref 3.5–5.3)
POTASSIUM SERPL-SCNC: 3.5 MMOL/L — SIGNIFICANT CHANGE UP (ref 3.5–5.3)
POTASSIUM SERPL-SCNC: 3.6 MMOL/L — SIGNIFICANT CHANGE UP (ref 3.5–5.3)
POTASSIUM SERPL-SCNC: 3.6 MMOL/L — SIGNIFICANT CHANGE UP (ref 3.5–5.3)
POTASSIUM SERPL-SCNC: 3.7 MMOL/L — SIGNIFICANT CHANGE UP (ref 3.5–5.3)
POTASSIUM SERPL-SCNC: 3.8 MMOL/L — SIGNIFICANT CHANGE UP (ref 3.5–5.3)
POTASSIUM SERPL-SCNC: 4 MMOL/L — SIGNIFICANT CHANGE UP (ref 3.5–5.3)
POTASSIUM SERPL-SCNC: 4 MMOL/L — SIGNIFICANT CHANGE UP (ref 3.5–5.3)
POTASSIUM SERPL-SCNC: 4.1 MMOL/L — SIGNIFICANT CHANGE UP (ref 3.5–5.3)
POTASSIUM SERPL-SCNC: 4.4 MMOL/L — SIGNIFICANT CHANGE UP (ref 3.5–5.3)
POTASSIUM SERPL-SCNC: 5 MMOL/L — SIGNIFICANT CHANGE UP (ref 3.5–5.3)
PROT SERPL-MCNC: 6 G/DL — SIGNIFICANT CHANGE UP (ref 6–8.3)
PROT SERPL-MCNC: 6.1 G/DL — SIGNIFICANT CHANGE UP (ref 6–8.3)
PROT SERPL-MCNC: 6.2 G/DL — SIGNIFICANT CHANGE UP (ref 6–8.3)
PROT SERPL-MCNC: 6.2 G/DL — SIGNIFICANT CHANGE UP (ref 6–8.3)
PROT SERPL-MCNC: 6.3 G/DL — SIGNIFICANT CHANGE UP (ref 6–8.3)
PROT SERPL-MCNC: 6.3 G/DL — SIGNIFICANT CHANGE UP (ref 6–8.3)
PROT SERPL-MCNC: 6.4 G/DL — SIGNIFICANT CHANGE UP (ref 6–8.3)
PROT SERPL-MCNC: 6.5 G/DL — SIGNIFICANT CHANGE UP (ref 6–8.3)
PROT SERPL-MCNC: 6.6 G/DL — SIGNIFICANT CHANGE UP (ref 6–8.3)
PROT SERPL-MCNC: 6.6 G/DL — SIGNIFICANT CHANGE UP (ref 6–8.3)
PROT SERPL-MCNC: 6.7 G/DL — SIGNIFICANT CHANGE UP (ref 6–8.3)
PROT SERPL-MCNC: 6.8 G/DL — SIGNIFICANT CHANGE UP (ref 6–8.3)
PROT SERPL-MCNC: 6.8 G/DL — SIGNIFICANT CHANGE UP (ref 6–8.3)
PROT SERPL-MCNC: 6.9 G/DL — SIGNIFICANT CHANGE UP (ref 6–8.3)
PROT SERPL-MCNC: 6.9 G/DL — SIGNIFICANT CHANGE UP (ref 6–8.3)
PROT SERPL-MCNC: 7 G/DL — SIGNIFICANT CHANGE UP (ref 6–8.3)
PROT SERPL-MCNC: 7.1 G/DL — SIGNIFICANT CHANGE UP (ref 6–8.3)
PROT SERPL-MCNC: 7.2 G/DL — SIGNIFICANT CHANGE UP (ref 6–8.3)
PROT SERPL-MCNC: 7.2 G/DL — SIGNIFICANT CHANGE UP (ref 6–8.3)
PROT SERPL-MCNC: 7.3 G/DL — SIGNIFICANT CHANGE UP (ref 6–8.3)
PROT SERPL-MCNC: 7.4 G/DL — SIGNIFICANT CHANGE UP (ref 6–8.3)
PROT SERPL-MCNC: 7.5 G/DL — SIGNIFICANT CHANGE UP (ref 6–8.3)
PROT SERPL-MCNC: 7.8 G/DL — SIGNIFICANT CHANGE UP (ref 6–8.3)
PROT SERPL-MCNC: 7.8 G/DL — SIGNIFICANT CHANGE UP (ref 6–8.3)
PROT UR-MCNC: ABNORMAL
PROT UR-MCNC: NEGATIVE — SIGNIFICANT CHANGE UP
PROT UR-MCNC: SIGNIFICANT CHANGE UP
PROTHROM AB SERPL-ACNC: 11.9 SEC — SIGNIFICANT CHANGE UP (ref 9.8–13.1)
PROTHROM AB SERPL-ACNC: 13.3 SEC — SIGNIFICANT CHANGE UP (ref 10.6–13.6)
PROTHROM AB SERPL-ACNC: 13.7 SEC — HIGH (ref 10.6–13.6)
PROTHROM AB SERPL-ACNC: 14 SEC — HIGH (ref 10.6–13.6)
PROTHROM AB SERPL-ACNC: 14.2 SEC — HIGH (ref 10.6–13.6)
PROTHROM AB SERPL-ACNC: 14.3 SEC — HIGH (ref 10.6–13.6)
PROTHROM AB SERPL-ACNC: 14.6 SEC — HIGH (ref 10.6–13.6)
PROTHROM AB SERPL-ACNC: 14.9 SEC — HIGH (ref 10.6–13.6)
PROTHROM AB SERPL-ACNC: 15 SEC — HIGH (ref 10.6–13.6)
PROTHROM AB SERPL-ACNC: 15 SEC — HIGH (ref 9.8–13.1)
PROTHROM AB SERPL-ACNC: 15.1 SEC — HIGH (ref 9.8–13.1)
PROTHROM AB SERPL-ACNC: 15.5 SEC — HIGH (ref 10.6–13.6)
PROTHROM AB SERPL-ACNC: 15.5 SEC — HIGH (ref 9.8–13.1)
PROTHROM AB SERPL-ACNC: 15.6 SEC — HIGH (ref 10.6–13.6)
PROTHROM AB SERPL-ACNC: 15.6 SEC — HIGH (ref 10.6–13.6)
PROTHROM AB SERPL-ACNC: 15.6 SEC — HIGH (ref 9.8–13.1)
PROTHROM AB SERPL-ACNC: 15.7 SEC — HIGH (ref 9.8–13.1)
PROTHROM AB SERPL-ACNC: 15.7 SEC — HIGH (ref 9.8–13.1)
PROTHROM AB SERPL-ACNC: 16 SEC — HIGH (ref 10.6–13.6)
PROTHROM AB SERPL-ACNC: 16.2 SEC — HIGH (ref 9.8–13.1)
RAPIDTEG MAXIMUM AMPLITUDE: 47.1 MM (ref 52–70)
RBC # BLD: 2.82 M/UL — LOW (ref 4.2–5.8)
RBC # BLD: 2.85 M/UL — LOW (ref 4.2–5.8)
RBC # BLD: 2.86 M/UL — LOW (ref 4.2–5.8)
RBC # BLD: 2.87 M/UL — LOW (ref 4.2–5.8)
RBC # BLD: 2.88 M/UL — LOW (ref 4.2–5.8)
RBC # BLD: 2.95 M/UL — LOW (ref 4.2–5.8)
RBC # BLD: 2.97 M/UL — LOW (ref 4.2–5.8)
RBC # BLD: 3 M/UL — LOW (ref 4.2–5.8)
RBC # BLD: 3.05 M/UL — LOW (ref 4.2–5.8)
RBC # BLD: 3.06 M/UL — LOW (ref 4.2–5.8)
RBC # BLD: 3.07 M/UL — LOW (ref 4.2–5.8)
RBC # BLD: 3.11 M/UL — LOW (ref 4.2–5.8)
RBC # BLD: 3.12 M/UL — LOW (ref 4.2–5.8)
RBC # BLD: 3.12 M/UL — LOW (ref 4.2–5.8)
RBC # BLD: 3.15 M/UL — LOW (ref 4.2–5.8)
RBC # BLD: 3.16 M/UL — LOW (ref 4.2–5.8)
RBC # BLD: 3.17 M/UL — LOW (ref 4.2–5.8)
RBC # BLD: 3.21 M/UL — LOW (ref 4.2–5.8)
RBC # BLD: 3.21 M/UL — LOW (ref 4.2–5.8)
RBC # BLD: 3.28 M/UL — LOW (ref 4.2–5.8)
RBC # BLD: 3.31 M/UL — LOW (ref 4.2–5.8)
RBC # BLD: 3.32 M/UL — LOW (ref 4.2–5.8)
RBC # BLD: 3.32 M/UL — LOW (ref 4.2–5.8)
RBC # BLD: 3.33 M/UL — LOW (ref 4.2–5.8)
RBC # BLD: 3.34 M/UL — LOW (ref 4.2–5.8)
RBC # BLD: 3.49 M/UL — LOW (ref 4.2–5.8)
RBC # BLD: 3.54 M/UL — LOW (ref 4.2–5.8)
RBC # BLD: 3.78 M/UL — LOW (ref 4.2–5.8)
RBC # BLD: 3.79 M/UL — LOW (ref 4.2–5.8)
RBC # BLD: 4.32 M/UL — SIGNIFICANT CHANGE UP (ref 4.2–5.8)
RBC # FLD: 12.9 % — SIGNIFICANT CHANGE UP (ref 10.3–14.5)
RBC # FLD: 14.3 % — SIGNIFICANT CHANGE UP (ref 10.3–14.5)
RBC # FLD: 14.5 % — SIGNIFICANT CHANGE UP (ref 10.3–14.5)
RBC # FLD: 14.6 % — HIGH (ref 10.3–14.5)
RBC # FLD: 14.7 % — HIGH (ref 10.3–14.5)
RBC # FLD: 15 % — HIGH (ref 10.3–14.5)
RBC # FLD: 15.3 % — HIGH (ref 10.3–14.5)
RBC # FLD: 15.3 % — HIGH (ref 10.3–14.5)
RBC # FLD: 15.4 % — HIGH (ref 10.3–14.5)
RBC # FLD: 15.5 % — HIGH (ref 10.3–14.5)
RBC # FLD: 15.6 % — HIGH (ref 10.3–14.5)
RBC # FLD: 15.7 % — HIGH (ref 10.3–14.5)
RBC # FLD: 15.9 % — HIGH (ref 10.3–14.5)
RBC # FLD: 15.9 % — HIGH (ref 10.3–14.5)
RBC # FLD: 17 % — HIGH (ref 10.3–14.5)
RBC # FLD: 17.4 % — HIGH (ref 10.3–14.5)
RBC # FLD: 17.4 % — HIGH (ref 10.3–14.5)
RBC # FLD: 17.7 % — HIGH (ref 10.3–14.5)
RBC # FLD: 17.9 % — HIGH (ref 10.3–14.5)
RBC # FLD: 18 % — HIGH (ref 10.3–14.5)
RBC BLD AUTO: SIGNIFICANT CHANGE UP
RBC CASTS # UR COMP ASSIST: 1 /HPF — SIGNIFICANT CHANGE UP (ref 0–4)
RBC CASTS # UR COMP ASSIST: 1 /HPF — SIGNIFICANT CHANGE UP (ref 0–4)
RH IG SCN BLD-IMP: POSITIVE — SIGNIFICANT CHANGE UP
SALICYLATES SERPL-MCNC: < 5 MG/DL — LOW (ref 15–30)
SAO2 % BLDV: 36 % — LOW (ref 67–88)
SARS-COV-2 RNA SPEC QL NAA+PROBE: SIGNIFICANT CHANGE UP
SODIUM SERPL-SCNC: 133 MMOL/L — LOW (ref 135–145)
SODIUM SERPL-SCNC: 134 MMOL/L — LOW (ref 135–145)
SODIUM SERPL-SCNC: 134 MMOL/L — LOW (ref 135–145)
SODIUM SERPL-SCNC: 135 MMOL/L — SIGNIFICANT CHANGE UP (ref 135–145)
SODIUM SERPL-SCNC: 136 MMOL/L — SIGNIFICANT CHANGE UP (ref 135–145)
SODIUM SERPL-SCNC: 137 MMOL/L — SIGNIFICANT CHANGE UP (ref 135–145)
SODIUM SERPL-SCNC: 138 MMOL/L — SIGNIFICANT CHANGE UP (ref 135–145)
SODIUM SERPL-SCNC: 139 MMOL/L — SIGNIFICANT CHANGE UP (ref 135–145)
SODIUM SERPL-SCNC: 140 MMOL/L — SIGNIFICANT CHANGE UP (ref 135–145)
SODIUM SERPL-SCNC: 141 MMOL/L — SIGNIFICANT CHANGE UP (ref 135–145)
SODIUM SERPL-SCNC: 142 MMOL/L — SIGNIFICANT CHANGE UP (ref 135–145)
SODIUM SERPL-SCNC: 142 MMOL/L — SIGNIFICANT CHANGE UP (ref 135–145)
SP GR SPEC: 1.01 — LOW (ref 1.01–1.02)
SP GR SPEC: 1.01 — SIGNIFICANT CHANGE UP (ref 1.01–1.02)
SP GR SPEC: 1.02 — SIGNIFICANT CHANGE UP (ref 1.01–1.02)
SPECIMEN SOURCE: SIGNIFICANT CHANGE UP
TEG FUNCTIONAL FIBRINOGEN: 20.8 MM — SIGNIFICANT CHANGE UP (ref 15–32)
TEG MAXIMUM AMPLITUDE: 44.7 MM (ref 52–69)
TEG REACTION TIME: 6.5 MIN — SIGNIFICANT CHANGE UP (ref 4.6–9.1)
THC UR QL: NEGATIVE — SIGNIFICANT CHANGE UP
TROPONIN T, HIGH SENSITIVITY: 8 NG/L — SIGNIFICANT CHANGE UP (ref ?–14)
TROPONIN T, HIGH SENSITIVITY: 8 NG/L — SIGNIFICANT CHANGE UP (ref ?–14)
UROBILINOGEN FLD QL: NEGATIVE — SIGNIFICANT CHANGE UP
VARIANT LYMPHS # BLD: 1.7 % — SIGNIFICANT CHANGE UP (ref 0–6)
WBC # BLD: 10.25 K/UL — SIGNIFICANT CHANGE UP (ref 3.8–10.5)
WBC # BLD: 3.41 K/UL — LOW (ref 3.8–10.5)
WBC # BLD: 3.42 K/UL — LOW (ref 3.8–10.5)
WBC # BLD: 3.89 K/UL — SIGNIFICANT CHANGE UP (ref 3.8–10.5)
WBC # BLD: 3.95 K/UL — SIGNIFICANT CHANGE UP (ref 3.8–10.5)
WBC # BLD: 4 K/UL — SIGNIFICANT CHANGE UP (ref 3.8–10.5)
WBC # BLD: 4.17 K/UL — SIGNIFICANT CHANGE UP (ref 3.8–10.5)
WBC # BLD: 4.26 K/UL — SIGNIFICANT CHANGE UP (ref 3.8–10.5)
WBC # BLD: 4.33 K/UL — SIGNIFICANT CHANGE UP (ref 3.8–10.5)
WBC # BLD: 4.39 K/UL — SIGNIFICANT CHANGE UP (ref 3.8–10.5)
WBC # BLD: 5.26 K/UL — SIGNIFICANT CHANGE UP (ref 3.8–10.5)
WBC # BLD: 5.33 K/UL — SIGNIFICANT CHANGE UP (ref 3.8–10.5)
WBC # BLD: 5.4 K/UL — SIGNIFICANT CHANGE UP (ref 3.8–10.5)
WBC # BLD: 5.43 K/UL — SIGNIFICANT CHANGE UP (ref 3.8–10.5)
WBC # BLD: 5.72 K/UL — SIGNIFICANT CHANGE UP (ref 3.8–10.5)
WBC # BLD: 5.73 K/UL — SIGNIFICANT CHANGE UP (ref 3.8–10.5)
WBC # BLD: 6.16 K/UL — SIGNIFICANT CHANGE UP (ref 3.8–10.5)
WBC # BLD: 6.21 K/UL — SIGNIFICANT CHANGE UP (ref 3.8–10.5)
WBC # BLD: 6.27 K/UL — SIGNIFICANT CHANGE UP (ref 3.8–10.5)
WBC # BLD: 6.28 K/UL — SIGNIFICANT CHANGE UP (ref 3.8–10.5)
WBC # BLD: 6.4 K/UL — SIGNIFICANT CHANGE UP (ref 3.8–10.5)
WBC # BLD: 6.68 K/UL — SIGNIFICANT CHANGE UP (ref 3.8–10.5)
WBC # BLD: 6.8 K/UL — SIGNIFICANT CHANGE UP (ref 3.8–10.5)
WBC # BLD: 7.2 K/UL — SIGNIFICANT CHANGE UP (ref 3.8–10.5)
WBC # BLD: 7.23 K/UL — SIGNIFICANT CHANGE UP (ref 3.8–10.5)
WBC # BLD: 7.27 K/UL — SIGNIFICANT CHANGE UP (ref 3.8–10.5)
WBC # BLD: 7.31 K/UL — SIGNIFICANT CHANGE UP (ref 3.8–10.5)
WBC # BLD: 7.35 K/UL — SIGNIFICANT CHANGE UP (ref 3.8–10.5)
WBC # BLD: 8.26 K/UL — SIGNIFICANT CHANGE UP (ref 3.8–10.5)
WBC # BLD: 9.1 K/UL — SIGNIFICANT CHANGE UP (ref 3.8–10.5)
WBC # BLD: 9.29 K/UL — SIGNIFICANT CHANGE UP (ref 3.8–10.5)
WBC # BLD: 9.99 K/UL — SIGNIFICANT CHANGE UP (ref 3.8–10.5)
WBC # FLD AUTO: 10.25 K/UL — SIGNIFICANT CHANGE UP (ref 3.8–10.5)
WBC # FLD AUTO: 3.41 K/UL — LOW (ref 3.8–10.5)
WBC # FLD AUTO: 3.42 K/UL — LOW (ref 3.8–10.5)
WBC # FLD AUTO: 3.89 K/UL — SIGNIFICANT CHANGE UP (ref 3.8–10.5)
WBC # FLD AUTO: 3.95 K/UL — SIGNIFICANT CHANGE UP (ref 3.8–10.5)
WBC # FLD AUTO: 4 K/UL — SIGNIFICANT CHANGE UP (ref 3.8–10.5)
WBC # FLD AUTO: 4.17 K/UL — SIGNIFICANT CHANGE UP (ref 3.8–10.5)
WBC # FLD AUTO: 4.26 K/UL — SIGNIFICANT CHANGE UP (ref 3.8–10.5)
WBC # FLD AUTO: 4.33 K/UL — SIGNIFICANT CHANGE UP (ref 3.8–10.5)
WBC # FLD AUTO: 4.39 K/UL — SIGNIFICANT CHANGE UP (ref 3.8–10.5)
WBC # FLD AUTO: 5.26 K/UL — SIGNIFICANT CHANGE UP (ref 3.8–10.5)
WBC # FLD AUTO: 5.33 K/UL — SIGNIFICANT CHANGE UP (ref 3.8–10.5)
WBC # FLD AUTO: 5.4 K/UL — SIGNIFICANT CHANGE UP (ref 3.8–10.5)
WBC # FLD AUTO: 5.43 K/UL — SIGNIFICANT CHANGE UP (ref 3.8–10.5)
WBC # FLD AUTO: 5.72 K/UL — SIGNIFICANT CHANGE UP (ref 3.8–10.5)
WBC # FLD AUTO: 5.73 K/UL — SIGNIFICANT CHANGE UP (ref 3.8–10.5)
WBC # FLD AUTO: 6.16 K/UL — SIGNIFICANT CHANGE UP (ref 3.8–10.5)
WBC # FLD AUTO: 6.21 K/UL — SIGNIFICANT CHANGE UP (ref 3.8–10.5)
WBC # FLD AUTO: 6.27 K/UL — SIGNIFICANT CHANGE UP (ref 3.8–10.5)
WBC # FLD AUTO: 6.28 K/UL — SIGNIFICANT CHANGE UP (ref 3.8–10.5)
WBC # FLD AUTO: 6.4 K/UL — SIGNIFICANT CHANGE UP (ref 3.8–10.5)
WBC # FLD AUTO: 6.68 K/UL — SIGNIFICANT CHANGE UP (ref 3.8–10.5)
WBC # FLD AUTO: 6.8 K/UL — SIGNIFICANT CHANGE UP (ref 3.8–10.5)
WBC # FLD AUTO: 7.2 K/UL — SIGNIFICANT CHANGE UP (ref 3.8–10.5)
WBC # FLD AUTO: 7.23 K/UL — SIGNIFICANT CHANGE UP (ref 3.8–10.5)
WBC # FLD AUTO: 7.27 K/UL — SIGNIFICANT CHANGE UP (ref 3.8–10.5)
WBC # FLD AUTO: 7.31 K/UL — SIGNIFICANT CHANGE UP (ref 3.8–10.5)
WBC # FLD AUTO: 7.35 K/UL — SIGNIFICANT CHANGE UP (ref 3.8–10.5)
WBC # FLD AUTO: 8.26 K/UL — SIGNIFICANT CHANGE UP (ref 3.8–10.5)
WBC # FLD AUTO: 9.1 K/UL — SIGNIFICANT CHANGE UP (ref 3.8–10.5)
WBC # FLD AUTO: 9.29 K/UL — SIGNIFICANT CHANGE UP (ref 3.8–10.5)
WBC # FLD AUTO: 9.99 K/UL — SIGNIFICANT CHANGE UP (ref 3.8–10.5)
WBC UR QL: 11 /HPF — HIGH (ref 0–5)
WBC UR QL: 2 /HPF — SIGNIFICANT CHANGE UP (ref 0–5)

## 2020-01-01 PROCEDURE — 93010 ELECTROCARDIOGRAM REPORT: CPT | Mod: 76

## 2020-01-01 PROCEDURE — 93970 EXTREMITY STUDY: CPT

## 2020-01-01 PROCEDURE — 71045 X-RAY EXAM CHEST 1 VIEW: CPT | Mod: 26

## 2020-01-01 PROCEDURE — 84132 ASSAY OF SERUM POTASSIUM: CPT

## 2020-01-01 PROCEDURE — 80184 ASSAY OF PHENOBARBITAL: CPT

## 2020-01-01 PROCEDURE — P9037: CPT

## 2020-01-01 PROCEDURE — 82435 ASSAY OF BLOOD CHLORIDE: CPT

## 2020-01-01 PROCEDURE — 93010 ELECTROCARDIOGRAM REPORT: CPT

## 2020-01-01 PROCEDURE — 83036 HEMOGLOBIN GLYCOSYLATED A1C: CPT

## 2020-01-01 PROCEDURE — 84295 ASSAY OF SERUM SODIUM: CPT

## 2020-01-01 PROCEDURE — 74018 RADEX ABDOMEN 1 VIEW: CPT

## 2020-01-01 PROCEDURE — 99223 1ST HOSP IP/OBS HIGH 75: CPT

## 2020-01-01 PROCEDURE — 99233 SBSQ HOSP IP/OBS HIGH 50: CPT | Mod: GC

## 2020-01-01 PROCEDURE — 99291 CRITICAL CARE FIRST HOUR: CPT | Mod: 25

## 2020-01-01 PROCEDURE — 72125 CT NECK SPINE W/O DYE: CPT | Mod: 26

## 2020-01-01 PROCEDURE — 83690 ASSAY OF LIPASE: CPT

## 2020-01-01 PROCEDURE — 90686 IIV4 VACC NO PRSV 0.5 ML IM: CPT

## 2020-01-01 PROCEDURE — 70450 CT HEAD/BRAIN W/O DYE: CPT

## 2020-01-01 PROCEDURE — 82803 BLOOD GASES ANY COMBINATION: CPT

## 2020-01-01 PROCEDURE — 72125 CT NECK SPINE W/O DYE: CPT

## 2020-01-01 PROCEDURE — 99223 1ST HOSP IP/OBS HIGH 75: CPT | Mod: GC

## 2020-01-01 PROCEDURE — 99233 SBSQ HOSP IP/OBS HIGH 50: CPT

## 2020-01-01 PROCEDURE — 97161 PT EVAL LOW COMPLEX 20 MIN: CPT

## 2020-01-01 PROCEDURE — 85730 THROMBOPLASTIN TIME PARTIAL: CPT

## 2020-01-01 PROCEDURE — 82962 GLUCOSE BLOOD TEST: CPT

## 2020-01-01 PROCEDURE — 85025 COMPLETE CBC W/AUTO DIFF WBC: CPT

## 2020-01-01 PROCEDURE — 81003 URINALYSIS AUTO W/O SCOPE: CPT

## 2020-01-01 PROCEDURE — 99232 SBSQ HOSP IP/OBS MODERATE 35: CPT | Mod: GC

## 2020-01-01 PROCEDURE — 94640 AIRWAY INHALATION TREATMENT: CPT

## 2020-01-01 PROCEDURE — 99291 CRITICAL CARE FIRST HOUR: CPT

## 2020-01-01 PROCEDURE — 87449 NOS EACH ORGANISM AG IA: CPT

## 2020-01-01 PROCEDURE — 82140 ASSAY OF AMMONIA: CPT

## 2020-01-01 PROCEDURE — G0008: CPT

## 2020-01-01 PROCEDURE — U0003: CPT

## 2020-01-01 PROCEDURE — 80048 BASIC METABOLIC PNL TOTAL CA: CPT

## 2020-01-01 PROCEDURE — 90792 PSYCH DIAG EVAL W/MED SRVCS: CPT

## 2020-01-01 PROCEDURE — 76604 US EXAM CHEST: CPT | Mod: 26,GC

## 2020-01-01 PROCEDURE — 85018 HEMOGLOBIN: CPT

## 2020-01-01 PROCEDURE — 80307 DRUG TEST PRSMV CHEM ANLYZR: CPT

## 2020-01-01 PROCEDURE — 93970 EXTREMITY STUDY: CPT | Mod: 26

## 2020-01-01 PROCEDURE — 87324 CLOSTRIDIUM AG IA: CPT

## 2020-01-01 PROCEDURE — 86850 RBC ANTIBODY SCREEN: CPT

## 2020-01-01 PROCEDURE — 99239 HOSP IP/OBS DSCHRG MGMT >30: CPT | Mod: GC

## 2020-01-01 PROCEDURE — 82947 ASSAY GLUCOSE BLOOD QUANT: CPT

## 2020-01-01 PROCEDURE — 99284 EMERGENCY DEPT VISIT MOD MDM: CPT | Mod: 25

## 2020-01-01 PROCEDURE — 85610 PROTHROMBIN TIME: CPT

## 2020-01-01 PROCEDURE — 83735 ASSAY OF MAGNESIUM: CPT

## 2020-01-01 PROCEDURE — 87635 SARS-COV-2 COVID-19 AMP PRB: CPT

## 2020-01-01 PROCEDURE — 12002 RPR S/N/AX/GEN/TRNK2.6-7.5CM: CPT

## 2020-01-01 PROCEDURE — 76705 ECHO EXAM OF ABDOMEN: CPT | Mod: 26

## 2020-01-01 PROCEDURE — 70450 CT HEAD/BRAIN W/O DYE: CPT | Mod: 26

## 2020-01-01 PROCEDURE — 87040 BLOOD CULTURE FOR BACTERIA: CPT

## 2020-01-01 PROCEDURE — 82272 OCCULT BLD FECES 1-3 TESTS: CPT

## 2020-01-01 PROCEDURE — 85396 CLOTTING ASSAY WHOLE BLOOD: CPT

## 2020-01-01 PROCEDURE — 36430 TRANSFUSION BLD/BLD COMPNT: CPT

## 2020-01-01 PROCEDURE — 84100 ASSAY OF PHOSPHORUS: CPT

## 2020-01-01 PROCEDURE — 93005 ELECTROCARDIOGRAM TRACING: CPT

## 2020-01-01 PROCEDURE — 86900 BLOOD TYPING SEROLOGIC ABO: CPT

## 2020-01-01 PROCEDURE — 99232 SBSQ HOSP IP/OBS MODERATE 35: CPT

## 2020-01-01 PROCEDURE — 71046 X-RAY EXAM CHEST 2 VIEWS: CPT | Mod: 26

## 2020-01-01 PROCEDURE — 82330 ASSAY OF CALCIUM: CPT

## 2020-01-01 PROCEDURE — 81001 URINALYSIS AUTO W/SCOPE: CPT

## 2020-01-01 PROCEDURE — 70450 CT HEAD/BRAIN W/O DYE: CPT | Mod: 26,77

## 2020-01-01 PROCEDURE — 71045 X-RAY EXAM CHEST 1 VIEW: CPT | Mod: 26,77

## 2020-01-01 PROCEDURE — 85014 HEMATOCRIT: CPT

## 2020-01-01 PROCEDURE — 74018 RADEX ABDOMEN 1 VIEW: CPT | Mod: 26

## 2020-01-01 PROCEDURE — 83605 ASSAY OF LACTIC ACID: CPT

## 2020-01-01 PROCEDURE — 93308 TTE F-UP OR LMTD: CPT | Mod: 26,GC

## 2020-01-01 PROCEDURE — 72125 CT NECK SPINE W/O DYE: CPT | Mod: 26,77

## 2020-01-01 PROCEDURE — 76705 ECHO EXAM OF ABDOMEN: CPT | Mod: 26,GC

## 2020-01-01 PROCEDURE — 80053 COMPREHEN METABOLIC PANEL: CPT

## 2020-01-01 PROCEDURE — 85027 COMPLETE CBC AUTOMATED: CPT

## 2020-01-01 PROCEDURE — 87086 URINE CULTURE/COLONY COUNT: CPT

## 2020-01-01 PROCEDURE — 87186 SC STD MICRODIL/AGAR DIL: CPT

## 2020-01-01 PROCEDURE — 71045 X-RAY EXAM CHEST 1 VIEW: CPT

## 2020-01-01 PROCEDURE — 99214 OFFICE O/P EST MOD 30 MIN: CPT | Mod: 25

## 2020-01-01 PROCEDURE — 86901 BLOOD TYPING SEROLOGIC RH(D): CPT

## 2020-01-01 PROCEDURE — 93010 ELECTROCARDIOGRAM REPORT: CPT | Mod: 59

## 2020-01-01 RX ORDER — PHENOBARBITAL 60 MG
130 TABLET ORAL EVERY 6 HOURS
Refills: 0 | Status: DISCONTINUED | OUTPATIENT
Start: 2020-01-01 | End: 2020-01-01

## 2020-01-01 RX ORDER — POTASSIUM CHLORIDE 20 MEQ
40 PACKET (EA) ORAL ONCE
Refills: 0 | Status: COMPLETED | OUTPATIENT
Start: 2020-01-01 | End: 2020-01-01

## 2020-01-01 RX ORDER — SODIUM CHLORIDE 9 MG/ML
1000 INJECTION, SOLUTION INTRAVENOUS
Refills: 0 | Status: DISCONTINUED | OUTPATIENT
Start: 2020-01-01 | End: 2020-01-01

## 2020-01-01 RX ORDER — PANTOPRAZOLE SODIUM 20 MG/1
8 TABLET, DELAYED RELEASE ORAL
Qty: 80 | Refills: 0 | Status: DISCONTINUED | OUTPATIENT
Start: 2020-01-01 | End: 2020-01-01

## 2020-01-01 RX ORDER — POTASSIUM CHLORIDE 20 MEQ
10 PACKET (EA) ORAL
Refills: 0 | Status: COMPLETED | OUTPATIENT
Start: 2020-01-01 | End: 2020-01-01

## 2020-01-01 RX ORDER — TETRACAINE/BENZOCAINE/BUTAMBEN 2%-14%-2%
1 OINTMENT (GRAM) TOPICAL ONCE
Refills: 0 | Status: COMPLETED | OUTPATIENT
Start: 2020-01-01 | End: 2020-01-01

## 2020-01-01 RX ORDER — HALOPERIDOL DECANOATE 100 MG/ML
5 INJECTION INTRAMUSCULAR ONCE
Refills: 0 | Status: COMPLETED | OUTPATIENT
Start: 2020-01-01 | End: 2020-01-01

## 2020-01-01 RX ORDER — POTASSIUM CHLORIDE 20 MEQ
40 PACKET (EA) ORAL DAILY
Refills: 0 | Status: DISCONTINUED | OUTPATIENT
Start: 2020-01-01 | End: 2020-01-01

## 2020-01-01 RX ORDER — THIAMINE MONONITRATE (VIT B1) 100 MG
1 TABLET ORAL
Qty: 30 | Refills: 0
Start: 2020-01-01 | End: 2021-01-01

## 2020-01-01 RX ORDER — CEFTRIAXONE 500 MG/1
1000 INJECTION, POWDER, FOR SOLUTION INTRAMUSCULAR; INTRAVENOUS EVERY 24 HOURS
Refills: 0 | Status: DISCONTINUED | OUTPATIENT
Start: 2020-01-01 | End: 2020-01-01

## 2020-01-01 RX ORDER — PANTOPRAZOLE SODIUM 20 MG/1
1 TABLET, DELAYED RELEASE ORAL
Qty: 30 | Refills: 0
Start: 2020-01-01 | End: 2021-01-01

## 2020-01-01 RX ORDER — OLANZAPINE 15 MG/1
5 TABLET, FILM COATED ORAL ONCE
Refills: 0 | Status: COMPLETED | OUTPATIENT
Start: 2020-01-01 | End: 2020-01-01

## 2020-01-01 RX ORDER — POTASSIUM CHLORIDE 20 MEQ
20 PACKET (EA) ORAL ONCE
Refills: 0 | Status: DISCONTINUED | OUTPATIENT
Start: 2020-01-01 | End: 2020-01-01

## 2020-01-01 RX ORDER — PHENOBARBITAL 60 MG
260 TABLET ORAL ONCE
Refills: 0 | Status: DISCONTINUED | OUTPATIENT
Start: 2020-01-01 | End: 2020-01-01

## 2020-01-01 RX ORDER — FOLIC ACID 0.8 MG
1 TABLET ORAL ONCE
Refills: 0 | Status: COMPLETED | OUTPATIENT
Start: 2020-01-01 | End: 2020-01-01

## 2020-01-01 RX ORDER — GLUCAGON INJECTION, SOLUTION 0.5 MG/.1ML
1 INJECTION, SOLUTION SUBCUTANEOUS ONCE
Refills: 0 | Status: DISCONTINUED | OUTPATIENT
Start: 2020-01-01 | End: 2020-01-01

## 2020-01-01 RX ORDER — DIAZEPAM 5 MG
5 TABLET ORAL ONCE
Refills: 0 | Status: DISCONTINUED | OUTPATIENT
Start: 2020-01-01 | End: 2020-01-01

## 2020-01-01 RX ORDER — MAGNESIUM SULFATE 500 MG/ML
2 VIAL (ML) INJECTION ONCE
Refills: 0 | Status: COMPLETED | OUTPATIENT
Start: 2020-01-01 | End: 2020-01-01

## 2020-01-01 RX ORDER — DEXTROSE 50 % IN WATER 50 %
25 SYRINGE (ML) INTRAVENOUS ONCE
Refills: 0 | Status: DISCONTINUED | OUTPATIENT
Start: 2020-01-01 | End: 2020-01-01

## 2020-01-01 RX ORDER — PHENOBARBITAL 60 MG
455 TABLET ORAL EVERY 4 HOURS
Refills: 0 | Status: DISCONTINUED | OUTPATIENT
Start: 2020-01-01 | End: 2020-01-01

## 2020-01-01 RX ORDER — ERTAPENEM SODIUM 1 G/1
1000 INJECTION, POWDER, LYOPHILIZED, FOR SOLUTION INTRAMUSCULAR; INTRAVENOUS EVERY 24 HOURS
Refills: 0 | Status: COMPLETED | OUTPATIENT
Start: 2020-01-01 | End: 2020-01-01

## 2020-01-01 RX ORDER — DIAZEPAM 5 MG
10 TABLET ORAL ONCE
Refills: 0 | Status: DISCONTINUED | OUTPATIENT
Start: 2020-01-01 | End: 2020-01-01

## 2020-01-01 RX ORDER — DEXTROSE 50 % IN WATER 50 %
12.5 SYRINGE (ML) INTRAVENOUS ONCE
Refills: 0 | Status: DISCONTINUED | OUTPATIENT
Start: 2020-01-01 | End: 2020-01-01

## 2020-01-01 RX ORDER — THIAMINE MONONITRATE (VIT B1) 100 MG
500 TABLET ORAL EVERY 8 HOURS
Refills: 0 | Status: COMPLETED | OUTPATIENT
Start: 2020-01-01 | End: 2020-01-01

## 2020-01-01 RX ORDER — CHLORHEXIDINE GLUCONATE 213 G/1000ML
1 SOLUTION TOPICAL
Refills: 0 | Status: DISCONTINUED | OUTPATIENT
Start: 2020-01-01 | End: 2020-01-01

## 2020-01-01 RX ORDER — ASPIRIN/CALCIUM CARB/MAGNESIUM 324 MG
325 TABLET ORAL ONCE
Refills: 0 | Status: COMPLETED | OUTPATIENT
Start: 2020-01-01 | End: 2020-01-01

## 2020-01-01 RX ORDER — TRAZODONE HCL 50 MG
50 TABLET ORAL AT BEDTIME
Refills: 0 | Status: DISCONTINUED | OUTPATIENT
Start: 2020-01-01 | End: 2020-01-01

## 2020-01-01 RX ORDER — ACETAMINOPHEN 500 MG
650 TABLET ORAL ONCE
Refills: 0 | Status: COMPLETED | OUTPATIENT
Start: 2020-01-01 | End: 2020-01-01

## 2020-01-01 RX ORDER — INSULIN LISPRO 100/ML
VIAL (ML) SUBCUTANEOUS AT BEDTIME
Refills: 0 | Status: DISCONTINUED | OUTPATIENT
Start: 2020-01-01 | End: 2020-01-01

## 2020-01-01 RX ORDER — LANOLIN ALCOHOL/MO/W.PET/CERES
3 CREAM (GRAM) TOPICAL AT BEDTIME
Refills: 0 | Status: DISCONTINUED | OUTPATIENT
Start: 2020-01-01 | End: 2020-01-01

## 2020-01-01 RX ORDER — QUETIAPINE FUMARATE 200 MG/1
25 TABLET, FILM COATED ORAL ONCE
Refills: 0 | Status: COMPLETED | OUTPATIENT
Start: 2020-01-01 | End: 2020-01-01

## 2020-01-01 RX ORDER — PHENOBARBITAL 60 MG
130 TABLET ORAL ONCE
Refills: 0 | Status: DISCONTINUED | OUTPATIENT
Start: 2020-01-01 | End: 2020-01-01

## 2020-01-01 RX ORDER — PIPERACILLIN AND TAZOBACTAM 4; .5 G/20ML; G/20ML
3.38 INJECTION, POWDER, LYOPHILIZED, FOR SOLUTION INTRAVENOUS ONCE
Refills: 0 | Status: DISCONTINUED | OUTPATIENT
Start: 2020-01-01 | End: 2020-01-01

## 2020-01-01 RX ORDER — POTASSIUM CHLORIDE 20 MEQ
40 PACKET (EA) ORAL EVERY 4 HOURS
Refills: 0 | Status: COMPLETED | OUTPATIENT
Start: 2020-01-01 | End: 2020-01-01

## 2020-01-01 RX ORDER — ONDANSETRON 8 MG/1
4 TABLET, FILM COATED ORAL ONCE
Refills: 0 | Status: COMPLETED | OUTPATIENT
Start: 2020-01-01 | End: 2020-01-01

## 2020-01-01 RX ORDER — LEVETIRACETAM 250 MG/1
500 TABLET, FILM COATED ORAL EVERY 12 HOURS
Refills: 0 | Status: DISCONTINUED | OUTPATIENT
Start: 2020-01-01 | End: 2020-01-01

## 2020-01-01 RX ORDER — MAGNESIUM OXIDE 400 MG ORAL TABLET 241.3 MG
400 TABLET ORAL
Refills: 0 | Status: COMPLETED | OUTPATIENT
Start: 2020-01-01 | End: 2020-01-01

## 2020-01-01 RX ORDER — TRAZODONE HYDROCHLORIDE 100 MG/1
100 TABLET ORAL
Refills: 0 | Status: DISCONTINUED | COMMUNITY
End: 2020-01-01

## 2020-01-01 RX ORDER — DEXMEDETOMIDINE HYDROCHLORIDE IN 0.9% SODIUM CHLORIDE 4 UG/ML
0.2 INJECTION INTRAVENOUS
Qty: 200 | Refills: 0 | Status: DISCONTINUED | OUTPATIENT
Start: 2020-01-01 | End: 2020-01-01

## 2020-01-01 RX ORDER — THIAMINE MONONITRATE (VIT B1) 100 MG
500 TABLET ORAL DAILY
Refills: 0 | Status: COMPLETED | OUTPATIENT
Start: 2020-01-01 | End: 2020-01-01

## 2020-01-01 RX ORDER — FOLIC ACID 0.8 MG
1 TABLET ORAL DAILY
Refills: 0 | Status: DISCONTINUED | OUTPATIENT
Start: 2020-01-01 | End: 2020-01-01

## 2020-01-01 RX ORDER — NYSTATIN CREAM 100000 [USP'U]/G
1 CREAM TOPICAL
Refills: 0 | Status: DISCONTINUED | OUTPATIENT
Start: 2020-01-01 | End: 2020-01-01

## 2020-01-01 RX ORDER — OCTREOTIDE ACETATE 200 UG/ML
50 INJECTION, SOLUTION INTRAVENOUS; SUBCUTANEOUS
Qty: 500 | Refills: 0 | Status: DISCONTINUED | OUTPATIENT
Start: 2020-01-01 | End: 2020-01-01

## 2020-01-01 RX ORDER — PANTOPRAZOLE SODIUM 20 MG/1
80 TABLET, DELAYED RELEASE ORAL ONCE
Refills: 0 | Status: COMPLETED | OUTPATIENT
Start: 2020-01-01 | End: 2020-01-01

## 2020-01-01 RX ORDER — PANTOPRAZOLE SODIUM 20 MG/1
40 TABLET, DELAYED RELEASE ORAL
Refills: 0 | Status: DISCONTINUED | OUTPATIENT
Start: 2020-01-01 | End: 2020-01-01

## 2020-01-01 RX ORDER — MAGNESIUM SULFATE 500 MG/ML
1 VIAL (ML) INJECTION ONCE
Refills: 0 | Status: COMPLETED | OUTPATIENT
Start: 2020-01-01 | End: 2020-01-01

## 2020-01-01 RX ORDER — PHENOBARBITAL 60 MG
130 TABLET ORAL
Refills: 0 | Status: DISCONTINUED | OUTPATIENT
Start: 2020-01-01 | End: 2020-01-01

## 2020-01-01 RX ORDER — INSULIN LISPRO 100/ML
VIAL (ML) SUBCUTANEOUS
Refills: 0 | Status: DISCONTINUED | OUTPATIENT
Start: 2020-01-01 | End: 2020-01-01

## 2020-01-01 RX ORDER — PANTOPRAZOLE SODIUM 20 MG/1
1 TABLET, DELAYED RELEASE ORAL
Qty: 30 | Refills: 0
Start: 2020-01-01 | End: 2020-01-01

## 2020-01-01 RX ORDER — HYDROCORTISONE 1 %
1 OINTMENT (GRAM) TOPICAL
Refills: 0 | Status: DISCONTINUED | OUTPATIENT
Start: 2020-01-01 | End: 2020-01-01

## 2020-01-01 RX ORDER — CEFTRIAXONE 500 MG/1
1000 INJECTION, POWDER, FOR SOLUTION INTRAMUSCULAR; INTRAVENOUS EVERY 24 HOURS
Refills: 0 | Status: COMPLETED | OUTPATIENT
Start: 2020-01-01 | End: 2020-01-01

## 2020-01-01 RX ORDER — POTASSIUM CHLORIDE 20 MEQ
10 PACKET (EA) ORAL
Refills: 0 | Status: DISCONTINUED | OUTPATIENT
Start: 2020-01-01 | End: 2020-01-01

## 2020-01-01 RX ORDER — ACETAMINOPHEN 500 MG
1000 TABLET ORAL ONCE
Refills: 0 | Status: COMPLETED | OUTPATIENT
Start: 2020-01-01 | End: 2020-01-01

## 2020-01-01 RX ORDER — QUETIAPINE FUMARATE 200 MG/1
25 TABLET, FILM COATED ORAL
Refills: 0 | Status: DISCONTINUED | OUTPATIENT
Start: 2020-01-01 | End: 2020-01-01

## 2020-01-01 RX ORDER — SODIUM CHLORIDE 9 MG/ML
1000 INJECTION INTRAMUSCULAR; INTRAVENOUS; SUBCUTANEOUS ONCE
Refills: 0 | Status: COMPLETED | OUTPATIENT
Start: 2020-01-01 | End: 2020-01-01

## 2020-01-01 RX ORDER — POTASSIUM CHLORIDE 20 MEQ
20 PACKET (EA) ORAL ONCE
Refills: 0 | Status: COMPLETED | OUTPATIENT
Start: 2020-01-01 | End: 2020-01-01

## 2020-01-01 RX ORDER — PHENOBARBITAL 60 MG
455 TABLET ORAL ONCE
Refills: 0 | Status: DISCONTINUED | OUTPATIENT
Start: 2020-01-01 | End: 2020-01-01

## 2020-01-01 RX ORDER — DEXTROSE MONOHYDRATE, SODIUM CHLORIDE, AND POTASSIUM CHLORIDE 50; .745; 4.5 G/1000ML; G/1000ML; G/1000ML
1000 INJECTION, SOLUTION INTRAVENOUS
Refills: 0 | Status: DISCONTINUED | OUTPATIENT
Start: 2020-01-01 | End: 2020-01-01

## 2020-01-01 RX ORDER — THIAMINE MONONITRATE (VIT B1) 100 MG
100 TABLET ORAL DAILY
Refills: 0 | Status: DISCONTINUED | OUTPATIENT
Start: 2020-01-01 | End: 2020-01-01

## 2020-01-01 RX ORDER — VANCOMYCIN HCL 1 G
125 VIAL (EA) INTRAVENOUS EVERY 6 HOURS
Refills: 0 | Status: COMPLETED | OUTPATIENT
Start: 2020-01-01 | End: 2020-01-01

## 2020-01-01 RX ORDER — THIAMINE MONONITRATE (VIT B1) 100 MG
100 TABLET ORAL ONCE
Refills: 0 | Status: COMPLETED | OUTPATIENT
Start: 2020-01-01 | End: 2020-01-01

## 2020-01-01 RX ORDER — POTASSIUM PHOSPHATE, MONOBASIC POTASSIUM PHOSPHATE, DIBASIC 236; 224 MG/ML; MG/ML
15 INJECTION, SOLUTION INTRAVENOUS ONCE
Refills: 0 | Status: COMPLETED | OUTPATIENT
Start: 2020-01-01 | End: 2020-01-01

## 2020-01-01 RX ORDER — AMLODIPINE BESYLATE 5 MG/1
5 TABLET ORAL DAILY
Qty: 90 | Refills: 1 | Status: ACTIVE | COMMUNITY
Start: 1900-01-01 | End: 1900-01-01

## 2020-01-01 RX ORDER — SODIUM CHLORIDE 0.65 %
1 AEROSOL, SPRAY (ML) NASAL EVERY 6 HOURS
Refills: 0 | Status: DISCONTINUED | OUTPATIENT
Start: 2020-01-01 | End: 2020-01-01

## 2020-01-01 RX ORDER — FOLIC ACID 0.8 MG
1 TABLET ORAL
Qty: 0 | Refills: 0 | DISCHARGE
Start: 2020-01-01

## 2020-01-01 RX ORDER — PHYTONADIONE (VIT K1) 5 MG
5 TABLET ORAL DAILY
Refills: 0 | Status: COMPLETED | OUTPATIENT
Start: 2020-01-01 | End: 2020-01-01

## 2020-01-01 RX ORDER — PANTOPRAZOLE SODIUM 20 MG/1
40 TABLET, DELAYED RELEASE ORAL EVERY 12 HOURS
Refills: 0 | Status: DISCONTINUED | OUTPATIENT
Start: 2020-01-01 | End: 2020-01-01

## 2020-01-01 RX ORDER — PHENOBARBITAL 60 MG
370 TABLET ORAL ONCE
Refills: 0 | Status: DISCONTINUED | OUTPATIENT
Start: 2020-01-01 | End: 2020-01-01

## 2020-01-01 RX ORDER — PHENOBARBITAL 60 MG
800 TABLET ORAL ONCE
Refills: 0 | Status: DISCONTINUED | OUTPATIENT
Start: 2020-01-01 | End: 2020-01-01

## 2020-01-01 RX ORDER — PIPERACILLIN AND TAZOBACTAM 4; .5 G/20ML; G/20ML
3.38 INJECTION, POWDER, LYOPHILIZED, FOR SOLUTION INTRAVENOUS EVERY 8 HOURS
Refills: 0 | Status: DISCONTINUED | OUTPATIENT
Start: 2020-01-01 | End: 2020-01-01

## 2020-01-01 RX ORDER — QUETIAPINE FUMARATE 200 MG/1
50 TABLET, FILM COATED ORAL AT BEDTIME
Refills: 0 | Status: DISCONTINUED | OUTPATIENT
Start: 2020-01-01 | End: 2020-01-01

## 2020-01-01 RX ORDER — THIAMINE MONONITRATE (VIT B1) 100 MG
1 TABLET ORAL
Qty: 30 | Refills: 0
Start: 2020-01-01 | End: 2020-01-01

## 2020-01-01 RX ORDER — SODIUM,POTASSIUM PHOSPHATES 278-250MG
1 POWDER IN PACKET (EA) ORAL THREE TIMES A DAY
Refills: 0 | Status: DISCONTINUED | OUTPATIENT
Start: 2020-01-01 | End: 2020-01-01

## 2020-01-01 RX ORDER — QUETIAPINE FUMARATE 200 MG/1
1 TABLET, FILM COATED ORAL
Qty: 30 | Refills: 0
Start: 2020-01-01 | End: 2021-01-01

## 2020-01-01 RX ORDER — FOLIC ACID 0.8 MG
1 TABLET ORAL DAILY
Refills: 0 | Status: COMPLETED | OUTPATIENT
Start: 2020-01-01 | End: 2020-01-01

## 2020-01-01 RX ORDER — ACETAMINOPHEN 500 MG
650 TABLET ORAL EVERY 6 HOURS
Refills: 0 | Status: DISCONTINUED | OUTPATIENT
Start: 2020-01-01 | End: 2020-01-01

## 2020-01-01 RX ORDER — HALOPERIDOL DECANOATE 100 MG/ML
5 INJECTION INTRAMUSCULAR EVERY 6 HOURS
Refills: 0 | Status: DISCONTINUED | OUTPATIENT
Start: 2020-01-01 | End: 2020-01-01

## 2020-01-01 RX ORDER — QUETIAPINE FUMARATE 200 MG/1
50 TABLET, FILM COATED ORAL
Refills: 0 | Status: DISCONTINUED | OUTPATIENT
Start: 2020-01-01 | End: 2020-01-01

## 2020-01-01 RX ORDER — CAPSAICIN 0.025 %
1 CREAM (GRAM) TOPICAL THREE TIMES A DAY
Refills: 0 | Status: DISCONTINUED | OUTPATIENT
Start: 2020-01-01 | End: 2020-01-01

## 2020-01-01 RX ORDER — POTASSIUM CHLORIDE 20 MEQ
10 PACKET (EA) ORAL ONCE
Refills: 0 | Status: COMPLETED | OUTPATIENT
Start: 2020-01-01 | End: 2020-01-01

## 2020-01-01 RX ORDER — DEXTROSE 50 % IN WATER 50 %
15 SYRINGE (ML) INTRAVENOUS ONCE
Refills: 0 | Status: DISCONTINUED | OUTPATIENT
Start: 2020-01-01 | End: 2020-01-01

## 2020-01-01 RX ORDER — IPRATROPIUM/ALBUTEROL SULFATE 18-103MCG
3 AEROSOL WITH ADAPTER (GRAM) INHALATION EVERY 6 HOURS
Refills: 0 | Status: DISCONTINUED | OUTPATIENT
Start: 2020-01-01 | End: 2020-01-01

## 2020-01-01 RX ORDER — POTASSIUM PHOSPHATE, MONOBASIC POTASSIUM PHOSPHATE, DIBASIC 236; 224 MG/ML; MG/ML
30 INJECTION, SOLUTION INTRAVENOUS ONCE
Refills: 0 | Status: COMPLETED | OUTPATIENT
Start: 2020-01-01 | End: 2020-01-01

## 2020-01-01 RX ORDER — SODIUM CHLORIDE 9 MG/ML
4 INJECTION INTRAMUSCULAR; INTRAVENOUS; SUBCUTANEOUS EVERY 6 HOURS
Refills: 0 | Status: DISCONTINUED | OUTPATIENT
Start: 2020-01-01 | End: 2020-01-01

## 2020-01-01 RX ORDER — TRAZODONE HCL 50 MG
25 TABLET ORAL AT BEDTIME
Refills: 0 | Status: DISCONTINUED | OUTPATIENT
Start: 2020-01-01 | End: 2020-01-01

## 2020-01-01 RX ORDER — CALCIUM GLUCONATE 100 MG/ML
2 VIAL (ML) INTRAVENOUS ONCE
Refills: 0 | Status: COMPLETED | OUTPATIENT
Start: 2020-01-01 | End: 2020-01-01

## 2020-01-01 RX ORDER — QUETIAPINE FUMARATE 200 MG/1
1 TABLET, FILM COATED ORAL
Qty: 0 | Refills: 0 | DISCHARGE
Start: 2020-01-01

## 2020-01-01 RX ORDER — FOLIC ACID 0.8 MG
1 TABLET ORAL
Qty: 30 | Refills: 0
Start: 2020-01-01 | End: 2021-01-01

## 2020-01-01 RX ORDER — TRAZODONE HCL 50 MG
0.5 TABLET ORAL
Qty: 15 | Refills: 0
Start: 2020-01-01 | End: 2021-01-01

## 2020-01-01 RX ORDER — PHENOBARBITAL 60 MG
150 TABLET ORAL ONCE
Refills: 0 | Status: DISCONTINUED | OUTPATIENT
Start: 2020-01-01 | End: 2020-01-01

## 2020-01-01 RX ADMIN — Medication 3 MILLILITER(S): at 18:27

## 2020-01-01 RX ADMIN — SODIUM CHLORIDE 100 MILLILITER(S): 9 INJECTION, SOLUTION INTRAVENOUS at 09:42

## 2020-01-01 RX ADMIN — Medication 1 MILLIGRAM(S): at 21:38

## 2020-01-01 RX ADMIN — Medication 125 MILLIGRAM(S): at 05:18

## 2020-01-01 RX ADMIN — Medication 125 MILLIGRAM(S): at 23:11

## 2020-01-01 RX ADMIN — Medication 125 MILLIGRAM(S): at 17:17

## 2020-01-01 RX ADMIN — Medication 2 MILLIGRAM(S): at 10:03

## 2020-01-01 RX ADMIN — HALOPERIDOL DECANOATE 5 MILLIGRAM(S): 100 INJECTION INTRAMUSCULAR at 10:14

## 2020-01-01 RX ADMIN — Medication 10 MILLIGRAM(S): at 18:04

## 2020-01-01 RX ADMIN — PANTOPRAZOLE SODIUM 40 MILLIGRAM(S): 20 TABLET, DELAYED RELEASE ORAL at 06:05

## 2020-01-01 RX ADMIN — PANTOPRAZOLE SODIUM 40 MILLIGRAM(S): 20 TABLET, DELAYED RELEASE ORAL at 17:41

## 2020-01-01 RX ADMIN — Medication 105 MILLIGRAM(S): at 06:05

## 2020-01-01 RX ADMIN — Medication 2 MILLIGRAM(S): at 13:28

## 2020-01-01 RX ADMIN — Medication 130 MILLIGRAM(S): at 19:53

## 2020-01-01 RX ADMIN — Medication 1 MILLIGRAM(S): at 13:21

## 2020-01-01 RX ADMIN — HALOPERIDOL DECANOATE 5 MILLIGRAM(S): 100 INJECTION INTRAMUSCULAR at 18:55

## 2020-01-01 RX ADMIN — Medication 100 MILLIEQUIVALENT(S): at 04:27

## 2020-01-01 RX ADMIN — DEXMEDETOMIDINE HYDROCHLORIDE IN 0.9% SODIUM CHLORIDE 4.41 MICROGRAM(S)/KG/HR: 4 INJECTION INTRAVENOUS at 11:29

## 2020-01-01 RX ADMIN — Medication 3 MILLIGRAM(S): at 15:53

## 2020-01-01 RX ADMIN — SODIUM CHLORIDE 4 MILLILITER(S): 9 INJECTION INTRAMUSCULAR; INTRAVENOUS; SUBCUTANEOUS at 05:17

## 2020-01-01 RX ADMIN — MAGNESIUM OXIDE 400 MG ORAL TABLET 400 MILLIGRAM(S): 241.3 TABLET ORAL at 11:59

## 2020-01-01 RX ADMIN — Medication 2 MILLIGRAM(S): at 16:01

## 2020-01-01 RX ADMIN — HALOPERIDOL DECANOATE 5 MILLIGRAM(S): 100 INJECTION INTRAMUSCULAR at 16:15

## 2020-01-01 RX ADMIN — Medication 105 MILLIGRAM(S): at 13:24

## 2020-01-01 RX ADMIN — DEXMEDETOMIDINE HYDROCHLORIDE IN 0.9% SODIUM CHLORIDE 4.41 MICROGRAM(S)/KG/HR: 4 INJECTION INTRAVENOUS at 11:06

## 2020-01-01 RX ADMIN — Medication 1 MILLIGRAM(S): at 18:05

## 2020-01-01 RX ADMIN — Medication 125 MILLIGRAM(S): at 23:30

## 2020-01-01 RX ADMIN — Medication 212.3 MILLIGRAM(S): at 11:39

## 2020-01-01 RX ADMIN — Medication 3 MILLIGRAM(S): at 06:05

## 2020-01-01 RX ADMIN — Medication 1 TABLET(S): at 12:10

## 2020-01-01 RX ADMIN — Medication 2 MILLIGRAM(S): at 13:40

## 2020-01-01 RX ADMIN — Medication 125 MILLIGRAM(S): at 13:22

## 2020-01-01 RX ADMIN — DEXTROSE MONOHYDRATE, SODIUM CHLORIDE, AND POTASSIUM CHLORIDE 100 MILLILITER(S): 50; .745; 4.5 INJECTION, SOLUTION INTRAVENOUS at 03:23

## 2020-01-01 RX ADMIN — Medication 4 MILLIGRAM(S): at 03:03

## 2020-01-01 RX ADMIN — HALOPERIDOL DECANOATE 5 MILLIGRAM(S): 100 INJECTION INTRAMUSCULAR at 23:50

## 2020-01-01 RX ADMIN — Medication 2 MILLIGRAM(S): at 17:40

## 2020-01-01 RX ADMIN — Medication 100 MILLIEQUIVALENT(S): at 10:55

## 2020-01-01 RX ADMIN — Medication 2 MILLIGRAM(S): at 04:37

## 2020-01-01 RX ADMIN — Medication 2 MILLIGRAM(S): at 09:53

## 2020-01-01 RX ADMIN — MAGNESIUM OXIDE 400 MG ORAL TABLET 400 MILLIGRAM(S): 241.3 TABLET ORAL at 17:41

## 2020-01-01 RX ADMIN — POTASSIUM PHOSPHATE, MONOBASIC POTASSIUM PHOSPHATE, DIBASIC 83.33 MILLIMOLE(S): 236; 224 INJECTION, SOLUTION INTRAVENOUS at 01:44

## 2020-01-01 RX ADMIN — Medication 1 TABLET(S): at 11:32

## 2020-01-01 RX ADMIN — POTASSIUM PHOSPHATE, MONOBASIC POTASSIUM PHOSPHATE, DIBASIC 62.5 MILLIMOLE(S): 236; 224 INJECTION, SOLUTION INTRAVENOUS at 13:41

## 2020-01-01 RX ADMIN — Medication 2 MILLIGRAM(S): at 10:58

## 2020-01-01 RX ADMIN — Medication 105 MILLIGRAM(S): at 12:03

## 2020-01-01 RX ADMIN — Medication 130 MILLIGRAM(S): at 03:30

## 2020-01-01 RX ADMIN — Medication 100 MILLIGRAM(S): at 12:29

## 2020-01-01 RX ADMIN — Medication 4 MILLIGRAM(S): at 17:04

## 2020-01-01 RX ADMIN — Medication 1 MILLIGRAM(S): at 18:25

## 2020-01-01 RX ADMIN — Medication 1 MILLIGRAM(S): at 12:10

## 2020-01-01 RX ADMIN — Medication 105 MILLIGRAM(S): at 13:51

## 2020-01-01 RX ADMIN — Medication 2 MILLIGRAM(S): at 18:52

## 2020-01-01 RX ADMIN — CEFTRIAXONE 100 MILLIGRAM(S): 500 INJECTION, POWDER, FOR SOLUTION INTRAMUSCULAR; INTRAVENOUS at 18:15

## 2020-01-01 RX ADMIN — LEVETIRACETAM 400 MILLIGRAM(S): 250 TABLET, FILM COATED ORAL at 17:40

## 2020-01-01 RX ADMIN — Medication 100 MILLIEQUIVALENT(S): at 19:42

## 2020-01-01 RX ADMIN — Medication 1 TABLET(S): at 12:00

## 2020-01-01 RX ADMIN — DEXMEDETOMIDINE HYDROCHLORIDE IN 0.9% SODIUM CHLORIDE 4.41 MICROGRAM(S)/KG/HR: 4 INJECTION INTRAVENOUS at 11:50

## 2020-01-01 RX ADMIN — CHLORHEXIDINE GLUCONATE 1 APPLICATION(S): 213 SOLUTION TOPICAL at 05:09

## 2020-01-01 RX ADMIN — Medication 2 MILLIGRAM(S): at 12:47

## 2020-01-01 RX ADMIN — Medication 100 MILLIEQUIVALENT(S): at 06:38

## 2020-01-01 RX ADMIN — Medication 125 MILLIGRAM(S): at 13:57

## 2020-01-01 RX ADMIN — Medication 20 MILLIEQUIVALENT(S): at 20:11

## 2020-01-01 RX ADMIN — Medication 1 MILLIGRAM(S): at 12:28

## 2020-01-01 RX ADMIN — Medication 3 MILLIGRAM(S): at 22:32

## 2020-01-01 RX ADMIN — PANTOPRAZOLE SODIUM 40 MILLIGRAM(S): 20 TABLET, DELAYED RELEASE ORAL at 06:28

## 2020-01-01 RX ADMIN — OCTREOTIDE ACETATE 10 MICROGRAM(S)/HR: 200 INJECTION, SOLUTION INTRAVENOUS; SUBCUTANEOUS at 23:22

## 2020-01-01 RX ADMIN — Medication 0.5 MILLIGRAM(S): at 15:35

## 2020-01-01 RX ADMIN — Medication 100 MILLIEQUIVALENT(S): at 09:20

## 2020-01-01 RX ADMIN — Medication 2 MILLIGRAM(S): at 17:32

## 2020-01-01 RX ADMIN — Medication 1 TABLET(S): at 12:13

## 2020-01-01 RX ADMIN — Medication 50 GRAM(S): at 16:33

## 2020-01-01 RX ADMIN — Medication 1 MILLIGRAM(S): at 17:10

## 2020-01-01 RX ADMIN — ONDANSETRON 4 MILLIGRAM(S): 8 TABLET, FILM COATED ORAL at 18:04

## 2020-01-01 RX ADMIN — DEXTROSE MONOHYDRATE, SODIUM CHLORIDE, AND POTASSIUM CHLORIDE 100 MILLILITER(S): 50; .745; 4.5 INJECTION, SOLUTION INTRAVENOUS at 16:22

## 2020-01-01 RX ADMIN — Medication 105 MILLIGRAM(S): at 22:58

## 2020-01-01 RX ADMIN — Medication 125 MILLIGRAM(S): at 11:50

## 2020-01-01 RX ADMIN — OCTREOTIDE ACETATE 10 MICROGRAM(S)/HR: 200 INJECTION, SOLUTION INTRAVENOUS; SUBCUTANEOUS at 14:07

## 2020-01-01 RX ADMIN — Medication 2 MILLIGRAM(S): at 21:41

## 2020-01-01 RX ADMIN — Medication 100 MILLIEQUIVALENT(S): at 10:47

## 2020-01-01 RX ADMIN — Medication 2 MILLIGRAM(S): at 15:52

## 2020-01-01 RX ADMIN — OCTREOTIDE ACETATE 10 MICROGRAM(S)/HR: 200 INJECTION, SOLUTION INTRAVENOUS; SUBCUTANEOUS at 22:37

## 2020-01-01 RX ADMIN — Medication 3 MILLIGRAM(S): at 22:06

## 2020-01-01 RX ADMIN — LEVETIRACETAM 400 MILLIGRAM(S): 250 TABLET, FILM COATED ORAL at 05:52

## 2020-01-01 RX ADMIN — Medication 130 MILLIGRAM(S): at 21:21

## 2020-01-01 RX ADMIN — Medication 2 MILLIGRAM(S): at 02:06

## 2020-01-01 RX ADMIN — Medication 2 MILLIGRAM(S): at 21:00

## 2020-01-01 RX ADMIN — Medication 105 MILLIGRAM(S): at 05:50

## 2020-01-01 RX ADMIN — Medication 325 MILLIGRAM(S): at 12:39

## 2020-01-01 RX ADMIN — DEXMEDETOMIDINE HYDROCHLORIDE IN 0.9% SODIUM CHLORIDE 4.41 MICROGRAM(S)/KG/HR: 4 INJECTION INTRAVENOUS at 11:10

## 2020-01-01 RX ADMIN — ERTAPENEM SODIUM 120 MILLIGRAM(S): 1 INJECTION, POWDER, LYOPHILIZED, FOR SOLUTION INTRAMUSCULAR; INTRAVENOUS at 17:12

## 2020-01-01 RX ADMIN — Medication 125 MILLIGRAM(S): at 05:09

## 2020-01-01 RX ADMIN — Medication 125 MILLIGRAM(S): at 06:35

## 2020-01-01 RX ADMIN — Medication 130 MILLIGRAM(S): at 02:37

## 2020-01-01 RX ADMIN — PANTOPRAZOLE SODIUM 10 MG/HR: 20 TABLET, DELAYED RELEASE ORAL at 23:22

## 2020-01-01 RX ADMIN — Medication 100 MILLIEQUIVALENT(S): at 03:34

## 2020-01-01 RX ADMIN — Medication 3 MILLIGRAM(S): at 09:27

## 2020-01-01 RX ADMIN — Medication 125 MILLIGRAM(S): at 05:02

## 2020-01-01 RX ADMIN — Medication 130 MILLIGRAM(S): at 16:50

## 2020-01-01 RX ADMIN — Medication 100 MILLIEQUIVALENT(S): at 09:13

## 2020-01-01 RX ADMIN — Medication 130 MILLIGRAM(S): at 23:43

## 2020-01-01 RX ADMIN — Medication 130 MILLIGRAM(S): at 17:20

## 2020-01-01 RX ADMIN — Medication 3 MILLIGRAM(S): at 01:30

## 2020-01-01 RX ADMIN — Medication 105 MILLIGRAM(S): at 21:35

## 2020-01-01 RX ADMIN — Medication 105 MILLIGRAM(S): at 06:28

## 2020-01-01 RX ADMIN — Medication 3 MILLILITER(S): at 11:20

## 2020-01-01 RX ADMIN — Medication 125 MILLIGRAM(S): at 05:52

## 2020-01-01 RX ADMIN — LEVETIRACETAM 400 MILLIGRAM(S): 250 TABLET, FILM COATED ORAL at 17:11

## 2020-01-01 RX ADMIN — Medication 1 TABLET(S): at 11:50

## 2020-01-01 RX ADMIN — Medication 2 MILLIGRAM(S): at 03:40

## 2020-01-01 RX ADMIN — Medication 1 MILLIGRAM(S): at 12:13

## 2020-01-01 RX ADMIN — Medication 50 GRAM(S): at 08:16

## 2020-01-01 RX ADMIN — Medication 105 MILLIGRAM(S): at 12:10

## 2020-01-01 RX ADMIN — Medication 3 MILLIGRAM(S): at 21:11

## 2020-01-01 RX ADMIN — Medication 2 MILLIGRAM(S): at 02:58

## 2020-01-01 RX ADMIN — Medication 3 MILLIGRAM(S): at 18:54

## 2020-01-01 RX ADMIN — Medication 1 MILLIGRAM(S): at 12:39

## 2020-01-01 RX ADMIN — Medication 100 MILLIEQUIVALENT(S): at 13:24

## 2020-01-01 RX ADMIN — Medication 3 MILLIGRAM(S): at 02:06

## 2020-01-01 RX ADMIN — CHLORHEXIDINE GLUCONATE 1 APPLICATION(S): 213 SOLUTION TOPICAL at 05:21

## 2020-01-01 RX ADMIN — Medication 400 MILLIGRAM(S): at 06:30

## 2020-01-01 RX ADMIN — Medication 4 MILLIGRAM(S): at 19:00

## 2020-01-01 RX ADMIN — CHLORHEXIDINE GLUCONATE 1 APPLICATION(S): 213 SOLUTION TOPICAL at 05:31

## 2020-01-01 RX ADMIN — LEVETIRACETAM 400 MILLIGRAM(S): 250 TABLET, FILM COATED ORAL at 05:02

## 2020-01-01 RX ADMIN — Medication 100 MILLIEQUIVALENT(S): at 01:21

## 2020-01-01 RX ADMIN — QUETIAPINE FUMARATE 25 MILLIGRAM(S): 200 TABLET, FILM COATED ORAL at 09:48

## 2020-01-01 RX ADMIN — PANTOPRAZOLE SODIUM 40 MILLIGRAM(S): 20 TABLET, DELAYED RELEASE ORAL at 18:15

## 2020-01-01 RX ADMIN — CEFTRIAXONE 100 MILLIGRAM(S): 500 INJECTION, POWDER, FOR SOLUTION INTRAMUSCULAR; INTRAVENOUS at 09:49

## 2020-01-01 RX ADMIN — Medication 4 MILLIGRAM(S): at 02:43

## 2020-01-01 RX ADMIN — Medication 100 MILLIEQUIVALENT(S): at 19:51

## 2020-01-01 RX ADMIN — Medication 1 MILLIGRAM(S): at 14:40

## 2020-01-01 RX ADMIN — DEXMEDETOMIDINE HYDROCHLORIDE IN 0.9% SODIUM CHLORIDE 4.41 MICROGRAM(S)/KG/HR: 4 INJECTION INTRAVENOUS at 12:52

## 2020-01-01 RX ADMIN — DEXTROSE MONOHYDRATE, SODIUM CHLORIDE, AND POTASSIUM CHLORIDE 100 MILLILITER(S): 50; .745; 4.5 INJECTION, SOLUTION INTRAVENOUS at 23:23

## 2020-01-01 RX ADMIN — Medication 125 MILLIGRAM(S): at 23:12

## 2020-01-01 RX ADMIN — PANTOPRAZOLE SODIUM 40 MILLIGRAM(S): 20 TABLET, DELAYED RELEASE ORAL at 06:21

## 2020-01-01 RX ADMIN — Medication 125 MILLIGRAM(S): at 18:05

## 2020-01-01 RX ADMIN — Medication 105 MILLIGRAM(S): at 12:05

## 2020-01-01 RX ADMIN — QUETIAPINE FUMARATE 25 MILLIGRAM(S): 200 TABLET, FILM COATED ORAL at 21:23

## 2020-01-01 RX ADMIN — Medication 6 MILLIGRAM(S): at 04:12

## 2020-01-01 RX ADMIN — Medication 100 MILLIEQUIVALENT(S): at 04:14

## 2020-01-01 RX ADMIN — Medication 260 MILLIGRAM(S): at 02:53

## 2020-01-01 RX ADMIN — Medication 1 MILLIGRAM(S): at 11:43

## 2020-01-01 RX ADMIN — Medication 1 MILLIGRAM(S): at 11:08

## 2020-01-01 RX ADMIN — Medication 1 TABLET(S): at 11:08

## 2020-01-01 RX ADMIN — Medication 1 MILLIGRAM(S): at 12:41

## 2020-01-01 RX ADMIN — Medication 1 MILLIGRAM(S): at 06:32

## 2020-01-01 RX ADMIN — Medication 2 MILLIGRAM(S): at 02:45

## 2020-01-01 RX ADMIN — Medication 100 GRAM(S): at 07:23

## 2020-01-01 RX ADMIN — Medication 40 MILLIEQUIVALENT(S): at 13:41

## 2020-01-01 RX ADMIN — SODIUM CHLORIDE 4 MILLILITER(S): 9 INJECTION INTRAMUSCULAR; INTRAVENOUS; SUBCUTANEOUS at 00:09

## 2020-01-01 RX ADMIN — Medication 125 MILLIGRAM(S): at 17:11

## 2020-01-01 RX ADMIN — NYSTATIN CREAM 1 APPLICATION(S): 100000 CREAM TOPICAL at 17:14

## 2020-01-01 RX ADMIN — LEVETIRACETAM 400 MILLIGRAM(S): 250 TABLET, FILM COATED ORAL at 05:07

## 2020-01-01 RX ADMIN — CEFTRIAXONE 100 MILLIGRAM(S): 500 INJECTION, POWDER, FOR SOLUTION INTRAMUSCULAR; INTRAVENOUS at 15:10

## 2020-01-01 RX ADMIN — Medication 1.5 MILLIGRAM(S): at 13:43

## 2020-01-01 RX ADMIN — PANTOPRAZOLE SODIUM 80 MILLIGRAM(S): 20 TABLET, DELAYED RELEASE ORAL at 22:29

## 2020-01-01 RX ADMIN — Medication 1 MILLIGRAM(S): at 06:26

## 2020-01-01 RX ADMIN — Medication 1 TABLET(S): at 12:49

## 2020-01-01 RX ADMIN — Medication 1 TABLET(S): at 11:31

## 2020-01-01 RX ADMIN — Medication 1.5 MILLIGRAM(S): at 21:59

## 2020-01-01 RX ADMIN — Medication 130 MILLIGRAM(S): at 20:46

## 2020-01-01 RX ADMIN — MAGNESIUM OXIDE 400 MG ORAL TABLET 400 MILLIGRAM(S): 241.3 TABLET ORAL at 11:50

## 2020-01-01 RX ADMIN — Medication 2 MILLIGRAM(S): at 14:59

## 2020-01-01 RX ADMIN — Medication 2 MILLIGRAM(S): at 16:49

## 2020-01-01 RX ADMIN — CEFTRIAXONE 100 MILLIGRAM(S): 500 INJECTION, POWDER, FOR SOLUTION INTRAMUSCULAR; INTRAVENOUS at 11:29

## 2020-01-01 RX ADMIN — Medication 1 MILLIGRAM(S): at 01:59

## 2020-01-01 RX ADMIN — CEFTRIAXONE 100 MILLIGRAM(S): 500 INJECTION, POWDER, FOR SOLUTION INTRAMUSCULAR; INTRAVENOUS at 14:13

## 2020-01-01 RX ADMIN — SODIUM CHLORIDE 125 MILLILITER(S): 9 INJECTION, SOLUTION INTRAVENOUS at 18:10

## 2020-01-01 RX ADMIN — Medication 40 MILLIEQUIVALENT(S): at 17:32

## 2020-01-01 RX ADMIN — Medication 100 MILLIEQUIVALENT(S): at 19:08

## 2020-01-01 RX ADMIN — Medication 100 MILLIEQUIVALENT(S): at 21:53

## 2020-01-01 RX ADMIN — CHLORHEXIDINE GLUCONATE 1 APPLICATION(S): 213 SOLUTION TOPICAL at 06:25

## 2020-01-01 RX ADMIN — Medication 5 MILLIGRAM(S): at 16:33

## 2020-01-01 RX ADMIN — Medication 130 MILLIGRAM(S): at 02:21

## 2020-01-01 RX ADMIN — Medication 100 MILLIGRAM(S): at 11:32

## 2020-01-01 RX ADMIN — LEVETIRACETAM 400 MILLIGRAM(S): 250 TABLET, FILM COATED ORAL at 17:30

## 2020-01-01 RX ADMIN — Medication 125 MILLIGRAM(S): at 11:06

## 2020-01-01 RX ADMIN — DEXMEDETOMIDINE HYDROCHLORIDE IN 0.9% SODIUM CHLORIDE 4.41 MICROGRAM(S)/KG/HR: 4 INJECTION INTRAVENOUS at 10:22

## 2020-01-01 RX ADMIN — QUETIAPINE FUMARATE 50 MILLIGRAM(S): 200 TABLET, FILM COATED ORAL at 22:32

## 2020-01-01 RX ADMIN — Medication 2 MILLIGRAM(S): at 21:35

## 2020-01-01 RX ADMIN — Medication 1 MILLIGRAM(S): at 10:08

## 2020-01-01 RX ADMIN — OLANZAPINE 5 MILLIGRAM(S): 15 TABLET, FILM COATED ORAL at 21:05

## 2020-01-01 RX ADMIN — LEVETIRACETAM 400 MILLIGRAM(S): 250 TABLET, FILM COATED ORAL at 22:17

## 2020-01-01 RX ADMIN — Medication 3 MILLIGRAM(S): at 21:53

## 2020-01-01 RX ADMIN — Medication 125 MILLIGRAM(S): at 23:03

## 2020-01-01 RX ADMIN — Medication 2 MILLIGRAM(S): at 22:41

## 2020-01-01 RX ADMIN — Medication 100 MILLIEQUIVALENT(S): at 18:39

## 2020-01-01 RX ADMIN — Medication 130 MILLIGRAM(S): at 05:32

## 2020-01-01 RX ADMIN — Medication 3 MILLIGRAM(S): at 21:01

## 2020-01-01 RX ADMIN — Medication 40 MILLIEQUIVALENT(S): at 05:32

## 2020-01-01 RX ADMIN — Medication 650 MILLIGRAM(S): at 18:29

## 2020-01-01 RX ADMIN — DEXMEDETOMIDINE HYDROCHLORIDE IN 0.9% SODIUM CHLORIDE 4.41 MICROGRAM(S)/KG/HR: 4 INJECTION INTRAVENOUS at 11:55

## 2020-01-01 RX ADMIN — Medication 2 MILLIGRAM(S): at 18:34

## 2020-01-01 RX ADMIN — Medication 1 MILLIGRAM(S): at 01:55

## 2020-01-01 RX ADMIN — Medication 125 MILLIGRAM(S): at 05:21

## 2020-01-01 RX ADMIN — Medication 130 MILLIGRAM(S): at 20:34

## 2020-01-01 RX ADMIN — PANTOPRAZOLE SODIUM 40 MILLIGRAM(S): 20 TABLET, DELAYED RELEASE ORAL at 16:49

## 2020-01-01 RX ADMIN — Medication 100 MILLIEQUIVALENT(S): at 09:25

## 2020-01-01 RX ADMIN — Medication 125 MILLIGRAM(S): at 00:52

## 2020-01-01 RX ADMIN — Medication 2 MILLIGRAM(S): at 14:12

## 2020-01-01 RX ADMIN — ERTAPENEM SODIUM 120 MILLIGRAM(S): 1 INJECTION, POWDER, LYOPHILIZED, FOR SOLUTION INTRAMUSCULAR; INTRAVENOUS at 15:29

## 2020-01-01 RX ADMIN — Medication 3 MILLIGRAM(S): at 22:30

## 2020-01-01 RX ADMIN — DEXMEDETOMIDINE HYDROCHLORIDE IN 0.9% SODIUM CHLORIDE 4.41 MICROGRAM(S)/KG/HR: 4 INJECTION INTRAVENOUS at 18:34

## 2020-01-01 RX ADMIN — DEXMEDETOMIDINE HYDROCHLORIDE IN 0.9% SODIUM CHLORIDE 4.41 MICROGRAM(S)/KG/HR: 4 INJECTION INTRAVENOUS at 17:05

## 2020-01-01 RX ADMIN — CHLORHEXIDINE GLUCONATE 1 APPLICATION(S): 213 SOLUTION TOPICAL at 06:35

## 2020-01-01 RX ADMIN — QUETIAPINE FUMARATE 25 MILLIGRAM(S): 200 TABLET, FILM COATED ORAL at 10:22

## 2020-01-01 RX ADMIN — PANTOPRAZOLE SODIUM 40 MILLIGRAM(S): 20 TABLET, DELAYED RELEASE ORAL at 07:38

## 2020-01-01 RX ADMIN — Medication 100 MILLIGRAM(S): at 17:44

## 2020-01-01 RX ADMIN — Medication 100 MILLIGRAM(S): at 11:44

## 2020-01-01 RX ADMIN — Medication 100 MILLIEQUIVALENT(S): at 11:59

## 2020-01-01 RX ADMIN — PANTOPRAZOLE SODIUM 40 MILLIGRAM(S): 20 TABLET, DELAYED RELEASE ORAL at 17:35

## 2020-01-01 RX ADMIN — Medication 130 MILLIGRAM(S): at 00:40

## 2020-01-01 RX ADMIN — Medication 100 MILLIEQUIVALENT(S): at 02:09

## 2020-01-01 RX ADMIN — Medication 1 TABLET(S): at 10:04

## 2020-01-01 RX ADMIN — Medication 3 MILLIGRAM(S): at 22:12

## 2020-01-01 RX ADMIN — Medication 40 MILLIEQUIVALENT(S): at 07:46

## 2020-01-01 RX ADMIN — Medication 130 MILLIGRAM(S): at 18:42

## 2020-01-01 RX ADMIN — Medication 2 MILLIGRAM(S): at 00:54

## 2020-01-01 RX ADMIN — Medication 2 MILLIGRAM(S): at 15:35

## 2020-01-01 RX ADMIN — Medication 2 MILLIGRAM(S): at 15:02

## 2020-01-01 RX ADMIN — Medication 1 MILLIGRAM(S): at 14:12

## 2020-01-01 RX ADMIN — Medication 2 MILLIGRAM(S): at 07:49

## 2020-01-01 RX ADMIN — Medication 105 MILLIGRAM(S): at 11:54

## 2020-01-01 RX ADMIN — Medication 3 MILLIGRAM(S): at 02:12

## 2020-01-01 RX ADMIN — Medication 1 TABLET(S): at 11:54

## 2020-01-01 RX ADMIN — CEFTRIAXONE 100 MILLIGRAM(S): 500 INJECTION, POWDER, FOR SOLUTION INTRAMUSCULAR; INTRAVENOUS at 18:59

## 2020-01-01 RX ADMIN — Medication 3 MILLIGRAM(S): at 09:20

## 2020-01-01 RX ADMIN — Medication 100 MILLIEQUIVALENT(S): at 19:53

## 2020-01-01 RX ADMIN — SODIUM CHLORIDE 100 MILLILITER(S): 9 INJECTION, SOLUTION INTRAVENOUS at 09:53

## 2020-01-01 RX ADMIN — Medication 100 MILLIEQUIVALENT(S): at 06:32

## 2020-01-01 RX ADMIN — CHLORHEXIDINE GLUCONATE 1 APPLICATION(S): 213 SOLUTION TOPICAL at 05:03

## 2020-01-01 RX ADMIN — PANTOPRAZOLE SODIUM 10 MG/HR: 20 TABLET, DELAYED RELEASE ORAL at 13:40

## 2020-01-01 RX ADMIN — Medication 100 MILLIEQUIVALENT(S): at 17:40

## 2020-01-01 RX ADMIN — MAGNESIUM OXIDE 400 MG ORAL TABLET 400 MILLIGRAM(S): 241.3 TABLET ORAL at 10:04

## 2020-01-01 RX ADMIN — DEXTROSE MONOHYDRATE, SODIUM CHLORIDE, AND POTASSIUM CHLORIDE 100 MILLILITER(S): 50; .745; 4.5 INJECTION, SOLUTION INTRAVENOUS at 13:40

## 2020-01-01 RX ADMIN — Medication 1 MILLIGRAM(S): at 10:04

## 2020-01-01 RX ADMIN — Medication 1 TABLET(S): at 11:51

## 2020-01-01 RX ADMIN — Medication 125 MILLIGRAM(S): at 12:52

## 2020-01-01 RX ADMIN — CEFTRIAXONE 100 MILLIGRAM(S): 500 INJECTION, POWDER, FOR SOLUTION INTRAMUSCULAR; INTRAVENOUS at 16:30

## 2020-01-01 RX ADMIN — Medication 125 MILLIGRAM(S): at 05:32

## 2020-01-01 RX ADMIN — Medication 1 MILLIGRAM(S): at 00:14

## 2020-01-01 RX ADMIN — CHLORHEXIDINE GLUCONATE 1 APPLICATION(S): 213 SOLUTION TOPICAL at 05:32

## 2020-01-01 RX ADMIN — Medication 2 MILLIGRAM(S): at 13:06

## 2020-01-01 RX ADMIN — Medication 1.5 MILLIGRAM(S): at 06:05

## 2020-01-01 RX ADMIN — Medication 3 MILLIGRAM(S): at 22:27

## 2020-01-01 RX ADMIN — Medication 1.5 MILLIGRAM(S): at 18:03

## 2020-01-01 RX ADMIN — Medication 105 MILLIGRAM(S): at 15:29

## 2020-01-01 RX ADMIN — Medication 5 MILLIGRAM(S): at 11:50

## 2020-01-01 RX ADMIN — Medication 2 MILLIGRAM(S): at 05:50

## 2020-01-01 RX ADMIN — Medication 100 MILLIEQUIVALENT(S): at 04:26

## 2020-01-01 RX ADMIN — Medication 100 MILLIEQUIVALENT(S): at 17:16

## 2020-01-01 RX ADMIN — Medication 1 TABLET(S): at 12:41

## 2020-01-01 RX ADMIN — Medication 1 TABLET(S): at 11:58

## 2020-01-01 RX ADMIN — Medication 105 MILLIGRAM(S): at 14:40

## 2020-01-01 RX ADMIN — PANTOPRAZOLE SODIUM 40 MILLIGRAM(S): 20 TABLET, DELAYED RELEASE ORAL at 05:49

## 2020-01-01 RX ADMIN — Medication 1 MILLIGRAM(S): at 06:28

## 2020-01-01 RX ADMIN — QUETIAPINE FUMARATE 50 MILLIGRAM(S): 200 TABLET, FILM COATED ORAL at 21:44

## 2020-01-01 RX ADMIN — PANTOPRAZOLE SODIUM 10 MG/HR: 20 TABLET, DELAYED RELEASE ORAL at 22:37

## 2020-01-01 RX ADMIN — Medication 6 MILLIGRAM(S): at 03:47

## 2020-01-01 RX ADMIN — SODIUM CHLORIDE 125 MILLILITER(S): 9 INJECTION, SOLUTION INTRAVENOUS at 01:06

## 2020-01-01 RX ADMIN — PANTOPRAZOLE SODIUM 10 MG/HR: 20 TABLET, DELAYED RELEASE ORAL at 08:05

## 2020-01-01 RX ADMIN — Medication 1 TABLET(S): at 12:39

## 2020-01-01 RX ADMIN — Medication 1 MILLIGRAM(S): at 11:32

## 2020-01-01 RX ADMIN — Medication 125 MILLIGRAM(S): at 00:17

## 2020-01-01 RX ADMIN — DEXMEDETOMIDINE HYDROCHLORIDE IN 0.9% SODIUM CHLORIDE 4.41 MICROGRAM(S)/KG/HR: 4 INJECTION INTRAVENOUS at 08:30

## 2020-01-01 RX ADMIN — SODIUM CHLORIDE 100 MILLILITER(S): 9 INJECTION, SOLUTION INTRAVENOUS at 09:47

## 2020-01-01 RX ADMIN — CHLORHEXIDINE GLUCONATE 1 APPLICATION(S): 213 SOLUTION TOPICAL at 05:07

## 2020-01-01 RX ADMIN — Medication 1 MILLIGRAM(S): at 12:52

## 2020-01-01 RX ADMIN — Medication 100 MILLIEQUIVALENT(S): at 22:41

## 2020-01-01 RX ADMIN — Medication 100 MILLIGRAM(S): at 13:57

## 2020-01-01 RX ADMIN — Medication 1 TABLET(S): at 13:28

## 2020-01-01 RX ADMIN — Medication 650 MILLIGRAM(S): at 10:00

## 2020-01-01 RX ADMIN — PANTOPRAZOLE SODIUM 40 MILLIGRAM(S): 20 TABLET, DELAYED RELEASE ORAL at 06:26

## 2020-01-01 RX ADMIN — Medication 3 MILLIGRAM(S): at 21:45

## 2020-01-01 RX ADMIN — Medication 125 MILLIGRAM(S): at 18:47

## 2020-01-01 RX ADMIN — Medication 125 MILLIGRAM(S): at 05:07

## 2020-01-01 RX ADMIN — Medication 1 TABLET(S): at 12:06

## 2020-01-01 RX ADMIN — SODIUM CHLORIDE 75 MILLILITER(S): 9 INJECTION, SOLUTION INTRAVENOUS at 20:06

## 2020-01-01 RX ADMIN — Medication 1 MILLIGRAM(S): at 06:29

## 2020-01-01 RX ADMIN — Medication 100 MILLIEQUIVALENT(S): at 10:39

## 2020-01-01 RX ADMIN — Medication 125 MILLIGRAM(S): at 17:40

## 2020-01-01 RX ADMIN — QUETIAPINE FUMARATE 50 MILLIGRAM(S): 200 TABLET, FILM COATED ORAL at 22:27

## 2020-01-01 RX ADMIN — Medication 100 MILLIEQUIVALENT(S): at 09:47

## 2020-01-01 RX ADMIN — Medication 1 SPRAY(S): at 10:46

## 2020-01-01 RX ADMIN — DEXMEDETOMIDINE HYDROCHLORIDE IN 0.9% SODIUM CHLORIDE 4.41 MICROGRAM(S)/KG/HR: 4 INJECTION INTRAVENOUS at 17:31

## 2020-01-01 RX ADMIN — Medication 130 MILLIGRAM(S): at 04:15

## 2020-01-01 RX ADMIN — CEFTRIAXONE 100 MILLIGRAM(S): 500 INJECTION, POWDER, FOR SOLUTION INTRAMUSCULAR; INTRAVENOUS at 10:22

## 2020-01-01 RX ADMIN — Medication 3 MILLIGRAM(S): at 05:55

## 2020-01-01 RX ADMIN — Medication 40 MILLIEQUIVALENT(S): at 11:06

## 2020-01-01 RX ADMIN — LEVETIRACETAM 400 MILLIGRAM(S): 250 TABLET, FILM COATED ORAL at 06:22

## 2020-01-01 RX ADMIN — Medication 650 MILLIGRAM(S): at 00:28

## 2020-01-01 RX ADMIN — Medication 100 MILLIGRAM(S): at 12:06

## 2020-01-01 RX ADMIN — Medication 1 MILLIGRAM(S): at 12:49

## 2020-01-01 RX ADMIN — Medication 1 TABLET(S): at 13:57

## 2020-01-01 RX ADMIN — Medication 130 MILLIGRAM(S): at 01:27

## 2020-01-01 RX ADMIN — CEFTRIAXONE 100 MILLIGRAM(S): 500 INJECTION, POWDER, FOR SOLUTION INTRAMUSCULAR; INTRAVENOUS at 10:58

## 2020-01-01 RX ADMIN — Medication 125 MILLIGRAM(S): at 17:13

## 2020-01-01 RX ADMIN — Medication 125 MILLIGRAM(S): at 12:06

## 2020-01-01 RX ADMIN — Medication 100 MILLIEQUIVALENT(S): at 07:40

## 2020-01-01 RX ADMIN — Medication 3 MILLIGRAM(S): at 21:26

## 2020-01-01 RX ADMIN — Medication 40 MILLIEQUIVALENT(S): at 17:17

## 2020-01-01 RX ADMIN — Medication 100 MILLIGRAM(S): at 16:37

## 2020-01-01 RX ADMIN — HALOPERIDOL DECANOATE 5 MILLIGRAM(S): 100 INJECTION INTRAMUSCULAR at 03:57

## 2020-01-01 RX ADMIN — Medication 125 MILLIGRAM(S): at 11:32

## 2020-01-01 RX ADMIN — SODIUM CHLORIDE 100 MILLILITER(S): 9 INJECTION, SOLUTION INTRAVENOUS at 03:14

## 2020-01-01 RX ADMIN — Medication 1 TABLET(S): at 13:40

## 2020-01-01 RX ADMIN — PANTOPRAZOLE SODIUM 10 MG/HR: 20 TABLET, DELAYED RELEASE ORAL at 16:11

## 2020-01-01 RX ADMIN — Medication 1 MILLIGRAM(S): at 11:24

## 2020-01-01 RX ADMIN — Medication 25 MILLIGRAM(S): at 23:31

## 2020-01-01 RX ADMIN — Medication 650 MILLIGRAM(S): at 15:34

## 2020-01-01 RX ADMIN — Medication 414 MILLIGRAM(S): at 17:10

## 2020-01-01 RX ADMIN — HALOPERIDOL DECANOATE 5 MILLIGRAM(S): 100 INJECTION INTRAMUSCULAR at 20:30

## 2020-01-01 RX ADMIN — Medication 3 MILLIGRAM(S): at 23:28

## 2020-01-01 RX ADMIN — Medication 100 MILLIEQUIVALENT(S): at 03:20

## 2020-01-01 RX ADMIN — CHLORHEXIDINE GLUCONATE 1 APPLICATION(S): 213 SOLUTION TOPICAL at 05:53

## 2020-01-01 RX ADMIN — Medication 100 MILLIEQUIVALENT(S): at 18:27

## 2020-01-01 RX ADMIN — PANTOPRAZOLE SODIUM 10 MG/HR: 20 TABLET, DELAYED RELEASE ORAL at 10:38

## 2020-01-01 RX ADMIN — Medication 4 MILLIGRAM(S): at 03:20

## 2020-01-01 RX ADMIN — LEVETIRACETAM 400 MILLIGRAM(S): 250 TABLET, FILM COATED ORAL at 05:09

## 2020-01-01 RX ADMIN — Medication 2 MILLIGRAM(S): at 01:08

## 2020-01-01 RX ADMIN — DEXTROSE MONOHYDRATE, SODIUM CHLORIDE, AND POTASSIUM CHLORIDE 100 MILLILITER(S): 50; .745; 4.5 INJECTION, SOLUTION INTRAVENOUS at 09:47

## 2020-01-01 RX ADMIN — Medication 2 MILLIGRAM(S): at 09:36

## 2020-01-01 RX ADMIN — Medication 125 MILLIGRAM(S): at 22:31

## 2020-01-01 RX ADMIN — Medication 1 APPLICATION(S): at 12:30

## 2020-01-01 RX ADMIN — Medication 1 TABLET(S): at 12:52

## 2020-01-01 RX ADMIN — DEXMEDETOMIDINE HYDROCHLORIDE IN 0.9% SODIUM CHLORIDE 4.41 MICROGRAM(S)/KG/HR: 4 INJECTION INTRAVENOUS at 12:49

## 2020-01-01 RX ADMIN — Medication 125 MILLIGRAM(S): at 11:54

## 2020-01-01 RX ADMIN — Medication 105 MILLIGRAM(S): at 14:12

## 2020-01-01 RX ADMIN — Medication 100 MILLIEQUIVALENT(S): at 20:43

## 2020-01-01 RX ADMIN — Medication 100 MILLIEQUIVALENT(S): at 08:16

## 2020-01-01 RX ADMIN — Medication 100 MILLIEQUIVALENT(S): at 18:50

## 2020-01-01 RX ADMIN — Medication 40 MILLIEQUIVALENT(S): at 14:12

## 2020-01-01 RX ADMIN — Medication 4 MILLIGRAM(S): at 23:20

## 2020-01-01 RX ADMIN — SODIUM CHLORIDE 1000 MILLILITER(S): 9 INJECTION INTRAMUSCULAR; INTRAVENOUS; SUBCUTANEOUS at 19:47

## 2020-01-01 RX ADMIN — Medication 1 MILLIGRAM(S): at 12:06

## 2020-01-01 RX ADMIN — Medication 1 MILLIGRAM(S): at 23:24

## 2020-01-01 RX ADMIN — Medication 0.5 MILLIGRAM(S): at 17:41

## 2020-01-01 RX ADMIN — Medication 5 MILLIGRAM(S): at 11:39

## 2020-01-01 RX ADMIN — Medication 1 MILLIGRAM(S): at 13:23

## 2020-01-01 RX ADMIN — Medication 414 MILLIGRAM(S): at 12:04

## 2020-01-01 RX ADMIN — Medication 125 MILLIGRAM(S): at 13:03

## 2020-01-01 RX ADMIN — Medication 1 MILLIGRAM(S): at 13:28

## 2020-01-01 RX ADMIN — CEFTRIAXONE 100 MILLIGRAM(S): 500 INJECTION, POWDER, FOR SOLUTION INTRAMUSCULAR; INTRAVENOUS at 11:06

## 2020-01-01 RX ADMIN — Medication 1 MILLIGRAM(S): at 11:50

## 2020-01-01 RX ADMIN — Medication 40 MILLIEQUIVALENT(S): at 07:57

## 2020-01-01 RX ADMIN — Medication 1 MILLIGRAM(S): at 09:57

## 2020-01-01 RX ADMIN — Medication 130 MILLIGRAM(S): at 01:49

## 2020-01-01 RX ADMIN — SODIUM CHLORIDE 4 MILLILITER(S): 9 INJECTION INTRAMUSCULAR; INTRAVENOUS; SUBCUTANEOUS at 18:27

## 2020-01-01 RX ADMIN — Medication 1 MILLIGRAM(S): at 12:00

## 2020-01-01 RX ADMIN — Medication 1 TABLET(S): at 12:28

## 2020-01-01 RX ADMIN — HALOPERIDOL DECANOATE 5 MILLIGRAM(S): 100 INJECTION INTRAMUSCULAR at 23:15

## 2020-01-01 RX ADMIN — Medication 3 MILLIGRAM(S): at 09:47

## 2020-01-01 RX ADMIN — Medication 3 MILLIGRAM(S): at 21:41

## 2020-01-01 RX ADMIN — Medication 2 MILLIGRAM(S): at 20:57

## 2020-01-01 RX ADMIN — Medication 1 MILLIGRAM(S): at 14:23

## 2020-01-01 RX ADMIN — Medication 3 MILLIGRAM(S): at 19:12

## 2020-01-01 RX ADMIN — DEXMEDETOMIDINE HYDROCHLORIDE IN 0.9% SODIUM CHLORIDE 4.41 MICROGRAM(S)/KG/HR: 4 INJECTION INTRAVENOUS at 06:22

## 2020-01-01 RX ADMIN — Medication 125 MILLIGRAM(S): at 06:32

## 2020-01-01 RX ADMIN — PANTOPRAZOLE SODIUM 40 MILLIGRAM(S): 20 TABLET, DELAYED RELEASE ORAL at 17:22

## 2020-01-01 RX ADMIN — Medication 100 MILLIGRAM(S): at 11:08

## 2020-01-01 RX ADMIN — Medication 125 MILLIGRAM(S): at 11:58

## 2020-01-01 RX ADMIN — Medication 4 MILLIGRAM(S): at 22:41

## 2020-01-01 RX ADMIN — QUETIAPINE FUMARATE 50 MILLIGRAM(S): 200 TABLET, FILM COATED ORAL at 22:12

## 2020-01-01 RX ADMIN — POTASSIUM PHOSPHATE, MONOBASIC POTASSIUM PHOSPHATE, DIBASIC 62.5 MILLIMOLE(S): 236; 224 INJECTION, SOLUTION INTRAVENOUS at 16:11

## 2020-01-01 RX ADMIN — Medication 100 MILLIEQUIVALENT(S): at 16:11

## 2020-01-01 RX ADMIN — Medication 100 MILLIGRAM(S): at 12:52

## 2020-01-01 RX ADMIN — MAGNESIUM OXIDE 400 MG ORAL TABLET 400 MILLIGRAM(S): 241.3 TABLET ORAL at 09:11

## 2020-01-01 RX ADMIN — Medication 1 MILLIGRAM(S): at 20:54

## 2020-01-01 RX ADMIN — SODIUM CHLORIDE 100 MILLILITER(S): 9 INJECTION, SOLUTION INTRAVENOUS at 14:18

## 2020-01-01 RX ADMIN — LEVETIRACETAM 400 MILLIGRAM(S): 250 TABLET, FILM COATED ORAL at 05:18

## 2020-01-01 RX ADMIN — Medication 1000 MILLIGRAM(S): at 07:29

## 2020-01-01 RX ADMIN — LEVETIRACETAM 400 MILLIGRAM(S): 250 TABLET, FILM COATED ORAL at 18:34

## 2020-01-01 RX ADMIN — LEVETIRACETAM 400 MILLIGRAM(S): 250 TABLET, FILM COATED ORAL at 05:31

## 2020-01-01 RX ADMIN — Medication 125 MILLIGRAM(S): at 17:30

## 2020-01-01 RX ADMIN — Medication 105 MILLIGRAM(S): at 15:10

## 2020-01-01 RX ADMIN — DEXTROSE MONOHYDRATE, SODIUM CHLORIDE, AND POTASSIUM CHLORIDE 100 MILLILITER(S): 50; .745; 4.5 INJECTION, SOLUTION INTRAVENOUS at 23:54

## 2020-01-01 RX ADMIN — CEFTRIAXONE 100 MILLIGRAM(S): 500 INJECTION, POWDER, FOR SOLUTION INTRAMUSCULAR; INTRAVENOUS at 15:52

## 2020-01-01 RX ADMIN — Medication 130 MILLIGRAM(S): at 04:00

## 2020-01-01 RX ADMIN — Medication 650 MILLIGRAM(S): at 19:49

## 2020-01-01 RX ADMIN — Medication 100 MILLIGRAM(S): at 11:09

## 2020-01-01 RX ADMIN — Medication 1 MILLIGRAM(S): at 00:48

## 2020-01-01 RX ADMIN — Medication 3 MILLILITER(S): at 05:17

## 2020-01-01 RX ADMIN — Medication 10 MILLIGRAM(S): at 17:04

## 2020-01-01 RX ADMIN — Medication 100 MILLIEQUIVALENT(S): at 05:22

## 2020-01-01 RX ADMIN — Medication 100 MILLIEQUIVALENT(S): at 20:11

## 2020-01-01 RX ADMIN — LEVETIRACETAM 400 MILLIGRAM(S): 250 TABLET, FILM COATED ORAL at 17:16

## 2020-01-01 RX ADMIN — Medication 1 TABLET(S): at 13:21

## 2020-01-01 RX ADMIN — Medication 125 MILLIGRAM(S): at 12:49

## 2020-01-01 RX ADMIN — MAGNESIUM OXIDE 400 MG ORAL TABLET 400 MILLIGRAM(S): 241.3 TABLET ORAL at 17:22

## 2020-01-01 RX ADMIN — Medication 40 MILLIEQUIVALENT(S): at 11:50

## 2020-01-01 RX ADMIN — Medication 1 MILLIGRAM(S): at 18:14

## 2020-01-01 RX ADMIN — Medication 2 MILLIGRAM(S): at 07:40

## 2020-01-01 RX ADMIN — PANTOPRAZOLE SODIUM 40 MILLIGRAM(S): 20 TABLET, DELAYED RELEASE ORAL at 06:55

## 2020-01-01 RX ADMIN — Medication 25 MILLIGRAM(S): at 00:00

## 2020-01-01 RX ADMIN — Medication 1.5 MILLIGRAM(S): at 01:07

## 2020-01-01 RX ADMIN — Medication 125 MILLIGRAM(S): at 23:45

## 2020-01-01 RX ADMIN — Medication 2 MILLIGRAM(S): at 22:29

## 2020-01-01 RX ADMIN — Medication 1.5 MILLIGRAM(S): at 02:50

## 2020-01-01 RX ADMIN — Medication 100 MILLIGRAM(S): at 10:04

## 2020-01-01 RX ADMIN — DEXMEDETOMIDINE HYDROCHLORIDE IN 0.9% SODIUM CHLORIDE 4.41 MICROGRAM(S)/KG/HR: 4 INJECTION INTRAVENOUS at 14:40

## 2020-01-01 RX ADMIN — Medication 650 MILLIGRAM(S): at 19:56

## 2020-01-01 RX ADMIN — Medication 3 MILLIGRAM(S): at 13:22

## 2020-01-01 RX ADMIN — Medication 40 MILLIEQUIVALENT(S): at 16:33

## 2020-01-01 RX ADMIN — Medication 1 MILLIGRAM(S): at 17:22

## 2020-01-01 RX ADMIN — Medication 1 MILLIGRAM(S): at 13:40

## 2020-01-01 RX ADMIN — Medication 650 MILLIGRAM(S): at 09:16

## 2020-01-01 RX ADMIN — Medication 105 MILLIGRAM(S): at 21:37

## 2020-01-01 RX ADMIN — Medication 125 MILLIGRAM(S): at 01:09

## 2020-01-01 RX ADMIN — Medication 1.5 MILLIGRAM(S): at 10:00

## 2020-01-01 RX ADMIN — PANTOPRAZOLE SODIUM 40 MILLIGRAM(S): 20 TABLET, DELAYED RELEASE ORAL at 19:00

## 2020-01-01 RX ADMIN — Medication 2 MILLIGRAM(S): at 10:19

## 2020-01-01 RX ADMIN — Medication 4 MILLIGRAM(S): at 06:28

## 2020-01-01 RX ADMIN — Medication 3 MILLIGRAM(S): at 18:41

## 2020-01-01 RX ADMIN — Medication 1 MILLIGRAM(S): at 16:45

## 2020-01-01 RX ADMIN — Medication 1 TABLET(S): at 13:23

## 2020-01-01 RX ADMIN — Medication 1 SPRAY(S): at 13:25

## 2020-01-01 RX ADMIN — Medication 2 MILLIGRAM(S): at 14:02

## 2020-01-01 RX ADMIN — ERTAPENEM SODIUM 120 MILLIGRAM(S): 1 INJECTION, POWDER, LYOPHILIZED, FOR SOLUTION INTRAMUSCULAR; INTRAVENOUS at 19:07

## 2020-01-01 RX ADMIN — Medication 40 MILLIEQUIVALENT(S): at 23:11

## 2020-01-01 RX ADMIN — Medication 2 MILLIGRAM(S): at 06:54

## 2020-01-01 RX ADMIN — PANTOPRAZOLE SODIUM 40 MILLIGRAM(S): 20 TABLET, DELAYED RELEASE ORAL at 18:41

## 2020-01-01 RX ADMIN — Medication 125 MILLIGRAM(S): at 18:36

## 2020-01-01 RX ADMIN — Medication 100 MILLIEQUIVALENT(S): at 05:32

## 2020-01-01 RX ADMIN — Medication 5 MILLIGRAM(S): at 13:03

## 2020-01-01 RX ADMIN — QUETIAPINE FUMARATE 25 MILLIGRAM(S): 200 TABLET, FILM COATED ORAL at 21:01

## 2020-01-01 RX ADMIN — Medication 2 MILLIGRAM(S): at 19:52

## 2020-01-01 RX ADMIN — Medication 1 MILLIGRAM(S): at 11:31

## 2020-01-01 RX ADMIN — SODIUM CHLORIDE 4 MILLILITER(S): 9 INJECTION INTRAMUSCULAR; INTRAVENOUS; SUBCUTANEOUS at 11:20

## 2020-01-01 RX ADMIN — CEFTRIAXONE 100 MILLIGRAM(S): 500 INJECTION, POWDER, FOR SOLUTION INTRAMUSCULAR; INTRAVENOUS at 09:10

## 2020-01-01 RX ADMIN — Medication 125 MILLIGRAM(S): at 18:33

## 2020-01-01 RX ADMIN — PANTOPRAZOLE SODIUM 40 MILLIGRAM(S): 20 TABLET, DELAYED RELEASE ORAL at 18:27

## 2020-01-01 RX ADMIN — CHLORHEXIDINE GLUCONATE 1 APPLICATION(S): 213 SOLUTION TOPICAL at 05:18

## 2020-01-01 RX ADMIN — Medication 2 MILLIGRAM(S): at 09:20

## 2020-01-01 RX ADMIN — ONDANSETRON 4 MILLIGRAM(S): 8 TABLET, FILM COATED ORAL at 22:16

## 2020-01-01 RX ADMIN — Medication 0.5 MILLIGRAM(S): at 11:08

## 2020-01-01 RX ADMIN — Medication 1 MILLIGRAM(S): at 13:57

## 2020-01-01 RX ADMIN — Medication 40 MILLIEQUIVALENT(S): at 12:41

## 2020-01-01 RX ADMIN — Medication 2 MILLIGRAM(S): at 08:00

## 2020-01-01 RX ADMIN — Medication 100 MILLIEQUIVALENT(S): at 02:26

## 2020-01-01 RX ADMIN — Medication 50 GRAM(S): at 01:21

## 2020-01-01 RX ADMIN — OCTREOTIDE ACETATE 10 MICROGRAM(S)/HR: 200 INJECTION, SOLUTION INTRAVENOUS; SUBCUTANEOUS at 11:32

## 2020-01-01 RX ADMIN — Medication 1 TABLET(S): at 13:03

## 2020-01-01 RX ADMIN — Medication 50 GRAM(S): at 05:56

## 2020-01-01 RX ADMIN — Medication 2 MILLIGRAM(S): at 04:28

## 2020-01-01 RX ADMIN — HALOPERIDOL DECANOATE 5 MILLIGRAM(S): 100 INJECTION INTRAMUSCULAR at 10:40

## 2020-01-01 RX ADMIN — OLANZAPINE 5 MILLIGRAM(S): 15 TABLET, FILM COATED ORAL at 08:08

## 2020-01-01 RX ADMIN — CEFTRIAXONE 100 MILLIGRAM(S): 500 INJECTION, POWDER, FOR SOLUTION INTRAMUSCULAR; INTRAVENOUS at 16:01

## 2020-01-01 RX ADMIN — SODIUM CHLORIDE 50 MILLILITER(S): 9 INJECTION, SOLUTION INTRAVENOUS at 18:35

## 2020-01-01 RX ADMIN — Medication 130 MILLIGRAM(S): at 02:55

## 2020-01-01 RX ADMIN — Medication 1 TABLET(S): at 11:06

## 2020-01-01 RX ADMIN — Medication 1 TABLET(S): at 14:12

## 2020-01-01 RX ADMIN — Medication 200 GRAM(S): at 07:19

## 2020-01-01 RX ADMIN — LEVETIRACETAM 400 MILLIGRAM(S): 250 TABLET, FILM COATED ORAL at 17:04

## 2020-01-01 RX ADMIN — Medication 1 MILLIGRAM(S): at 17:44

## 2020-01-01 RX ADMIN — Medication 2 MILLIGRAM(S): at 18:50

## 2020-01-01 RX ADMIN — OCTREOTIDE ACETATE 10 MICROGRAM(S)/HR: 200 INJECTION, SOLUTION INTRAVENOUS; SUBCUTANEOUS at 08:13

## 2020-01-01 RX ADMIN — POTASSIUM PHOSPHATE, MONOBASIC POTASSIUM PHOSPHATE, DIBASIC 62.5 MILLIMOLE(S): 236; 224 INJECTION, SOLUTION INTRAVENOUS at 20:11

## 2020-01-01 RX ADMIN — QUETIAPINE FUMARATE 25 MILLIGRAM(S): 200 TABLET, FILM COATED ORAL at 14:45

## 2020-01-01 RX ADMIN — POTASSIUM PHOSPHATE, MONOBASIC POTASSIUM PHOSPHATE, DIBASIC 62.5 MILLIMOLE(S): 236; 224 INJECTION, SOLUTION INTRAVENOUS at 20:41

## 2020-01-01 RX ADMIN — Medication 105 MILLIGRAM(S): at 11:50

## 2020-01-01 RX ADMIN — Medication 3 MILLILITER(S): at 00:09

## 2020-01-01 RX ADMIN — Medication 100 MILLIEQUIVALENT(S): at 18:09

## 2020-01-01 RX ADMIN — Medication 2 MILLIGRAM(S): at 11:29

## 2020-01-01 RX ADMIN — Medication 100 MILLIGRAM(S): at 12:39

## 2020-01-01 RX ADMIN — Medication 130 MILLIGRAM(S): at 00:57

## 2020-01-01 RX ADMIN — Medication 1 MILLIGRAM(S): at 14:07

## 2020-01-01 RX ADMIN — OCTREOTIDE ACETATE 10 MICROGRAM(S)/HR: 200 INJECTION, SOLUTION INTRAVENOUS; SUBCUTANEOUS at 16:11

## 2020-01-03 ENCOUNTER — APPOINTMENT (OUTPATIENT)
Dept: HEPATOLOGY | Facility: CLINIC | Age: 55
End: 2020-01-03

## 2020-01-08 ENCOUNTER — APPOINTMENT (OUTPATIENT)
Dept: HEPATOLOGY | Facility: CLINIC | Age: 55
End: 2020-01-08

## 2020-01-10 ENCOUNTER — EMERGENCY (EMERGENCY)
Facility: HOSPITAL | Age: 55
LOS: 1 days | Discharge: HOME | End: 2020-01-10
Attending: EMERGENCY MEDICINE
Payer: COMMERCIAL

## 2020-01-10 VITALS
TEMPERATURE: 98 F | RESPIRATION RATE: 18 BRPM | WEIGHT: 220.02 LBS | HEIGHT: 71 IN | SYSTOLIC BLOOD PRESSURE: 154 MMHG | HEART RATE: 114 BPM | DIASTOLIC BLOOD PRESSURE: 90 MMHG | OXYGEN SATURATION: 95 %

## 2020-01-10 VITALS
RESPIRATION RATE: 18 BRPM | TEMPERATURE: 98 F | HEART RATE: 109 BPM | DIASTOLIC BLOOD PRESSURE: 70 MMHG | SYSTOLIC BLOOD PRESSURE: 146 MMHG | OXYGEN SATURATION: 97 %

## 2020-01-10 LAB
ALBUMIN SERPL ELPH-MCNC: 4.7 G/DL — SIGNIFICANT CHANGE UP (ref 3.3–5)
ALP SERPL-CCNC: 201 U/L — HIGH (ref 40–120)
ALT FLD-CCNC: 25 U/L — SIGNIFICANT CHANGE UP (ref 10–45)
AMMONIA BLD-MCNC: 46 UMOL/L — SIGNIFICANT CHANGE UP (ref 11–55)
ANION GAP SERPL CALC-SCNC: 26 MMOL/L — HIGH (ref 5–17)
APTT BLD: 35 SEC — SIGNIFICANT CHANGE UP (ref 27.5–36.3)
AST SERPL-CCNC: 89 U/L — HIGH (ref 10–40)
BASOPHILS # BLD AUTO: 0.04 K/UL — SIGNIFICANT CHANGE UP (ref 0–0.2)
BASOPHILS NFR BLD AUTO: 0.5 % — SIGNIFICANT CHANGE UP (ref 0–2)
BILIRUB SERPL-MCNC: 0.9 MG/DL — SIGNIFICANT CHANGE UP (ref 0.2–1.2)
BUN SERPL-MCNC: 7 MG/DL — SIGNIFICANT CHANGE UP (ref 7–23)
CALCIUM SERPL-MCNC: 9 MG/DL — SIGNIFICANT CHANGE UP (ref 8.4–10.5)
CHLORIDE SERPL-SCNC: 90 MMOL/L — LOW (ref 96–108)
CO2 SERPL-SCNC: 24 MMOL/L — SIGNIFICANT CHANGE UP (ref 22–31)
CREAT SERPL-MCNC: 0.63 MG/DL — SIGNIFICANT CHANGE UP (ref 0.5–1.3)
EOSINOPHIL # BLD AUTO: 0.01 K/UL — SIGNIFICANT CHANGE UP (ref 0–0.5)
EOSINOPHIL NFR BLD AUTO: 0.1 % — SIGNIFICANT CHANGE UP (ref 0–6)
ETHANOL SERPL-MCNC: 374 MG/DL — HIGH (ref 0–10)
GAS PNL BLDV: SIGNIFICANT CHANGE UP
GLUCOSE SERPL-MCNC: 137 MG/DL — HIGH (ref 70–99)
HCT VFR BLD CALC: 41.8 % — SIGNIFICANT CHANGE UP (ref 39–50)
HGB BLD-MCNC: 14 G/DL — SIGNIFICANT CHANGE UP (ref 13–17)
IMM GRANULOCYTES NFR BLD AUTO: 0.7 % — SIGNIFICANT CHANGE UP (ref 0–1.5)
INR BLD: 1.03 RATIO — SIGNIFICANT CHANGE UP (ref 0.88–1.16)
LIDOCAIN IGE QN: 20 U/L — SIGNIFICANT CHANGE UP (ref 7–60)
LYMPHOCYTES # BLD AUTO: 0.8 K/UL — LOW (ref 1–3.3)
LYMPHOCYTES # BLD AUTO: 9.6 % — LOW (ref 13–44)
MAGNESIUM SERPL-MCNC: 2 MG/DL — SIGNIFICANT CHANGE UP (ref 1.6–2.6)
MCHC RBC-ENTMCNC: 32.6 PG — SIGNIFICANT CHANGE UP (ref 27–34)
MCHC RBC-ENTMCNC: 33.5 GM/DL — SIGNIFICANT CHANGE UP (ref 32–36)
MCV RBC AUTO: 97.4 FL — SIGNIFICANT CHANGE UP (ref 80–100)
MONOCYTES # BLD AUTO: 0.45 K/UL — SIGNIFICANT CHANGE UP (ref 0–0.9)
MONOCYTES NFR BLD AUTO: 5.4 % — SIGNIFICANT CHANGE UP (ref 2–14)
NEUTROPHILS # BLD AUTO: 6.94 K/UL — SIGNIFICANT CHANGE UP (ref 1.8–7.4)
NEUTROPHILS NFR BLD AUTO: 83.7 % — HIGH (ref 43–77)
NRBC # BLD: 0 /100 WBCS — SIGNIFICANT CHANGE UP (ref 0–0)
PLATELET # BLD AUTO: 75 K/UL — LOW (ref 150–400)
POTASSIUM SERPL-MCNC: 3.7 MMOL/L — SIGNIFICANT CHANGE UP (ref 3.5–5.3)
POTASSIUM SERPL-SCNC: 3.7 MMOL/L — SIGNIFICANT CHANGE UP (ref 3.5–5.3)
PROT SERPL-MCNC: 8.5 G/DL — HIGH (ref 6–8.3)
PROTHROM AB SERPL-ACNC: 11.9 SEC — SIGNIFICANT CHANGE UP (ref 10–12.9)
RBC # BLD: 4.29 M/UL — SIGNIFICANT CHANGE UP (ref 4.2–5.8)
RBC # FLD: 15.8 % — HIGH (ref 10.3–14.5)
SODIUM SERPL-SCNC: 140 MMOL/L — SIGNIFICANT CHANGE UP (ref 135–145)
WBC # BLD: 8.3 K/UL — SIGNIFICANT CHANGE UP (ref 3.8–10.5)
WBC # FLD AUTO: 8.3 K/UL — SIGNIFICANT CHANGE UP (ref 3.8–10.5)

## 2020-01-10 PROCEDURE — 82330 ASSAY OF CALCIUM: CPT

## 2020-01-10 PROCEDURE — 85027 COMPLETE CBC AUTOMATED: CPT

## 2020-01-10 PROCEDURE — 99285 EMERGENCY DEPT VISIT HI MDM: CPT

## 2020-01-10 PROCEDURE — 83690 ASSAY OF LIPASE: CPT

## 2020-01-10 PROCEDURE — 84132 ASSAY OF SERUM POTASSIUM: CPT

## 2020-01-10 PROCEDURE — 83605 ASSAY OF LACTIC ACID: CPT

## 2020-01-10 PROCEDURE — 85014 HEMATOCRIT: CPT

## 2020-01-10 PROCEDURE — 99284 EMERGENCY DEPT VISIT MOD MDM: CPT

## 2020-01-10 PROCEDURE — 82140 ASSAY OF AMMONIA: CPT

## 2020-01-10 PROCEDURE — 84295 ASSAY OF SERUM SODIUM: CPT

## 2020-01-10 PROCEDURE — 80307 DRUG TEST PRSMV CHEM ANLYZR: CPT

## 2020-01-10 PROCEDURE — 85730 THROMBOPLASTIN TIME PARTIAL: CPT

## 2020-01-10 PROCEDURE — 82947 ASSAY GLUCOSE BLOOD QUANT: CPT

## 2020-01-10 PROCEDURE — 82435 ASSAY OF BLOOD CHLORIDE: CPT

## 2020-01-10 PROCEDURE — 83735 ASSAY OF MAGNESIUM: CPT

## 2020-01-10 PROCEDURE — 82803 BLOOD GASES ANY COMBINATION: CPT

## 2020-01-10 PROCEDURE — 85610 PROTHROMBIN TIME: CPT

## 2020-01-10 PROCEDURE — 80053 COMPREHEN METABOLIC PANEL: CPT

## 2020-01-10 RX ORDER — FOLIC ACID 0.8 MG
1 TABLET ORAL ONCE
Refills: 0 | Status: DISCONTINUED | OUTPATIENT
Start: 2020-01-10 | End: 2020-01-10

## 2020-01-10 RX ORDER — THIAMINE MONONITRATE (VIT B1) 100 MG
500 TABLET ORAL ONCE
Refills: 0 | Status: DISCONTINUED | OUTPATIENT
Start: 2020-01-10 | End: 2020-01-10

## 2020-01-10 RX ORDER — SODIUM CHLORIDE 9 MG/ML
1000 INJECTION, SOLUTION INTRAVENOUS
Refills: 0 | Status: DISCONTINUED | OUTPATIENT
Start: 2020-01-10 | End: 2020-01-10

## 2020-01-10 NOTE — ED ADULT NURSE NOTE - OBJECTIVE STATEMENT
Pt presented to ED after daughter found him unresponsive from ETOH. As per daughter, pt recently was treated for alcohol and was "not drinking for 1 month." As per daughter, pt "full-blown relapsed 1-2 weeks before [place]." Daughter is concerned as pt was diagnosed with cirrhosis about 1 month before Thanksgiving and is currently not seeking treatment. Pt states he drinks "2 pints of tequila a day for 4 years." Pt denies using other substances. As per daughter and pt, pt has withdrawn before and experienced hallucinations and tremors, but never had seizures from withdrawal. Pt currently denies hallucinations, N/V, tremors. Upon assessment, pt appears intoxicated and has delayed responses. Pt is A&Ox . Bilateral clear lung sounds, tachycardic, no tremors noted, no diaphoresis noted. Pt appears restless. When asked, pt states he feels restless and anxious. Pt presented to ED after daughter found him unresponsive from ETOH. As per daughter, pt recently was treated for alcohol and was "not drinking for 1 month." As per daughter, pt "full-blown relapsed 1-2 weeks before [place]." Daughter is concerned as pt was diagnosed with cirrhosis about 1 month before Thanksgiving and is currently not seeking treatment. Pt states he drinks "2 pints of tequila a day for 4 years." Pt denies using other substances. As per daughter and pt, pt has withdrawn before and experienced hallucinations and tremors, but never had seizures from withdrawal. Pt currently denies hallucinations, N/V, tremors. Upon assessment, pt appears intoxicated and has delayed responses. Pt is A&Ox3. Bilateral clear lung sounds, tachycardic, no tremors noted, no diaphoresis noted. Pt appears restless, keeps standing up and playing with hands. When asked, pt states he feels restless and anxious. Pt states he has a H/A 9/10. Pt states last drink was today around 13:30/14:00. Pt presented to ED after daughter found him unresponsive from ETOH. As per daughter, pt recently was treated for alcohol abuse and was "not drinking for 1 month." As per daughter, pt "full-blown relapsed 1-2 weeks before [place]." Daughter is concerned as pt was diagnosed with cirrhosis about 1 month before Thanksgiving and is currently not seeking treatment. Pt states he drinks "2 pints of tequila a day for 4 years." Pt denies using other substances. As per daughter and pt, pt has withdrawn before and experienced hallucinations and tremors, but never had seizures from withdrawal. Pt currently denies hallucinations, N/V, tremors. Upon assessment, pt appears intoxicated and has delayed responses. Pt is A&Ox3. Bilateral clear lung sounds, tachycardic, no tremors noted, no diaphoresis noted. Pt appears restless, keeps standing up and playing with hands. When asked, pt states he feels restless and anxious. Pt states he has a H/A 9/10. Pt states last drink was today around 13:30/14:00.

## 2020-01-10 NOTE — ED PROVIDER NOTE - CLINICAL SUMMARY MEDICAL DECISION MAKING FREE TEXT BOX
Rosa Mohr MD - Attending Physician: Pt here with alcohol intoxication, daughter concerned for liver disease. +intoxicated however alert and oriented, fully conversant, not ataxic in bed. Labs, PO + IVF

## 2020-01-10 NOTE — ED PROVIDER NOTE - NSFOLLOWUPCLINICS_GEN_ALL_ED_FT
[facility]  Gastroenterology  [address] [place] [id number]  Phone: [phone]  Fax:   Follow Up Time:

## 2020-01-10 NOTE — ED PROVIDER NOTE - PATIENT PORTAL LINK FT
You can access the FollowMyHealth Patient Portal offered by [facility] by registering at the following website: [email or web address]. By joining [employer]’s FollowMyHealth portal, you will also be able to view your health information using other applications (apps) compatible with our system.

## 2020-01-10 NOTE — ED PROVIDER NOTE - ATTENDING CONTRIBUTION TO CARE
Rosa Mohr MD - Attending Physician: I have personally seen and examined this patient with the resident/fellow.  I have fully participated in the care of this patient. I have reviewed all pertinent clinical information, including history, physical exam, plan and the Resident/Fellow’s note and agree except as noted. See MDM

## 2020-01-10 NOTE — ED ADULT NURSE NOTE - NSIMPLEMENTINTERV_GEN_ALL_ED
Implemented All Fall Risk Interventions:  [place] to call system. Call bell, personal items and telephone within reach. Instruct patient to call for assistance. Room bathroom lighting operational. Non-slip footwear when patient is off stretcher. Physically safe environment: no spills, clutter or unnecessary equipment. Stretcher in lowest position, wheels locked, appropriate side rails in place. Provide visual cue, wrist band, yellow gown, etc. Monitor gait and stability. Monitor for mental status changes and reorient to person, place, and time. Review medications for side effects contributing to fall risk. Reinforce activity limits and safety measures with patient and family.

## 2020-01-10 NOTE — ED PROVIDER NOTE - NSFOLLOWUPINSTRUCTIONS_ED_ALL_ED_FT
Thank you for visiting our Emergency Department, it has been a pleasure taking part in your healthcare.    Please follow up with your Primary Doctor in 2-3 days.  STOP DRINKING ALCOHOL - PLEASE SEEK HELP      Alcohol Use Disorder  Alcohol use disorder is when your drinking disrupts your daily life. When you have this condition, you drink too much alcohol and you cannot control your drinking.  Alcohol use disorder can cause serious problems with your physical health. It can affect your brain, heart, liver, pancreas, immune system, stomach, and intestines. Alcohol use disorder can increase your risk for certain cancers and cause problems with your mental health, such as depression, anxiety, psychosis, delirium, and dementia. People with this disorder risk hurting themselves and others.  What are the causes?  This condition is caused by drinking too much alcohol over time. It is not caused by drinking too much alcohol only one or two times. Some people with this condition drink alcohol to cope with or escape from negative life events. Others drink to relieve pain or symptoms of mental illness.  What increases the risk?  You are more likely to develop this condition if:  You have a family history of alcohol use disorder.Your culture encourages drinking to the point of intoxication, or makes alcohol easy to get.You had a mood or conduct disorder in childhood.You have been a victim of abuse.You are an adolescent and:  You have poor grades or difficulties in school.Your caregivers do not talk to you about saying no to alcohol, or supervise your activities.You are impulsive or you have trouble with self-control.What are the signs or symptoms?  Symptoms of this condition include:  Drinking more than you want to.Drinking for longer than you want to.Trying several times to drink less or to control your drinking.Spending a lot of time getting alcohol, drinking, or recovering from drinking.Craving alcohol.Having problems at work, at school, or at home due to drinking.Having problems in relationships due to drinking.Drinking when it is dangerous to drink, such as before driving a car.Continuing to drink even though you know you might have a physical or mental problem related to drinking.Needing more and more alcohol to get the same effect you want from the alcohol (building up tolerance).Having symptoms of withdrawal when you stop drinking. Symptoms of withdrawal include:  Fatigue.Nightmares.Trouble sleeping.Depression.Anxiety.Fever.Seizures.Severe confusion.Feeling or seeing things that are not there (hallucinations).Tremors.Rapid heart rate.Rapid breathing.High blood pressure.Drinking to avoid symptoms of withdrawal.How is this diagnosed?  This condition is diagnosed with an assessment. Your health care provider may start the assessment by asking three or four questions about your drinking.  Your health care provider may perform a physical exam or do lab tests to see if you have physical problems resulting from alcohol use. She or he may refer you to a mental health professional for evaluation.  How is this treated?  Some people with alcohol use disorder are able to reduce their alcohol use to low-risk levels. Others need to completely quit drinking alcohol. When necessary, mental health professionals with specialized training in substance use treatment can help. Your health care provider can help you decide how severe your alcohol use disorder is and what type of treatment you need. The following forms of treatment are available:  Detoxification. Detoxification involves quitting drinking and using prescription medicines within the first week to help lessen withdrawal symptoms. This treatment is important for people who have had withdrawal symptoms before and for heavy drinkers who are likely to have withdrawal symptoms. Alcohol withdrawal can be dangerous, and in severe cases, it can cause death. Detoxification may be provided in a home, community, or primary care setting, or in a hospital or substance use treatment facility.Counseling. This treatment is also called talk therapy. It is provided by substance use treatment counselors. A counselor can address the reasons you use alcohol and suggest ways to keep you from drinking again or to prevent problem drinking. The goals of talk therapy are to:  Find healthy activities and ways for you to cope with stress.Identify and avoid the things that trigger your alcohol use.Help you learn how to handle cravings.Medicines. Medicines can help treat alcohol use disorder by:  Decreasing alcohol cravings.Decreasing the positive feeling you have when you drink alcohol.Causing an uncomfortable physical reaction when you drink alcohol (aversion therapy).Support groups. Support groups are led by people who have quit drinking. They provide emotional support, advice, and guidance.These forms of treatment are often combined. Some people with this condition benefit from a combination of treatments provided by specialized substance use treatment centers.  Follow these instructions at home:  Take over-the-counter and prescription medicines only as told by your health care provider.Check with your health care provider before starting any new medicines.Ask friends and family members not to offer you alcohol.Avoid situations where alcohol is served, including gatherings where others are drinking alcohol.Create a plan for what to do when you are tempted to use alcohol.Find hobbies or activities that you enjoy that do not include alcohol.Keep all follow-up visits as told by your health care provider. This is important.How is this prevented?  If you drink, limit alcohol intake to no more than 1 drink a day for nonpregnant women and 2 drinks a day for men. One drink equals 12 oz of beer, 5 oz of wine, or 1½ oz of hard liquor.If you have a mental health condition, get treatment and support.Do not give alcohol to adolescents.If you are an adolescent:  Do not drink alcohol.Do not be afraid to say no if someone offers you alcohol. Speak up about why you do not want to drink. You can be a positive role model for your friends and set a good example for those around you by not drinking alcohol.If your friends drink, spend time with others who do not drink alcohol. Make new friends who do not use alcohol.Find healthy ways to manage stress and emotions, such as meditation or deep breathing, exercise, spending time in nature, listening to music, or talking with a trusted friend or family member.Contact a health care provider if:  You are not able to take your medicines as told.Your symptoms get worse.You return to drinking alcohol (relapse) and your symptoms get worse.Get help right away if:  You have thoughts about hurting yourself or others.If you ever feel like you may hurt yourself or others, or have thoughts about taking your own life, get help right away. You can go to your nearest emergency department or call:   Your local emergency services (911 in the [place] A suicide crisis helpline, such as the National Suicide Prevention Lifeline at 1-690.828.4258. This is open 24 hours a day. Summary  Alcohol use disorder is when your drinking disrupts your daily life. When you have this condition, you drink too much alcohol and you cannot control your drinking.Treatment may include detoxification, counseling, medicine, and support groups.Ask friends and family members not to offer you alcohol. Avoid situations where alcohol is served.Get help right away if you have thoughts about hurting yourself or others.This information is not intended to replace advice given to you by your health care provider. Make sure you discuss any questions you have with your health care provider.

## 2020-01-10 NOTE — ED ADULT NURSE NOTE - CAS EDN DISCHARGE ASSESSMENT
Alert and oriented to person, place and time MD Lee aware of HR and comfortable sending pt home/Alert and oriented to person, place and time

## 2020-01-10 NOTE — ED PROVIDER NOTE - OBJECTIVE STATEMENT
Patient is a 54 y.o M w/ PMH alcohol abuse c/b cirrhosis and portal HTN who p/w alcohol intoxication and "abnormal lab".     Of note, patient was last hospitalized at [facility] from 10/14- 10/19 for alcohol withdrawal. He has a hx of delirium tremends requiring MICU in 2013. Patient brought in by family due to AMS. Pt has long history of ETOH abuse. Was told in Nov he has early Cirrhosis. Stopped drinking for a short period, but has returned to drinking regularly. Pt reports he has been drinking nonstop for 5 days. Today, daughter checked on him and he was "staring into space" and wouldn't get up and was concerned it was his liver so brought him here. Pt denies any pain. Notes he feels anxious but otherwise at baseline. Last drink at 2pm. Pt reports no desire to quit drinking, aware it can cause harm, does not want to be in the ED for any further treatment    Pt reports appointment with Hepatology on 1/30, though daughter thinks it was cancelled.   Of note, patient was last hospitalized at [facility] from 10/14- 10/19 for alcohol withdrawal. He has a hx of delirium tremens requiring MICU in 2013.

## 2020-01-10 NOTE — ED PROVIDER NOTE - CONSTITUTIONAL, MLM
normal... Awake, alert, oriented to person, place, time/situation and in no apparent distress. Mildly unkempt

## 2020-01-10 NOTE — ED ADULT TRIAGE NOTE - CHIEF COMPLAINT QUOTE
recently treated for ETOH abuse.  Intoxicated in triage.  A/o X 4.  Was told he has liver failure from alcoholism.  As per family at bedside patient was catatonic at home

## 2020-01-10 NOTE — ED PROVIDER NOTE - PROGRESS NOTE DETAILS
Initial labs showing some alcoholic acidosis, otherwise nonactionable. Pt does not want to wait for IVF for further work-up. Clinically mildly intoxicated but oriented, stable gait, has sober ride home. Discussed with daughter and patient risks of ETOH abuse, need to quit, need for GI follow-up, and need for good hydration/diet. Daughter comfortable taking patient home. Will dc. Return precautions discussed

## 2020-01-11 ENCOUNTER — INPATIENT (INPATIENT)
Facility: HOSPITAL | Age: 55
LOS: 2 days | Discharge: HOME | DRG: 897 | End: 2020-01-14
Attending: STUDENT IN AN ORGANIZED HEALTH CARE EDUCATION/TRAINING PROGRAM | Admitting: HOSPITALIST
Payer: COMMERCIAL

## 2020-01-11 VITALS
DIASTOLIC BLOOD PRESSURE: 81 MMHG | HEART RATE: 87 BPM | SYSTOLIC BLOOD PRESSURE: 164 MMHG | RESPIRATION RATE: 20 BRPM | WEIGHT: 214.95 LBS | HEIGHT: 71 IN | TEMPERATURE: 98 F | OXYGEN SATURATION: 98 %

## 2020-01-11 DIAGNOSIS — F10.239 ALCOHOL DEPENDENCE WITH WITHDRAWAL, UNSPECIFIED: ICD-10-CM

## 2020-01-11 LAB
ALBUMIN SERPL ELPH-MCNC: 4.8 G/DL — SIGNIFICANT CHANGE UP (ref 3.3–5)
ALP SERPL-CCNC: 175 U/L — HIGH (ref 40–120)
ALT FLD-CCNC: 28 U/L — SIGNIFICANT CHANGE UP (ref 10–45)
AMMONIA BLD-MCNC: 42 UMOL/L — SIGNIFICANT CHANGE UP (ref 11–55)
ANION GAP SERPL CALC-SCNC: 20 MMOL/L — HIGH (ref 5–17)
AST SERPL-CCNC: 111 U/L — HIGH (ref 10–40)
BILIRUB DIRECT SERPL-MCNC: 0.5 MG/DL — HIGH (ref 0–0.2)
BILIRUB INDIRECT FLD-MCNC: 1.3 MG/DL — HIGH (ref 0.2–1)
BILIRUB SERPL-MCNC: 1.8 MG/DL — HIGH (ref 0.2–1.2)
BUN SERPL-MCNC: 8 MG/DL — SIGNIFICANT CHANGE UP (ref 7–23)
CALCIUM SERPL-MCNC: 9.7 MG/DL — SIGNIFICANT CHANGE UP (ref 8.4–10.5)
CHLORIDE SERPL-SCNC: 95 MMOL/L — LOW (ref 96–108)
CO2 SERPL-SCNC: 30 MMOL/L — SIGNIFICANT CHANGE UP (ref 22–31)
CREAT SERPL-MCNC: 0.48 MG/DL — LOW (ref 0.5–1.3)
ETHANOL SERPL-MCNC: SIGNIFICANT CHANGE UP MG/DL (ref 0–10)
GAS PNL BLDV: SIGNIFICANT CHANGE UP
GLUCOSE SERPL-MCNC: 172 MG/DL — HIGH (ref 70–99)
MAGNESIUM SERPL-MCNC: 1.7 MG/DL — SIGNIFICANT CHANGE UP (ref 1.6–2.6)
PHOSPHATE SERPL-MCNC: 2 MG/DL — LOW (ref 2.5–4.5)
POTASSIUM SERPL-MCNC: 3.9 MMOL/L — SIGNIFICANT CHANGE UP (ref 3.5–5.3)
POTASSIUM SERPL-SCNC: 3.9 MMOL/L — SIGNIFICANT CHANGE UP (ref 3.5–5.3)
PROT SERPL-MCNC: 8.6 G/DL — HIGH (ref 6–8.3)
SODIUM SERPL-SCNC: 145 MMOL/L — SIGNIFICANT CHANGE UP (ref 135–145)

## 2020-01-11 PROCEDURE — 99223 1ST HOSP IP/OBS HIGH 75: CPT

## 2020-01-11 PROCEDURE — 93010 ELECTROCARDIOGRAM REPORT: CPT | Mod: 59

## 2020-01-11 PROCEDURE — 99285 EMERGENCY DEPT VISIT HI MDM: CPT

## 2020-01-11 RX ORDER — ONDANSETRON 8 MG/1
4 TABLET, FILM COATED ORAL ONCE
Refills: 0 | Status: COMPLETED | OUTPATIENT
Start: 2020-01-11 | End: 2020-01-11

## 2020-01-11 RX ORDER — SODIUM CHLORIDE 9 MG/ML
1000 INJECTION INTRAMUSCULAR; INTRAVENOUS; SUBCUTANEOUS ONCE
Refills: 0 | Status: COMPLETED | OUTPATIENT
Start: 2020-01-11 | End: 2020-01-11

## 2020-01-11 RX ORDER — PANTOPRAZOLE SODIUM 20 MG/1
80 TABLET, DELAYED RELEASE ORAL ONCE
Refills: 0 | Status: COMPLETED | OUTPATIENT
Start: 2020-01-11 | End: 2020-01-11

## 2020-01-11 RX ORDER — PANTOPRAZOLE SODIUM 20 MG/1
8 TABLET, DELAYED RELEASE ORAL
Qty: 80 | Refills: 0 | Status: DISCONTINUED | OUTPATIENT
Start: 2020-01-11 | End: 2020-01-12

## 2020-01-11 RX ADMIN — PANTOPRAZOLE SODIUM 80 MILLIGRAM(S): 20 TABLET, DELAYED RELEASE ORAL at 20:16

## 2020-01-11 RX ADMIN — ONDANSETRON 4 MILLIGRAM(S): 8 TABLET, FILM COATED ORAL at 20:18

## 2020-01-11 RX ADMIN — Medication 2 MILLIGRAM(S): at 20:16

## 2020-01-11 RX ADMIN — Medication 25 MILLIGRAM(S): at 22:06

## 2020-01-11 RX ADMIN — PANTOPRAZOLE SODIUM 10 MG/HR: 20 TABLET, DELAYED RELEASE ORAL at 21:11

## 2020-01-11 RX ADMIN — SODIUM CHLORIDE 1000 MILLILITER(S): 9 INJECTION INTRAMUSCULAR; INTRAVENOUS; SUBCUTANEOUS at 20:47

## 2020-01-11 NOTE — H&P ADULT - PROBLEM SELECTOR PLAN 1
Start CIWA monitoring with high risk ativan taper.  Start folic acid, multivitamin and thiamine supplementation.  Blood alcohol level was negative, but alcohol level yesterday was 374.  I discussed with the patient the importance of abstinence from alcohol to prevent further complications. Patient expressed willingness to consider quitting alcohol use.

## 2020-01-11 NOTE — ED ADULT NURSE NOTE - OBJECTIVE STATEMENT
55 y/o male history of sciatica, HTN, ETOH abuse, presents to the ED from home c/o ETOH withdrawal. Patient states that he has been vomiting all day. Patient was in the ED yesterday for ETOH intoxication. States last time he had alcohol was yesterday. Patient states this morning he had a watery black stool. Patient states that he usually drinks 2 pints of tequila a day. Patient states that he has been having tremors and dizziness. Denies fever, chills, abd pain, diarrhea/constipation, numbness/tingling, urinary s/s. Patient A&Ox3, in no respiratory distress, and denies chest pain. Strong peripheral pulses. Tremors present. NSR on monitor. Equal strength and sensation in all 4 extremities.

## 2020-01-11 NOTE — ED PROVIDER NOTE - OBJECTIVE STATEMENT
53yo M with hx of alcohol abuse presents with vomiting. Vomiting all day. Was in the ED yesterday for alcohol intox, discharged. after vomiting, he had an episode of blood tinged vomit. 1 episode of dark stools. hand tremors, anxiety, sweating. no f/c.

## 2020-01-11 NOTE — H&P ADULT - HISTORY OF PRESENT ILLNESS
This patient is a 54yoM with PMH of EtOH abuse c/b withdrawal and DT requiring MICU stay, cirrhosis with portal htn, hx of oxycodone abuse, who presents to the ED for emesis.    The patinet had been drinking all day, had an episode of black diarrhea today.      The patient was seen in the ED on 1/10/2019 by his family for altered mental status. Per [place] notes, patient was clinically mildly intoxicated but oriented with stable gait. He was discharged home with daughter. This patient is a 54yoM with PMH of EtOH abuse c/b withdrawal and DT requiring MICU stay, cirrhosis with portal htn, hx of oxycodone abuse, who presents to the ED for emesis. The patient was seen in the ED on 1/10/2019 by his family for altered mental status. Per [place] notes, patient was clinically mildly intoxicated but oriented with stable gait. He was discharged home with daughter. The patient complains today of having about 20 episodes of nonbloody emesis since 8AM this morning. He also endorses having a black BM, but later had a brown BM. He denies any abdominal pain. He was able to consume pizza about an hour ago without vomiting. The patient has recent history of NSAID use, but takes aspirin occasionally. He drinks about a fifth of tequila daily for the last 2 years, and his last drink was yesterday morning. He endorses tremors, L sided pressure-like headache, and chills.   Patient also states that this morning he stubbed his foot and tripped in his home, hitting his head on the floor. He had no LOC and was able to get up unassisted.   This patient was last hospitalized from 10/14-10/19/2019 for alcohol withdrawal requiring ativan taper.

## 2020-01-11 NOTE — H&P ADULT - ASSESSMENT
This patient is a 54yoM with PMH of EtOH abuse c/b withdrawal and DT requiring MICU stay, cirrhosis with portal htn, hx of oxycodone abuse, who presents to the ED for emesis, admitted for alcohol withdrawal

## 2020-01-11 NOTE — PATIENT PROFILE ADULT - NSPROGENANESREACTION_GEN_A_NUR
Health Maintenance Due   Topic Date Due   • DTaP/Tdap/Td Vaccine (1 - Tdap) 02/09/2013   • Influenza Vaccine (1) 09/01/2018       Patient is due for topics as listed above but is not proceeding with Immunization(s) Dtap/Tdap/Td and Influenza at this time.              never had anesthesia

## 2020-01-11 NOTE — H&P ADULT - PROBLEM SELECTOR PLAN 9
VTE ppx: venodyne boots  Activity: OOB with assistance, fall precaution  Diet: DASH    Transitions of Care Status:  1.  Name of PCP:  2.  PCP Contacted on Admission: [ ] Y    [ ] N    3.  PCP contacted at Discharge: [ ] Y    [ ] N    [ ] N/A  4.  Post-Discharge Appointment Date and Location:  5.  Summary of Handoff given to PCP: Patient has a mass on the back which is nontender, slightly mobile and feels like it may be a cyst. Patient states that the mass has not changed in size.   Refer for imaging upon discharge.

## 2020-01-11 NOTE — H&P ADULT - PROBLEM SELECTOR PLAN 6
Start fall precautions.  Check noncontrast CT head since patient had fall earlier today. At this time, he does not have any focal neurologic deficits.

## 2020-01-11 NOTE — ED PROVIDER NOTE - NS ED ROS FT
ROS: denies weakness, dizziness, fevers/chills, chest pain, SOB, diaphoresis, abdominal pain, diarrhea, joint pain, neuro deficits, dysuria/hematuria, rash    +HA, tremors, n/v

## 2020-01-11 NOTE — H&P ADULT - PROBLEM SELECTOR PLAN 10
VTE ppx: venodyne boots  Activity: OOB with assistance, fall precaution  Diet: DASH    Transitions of Care Status:  1.  Name of PCP:  2.  PCP Contacted on Admission: [ ] Y    [ ] N    3.  PCP contacted at Discharge: [ ] Y    [ ] N    [ ] N/A  4.  Post-Discharge Appointment Date and Location:  5.  Summary of Handoff given to PCP:

## 2020-01-11 NOTE — H&P ADULT - PROBLEM SELECTOR PLAN 7
The patient had a bowel movement when he arrived at the floor, which appeared brown and without red blood.  Will check CBC. If stable, will d/c protonix gtt and switch to protonix 40mg PO qdaily for suspected gastritis.  Patient should still follow up with GI/hepatology team for endoscopy. Pt has elevated anion gap metabolic acidosis, with elevated lactate and recent alcohol use.   Trend lactate.

## 2020-01-11 NOTE — H&P ADULT - NSHPPHYSICALEXAM_GEN_ALL_CORE
T(C): 37.2 (01-11-20 @ 23:46), Max: 37.2 (01-11-20 @ 23:46)  HR: 90 (01-11-20 @ 23:46) (75 - 90)  BP: 140/69 (01-11-20 @ 23:46) (128/59 - 164/81)  RR: 19 (01-11-20 @ 23:46) (14 - 20)  SpO2: 95% (01-11-20 @ 23:46) (95% - 99%)  Wt(kg): --    PHYSICAL EXAM:  GENERAL: NAD, well-groomed, well-developed  HEAD:  + nontender bruise over eyebrow, normocephalic  EYES: EOMI, PERRLA, conjunctiva and sclera clear, no scleral icterus  ENMT: No oropharyngeal exudates, erythema or lesions,  Moist mucous membranes  NECK: Supple, no cervical lymphadenopathy  NERVOUS SYSTEM:  CN II-XII intact, 5/5 BUE and BLE motor strength, full sensation to light touch, +visible tongue fasciculations and hand tremor  CHEST/LUNG: Clear to auscultation bilaterally; No rales, no  rhonchi, no wheezing  HEART: Regular rate and rhythm; No murmurs, rubs, or gallops  ABDOMEN: Soft, Nontender, Nondistended  EXTREMITIES:  2+ radial Pulses, No clubbing, cyanosis, or edema  SKIN: warm, dry, spider angiomata at chest wall, no palmar erythema, no caput medusa, no jaundice  PSYCH: Alert & Oriented X3, patient appears anxious T(C): 37.2 (01-11-20 @ 23:46), Max: 37.2 (01-11-20 @ 23:46)  HR: 90 (01-11-20 @ 23:46) (75 - 90)  BP: 140/69 (01-11-20 @ 23:46) (128/59 - 164/81)  RR: 19 (01-11-20 @ 23:46) (14 - 20)  SpO2: 95% (01-11-20 @ 23:46) (95% - 99%)  Wt(kg): --    PHYSICAL EXAM:  GENERAL: NAD, well-groomed, well-developed  HEAD:  + nontender bruise over eyebrow, normocephalic  EYES: EOMI, PERRLA, conjunctiva and sclera clear, no scleral icterus  ENMT: No oropharyngeal exudates, erythema or lesions,  Moist mucous membranes  NECK: Supple, no cervical lymphadenopathy  NERVOUS SYSTEM:  CN II-XII intact, 5/5 BUE and BLE motor strength, full sensation to light touch, +visible tongue fasciculations and hand tremor  CHEST/LUNG: Clear to auscultation bilaterally; No rales, no  rhonchi, no wheezing  HEART: Regular rate and rhythm; No murmurs, rubs, or gallops  ABDOMEN: Soft, Nontender, Nondistended  EXTREMITIES:  2+ radial Pulses, No clubbing, cyanosis, or edema  SKIN: warm, dry, spider angiomata at chest wall, no palmar erythema, no caput medusa, no jaundice  + slightly mobile mass on upper back   PSYCH: Alert & Oriented X3, patient appears anxious

## 2020-01-11 NOTE — H&P ADULT - NSHPSOCIALHISTORY_GEN_ALL_CORE
This patient lives with his son and mother.  He drinks a fifth of tequila daily.  He used to smoke for 25 years, but quit 3 years ago.   He previously used prescribed oxycodone due to pain from sciatica.

## 2020-01-11 NOTE — ED ADULT NURSE NOTE - NSIMPLEMENTINTERV_GEN_ALL_ED
Implemented All Universal Safety Interventions:  [place] to call system. Call bell, personal items and telephone within reach. Instruct patient to call for assistance. Room bathroom lighting operational. Non-slip footwear when patient is off stretcher. Physically safe environment: no spills, clutter or unnecessary equipment. Stretcher in lowest position, wheels locked, appropriate side rails in place.

## 2020-01-11 NOTE — H&P ADULT - PROBLEM SELECTOR PLAN 8
VTE ppx: venodyne boots  Activity: OOB with assistance, fall precaution  Diet: DASH    Transitions of Care Status:  1.  Name of PCP:  2.  PCP Contacted on Admission: [ ] Y    [ ] N    3.  PCP contacted at Discharge: [ ] Y    [ ] N    [ ] N/A  4.  Post-Discharge Appointment Date and Location:  5.  Summary of Handoff given to PCP: The patient had a bowel movement when he arrived at the floor, which appeared brown and without red blood.  Will check CBC. If stable, will d/c protonix gtt and switch to protonix 40mg PO qdaily for suspected gastritis.  Patient should still follow up with GI/hepatology team for endoscopy.

## 2020-01-11 NOTE — ED ADULT NURSE NOTE - CHPI ED NUR SYMPTOMS NEG
no fever/no back pain/no pain/no decreased eating/drinking/no headache/no loss of consciousness/no chills

## 2020-01-11 NOTE — ED ADULT TRIAGE NOTE - CHIEF COMPLAINT QUOTE
vomiting all day and episode of black diarrhea today  stopped drinking alcohol yesterday. Normally drinks 2 pints of tequila per day

## 2020-01-11 NOTE — H&P ADULT - PROBLEM SELECTOR PLAN 3
Differential diagnosis includes but not limited to alcohol use, viral gastroenteritis vs gastritis   Continue IVNS at 75cc/ hr x 10 hrs.  Hold zofran due to QTc prolongation noted on EKG.   Patient is without leukocytosis or diarrhea thus viral gastroenteritis less likely.

## 2020-01-11 NOTE — H&P ADULT - PROBLEM SELECTOR PLAN 4
This patient was seen by hepatology team during his previous admission in 10/2019. He has no hospitalization for hepatic encephalopathy.   Patient should have MRI with IV contrast as outpatient to assess for HCC as outpatient with hepatology followup .  This patient last had an upper endoscopy in 2009 which found normal esophagus, normal duodenum and hiatal hernia.   Check AFP.   The patient should have repeat endoscopy arranged with GI/hepatology team. This patient was seen by hepatology team during his previous admission in 10/2019. He has no hospitalization for hepatic encephalopathy.   Patient should have MRI with IV contrast as outpatient to assess for HCC as outpatient with hepatology followup .  This patient last had an upper endoscopy in 2009 which found normal esophagus, normal duodenum and hiatal hernia.   Check AFP.   The patient should have repeat endoscopy arranged with GI/hepatology team.  MELD score of 9.

## 2020-01-11 NOTE — H&P ADULT - NSHPREVIEWOFSYSTEMS_GEN_ALL_CORE
REVIEW OF SYSTEMS  CONSTITUTIONAL: No fever, + no chills  EYES: No eye pain, no vision changes  ENMT:  No difficulty hearing, no throat pain  RESPIRATORY: No cough, no wheezing, No shortness of breath  CARDIOVASCULAR: No chest pain, no palpitations, no leg swelling  GASTROINTESTINAL: No abdominal pain, + vomiting, soft stool, + 1 episode of black stool   GENITOURINARY: No dysuria, no hematuria  NEUROLOGICAL: + headaches, no loss of strength, no numbness  SKIN: No itching, no rashes, no lesions   MUSCULOSKELETAL: No joint pain, no joint swelling; No muscle pain  HEME/LYMPH: + bruising on head, no bleeding

## 2020-01-11 NOTE — ED PROVIDER NOTE - ATTENDING CONTRIBUTION TO CARE
54yr M hx of heavy etoh use recently diagnosed with cirrhosis p/w n/v/d w dark stools since this AM. 36hrs with no etoh (normally a fifth a day for many years). denies cp, sob, abd pain now. no fever chills. no urinary complaints.   exam notable for no stigmata of cirrohsis, no jaundice, protuberant abd, non tender, clear lungs, S1 S2, systolic murmur. no leg swelling. has tremor, and fasiculations of tongue.   will monitor CIWA, IV ativan PPI and recheck labs.

## 2020-01-11 NOTE — H&P ADULT - NSHPLABSRESULTS_GEN_ALL_CORE
Labs, imaging  personally reviewed by me.     CBC not obtained by ED.   CMP found elevated anion gap of 20. Total bilirubin elevated at 1.8. AST elevated at 111, and alkaline phosphatase is elevated at 175.   Phosphorus is low at 2.  VBG notable for elevated pH of 7.56 and elevated lactate of 3.6.     EKG sinus HR 77, QTc 586, TWI in V1-V4, Q in III     LABS:                        14.0   8.30  )-----------( 75       ( 10 Orville 2020 16:24 )             41.8     Hgb Trend: 14.0<--  01-11    145  |  95<L>  |  8   ----------------------------<  172<H>  3.9   |  30  |  0.48<L>    Ca    9.7      11 Jan 2020 20:12  Phos  2.0     01-11  Mg     1.7     01-11    TPro  8.6<H>  /  Alb  4.8  /  TBili  1.8<H>  /  DBili  0.5<H>  /  AST  111<H>  /  ALT  28  /  AlkPhos  175<H>  01-11    Creatinine Trend: 0.48<--, 0.63<--  PT/INR - ( 10 Orville 2020 16:24 )   PT: 11.9 sec;   INR: 1.03 ratio         PTT - ( 10 Orville 2020 16:24 )  PTT:35.0 sec      Venous Blood Gas:  01-11 @ 20:12  7.56/37/49/33/87  VBG Lactate: 3.6  Venous Blood Gas:  01-10 @ 16:24  7.45/42/49/29/79  VBG Lactate: 4.7

## 2020-01-11 NOTE — ED PROVIDER NOTE - PHYSICAL EXAMINATION
Gen: tremors, anxious, diaphoretic  Head: NCAT  HEENT: PERRL, MMM, normal conjunctiva, anicteric, neck supple; tongue fasciculations  Lung: CTAB, no adventitious sounds  CV: RRR, no murmurs, rubs or gallops  Abd: soft, NTND, no rebound or guarding, no CVAT  MSK: No edema, no visible deformities; hand tremors  Neuro: No focal neurologic deficits. CN II-XII grossly intact. 5/5 strength and normal sensation in all extremities.  Skin: diaphoresis  Psych: anxious mood and affect

## 2020-01-12 DIAGNOSIS — K74.60 UNSPECIFIED CIRRHOSIS OF LIVER: ICD-10-CM

## 2020-01-12 DIAGNOSIS — R11.10 VOMITING, UNSPECIFIED: ICD-10-CM

## 2020-01-12 DIAGNOSIS — E87.2 ACIDOSIS: ICD-10-CM

## 2020-01-12 DIAGNOSIS — E83.39 OTHER DISORDERS OF PHOSPHORUS METABOLISM: ICD-10-CM

## 2020-01-12 DIAGNOSIS — D69.6 THROMBOCYTOPENIA, UNSPECIFIED: ICD-10-CM

## 2020-01-12 DIAGNOSIS — R22.2 LOCALIZED SWELLING, MASS AND LUMP, TRUNK: ICD-10-CM

## 2020-01-12 DIAGNOSIS — F10.239 ALCOHOL DEPENDENCE WITH WITHDRAWAL, UNSPECIFIED: ICD-10-CM

## 2020-01-12 DIAGNOSIS — Z02.9 ENCOUNTER FOR ADMINISTRATIVE EXAMINATIONS, UNSPECIFIED: ICD-10-CM

## 2020-01-12 DIAGNOSIS — R26.81 UNSTEADINESS ON FEET: ICD-10-CM

## 2020-01-12 DIAGNOSIS — K92.1 MELENA: ICD-10-CM

## 2020-01-12 LAB
AFP-TM SERPL-MCNC: 1.9 NG/ML — SIGNIFICANT CHANGE UP
ALBUMIN SERPL ELPH-MCNC: 4.1 G/DL — SIGNIFICANT CHANGE UP (ref 3.3–5)
ALP SERPL-CCNC: 171 U/L — HIGH (ref 40–120)
ALT FLD-CCNC: 24 U/L — SIGNIFICANT CHANGE UP (ref 10–45)
ANION GAP SERPL CALC-SCNC: 15 MMOL/L — SIGNIFICANT CHANGE UP (ref 5–17)
ANION GAP SERPL CALC-SCNC: 16 MMOL/L — SIGNIFICANT CHANGE UP (ref 5–17)
ANION GAP SERPL CALC-SCNC: 18 MMOL/L — HIGH (ref 5–17)
AST SERPL-CCNC: 94 U/L — HIGH (ref 10–40)
BILIRUB DIRECT SERPL-MCNC: 0.7 MG/DL — HIGH (ref 0–0.2)
BILIRUB INDIRECT FLD-MCNC: 1 MG/DL — SIGNIFICANT CHANGE UP (ref 0.2–1)
BILIRUB SERPL-MCNC: 1.7 MG/DL — HIGH (ref 0.2–1.2)
BUN SERPL-MCNC: 8 MG/DL — SIGNIFICANT CHANGE UP (ref 7–23)
BUN SERPL-MCNC: 9 MG/DL — SIGNIFICANT CHANGE UP (ref 7–23)
BUN SERPL-MCNC: 9 MG/DL — SIGNIFICANT CHANGE UP (ref 7–23)
CALCIUM SERPL-MCNC: 9.1 MG/DL — SIGNIFICANT CHANGE UP (ref 8.4–10.5)
CALCIUM SERPL-MCNC: 9.2 MG/DL — SIGNIFICANT CHANGE UP (ref 8.4–10.5)
CALCIUM SERPL-MCNC: 9.2 MG/DL — SIGNIFICANT CHANGE UP (ref 8.4–10.5)
CHLORIDE SERPL-SCNC: 96 MMOL/L — SIGNIFICANT CHANGE UP (ref 96–108)
CHLORIDE SERPL-SCNC: 97 MMOL/L — SIGNIFICANT CHANGE UP (ref 96–108)
CHLORIDE SERPL-SCNC: 98 MMOL/L — SIGNIFICANT CHANGE UP (ref 96–108)
CO2 SERPL-SCNC: 30 MMOL/L — SIGNIFICANT CHANGE UP (ref 22–31)
CO2 SERPL-SCNC: 30 MMOL/L — SIGNIFICANT CHANGE UP (ref 22–31)
CO2 SERPL-SCNC: 31 MMOL/L — SIGNIFICANT CHANGE UP (ref 22–31)
CREAT SERPL-MCNC: 0.56 MG/DL — SIGNIFICANT CHANGE UP (ref 0.5–1.3)
CREAT SERPL-MCNC: 0.56 MG/DL — SIGNIFICANT CHANGE UP (ref 0.5–1.3)
CREAT SERPL-MCNC: 0.62 MG/DL — SIGNIFICANT CHANGE UP (ref 0.5–1.3)
GLUCOSE SERPL-MCNC: 112 MG/DL — HIGH (ref 70–99)
GLUCOSE SERPL-MCNC: 136 MG/DL — HIGH (ref 70–99)
GLUCOSE SERPL-MCNC: 87 MG/DL — SIGNIFICANT CHANGE UP (ref 70–99)
HCT VFR BLD CALC: 37.1 % — LOW (ref 39–50)
HGB BLD-MCNC: 12.2 G/DL — LOW (ref 13–17)
LACTATE SERPL-SCNC: 1.5 MMOL/L — SIGNIFICANT CHANGE UP (ref 0.7–2)
MAGNESIUM SERPL-MCNC: 1.5 MG/DL — LOW (ref 1.6–2.6)
MAGNESIUM SERPL-MCNC: 1.6 MG/DL — SIGNIFICANT CHANGE UP (ref 1.6–2.6)
MCHC RBC-ENTMCNC: 32.4 PG — SIGNIFICANT CHANGE UP (ref 27–34)
MCHC RBC-ENTMCNC: 32.9 GM/DL — SIGNIFICANT CHANGE UP (ref 32–36)
MCV RBC AUTO: 98.4 FL — SIGNIFICANT CHANGE UP (ref 80–100)
NRBC # BLD: 0 /100 WBCS — SIGNIFICANT CHANGE UP (ref 0–0)
OB PNL STL: NEGATIVE — SIGNIFICANT CHANGE UP
PHOSPHATE SERPL-MCNC: 2.1 MG/DL — LOW (ref 2.5–4.5)
PHOSPHATE SERPL-MCNC: 2.1 MG/DL — LOW (ref 2.5–4.5)
PLATELET # BLD AUTO: 39 K/UL — LOW (ref 150–400)
POTASSIUM SERPL-MCNC: 3.1 MMOL/L — LOW (ref 3.5–5.3)
POTASSIUM SERPL-MCNC: 3.3 MMOL/L — LOW (ref 3.5–5.3)
POTASSIUM SERPL-MCNC: 3.3 MMOL/L — LOW (ref 3.5–5.3)
POTASSIUM SERPL-SCNC: 3.1 MMOL/L — LOW (ref 3.5–5.3)
POTASSIUM SERPL-SCNC: 3.3 MMOL/L — LOW (ref 3.5–5.3)
POTASSIUM SERPL-SCNC: 3.3 MMOL/L — LOW (ref 3.5–5.3)
PROT SERPL-MCNC: 7.6 G/DL — SIGNIFICANT CHANGE UP (ref 6–8.3)
RBC # BLD: 3.77 M/UL — LOW (ref 4.2–5.8)
RBC # FLD: 15.7 % — HIGH (ref 10.3–14.5)
SODIUM SERPL-SCNC: 143 MMOL/L — SIGNIFICANT CHANGE UP (ref 135–145)
SODIUM SERPL-SCNC: 143 MMOL/L — SIGNIFICANT CHANGE UP (ref 135–145)
SODIUM SERPL-SCNC: 145 MMOL/L — SIGNIFICANT CHANGE UP (ref 135–145)
WBC # BLD: 5.61 K/UL — SIGNIFICANT CHANGE UP (ref 3.8–10.5)
WBC # FLD AUTO: 5.61 K/UL — SIGNIFICANT CHANGE UP (ref 3.8–10.5)

## 2020-01-12 PROCEDURE — 99233 SBSQ HOSP IP/OBS HIGH 50: CPT

## 2020-01-12 PROCEDURE — 70450 CT HEAD/BRAIN W/O DYE: CPT | Mod: 26

## 2020-01-12 RX ORDER — PANTOPRAZOLE SODIUM 20 MG/1
40 TABLET, DELAYED RELEASE ORAL
Refills: 0 | Status: DISCONTINUED | OUTPATIENT
Start: 2020-01-12 | End: 2020-01-14

## 2020-01-12 RX ORDER — FOLIC ACID 0.8 MG
1 TABLET ORAL DAILY
Refills: 0 | Status: DISCONTINUED | OUTPATIENT
Start: 2020-01-12 | End: 2020-01-14

## 2020-01-12 RX ORDER — POTASSIUM CHLORIDE 20 MEQ
40 PACKET (EA) ORAL EVERY 4 HOURS
Refills: 0 | Status: COMPLETED | OUTPATIENT
Start: 2020-01-12 | End: 2020-01-12

## 2020-01-12 RX ORDER — THIAMINE MONONITRATE (VIT B1) 100 MG
100 TABLET ORAL DAILY
Refills: 0 | Status: DISCONTINUED | OUTPATIENT
Start: 2020-01-12 | End: 2020-01-14

## 2020-01-12 RX ORDER — AMLODIPINE BESYLATE 2.5 MG/1
5 TABLET ORAL DAILY
Refills: 0 | Status: DISCONTINUED | OUTPATIENT
Start: 2020-01-12 | End: 2020-01-14

## 2020-01-12 RX ORDER — SODIUM CHLORIDE 9 MG/ML
1000 INJECTION INTRAMUSCULAR; INTRAVENOUS; SUBCUTANEOUS
Refills: 0 | Status: DISCONTINUED | OUTPATIENT
Start: 2020-01-12 | End: 2020-01-14

## 2020-01-12 RX ORDER — LANOLIN ALCOHOL/MO/W.PET/CERES
3 CREAM (GRAM) TOPICAL ONCE
Refills: 0 | Status: COMPLETED | OUTPATIENT
Start: 2020-01-12 | End: 2020-01-12

## 2020-01-12 RX ORDER — SODIUM,POTASSIUM PHOSPHATES 278-250MG
1 POWDER IN PACKET (EA) ORAL THREE TIMES A DAY
Refills: 0 | Status: COMPLETED | OUTPATIENT
Start: 2020-01-12 | End: 2020-01-13

## 2020-01-12 RX ORDER — POTASSIUM CHLORIDE 20 MEQ
40 PACKET (EA) ORAL ONCE
Refills: 0 | Status: COMPLETED | OUTPATIENT
Start: 2020-01-12 | End: 2020-01-12

## 2020-01-12 RX ADMIN — Medication 2 MILLIGRAM(S): at 00:37

## 2020-01-12 RX ADMIN — Medication 2 MILLIGRAM(S): at 20:27

## 2020-01-12 RX ADMIN — Medication 2 MILLIGRAM(S): at 07:52

## 2020-01-12 RX ADMIN — SODIUM CHLORIDE 75 MILLILITER(S): 9 INJECTION INTRAMUSCULAR; INTRAVENOUS; SUBCUTANEOUS at 02:33

## 2020-01-12 RX ADMIN — Medication 1 PACKET(S): at 13:54

## 2020-01-12 RX ADMIN — Medication 100 MILLIGRAM(S): at 10:08

## 2020-01-12 RX ADMIN — Medication 2 MILLIGRAM(S): at 12:01

## 2020-01-12 RX ADMIN — Medication 3 MILLIGRAM(S): at 01:03

## 2020-01-12 RX ADMIN — Medication 40 MILLIEQUIVALENT(S): at 10:08

## 2020-01-12 RX ADMIN — Medication 1 TABLET(S): at 10:08

## 2020-01-12 RX ADMIN — Medication 1 MILLIGRAM(S): at 10:08

## 2020-01-12 RX ADMIN — PANTOPRAZOLE SODIUM 10 MG/HR: 20 TABLET, DELAYED RELEASE ORAL at 07:53

## 2020-01-12 RX ADMIN — Medication 40 MILLIEQUIVALENT(S): at 04:12

## 2020-01-12 RX ADMIN — AMLODIPINE BESYLATE 5 MILLIGRAM(S): 2.5 TABLET ORAL at 05:47

## 2020-01-12 RX ADMIN — Medication 1 PACKET(S): at 21:29

## 2020-01-12 RX ADMIN — Medication 1 PACKET(S): at 05:47

## 2020-01-12 RX ADMIN — Medication 2 MILLIGRAM(S): at 04:12

## 2020-01-12 RX ADMIN — Medication 40 MILLIEQUIVALENT(S): at 13:54

## 2020-01-12 RX ADMIN — Medication 2 MILLIGRAM(S): at 16:42

## 2020-01-12 RX ADMIN — SODIUM CHLORIDE 75 MILLILITER(S): 9 INJECTION INTRAMUSCULAR; INTRAVENOUS; SUBCUTANEOUS at 07:53

## 2020-01-12 NOTE — PROGRESS NOTE ADULT - PROBLEM SELECTOR PLAN 4
chronic thrombocytopenia likely 2/2 alcohol use  -no signs of active bleed at this time  -monitor daily

## 2020-01-12 NOTE — PROGRESS NOTE ADULT - PROBLEM SELECTOR PLAN 2
Differential diagnosis includes but not limited to alcohol use, viral gastroenteritis vs gastritis   -now resolved, able to tolerate PO  -hold zofran given prolonged QTc on admission

## 2020-01-12 NOTE — PROGRESS NOTE ADULT - PROBLEM SELECTOR PLAN 1
Last drink Friday morning  -hx of DTs in the past  -continue CIWA with ativan po taper given high risk  - consult  -folic acid/multivitamin/thiamine

## 2020-01-12 NOTE — PROGRESS NOTE ADULT - PROBLEM SELECTOR PLAN 3
Seen by hepatology team during his previous admission in 10/2019. He has no hospitalization for hepatic encephalopathy.   -Patient should have MRI with IV contrast as outpatient to assess for HCC as outpatient with hepatology followup .  This patient last had an upper endoscopy in 2009 which found normal esophagus, normal duodenum and hiatal hernia.   -The patient should have repeat endoscopy arranged with GI/hepatology team.  -MELD score of 9.

## 2020-01-12 NOTE — PROGRESS NOTE ADULT - SUBJECTIVE AND OBJECTIVE BOX
Patient is a 54y old  Male who presents with a chief complaint of emesis (11 Jan 2020 22:51)      SUBJECTIVE / OVERNIGHT EVENTS:  no acute events overnight, vss, afebrile  CIWA 3-5 overnight  pt reports that he is still shaky, but feels better  was doing well with AA for 1.5 month since last admission but with thanksgiving holidays, relapsed  has no specific complaints at this time    ROS:  14 point ROS negative in detail except stated as above    MEDICATIONS  (STANDING):  amLODIPine   Tablet 5 milliGRAM(s) Oral daily  folic acid 1 milliGRAM(s) Oral daily  LORazepam     Tablet   Oral   LORazepam     Tablet 2 milliGRAM(s) Oral every 4 hours  multivitamin 1 Tablet(s) Oral daily  pantoprazole Infusion 8 mG/Hr (10 mL/Hr) IV Continuous <Continuous>  potassium chloride    Tablet ER 40 milliEquivalent(s) Oral every 4 hours  potassium phosphate / sodium phosphate powder 1 Packet(s) Oral three times a day  sodium chloride 0.9%. 1000 milliLiter(s) (75 mL/Hr) IV Continuous <Continuous>  thiamine 100 milliGRAM(s) Oral daily    MEDICATIONS  (PRN):      CAPILLARY BLOOD GLUCOSE        I&O's Summary      PHYSICAL EXAM:  Vital Signs Last 24 Hrs  T(C): 36.9 (12 Jan 2020 07:30), Max: 37.2 (11 Jan 2020 23:46)  T(F): 98.5 (12 Jan 2020 07:30), Max: 98.9 (11 Jan 2020 23:46)  HR: 78 (12 Jan 2020 07:30) (75 - 90)  BP: 124/76 (12 Jan 2020 07:30) (124/76 - 164/81)  BP(mean): --  RR: 16 (12 Jan 2020 07:30) (14 - 20)  SpO2: 94% (12 Jan 2020 07:30) (93% - 99%)    GENERAL: NAD, well-developed  HEAD:  Atraumatic, Normocephalic; bruise over right eye  EYES: EOMI, PERRLA, conjunctiva and sclera clear  NECK: Supple, No JVD  CHEST/LUNG: Clear to auscultation bilaterally; No wheeze  HEART: Regular rate and rhythm; No murmurs, rubs, or gallops  ABDOMEN: Soft, Nontender, Nondistended; Bowel sounds present  EXTREMITIES:  2+ Peripheral Pulses, No clubbing, cyanosis, or edema  NEUROLOGY: AAOx3; non-focal; (+) tongue fasciculation, hand tremors  SKIN: No rashes or lesions    LABS:  personally reviewed                        12.2   5.61  )-----------( 39       ( 12 Jan 2020 01:54 )             37.1     01-12    145  |  96  |  9   ----------------------------<  87  3.3<L>   |  31  |  0.56    Ca    9.1      12 Jan 2020 07:12  Phos  2.1     01-12  Mg     1.6     01-12    TPro  8.6<H>  /  Alb  4.8  /  TBili  1.8<H>  /  DBili  0.5<H>  /  AST  111<H>  /  ALT  28  /  AlkPhos  175<H>  01-11    PT/INR - ( 10 Orville 2020 16:24 )   PT: 11.9 sec;   INR: 1.03 ratio         PTT - ( 10 Orville 2020 16:24 )  PTT:35.0 sec          RADIOLOGY & ADDITIONAL TESTS:    Imaging Personally Reviewed:    Consultant(s) Notes Reviewed:      Care Discussed with Consultants/Other Providers:

## 2020-01-12 NOTE — PROGRESS NOTE ADULT - ATTENDING COMMENTS
Kristan Valadez MD  Division of Hospital Medicine  Cell: [phone]  Pager: [phone]  Office: [phone]

## 2020-01-12 NOTE — PROGRESS NOTE ADULT - PROBLEM SELECTOR PLAN 8
Patient has a mass on the back which is nontender, slightly mobile and feels like it may be a cyst. Patient states that the mass has not changed in size.   Refer for imaging upon discharge.

## 2020-01-12 NOTE — SBIRT NOTE ADULT - NSSBIRTALCPASSREFTXDET_GEN_A_CORE
LMSW explored patient's alcohol use.  Patient states that his drink of choice is Tequila. patient states he drinks 4 or more times a week, having over 10 drinks each time.  LMSW explored patient's motivation to change.  Patient states he has attended outpatient treatment in the past and had periods where he was sober.  These periods would last about 2 months.  Patient states he is motivated to stop drinking.  LMSW explored the different types of treatments available with patient.  Patient offered inpatient and outpatient services. Patient interested in both, states he may be interested in going to an inpatient program directly from the hospital if able. Patient left with outpatient, intensive outpatient, and inpatient, resources, for his review. SW to remain involved.

## 2020-01-12 NOTE — PROGRESS NOTE ADULT - PROBLEM SELECTOR PLAN 6
Pt has elevated anion gap metabolic acidosis, with elevated lactate and recent alcohol use.   -lactate trending down  -monitor BMP

## 2020-01-13 ENCOUNTER — TRANSCRIPTION ENCOUNTER (OUTPATIENT)
Age: 55
End: 2020-01-13

## 2020-01-13 LAB
ALBUMIN SERPL ELPH-MCNC: 3.6 G/DL — SIGNIFICANT CHANGE UP (ref 3.3–5)
ALP SERPL-CCNC: 160 U/L — HIGH (ref 40–120)
ALT FLD-CCNC: 25 U/L — SIGNIFICANT CHANGE UP (ref 10–45)
ANION GAP SERPL CALC-SCNC: 15 MMOL/L — SIGNIFICANT CHANGE UP (ref 5–17)
ANION GAP SERPL CALC-SCNC: 19 MMOL/L — HIGH (ref 5–17)
AST SERPL-CCNC: 88 U/L — HIGH (ref 10–40)
BILIRUB SERPL-MCNC: 1.7 MG/DL — HIGH (ref 0.2–1.2)
BUN SERPL-MCNC: 4 MG/DL — LOW (ref 7–23)
BUN SERPL-MCNC: 5 MG/DL — LOW (ref 7–23)
CALCIUM SERPL-MCNC: 8.6 MG/DL — SIGNIFICANT CHANGE UP (ref 8.4–10.5)
CALCIUM SERPL-MCNC: 9.1 MG/DL — SIGNIFICANT CHANGE UP (ref 8.4–10.5)
CHLORIDE SERPL-SCNC: 91 MMOL/L — LOW (ref 96–108)
CHLORIDE SERPL-SCNC: 97 MMOL/L — SIGNIFICANT CHANGE UP (ref 96–108)
CO2 SERPL-SCNC: 24 MMOL/L — SIGNIFICANT CHANGE UP (ref 22–31)
CO2 SERPL-SCNC: 25 MMOL/L — SIGNIFICANT CHANGE UP (ref 22–31)
CREAT SERPL-MCNC: 0.62 MG/DL — SIGNIFICANT CHANGE UP (ref 0.5–1.3)
CREAT SERPL-MCNC: 0.63 MG/DL — SIGNIFICANT CHANGE UP (ref 0.5–1.3)
GLUCOSE SERPL-MCNC: 111 MG/DL — HIGH (ref 70–99)
GLUCOSE SERPL-MCNC: 118 MG/DL — HIGH (ref 70–99)
HCT VFR BLD CALC: 37.9 % — LOW (ref 39–50)
HGB BLD-MCNC: 12.8 G/DL — LOW (ref 13–17)
INR BLD: 1.34 RATIO — HIGH (ref 0.88–1.16)
MAGNESIUM SERPL-MCNC: 1.4 MG/DL — LOW (ref 1.6–2.6)
MAGNESIUM SERPL-MCNC: 1.8 MG/DL — SIGNIFICANT CHANGE UP (ref 1.6–2.6)
MCHC RBC-ENTMCNC: 33.2 PG — SIGNIFICANT CHANGE UP (ref 27–34)
MCHC RBC-ENTMCNC: 33.8 GM/DL — SIGNIFICANT CHANGE UP (ref 32–36)
MCV RBC AUTO: 98.2 FL — SIGNIFICANT CHANGE UP (ref 80–100)
PHOSPHATE SERPL-MCNC: 3 MG/DL — SIGNIFICANT CHANGE UP (ref 2.5–4.5)
PLATELET # BLD AUTO: 37 K/UL — LOW (ref 150–400)
POTASSIUM SERPL-MCNC: 3.1 MMOL/L — LOW (ref 3.5–5.3)
POTASSIUM SERPL-MCNC: 3.6 MMOL/L — SIGNIFICANT CHANGE UP (ref 3.5–5.3)
POTASSIUM SERPL-SCNC: 3.1 MMOL/L — LOW (ref 3.5–5.3)
POTASSIUM SERPL-SCNC: 3.6 MMOL/L — SIGNIFICANT CHANGE UP (ref 3.5–5.3)
PROT SERPL-MCNC: 7 G/DL — SIGNIFICANT CHANGE UP (ref 6–8.3)
PROTHROM AB SERPL-ACNC: 15.4 SEC — HIGH (ref 10–13.1)
RBC # BLD: 3.86 M/UL — LOW (ref 4.2–5.8)
RBC # FLD: 15.5 % — HIGH (ref 10.3–14.5)
SODIUM SERPL-SCNC: 134 MMOL/L — LOW (ref 135–145)
SODIUM SERPL-SCNC: 137 MMOL/L — SIGNIFICANT CHANGE UP (ref 135–145)
WBC # BLD: 5.05 K/UL — SIGNIFICANT CHANGE UP (ref 3.8–10.5)
WBC # FLD AUTO: 5.05 K/UL — SIGNIFICANT CHANGE UP (ref 3.8–10.5)

## 2020-01-13 PROCEDURE — 99233 SBSQ HOSP IP/OBS HIGH 50: CPT

## 2020-01-13 RX ORDER — MAGNESIUM SULFATE 500 MG/ML
2 VIAL (ML) INJECTION ONCE
Refills: 0 | Status: COMPLETED | OUTPATIENT
Start: 2020-01-13 | End: 2020-01-13

## 2020-01-13 RX ORDER — POTASSIUM CHLORIDE 20 MEQ
10 PACKET (EA) ORAL
Refills: 0 | Status: COMPLETED | OUTPATIENT
Start: 2020-01-13 | End: 2020-01-13

## 2020-01-13 RX ORDER — LANOLIN ALCOHOL/MO/W.PET/CERES
3 CREAM (GRAM) TOPICAL ONCE
Refills: 0 | Status: COMPLETED | OUTPATIENT
Start: 2020-01-13 | End: 2020-01-13

## 2020-01-13 RX ORDER — POTASSIUM CHLORIDE 20 MEQ
40 PACKET (EA) ORAL ONCE
Refills: 0 | Status: COMPLETED | OUTPATIENT
Start: 2020-01-13 | End: 2020-01-13

## 2020-01-13 RX ADMIN — Medication 100 MILLIEQUIVALENT(S): at 07:32

## 2020-01-13 RX ADMIN — Medication 1.5 MILLIGRAM(S): at 20:04

## 2020-01-13 RX ADMIN — Medication 1 PACKET(S): at 05:42

## 2020-01-13 RX ADMIN — Medication 2 MILLIGRAM(S): at 04:15

## 2020-01-13 RX ADMIN — Medication 1 PACKET(S): at 21:18

## 2020-01-13 RX ADMIN — AMLODIPINE BESYLATE 5 MILLIGRAM(S): 2.5 TABLET ORAL at 05:25

## 2020-01-13 RX ADMIN — Medication 1 PACKET(S): at 12:18

## 2020-01-13 RX ADMIN — Medication 1 TABLET(S): at 12:18

## 2020-01-13 RX ADMIN — Medication 100 MILLIGRAM(S): at 12:18

## 2020-01-13 RX ADMIN — Medication 3 MILLIGRAM(S): at 21:43

## 2020-01-13 RX ADMIN — Medication 1.5 MILLIGRAM(S): at 12:18

## 2020-01-13 RX ADMIN — Medication 50 GRAM(S): at 05:38

## 2020-01-13 RX ADMIN — Medication 100 MILLIEQUIVALENT(S): at 05:25

## 2020-01-13 RX ADMIN — Medication 1.5 MILLIGRAM(S): at 16:01

## 2020-01-13 RX ADMIN — Medication 1 MILLIGRAM(S): at 12:18

## 2020-01-13 RX ADMIN — Medication 2 MILLIGRAM(S): at 08:07

## 2020-01-13 RX ADMIN — Medication 40 MILLIEQUIVALENT(S): at 05:25

## 2020-01-13 RX ADMIN — Medication 2 MILLIGRAM(S): at 00:04

## 2020-01-13 RX ADMIN — Medication 2 MILLIGRAM(S): at 10:44

## 2020-01-13 RX ADMIN — Medication 1.5 MILLIGRAM(S): at 23:59

## 2020-01-13 RX ADMIN — PANTOPRAZOLE SODIUM 40 MILLIGRAM(S): 20 TABLET, DELAYED RELEASE ORAL at 06:20

## 2020-01-13 RX ADMIN — Medication 100 MILLIEQUIVALENT(S): at 06:21

## 2020-01-13 NOTE — PROGRESS NOTE ADULT - SUBJECTIVE AND OBJECTIVE BOX
PROGRESS NOTE:     Patient is a 54y old  Male who presents with a chief complaint of emesis (12 Jan 2020 11:34)      SUBJECTIVE / OVERNIGHT EVENTS: Feeling anxious still and saying he feels dizzy when he turns his head     ADDITIONAL REVIEW OF SYSTEMS:  No n/v/d  No fevers or chills    MEDICATIONS  (STANDING):  amLODIPine   Tablet 5 milliGRAM(s) Oral daily  folic acid 1 milliGRAM(s) Oral daily  LORazepam     Tablet   Oral   LORazepam     Tablet 1.5 milliGRAM(s) Oral every 4 hours  multivitamin 1 Tablet(s) Oral daily  pantoprazole    Tablet 40 milliGRAM(s) Oral before breakfast  potassium phosphate / sodium phosphate powder 1 Packet(s) Oral three times a day  sodium chloride 0.9%. 1000 milliLiter(s) (75 mL/Hr) IV Continuous <Continuous>  thiamine 100 milliGRAM(s) Oral daily    MEDICATIONS  (PRN):  LORazepam     Tablet 2 milliGRAM(s) Oral every 2 hours PRN Symptom-triggered 2 point increase in CIWA-Ar      CAPILLARY BLOOD GLUCOSE        I&O's Summary    12 Jan 2020 07:01  -  13 Jan 2020 07:00  --------------------------------------------------------  IN: 940 mL / OUT: 300 mL / NET: 640 mL        PHYSICAL EXAM:  Vital Signs Last 24 Hrs  T(C): 37 (13 Jan 2020 08:06), Max: 37.4 (13 Jan 2020 00:46)  T(F): 98.6 (13 Jan 2020 08:06), Max: 99.3 (13 Jan 2020 00:46)  HR: 104 (13 Jan 2020 08:06) (61 - 104)  BP: 133/82 (13 Jan 2020 08:06) (125/53 - 133/82)  BP(mean): --  RR: 18 (13 Jan 2020 08:06) (18 - 18)  SpO2: 96% (13 Jan 2020 08:06) (96% - 98%)    CONSTITUTIONAL: NAD, well-developed, non toxic appearing  HEENT: ecchymosis on right eye   RESPIRATORY: Normal respiratory effort; lungs are clear to auscultation bilaterally  CARDIOVASCULAR: Regular rate and rhythm, normal S1 and S2, no murmur/rub/gallop; No lower extremity edema; Peripheral pulses are 2+ bilaterally  ABDOMEN: Nontender to palpation, normoactive bowel sounds, no rebound/guarding; No hepatosplenomegaly  MUSCLOSKELETAL: no clubbing or cyanosis of digits; no joint swelling or tenderness to palpation  PSYCH: A+O to person, place, and time; affect appropriate    LABS:                        12.8   5.05  )-----------( 37       ( 13 Jan 2020 08:40 )             37.9     01-13    137  |  97  |  5<L>  ----------------------------<  111<H>  3.1<L>   |  25  |  0.62    Ca    8.6      13 Jan 2020 03:08  Phos  3.0     01-13  Mg     1.4     01-13    TPro  7.0  /  Alb  3.6  /  TBili  1.7<H>  /  DBili  x   /  AST  88<H>  /  ALT  25  /  AlkPhos  160<H>  01-13    PT/INR - ( 13 Jan 2020 08:23 )   PT: 15.4 sec;   INR: 1.34 ratio                     RADIOLOGY & ADDITIONAL TESTS:  Results Reviewed:   Imaging Personally Reviewed:  Electrocardiogram Personally Reviewed:    COORDINATION OF CARE:  Care Discussed with Consultants/Other Providers [Y/N]:  Prior or Outpatient Records Reviewed [Y/N]:

## 2020-01-13 NOTE — PROGRESS NOTE ADULT - PROBLEM SELECTOR PROBLEM 3
Cirrhosis You can access the FollowMyHealth Patient Portal offered by [facility] by registering at the following website: [email or web address]. By joining [employer]’s FollowMyHealth portal, you will also be able to view your health information using other applications (apps) compatible with our system.

## 2020-01-13 NOTE — DISCHARGE NOTE NURSING/CASE MANAGEMENT/SOCIAL WORK - PATIENT PORTAL LINK FT
You can access the FollowMyHealth Patient Portal offered by [facility] by registering at the following website: [email or web address]. By joining [employer]’s FollowMyHealth portal, you will also be able to view your health information using other applications (apps) compatible with our system.

## 2020-01-13 NOTE — PROGRESS NOTE ADULT - PROBLEM SELECTOR PLAN 8
Patient has a mass on the back which is nontender, slightly mobile and feels like it may be a cyst. Patient states that the mass has not changed in size.   F/U outpt PCP

## 2020-01-13 NOTE — PROGRESS NOTE ADULT - PROBLEM SELECTOR PLAN 1
Last drink Friday morning  -hx of DTs in the past  -continue CIWA with ativan po taper given high risk and will add Ativan 2mg PO PRN dosing as he is having breakthough anxiety  -SW consult  -folic acid/multivitamin/thiamine

## 2020-01-13 NOTE — DISCHARGE NOTE NURSING/CASE MANAGEMENT/SOCIAL WORK - NSDCPEWEB_GEN_ALL_CORE
[place] website --- [email or web address] for Tobacco Control website --- [email or web address]

## 2020-01-14 ENCOUNTER — TRANSCRIPTION ENCOUNTER (OUTPATIENT)
Age: 55
End: 2020-01-14

## 2020-01-14 VITALS
DIASTOLIC BLOOD PRESSURE: 88 MMHG | SYSTOLIC BLOOD PRESSURE: 142 MMHG | HEART RATE: 103 BPM | OXYGEN SATURATION: 98 % | RESPIRATION RATE: 18 BRPM | TEMPERATURE: 98 F

## 2020-01-14 LAB
ALBUMIN SERPL ELPH-MCNC: 3.9 G/DL — SIGNIFICANT CHANGE UP (ref 3.3–5)
ALP SERPL-CCNC: 169 U/L — HIGH (ref 40–120)
ALT FLD-CCNC: 30 U/L — SIGNIFICANT CHANGE UP (ref 10–45)
ANION GAP SERPL CALC-SCNC: 17 MMOL/L — SIGNIFICANT CHANGE UP (ref 5–17)
AST SERPL-CCNC: 95 U/L — HIGH (ref 10–40)
BILIRUB SERPL-MCNC: 1.7 MG/DL — HIGH (ref 0.2–1.2)
BUN SERPL-MCNC: 6 MG/DL — LOW (ref 7–23)
CALCIUM SERPL-MCNC: 8.9 MG/DL — SIGNIFICANT CHANGE UP (ref 8.4–10.5)
CHLORIDE SERPL-SCNC: 95 MMOL/L — LOW (ref 96–108)
CO2 SERPL-SCNC: 23 MMOL/L — SIGNIFICANT CHANGE UP (ref 22–31)
CREAT SERPL-MCNC: 0.64 MG/DL — SIGNIFICANT CHANGE UP (ref 0.5–1.3)
GLUCOSE SERPL-MCNC: 106 MG/DL — HIGH (ref 70–99)
HCT VFR BLD CALC: 39.7 % — SIGNIFICANT CHANGE UP (ref 39–50)
HGB BLD-MCNC: 13.1 G/DL — SIGNIFICANT CHANGE UP (ref 13–17)
MAGNESIUM SERPL-MCNC: 1.8 MG/DL — SIGNIFICANT CHANGE UP (ref 1.6–2.6)
MCHC RBC-ENTMCNC: 32.3 PG — SIGNIFICANT CHANGE UP (ref 27–34)
MCHC RBC-ENTMCNC: 33 GM/DL — SIGNIFICANT CHANGE UP (ref 32–36)
MCV RBC AUTO: 97.8 FL — SIGNIFICANT CHANGE UP (ref 80–100)
PHOSPHATE SERPL-MCNC: 4.4 MG/DL — SIGNIFICANT CHANGE UP (ref 2.5–4.5)
PLATELET # BLD AUTO: 46 K/UL — LOW (ref 150–400)
POTASSIUM SERPL-MCNC: 3.1 MMOL/L — LOW (ref 3.5–5.3)
POTASSIUM SERPL-SCNC: 3.1 MMOL/L — LOW (ref 3.5–5.3)
PROT SERPL-MCNC: 7.3 G/DL — SIGNIFICANT CHANGE UP (ref 6–8.3)
RBC # BLD: 4.06 M/UL — LOW (ref 4.2–5.8)
RBC # FLD: 15 % — HIGH (ref 10.3–14.5)
SODIUM SERPL-SCNC: 135 MMOL/L — SIGNIFICANT CHANGE UP (ref 135–145)
WBC # BLD: 4.93 K/UL — SIGNIFICANT CHANGE UP (ref 3.8–10.5)
WBC # FLD AUTO: 4.93 K/UL — SIGNIFICANT CHANGE UP (ref 3.8–10.5)

## 2020-01-14 PROCEDURE — 80076 HEPATIC FUNCTION PANEL: CPT

## 2020-01-14 PROCEDURE — 82330 ASSAY OF CALCIUM: CPT

## 2020-01-14 PROCEDURE — 80048 BASIC METABOLIC PNL TOTAL CA: CPT

## 2020-01-14 PROCEDURE — 99239 HOSP IP/OBS DSCHRG MGMT >30: CPT

## 2020-01-14 PROCEDURE — 84100 ASSAY OF PHOSPHORUS: CPT

## 2020-01-14 PROCEDURE — 83605 ASSAY OF LACTIC ACID: CPT

## 2020-01-14 PROCEDURE — 85027 COMPLETE CBC AUTOMATED: CPT

## 2020-01-14 PROCEDURE — 85014 HEMATOCRIT: CPT

## 2020-01-14 PROCEDURE — 82947 ASSAY GLUCOSE BLOOD QUANT: CPT

## 2020-01-14 PROCEDURE — 82272 OCCULT BLD FECES 1-3 TESTS: CPT

## 2020-01-14 PROCEDURE — 96375 TX/PRO/DX INJ NEW DRUG ADDON: CPT

## 2020-01-14 PROCEDURE — 96374 THER/PROPH/DIAG INJ IV PUSH: CPT

## 2020-01-14 PROCEDURE — 99285 EMERGENCY DEPT VISIT HI MDM: CPT | Mod: 25

## 2020-01-14 PROCEDURE — 84132 ASSAY OF SERUM POTASSIUM: CPT

## 2020-01-14 PROCEDURE — 70450 CT HEAD/BRAIN W/O DYE: CPT

## 2020-01-14 PROCEDURE — 83735 ASSAY OF MAGNESIUM: CPT

## 2020-01-14 PROCEDURE — 80053 COMPREHEN METABOLIC PANEL: CPT

## 2020-01-14 PROCEDURE — 93005 ELECTROCARDIOGRAM TRACING: CPT

## 2020-01-14 PROCEDURE — 82435 ASSAY OF BLOOD CHLORIDE: CPT

## 2020-01-14 PROCEDURE — 84295 ASSAY OF SERUM SODIUM: CPT

## 2020-01-14 PROCEDURE — 82140 ASSAY OF AMMONIA: CPT

## 2020-01-14 PROCEDURE — 80307 DRUG TEST PRSMV CHEM ANLYZR: CPT

## 2020-01-14 PROCEDURE — 85610 PROTHROMBIN TIME: CPT

## 2020-01-14 PROCEDURE — 96376 TX/PRO/DX INJ SAME DRUG ADON: CPT

## 2020-01-14 PROCEDURE — 82105 ALPHA-FETOPROTEIN SERUM: CPT

## 2020-01-14 PROCEDURE — 82803 BLOOD GASES ANY COMBINATION: CPT

## 2020-01-14 RX ORDER — FOLIC ACID 0.8 MG
1 TABLET ORAL
Qty: 0 | Refills: 0 | DISCHARGE
Start: 2020-01-14

## 2020-01-14 RX ORDER — THIAMINE MONONITRATE (VIT B1) 100 MG
1 TABLET ORAL
Qty: 0 | Refills: 0 | DISCHARGE
Start: 2020-01-14

## 2020-01-14 RX ORDER — MAGNESIUM SULFATE 500 MG/ML
2 VIAL (ML) INJECTION ONCE
Refills: 0 | Status: COMPLETED | OUTPATIENT
Start: 2020-01-14 | End: 2020-01-14

## 2020-01-14 RX ORDER — POTASSIUM CHLORIDE 20 MEQ
40 PACKET (EA) ORAL EVERY 4 HOURS
Refills: 0 | Status: COMPLETED | OUTPATIENT
Start: 2020-01-14 | End: 2020-01-14

## 2020-01-14 RX ADMIN — Medication 1 TABLET(S): at 09:21

## 2020-01-14 RX ADMIN — Medication 40 MILLIEQUIVALENT(S): at 09:21

## 2020-01-14 RX ADMIN — AMLODIPINE BESYLATE 5 MILLIGRAM(S): 2.5 TABLET ORAL at 05:10

## 2020-01-14 RX ADMIN — Medication 1 MILLIGRAM(S): at 15:44

## 2020-01-14 RX ADMIN — Medication 40 MILLIEQUIVALENT(S): at 14:15

## 2020-01-14 RX ADMIN — Medication 50 GRAM(S): at 09:21

## 2020-01-14 RX ADMIN — Medication 1.5 MILLIGRAM(S): at 03:57

## 2020-01-14 RX ADMIN — Medication 1.5 MILLIGRAM(S): at 08:19

## 2020-01-14 RX ADMIN — PANTOPRAZOLE SODIUM 40 MILLIGRAM(S): 20 TABLET, DELAYED RELEASE ORAL at 05:10

## 2020-01-14 RX ADMIN — Medication 100 MILLIGRAM(S): at 09:21

## 2020-01-14 RX ADMIN — Medication 1 MILLIGRAM(S): at 09:21

## 2020-01-14 NOTE — DISCHARGE NOTE PROVIDER - NSDCFUSCHEDAPPT_GEN_ALL_CORE_FT
KWAME CARRINGTON ; 01/30/2020 ; [facility] [address] [place] [address] KWAME CARRINGTON ; 01/30/2020 ; [facility] [address] [place] [address] KWAME CARRINGTON ; 01/30/2020 ; [facility] [address] [place] [address]

## 2020-01-14 NOTE — DISCHARGE NOTE PROVIDER - NSDCCPCAREPLAN_GEN_ALL_CORE_FT
PRINCIPAL DISCHARGE DIAGNOSIS  Diagnosis: Alcohol withdrawal  Assessment and Plan of Treatment: Do not drink alcohol      SECONDARY DISCHARGE DIAGNOSES  Diagnosis: HTN (hypertension)  Assessment and Plan of Treatment: Take medications for your blood pressure as recommended.  Eat a heart healthy diet that is low in saturated fats and salt, and includes whole grains, fruits, vegetables and lean protein   Exercise regularly (consult with your physician or cardiologist first); maintain a heart healthy weight.   If you smoke - quit (A resource to help you stop smoking is the [facility] for Tobacco Control – phone number 277-037-7711.). Continue to follow with your primary physician or cardiologist.   Seek medical help for dizziness, Lightheadedness, Blurry vision, Headache, Chest pain, Shortness of breath  Follow up with your medical doctor to establish long term blood pressure treatment goals.    Diagnosis: Cirrhosis  Assessment and Plan of Treatment: Follow up with Gastroenterologist and Hepatologist as outpatient  Cirrhosis is scarring of the liver which can cause heavy bleeding, swelling, & breathing problems  Call your doctor with belly & leg swelling, shortness of breath, bruising or bleeding easily, feeling full, tired, trouble getting enough or too much sleep, yellowing of skin or whites of eye, sudden confusion, or coma  Causes may include heavy alcohol use, hepatitis B or C, or fatty liver.    You can help yourself by avoiding alcohol, speak with your doctor before starting on any new medication, herbs, vitamins, or supplements which may cause more damage to the liver  Treatment includes treating the cause, reducing blood pressure, low salt diet, diuretics, belly fluid drainage, treating infections, getting vaccinations to prevent common infections, &/or lactulose to improve confusion  It is important to get help if you have an alcohol problem, use condoms when having sex, and nor sharing drug needles if this applies to you

## 2020-01-14 NOTE — DISCHARGE NOTE PROVIDER - NSDCMRMEDTOKEN_GEN_ALL_CORE_FT
amLODIPine 5 mg oral tablet: 1 tab(s) orally once a day  folic acid 1 mg oral tablet: 1 tab(s) orally once a day  Multiple Vitamins oral tablet: 1 tab(s) orally once a day  thiamine 100 mg oral tablet: 1 tab(s) orally once a day

## 2020-01-14 NOTE — DISCHARGE NOTE PROVIDER - HOSPITAL COURSE
54yoM with PMH of EtOH abuse c/b withdrawal and DT requiring MICU stay, cirrhosis with portal htn, hx of oxycodone abuse, who presents to the ED for emesis, admitted for alcohol withdrawal. Last drink Friday morning. hx of DTs in the past. CIWA with ativan po taper given high risk and Ativan 2mg PO PRN dosing as he is having breakthrough anxiety. Emesis 2/2 ETOH w/d now resolved, able to tolerate PO. Hx of Cirrhosis. Seen by hepatology team during his previous admission in 10/2019. He has no hospitalization for hepatic encephalopathy. Patient should have MRI with IV contrast as outpatient to assess for HCC as outpatient with hepatology followup .    This patient last had an upper endoscopy in 2009 which found normal esophagus, normal duodenum and hiatal hernia. The patient should have repeat endoscopy arranged with GI/hepatology team. MELD score of 9. chronic thrombocytopenia likely 2/2 alcohol use. no signs of active bleed at this time. Completed ativan taper. d/c home.

## 2020-01-14 NOTE — DISCHARGE NOTE PROVIDER - NSDCPNSUBOBJ_GEN_ALL_CORE
Pt admitted for nausea and vomiting from alcohol withdrawal. Finishing taper today and pt feels well enough to follow up outpatient for alcohol rehab treatment.  Should follow wit hepatology for cirrhosis as well. Discharge time 43 minutes.

## 2020-01-14 NOTE — DISCHARGE NOTE PROVIDER - CARE PROVIDERS DIRECT ADDRESSES
[email or web address]

## 2020-01-14 NOTE — DISCHARGE NOTE PROVIDER - CARE PROVIDER_API CALL
Vikas Clancy)  Internal Medicine  [address], [address]  [place] [address]  Phone: [phone]  Fax: [phone]  Follow Up Time:     Elliot Guerrero)  Internal Medicine  [address] [place] [address]  Phone: [phone]  Fax: [phone]  Follow Up Time:

## 2020-01-21 ENCOUNTER — APPOINTMENT (OUTPATIENT)
Dept: INTERNAL MEDICINE | Facility: CLINIC | Age: 55
End: 2020-01-21

## 2020-01-30 ENCOUNTER — APPOINTMENT (OUTPATIENT)
Dept: HEPATOLOGY | Facility: CLINIC | Age: 55
End: 2020-01-30
Payer: COMMERCIAL

## 2020-01-30 ENCOUNTER — LABORATORY RESULT (OUTPATIENT)
Age: 55
End: 2020-01-30

## 2020-01-30 VITALS
RESPIRATION RATE: 16 BRPM | BODY MASS INDEX: 30.36 KG/M2 | HEIGHT: 69 IN | TEMPERATURE: 98.2 F | SYSTOLIC BLOOD PRESSURE: 164 MMHG | HEART RATE: 96 BPM | WEIGHT: 205 LBS | DIASTOLIC BLOOD PRESSURE: 88 MMHG

## 2020-01-30 DIAGNOSIS — F10.20 ALCOHOL DEPENDENCE, UNCOMPLICATED: ICD-10-CM

## 2020-01-30 DIAGNOSIS — R16.0 HEPATOMEGALY, NOT ELSEWHERE CLASSIFIED: ICD-10-CM

## 2020-01-30 DIAGNOSIS — Z87.19 PERSONAL HISTORY OF OTHER DISEASES OF THE DIGESTIVE SYSTEM: ICD-10-CM

## 2020-01-30 PROCEDURE — 99204 OFFICE O/P NEW MOD 45 MIN: CPT

## 2020-01-30 RX ORDER — HYDROXYZINE HYDROCHLORIDE 25 MG/1
25 TABLET ORAL
Qty: 30 | Refills: 1 | Status: DISCONTINUED | COMMUNITY
Start: 2019-05-16 | End: 2020-01-30

## 2020-01-30 RX ORDER — ALBUTEROL SULFATE 90 UG/1
108 (90 BASE) AEROSOL, METERED RESPIRATORY (INHALATION)
Qty: 1 | Refills: 4 | Status: DISCONTINUED | COMMUNITY
Start: 2017-05-03 | End: 2020-01-30

## 2020-01-31 PROBLEM — R16.0 HEPATOMEGALY: Status: ACTIVE | Noted: 2020-01-30

## 2020-01-31 LAB
A1AT SERPL-MCNC: 192 MG/DL
AFP-TM SERPL-MCNC: 2.5 NG/ML
ALBUMIN SERPL ELPH-MCNC: 4.6 G/DL
ALP BLD-CCNC: 190 U/L
ALT SERPL-CCNC: 16 U/L
ANION GAP SERPL CALC-SCNC: 20 MMOL/L
AST SERPL-CCNC: 38 U/L
BASOPHILS # BLD AUTO: 0.06 K/UL
BASOPHILS NFR BLD AUTO: 0.6 %
BILIRUB SERPL-MCNC: 0.7 MG/DL
BUN SERPL-MCNC: 12 MG/DL
CALCIUM SERPL-MCNC: 9.9 MG/DL
CERULOPLASMIN SERPL-MCNC: 28 MG/DL
CHLORIDE SERPL-SCNC: 96 MMOL/L
CO2 SERPL-SCNC: 28 MMOL/L
CREAT SERPL-MCNC: 0.89 MG/DL
EOSINOPHIL # BLD AUTO: 0.05 K/UL
EOSINOPHIL NFR BLD AUTO: 0.5 %
ERYTHROCYTE [SEDIMENTATION RATE] IN BLOOD BY WESTERGREN METHOD: 26 MM/HR
FERRITIN SERPL-MCNC: 249 NG/ML
GLUCOSE SERPL-MCNC: 83 MG/DL
HBV CORE IGG+IGM SER QL: NONREACTIVE
HBV SURFACE AB SER QL: REACTIVE
HBV SURFACE AG SER QL: NONREACTIVE
HCT VFR BLD CALC: 43.2 %
HCV AB SER QL: NONREACTIVE
HCV S/CO RATIO: 0.28 S/CO
HEPATITIS A IGG ANTIBODY: NONREACTIVE
HGB BLD-MCNC: 14.4 G/DL
IMM GRANULOCYTES NFR BLD AUTO: 0.5 %
INR PPP: 1.15 RATIO
LYMPHOCYTES # BLD AUTO: 1.47 K/UL
LYMPHOCYTES NFR BLD AUTO: 14.1 %
MAN DIFF?: NORMAL
MCHC RBC-ENTMCNC: 32.7 PG
MCHC RBC-ENTMCNC: 33.3 GM/DL
MCV RBC AUTO: 98.2 FL
MONOCYTES # BLD AUTO: 0.6 K/UL
MONOCYTES NFR BLD AUTO: 5.8 %
NEUTROPHILS # BLD AUTO: 8.17 K/UL
NEUTROPHILS NFR BLD AUTO: 78.5 %
PLATELET # BLD AUTO: 156 K/UL
POTASSIUM SERPL-SCNC: 3.4 MMOL/L
PROT SERPL-MCNC: 8.1 G/DL
PT BLD: 13.1 SEC
RBC # BLD: 4.4 M/UL
RBC # FLD: 14.4 %
SODIUM SERPL-SCNC: 143 MMOL/L
WBC # FLD AUTO: 10.4 K/UL

## 2020-02-03 LAB
DEPRECATED KAPPA LC FREE/LAMBDA SER: 1.35 RATIO
IGA SER QL IEP: 840 MG/DL
IGG SER QL IEP: 1178 MG/DL
IGM SER QL IEP: 107 MG/DL
KAPPA LC CSF-MCNC: 5.56 MG/DL
KAPPA LC SERPL-MCNC: 7.51 MG/DL
MITOCHONDRIA AB SER IF-ACNC: NORMAL
SMOOTH MUSCLE AB SER QL IF: NORMAL
TTG IGA SER IA-ACNC: 1.6 U/ML
TTG IGA SER-ACNC: NEGATIVE

## 2020-02-04 LAB
ANA SER IF-ACNC: NEGATIVE
CARDIOLIPIN AB SER IA-ACNC: POSITIVE

## 2020-02-05 LAB — SOLUBLE LIVER IGG SER IA-ACNC: < 20.1 UNITS

## 2020-02-07 ENCOUNTER — APPOINTMENT (OUTPATIENT)
Dept: HEPATOLOGY | Facility: CLINIC | Age: 55
End: 2020-02-07

## 2020-04-24 NOTE — H&P ADULT - HISTORY OF PRESENT ILLNESS
Patient is a 56 y/o M PMH HTN (noncompliant w/ meds), EtOH abuse, Cirrhosis with portal HTN, prior oxycodone abuse, GI bleeds, DTs and alcoholic withdrawal seizures p/w EtOH withdrawal. Drinks 1 pint of tequila daily, last drink this AM. Reports 2 episodes of melena this AM. Has not followed up w/ GI since discharge 1/2020. Reports nausea and nonbloody vomiting (shortly after arriving to medical floor).

## 2020-04-24 NOTE — ED PROVIDER NOTE - PMH
REVIEW OF SYSTEMS:  Constitutional: Negative  Integumentary problem(s):negative  HEENT Problem(s): Lightheaded or Dizzy and Sinus Congestion  Cardiovascular problem(s): Palpitations, Rapid Heartbeat, Irregular Heartbeat, Exertional Chest Pain and Shortness of Breath on Exertion  Respiratory problem(s): Asthma  Gastro-intestinal problem(s): none  Genito-urinary problem(s): Negative  Musculoskeletal problem(s): Muscle or Joint Pain  Neurological problem(s): Feeling Like Passing Out  Psychiatric problem(s): Anxiety  Endocrine problem(s): Negative  Hematologic and/or Lymphatic problem(s): Allergies    Patient does not smoke.    Patient does not take aspirin   Active smoker    ETOH abuse    Grief reaction    HTN (hypertension)    Sciatica

## 2020-04-24 NOTE — ED PROVIDER NOTE - ATTENDING CONTRIBUTION TO CARE
I have personally performed a face to face bedside history and physical examination of this patient. I have discussed the history, examination, review of systems, assessment and plan of management with the resident. I have reviewed the electronic medical record and amended it to reflect my history, review of systems, physical exam, assessment and plan.    Brief HPI:  55M PMH HTN, alcohol abuse, previous h/o GI bleeds/portal HTN?, DTs and alcoholic withdrawal seizures presents requesting detox for alcohol.  Reports that he is in withdrawal.  States he drank unspecified etoh today.  Reports multiple falls recently, but unable to provide details.  Denies fever.      Vitals:   Reviewed    Exam:    GEN:  Non-toxic appearing, non-distressed, speaking full sentences, non-diaphoretic, AAOx3, mild tremor   HEENT:  NCAT, neck supple, EOMI, PERRLA, +sclera icteric, no conjunctival pallor or injection, no stridor, normal voice, no tonsillar exudate, uvula midline, tympanic membranes and external auditory canals normal appearing bilaterally   CV:  regular rhythm and rate, s1/s2 audible, no murmurs, rubs or gallops, peripheral pulses 2+ and symmetric  PULM:  non-labored respirations, lungs clear to auscultation bilaterally, no wheezes, crackles or rales  ABD:  minimally distended, non-tender, no rebound, no guarding, negative Wallis's sign, bowel sounds normal, no cvat, no fluid shift   MSK:  +diffuse ecchymoses over b/l arms and legs, no gross deformity, non-tender extremities and joints, range of motion grossly normal appearing, no extremity edema, extremities warm and well perfused   NEURO:  AAOx3, CN II-XII intact, motor 5/5 in upper and lower extremities bilaterally, sensation grossly intact in extremities and trunk, finger to nose testing wnl, no nystagmus, negative Romberg, no pronator drift, no asterixis   SKIN:  warm, dry, no rash or vesicles, ecchymoses as previously described     A/P:  55M PMH HTN, alcohol abuse, previous h/o GI bleeds/portal HTN?, DTs and alcoholic withdrawal seizures presents requesting detox for alcohol.  Reports withdrawal.  Mild tremor on exam, but not altered appearing.  Has diffuse ecchymoses on extremities, but no c/f fracture based on exam.  Abdomen non-tender.  Low c/f sbp.  Appears in mild etoh withdrawal.  Will send labs, cxr, head ct given likely repeated trauma, supportive care.  Anticipate admission.

## 2020-04-24 NOTE — H&P ADULT - NSHPLABSRESULTS_GEN_ALL_CORE
04-24    140  |  90<L>  |  23  ----------------------------<  103<H>  3.8   |  18<L>  |  0.70    Ca    8.4      24 Apr 2020 17:30  Phos  3.4     04-24  Mg     2.3     04-24    TPro  7.8  /  Alb  4.4  /  TBili  4.1<H>  /  DBili  x   /  AST  281<H>  /  ALT  52<H>  /  AlkPhos  229<H>  04-24                        11.2   5.43  )-----------( 31       ( 24 Apr 2020 17:30 )             32.8     LIVER FUNCTIONS - ( 24 Apr 2020 17:30 )  Alb: 4.4 g/dL / Pro: 7.8 g/dL / ALK PHOS: 229 u/L / ALT: 52 u/L / AST: 281 u/L / GGT: x           PT/INR - ( 24 Apr 2020 17:30 )   PT: 11.9 SEC;   INR: 1.03        PTT - ( 24 Apr 2020 17:30 )  PTT:33.5 SEC

## 2020-04-24 NOTE — H&P ADULT - ASSESSMENT
55 M PMH HTN (noncompliant w/ meds), EtOH abuse, Cirrhosis with portal HTN, prior oxycodone abuse, GI bleeds, DTs and alcoholic withdrawal seizures p/w EtOH withdrawal

## 2020-04-24 NOTE — ED PROVIDER NOTE - NS ED ROS FT
REVIEW OF SYSTEMS:  General:  no fever, no chills  HEENT: no headache, no vision changes  Cardiac: no chest pain, no palpitations  Respiratory: no cough, no shortness of breath  Gastrointestinal: no abdominal pain, no nausea, no vomiting, no diarrhea +dark stool   Genitourinary: no hematuria, no dysuria, no urinary frequency  Extremities: no extremity swelling, no extremity pain  Neuro: +withdrawal. no focal weakness, no numbness/tingling of the extremities, no decreased sensation  Heme: no easy bleeding, no easy bruising  Skin: no jaundice,  no rashes, no lesions  All other ROS as documented in HPI  -Chivo Boles, PGY-2

## 2020-04-24 NOTE — H&P ADULT - NSHPREVIEWOFSYSTEMS_GEN_ALL_CORE
Constitutional Symptoms: No weakness, fevers, chills, weight loss  Eyes: No visual changes, headache, eye pain, double vision  Ears, Nose, Mouth, Throat: No runny nose, sinus pain, ear pain, tinnitus, sore throat, dysphagia, odynophagia  Cardiovascular: No chest pain, palpitations, edema  Respiratory: No cough, wheezing, hemoptysis, shortness of breath  Gastrointestinal: +N/V, melena, no abdominal pain, dysphagia, anorexia, diarrhea/constipation, hematemesis, BRBPR  Genitourinary: No dysuria, frequency, hematuria  Musculoskeletal: No joint pain, joint swelling, decreased ROM  Skin: No pruritus, rashes, lesions, wounds  Neurologic:  No seizures, headache, paraesthesia, numbness, limb weakness    Positives and pertinent negatives noted and all other systems negative.

## 2020-04-24 NOTE — ED ADULT NURSE REASSESSMENT NOTE - NS ED NURSE REASSESS COMMENT FT1
patient lying in bed at this time sleeping, received 1 mg lorazepam, respirations normal/unlabored, no s/s of distress.

## 2020-04-24 NOTE — ED PROVIDER NOTE - CLINICAL SUMMARY MEDICAL DECISION MAKING FREE TEXT BOX
55M w/ chronic alcohol abuse presents with withdrawal sx, bruising, possible GI bleed. Will CT head for frequent falls. labs, EKG, CXR, folate/thiamine. Active T&S. Pt will require admission for further treatment.

## 2020-04-24 NOTE — ED PROVIDER NOTE - PHYSICAL EXAMINATION
General: disheveled, unkempt, male   HEENT: Normocephalic and atraumatic, Scleral icterus. Trachea midline.   Cardiac: Normal S1 and S2  Pulmonary: No increased WOB.   Abdominal: Soft, NTND  Neurologic: No asterixis. Mild tremulousness.   Musculoskeletal: No limited ROM.  Vascular: Warm and well perfused  Skin: large areas of ecchymosis and bruising over arms and legs. no caput medusa, no spider angiomata. Color appropriate for race.   Psychiatric: Anxious appearing  Chivo Boles M.D. PGY-2

## 2020-04-24 NOTE — ED ADULT TRIAGE NOTE - CHIEF COMPLAINT QUOTE
as per EMS' p[ts daughter called 911 for pt going through ETOH withdrawal, pt participates minimally in interview, reports last ETOH intake this morning 1 pint of tequila, forgetful in triage, judice noted in sclera with multiple ecchymotic areas to skin. Ketones like smell noted on breath.

## 2020-04-24 NOTE — ED PROVIDER NOTE - OBJECTIVE STATEMENT
55M PMH HTN, alcohol abuse, previous h/o GI bleeds/portal HTN?, DTs and alcoholic withdrawal seizures presents with withdrawal. Pt states he has been drinking 1L of tequila daily, last drink this morning. States he wants to detox and feels as if he is going through withdrawal. Pt states he has been falling frequently in the last few days. Spoke to son Paco who states pt had one episode of dark stool this morning. Pt denies vomiting. 55M PMH HTN, alcohol abuse, previous h/o GI bleeds/portal HTN?, DTs and alcoholic withdrawal seizures presents with withdrawal. Pt states he has been drinking 1L of tequila daily, last drink this morning. States he wants to detox and feels as if he is going through withdrawal. Pt states he has been falling frequently in the last few days. Spoke to son Paco who states pt had one episode of dark stool this morning. Pt denies vomiting.  Paco (son): [phone]  Emely (daughter): [phone]

## 2020-04-25 NOTE — PROGRESS NOTE ADULT - PROBLEM SELECTOR PLAN 1
start high risk CIWA w/ first dose ativan 2 mg IV STAT, d/w RN, fall/seizure precautions ETOH on admission was 402.   -Monitor CIWA scores. Scores has been between 7-14 with a most recent score of 9  -C/w high risk CIWA w/ ativan taper. May need to do slower taper if pt w/o significant improvement given pt was admitted w/ high ETOH levels in his system  -c/w folic acid, thiamine and MVI. Would start IV thiamine 250mg qd once banana bag is complete.   -fall/seizure precautions  -monitor electrolytes and replete PRN   -MICU consult if CIWA scores persistently greater than 15 despite medical management

## 2020-04-25 NOTE — PROGRESS NOTE ADULT - PROBLEM SELECTOR PLAN 3
hx of melena in the past likely 2/2 EtOH use, protonix STAT, monitor CBC, tx as needed, goal >7. Hx of melena in the past. Pt also Liver cirrhosis with portal hypertension and therefor pt is at risk for varices. + FOBT here. Pt s/p E[facility] that was only + for sigmoid diverticulosis.  Coags stable.   -Monitor CBC closely  -transfuse  for goal hg >7.  -maintain active type and screen  -Will likely consult GI during admission given pts hx and possibility of varices. However, will hold off for now given pt is in active withdrawal and H/H currently not significantly lower than his baseline.

## 2020-04-25 NOTE — PROGRESS NOTE ADULT - PROBLEM SELECTOR PLAN 5
Per chart review pt is noncompliant. BPs have been fluctuating however this is likely in the setting of his withdrawal.   -monitor BP for now

## 2020-04-25 NOTE — PROGRESS NOTE ADULT - PROBLEM SELECTOR PLAN 6
DVT ppx- Improve score low. Also no pharmacological tx in setting of possible GI bleed. Will use SCDs for now given minimal ambulation.

## 2020-04-25 NOTE — PROGRESS NOTE ADULT - PROBLEM SELECTOR PLAN 2
2/2 EToH, discriminant score not high enough for steroids, will monitor Likely 2/2 EToH   -discriminant score not high enough for steroids at this time   -continue to monitor

## 2020-04-25 NOTE — PROGRESS NOTE ADULT - SUBJECTIVE AND OBJECTIVE BOX
Patient is a 55y old  Male who presents with a chief complaint of EtOH withdrawal (24 Apr 2020 22:10)      SUBJECTIVE / OVERNIGHT EVENTS: Pt reports having mild H/A and nausea. He also states that he sees candie mouse but denies auditory hallucinations. He reports intermittent cough and diarrhea for a couple denies. Currently denies CP, SOB, anxiety.     Review of Systems:     MEDICATIONS  (STANDING):  folic acid 1 milliGRAM(s) Oral daily  LORazepam   Injectable   IV Push   LORazepam   Injectable 2 milliGRAM(s) IV Push every 4 hours  LORazepam   Injectable 1.5 milliGRAM(s) IV Push every 4 hours  multivitamin 1 Tablet(s) Oral daily  pantoprazole  Injectable 40 milliGRAM(s) IV Push two times a day  sodium chloride 0.9% 1000 milliLiter(s) IV Continuous     MEDICATIONS  (PRN):  LORazepam   Injectable 2 milliGRAM(s) IV Push every 1 hour PRN Symptom-triggered: each CIWA -Ar score 8 or GREATER      PHYSICAL EXAM:  Vital Signs Last 24 Hrs  T(C): 37.3 (25 Apr 2020 11:43), Max: 37.4 (25 Apr 2020 02:40)  T(F): 99.1 (25 Apr 2020 11:43), Max: 99.4 (25 Apr 2020 10:46)  HR: 116 (25 Apr 2020 11:43) (69 - 125)  BP: 142/78 (25 Apr 2020 11:43) (115/76 - 154/99)  BP(mean): --  RR: 16 (25 Apr 2020 11:43) (14 - 21)  SpO2: 95% (25 Apr 2020 11:43) (94% - 99%)    GENERAL: NAD, laying down flat in bed   HEAD:  Atraumatic, Normocephalic  EYES: EOMI,  conjunctiva and sclera clear  NECK: Supple,  CHEST/LUNG: Clear to auscultation bilaterally; No wheeze  HEART: Regular rate and rhythm; No murmurs, rubs, or gallops  ABDOMEN: Soft, Nontender, Nondistended; Bowel sounds present  EXTREMITIES:  2+ Peripheral Pulses, No clubbing, cyanosis, or edema  PSYCH: Alert, calm, drowsy but easily arousable.   NEUROLOGY: moves all extremities. + Tremor of hands  SKIN: No diaphoresis   LABS:  CAPILLARY BLOOD GLUCOSE      POCT Blood Glucose.: 100 mg/dL (24 Apr 2020 16:46)                          11.2   5.43  )-----------( 31       ( 24 Apr 2020 17:30 )             32.8     04-25    140  |  94<L>  |  21  ----------------------------<  95  4.0   |  18<L>  |  0.63    Ca    7.9<L>      25 Apr 2020 00:01  Phos  2.7     04-25  Mg     2.2     04-25    TPro  7.3  /  Alb  3.8  /  TBili  4.1<H>  /  DBili  3.1<H>  /  AST  273<H>  /  ALT  51<H>  /  AlkPhos  204<H>  04-25    PT/INR - ( 24 Apr 2020 17:30 )   PT: 11.9 SEC;   INR: 1.03          PTT - ( 24 Apr 2020 17:30 )  PTT:33.5 SEC          RADIOLOGY & ADDITIONAL TESTS:    Imaging Personally Reviewed:    Consultant(s) Notes Reviewed:      Care Discussed with Consultants/Other Providers:

## 2020-04-25 NOTE — PROGRESS NOTE ADULT - ASSESSMENT
55 M PMH HTN (noncompliant w/ meds), EtOH abuse, Cirrhosis with portal HTN, prior oxycodone abuse, GI bleeds, DTs and alcoholic withdrawal seizures p/w EtOH withdrawal 55 M PMH HTN (noncompliant w/ meds), EtOH abuse, Cirrhosis with portal HTN, prior oxycodone abuse, GI bleeds, DTs and alcoholic withdrawal seizures p/w EtOH withdrawal, recent falls, and reported dark stools

## 2020-04-25 NOTE — PROGRESS NOTE ADULT - PROBLEM SELECTOR PLAN 4
noncompliant, monitor BP for now Pt reports having diarrhea over past couple days unclear if its in the setting of melena. He also reports having intermittent cough.   -will obtain COVID PCR to rule this out as possible underlining cause

## 2020-04-26 NOTE — CHART NOTE - NSCHARTNOTEFT_GEN_A_CORE
-ekg:  ,lfts trending up ,cbc decreased.  reviewed all labs with medical attending Dr. Mac.  GI consulted recs ordered.  Pt on no prolong agents.  will repeat ekg in am. LFTs elevated will continue to trend.  D/w Medical attending Dami Mac

## 2020-04-26 NOTE — PROGRESS NOTE ADULT - SUBJECTIVE AND OBJECTIVE BOX
Patient is a 55y old  Male who presents with a chief complaint of EtOH withdrawal (25 Apr 2020 12:25)      SUBJECTIVE / OVERNIGHT EVENTS:    Review of Systems:     MEDICATIONS  (STANDING):  acetaminophen  IVPB .. 650 milliGRAM(s) IV Intermittent once  folic acid 1 milliGRAM(s) Oral daily  LORazepam   Injectable   IV Push   LORazepam   Injectable 1.5 milliGRAM(s) IV Push every 4 hours  LORazepam   Injectable 1 milliGRAM(s) IV Push every 4 hours  multivitamin 1 Tablet(s) Oral daily  pantoprazole  Injectable 40 milliGRAM(s) IV Push two times a day  potassium chloride  10 mEq/100 mL IVPB 10 milliEquivalent(s) IV Intermittent every 1 hour  potassium phosphate IVPB 15 milliMole(s) IV Intermittent once  sodium chloride 0.9% 1000 milliLiter(s) IV Continuous   thiamine IVPB 500 milliGRAM(s) IV Intermittent daily    MEDICATIONS  (PRN):  LORazepam   Injectable 2 milliGRAM(s) IV Push every 1 hour PRN Symptom-triggered: each CIWA -Ar score 8 or GREATER      PHYSICAL EXAM:    IVital Signs Last 24 Hrs  T(C): 36.7 (26 Apr 2020 08:00), Max: 37.8 (25 Apr 2020 14:45)  T(F): 98 (26 Apr 2020 08:00), Max: 100.1 (25 Apr 2020 20:59)  HR: 108 (26 Apr 2020 08:00) (104 - 133)  BP: 116/70 (26 Apr 2020 08:00) (116/70 - 163/97)  BP(mean): --  RR: 17 (26 Apr 2020 08:00) (14 - 19)  SpO2: 99% (26 Apr 2020 08:00) (95% - 100%)    LABS:  CAPILLARY BLOOD GLUCOSE                              9.6    5.33  )-----------( 22       ( 26 Apr 2020 06:15 )             28.6     04-26    140  |  100  |  13  ----------------------------<  102<H>  3.3<L>   |  17<L>  |  0.59    Ca    8.3<L>      26 Apr 2020 06:15  Phos  1.8     04-26  Mg     1.9     04-26    TPro  6.9  /  Alb  3.6  /  TBili  6.1<H>  /  DBili  x   /  AST  296<H>  /  ALT  49<H>  /  AlkPhos  209<H>  04-26    PT/INR - ( 24 Apr 2020 17:30 )   PT: 11.9 SEC;   INR: 1.03          PTT - ( 24 Apr 2020 17:30 )  PTT:33.5 SEC          RADIOLOGY & ADDITIONAL TESTS:    Imaging Personally Reviewed:    Consultant(s) Notes Reviewed:      Care Discussed with Consultants/Other Providers: Patient is a 55y old  Male who presents with a chief complaint of EtOH withdrawal (25 Apr 2020 12:25)      SUBJECTIVE / OVERNIGHT EVENTS: Pt denies CP, SOB, n/v, H/a or hallucinations. He now denies cough. States he was still having loose BM     Review of Systems:     MEDICATIONS  (STANDING):  acetaminophen  IVPB .. 650 milliGRAM(s) IV Intermittent once  folic acid 1 milliGRAM(s) Oral daily  LORazepam   Injectable   IV Push   LORazepam   Injectable 1.5 milliGRAM(s) IV Push every 4 hours  LORazepam   Injectable 1 milliGRAM(s) IV Push every 4 hours  multivitamin 1 Tablet(s) Oral daily  pantoprazole  Injectable 40 milliGRAM(s) IV Push two times a day  potassium chloride  10 mEq/100 mL IVPB 10 milliEquivalent(s) IV Intermittent every 1 hour  potassium phosphate IVPB 15 milliMole(s) IV Intermittent once  sodium chloride 0.9% 1000 milliLiter(s) IV Continuous   thiamine IVPB 500 milliGRAM(s) IV Intermittent daily    MEDICATIONS  (PRN):  LORazepam   Injectable 2 milliGRAM(s) IV Push every 1 hour PRN Symptom-triggered: each CIWA -Ar score 8 or GREATER      PHYSICAL EXAM:    IVital Signs Last 24 Hrs  T(C): 36.7 (26 Apr 2020 08:00), Max: 37.8 (25 Apr 2020 14:45)  T(F): 98 (26 Apr 2020 08:00), Max: 100.1 (25 Apr 2020 20:59)  HR: 108 (26 Apr 2020 08:00) (104 - 133)  BP: 116/70 (26 Apr 2020 08:00) (116/70 - 163/97)  BP(mean): --  RR: 17 (26 Apr 2020 08:00) (14 - 19)  SpO2: 99% (26 Apr 2020 08:00) (95% - 100%)    LABS:  CAPILLARY BLOOD GLUCOSE                              9.6    5.33  )-----------( 22       ( 26 Apr 2020 06:15 )             28.6     04-26    140  |  100  |  13  ----------------------------<  102<H>  3.3<L>   |  17<L>  |  0.59    Ca    8.3<L>      26 Apr 2020 06:15  Phos  1.8     04-26  Mg     1.9     04-26    TPro  6.9  /  Alb  3.6  /  TBili  6.1<H>  /  DBili  x   /  AST  296<H>  /  ALT  49<H>  /  AlkPhos  209<H>  04-26    PT/INR - ( 24 Apr 2020 17:30 )   PT: 11.9 SEC;   INR: 1.03          PTT - ( 24 Apr 2020 17:30 )  PTT:33.5 SEC          RADIOLOGY & ADDITIONAL TESTS:    Imaging Personally Reviewed:    Consultant(s) Notes Reviewed:      Care Discussed with Consultants/Other Providers:

## 2020-04-26 NOTE — PROGRESS NOTE ADULT - PROBLEM SELECTOR PLAN 3
Hx of melena in the past. Pt also w/ hx of Liver cirrhosis with portal hypertension and therefor pt is at risk for varices. + FOBT here. Pt s/p [facility] that was only + for sigmoid diverticulosis.  Coags stable. Pt did report having dark diarrhea on this admission. H/H trending down  -Monitor CBC closely  -transfuse  for goal hg >7.  -maintain active type and screen  -Will consult GI now given drop in H/H

## 2020-04-26 NOTE — CONSULT NOTE ADULT - ASSESSMENT
54 y/o M with PMHx HTN (noncompliant w/ meds), EtOH abuse, Etoh Cirrhosis with portal HTN, prior oxycodone abuse, DTs and alcoholic withdrawal seizures p/w EtOH withdrawal. GI consulted for melena.    1) Melena  Patient reporting 1-2 days of melena with drop in Hb from 11 on admission to 9.8 (baseline of 13 in 1/2020).  DDx: PUD vs. alcoholic gastropathy vs. esophagitis vs. AVM vs. varices vs. malignancy  2) Alcoholic hepatitis  Patient with elevated alcohol level and acute rise in LFTs from baseline T bili of 6.1 Alk phos of 209 AST of 296 ALT 49. INR of 1.03. Alternative diagnosis include CBD stone vs. DILI vs infectious hepatitis.  3) ETOH cirrhosis  MELD Na: 14  Varies: no recent EGD, last in 2009 without varices  PSE: none  Ascites: small amount on prior imaging  HCC: 4cm lesion seen on CT from 2019 but MRI abdomen showed no lesions. AFP of 1.9 in 1/2020  4) Alcohol abuse  Currently on [facility] protocol    - ppi gtt  - octreotide gtt  - serial CBC and transfuse for Hb<7  - will hold off on endoscopic evaluation for now while patient on CIWA protocol. Can consider urgent EGD if patient has continued melena with Hb drop and HD instability  - thiamine and folate  - continue with [facility] protocol  - please obtain abdominal US complete to evaluate liver and elevated LFTs  - please keep plts >50 in the setting of GIB  - please check AFP  - trend MELD Na labs daily  - rest of plan as per primary team 56 y/o M with PMHx HTN (noncompliant w/ meds), EtOH abuse, Etoh Cirrhosis with portal HTN, prior oxycodone abuse, DTs and alcoholic withdrawal seizures p/w EtOH withdrawal. GI consulted for melena.    1) Melena  Patient reporting 1-2 days of melena with drop in Hb from 11 on admission to 9.8 (baseline of 13 in 1/2020).  DDx: PUD vs. alcoholic gastropathy vs. esophagitis vs. AVM vs. varices vs. malignancy  2) Alcoholic hepatitis  Patient with elevated alcohol level and acute rise in LFTs from baseline T bili of 6.1 Alk phos of 209 AST of 296 ALT 49. INR of 1.03. Alternative diagnosis include CBD stone vs. DILI vs infectious hepatitis.  3) ETOH cirrhosis  MELD Na: 14  Varies: no recent EGD, last in 2009 without varices  PSE: none  Ascites: small amount on prior imaging  HCC: 4cm lesion seen on CT from 2019 but MRI abdomen showed no lesions. AFP of 1.9 in 1/2020  4) Alcohol abuse  Currently on [facility] protocol    - ppi gtt  - octreotide gtt  - serial CBC and transfuse for Hb<7  - will hold off on endoscopic evaluation for now while patient on CIWA protocol. Can consider urgent EGD if patient has continued melena with Hb drop and HD instability  - thiamine and folate  - continue with CIWA protocol  - please obtain abdominal US complete to evaluate liver and elevated LFTs  - please keep plts >50 in the setting of GIB  - please check AFP  - trend MELD Na labs daily  - please keep NPO for now  - rest of plan as per primary team 56 y/o M with PMHx HTN (noncompliant w/ meds), EtOH abuse, Etoh Cirrhosis with portal HTN, prior oxycodone abuse, DTs and alcoholic withdrawal seizures p/w EtOH withdrawal. GI consulted for melena.    1) Melena  Patient reporting 1-2 days of melena with drop in Hb from 11 on admission to 9.8 (baseline of 13 in 1/2020).  DDx: PUD vs. alcoholic gastropathy vs. esophagitis vs. AVM vs. varices vs. malignancy  2) Alcoholic hepatitis  Patient with elevated alcohol level and acute rise in LFTs from baseline T bili of 6.1 Alk phos of 209 AST of 296 ALT 49. INR of 1.03. Alternative diagnosis include CBD stone vs. DILI vs infectious hepatitis.  3) ETOH cirrhosis  MELD Na: 14  Varies: no recent EGD, last in 2009 without varices  PSE: none  Ascites: small amount on prior imaging  HCC: 4cm lesion seen on CT from 2019 but MRI abdomen showed no lesions. AFP of 1.9 in 1/2020  4) Alcohol abuse  Currently on [facility] protocol    - ppi gtt  - octreotide gtt  - serial CBC and transfuse for Hb<7  - will hold off on endoscopic evaluation for now while patient on CIWA protocol. Can consider urgent EGD if patient has continued melena with Hb drop and HD instability  - thiamine and folate  - continue with [facility] protocol  - please obtain abdominal US complete to evaluate liver and elevated LFTs  - please keep plts >50 in the setting of GIB  - please check AFP  - trend MELD Na labs daily  - please keep NPO for now  -f/u repeat COVID PCR  - rest of plan as per primary team 54 y/o M with PMHx HTN (noncompliant w/ meds), EtOH abuse, Etoh Cirrhosis with portal HTN, prior oxycodone abuse, DTs and alcoholic withdrawal seizures p/w EtOH withdrawal. GI consulted for melena.    1) Melena  Patient reporting 1-2 days of melena with drop in Hb from 11 on admission to 9.8 (baseline of 13 in 1/2020).  DDx: PUD vs. alcoholic gastropathy vs. esophagitis vs. AVM vs. varices vs. malignancy  2) Alcoholic hepatitis  Patient with elevated alcohol level and acute rise in LFTs from baseline T bili of 6.1 Alk phos of 209 AST of 296 ALT 49. INR of 1.03. Alternative diagnosis include CBD stone vs. DILI vs infectious hepatitis.  3) ETOH cirrhosis  MELD Na: 14  Varies: no recent EGD, last in 2009 without varices  PSE: none  Ascites: small amount on prior imaging  HCC: 4cm lesion seen on CT from 2019 but MRI abdomen showed no lesions. AFP of 1.9 in 1/2020  4) Alcohol abuse  Currently on [facility] protocol    - ppi gtt  - octreotide gtt  - please give ceftrixone 1g x 7 days  - serial CBC and transfuse for Hb<7  - will hold off on endoscopic evaluation for now while patient on CIWA protocol. Can consider urgent EGD if patient has continued melena with Hb drop and HD instability  - thiamine and folate  - continue with CIWA protocol  - please obtain abdominal US complete to evaluate liver and elevated LFTs  - please keep plts >50 in the setting of GIB  - please check AFP  - trend MELD Na labs daily  - please keep NPO for now  -f/u repeat COVID PCR  - rest of plan as per primary team

## 2020-04-26 NOTE — PROGRESS NOTE ADULT - PROBLEM SELECTOR PLAN 1
ETOH on admission was 402.   -Monitor CIWA scores. Scores has been between 3-6 today  -C/w high risk CIWA w/ ativan taper.   -c/w folic acid, thiamine and MVI.   -fall/seizure precautions  -monitor electrolytes and replete PRN   -MICU consult if CIWA scores persistently greater than 15 despite medical management

## 2020-04-26 NOTE — CONSULT NOTE ADULT - SUBJECTIVE AND OBJECTIVE BOX
Chief Complaint:  Patient is a 55y old  Male who presents with a chief complaint of EtOH withdrawal (26 Apr 2020 09:00)      HPI:  56 y/o M with PMHx HTN (noncompliant w/ meds), EtOH abuse, Etoh Cirrhosis with portal HTN, prior oxycodone abuse, DTs and alcoholic withdrawal seizures p/w EtOH withdrawal. GI consulted for melena.    Patient reports drinking 1 pint of tequila daily, last drink this AM. Reports 2 episodes of melena this AM. Patient last admitted in 1/2020 for alcohol withdrawl and nonbloody emesis and 10/2019 w/alcohol withdrawl and workup of newly diagnosed cirrhosis from 5/2019. Reports nausea and nonbloody vomiting (shortly after arriving to medical floor).    Last EGD/colonoscopy in 2009 for heart burn and CRC screening. Showed sigmoid diverticulosis and hiatal hernia.    In the ED, vitals stable. Hb of 11.2 on arrival and repeat 2 days later of 9.6. Alcohol level elevated. Worsening LFTs from baseline on previous admission. Patient on CIWA protocol. GI consulted.    Allergies:  No Known Allergies      Home Medications:    Hospital Medications:  acetaminophen  IVPB .. 650 milliGRAM(s) IV Intermittent once  folic acid 1 milliGRAM(s) Oral daily  LORazepam   Injectable   IV Push   LORazepam   Injectable 2 milliGRAM(s) IV Push every 1 hour PRN  LORazepam   Injectable 1.5 milliGRAM(s) IV Push every 4 hours  LORazepam   Injectable 1 milliGRAM(s) IV Push every 4 hours  multivitamin 1 Tablet(s) Oral daily  pantoprazole  Injectable 40 milliGRAM(s) IV Push two times a day  potassium phosphate IVPB 15 milliMole(s) IV Intermittent once  sodium chloride 0.9% 1000 milliLiter(s) IV Continuous   thiamine IVPB 500 milliGRAM(s) IV Intermittent daily      PMHX/PSHX:  Sciatica  HTN (hypertension)  ETOH abuse  Grief reaction  Active smoker  S/P Repair of Inguinal Hernia      Family history:  FH: dementia  No pertinent family history in first degree relatives      Social History:     ROS:     General:  No wt loss, fevers, chills, night sweats, fatigue,   Eyes:  Good vision, no reported pain  ENT:  No sore throat, pain, runny nose, dysphagia  CV:  No pain, palpitations, hypo/hypertension  Resp:  No dyspnea, cough, tachypnea, wheezing  GI:  See HPI  :  No pain, bleeding, incontinence, nocturia  Muscle:  No pain, weakness  Neuro:  No weakness, tingling, memory problems  Psych:  No fatigue, insomnia, mood problems, depression  Endocrine:  No polyuria, polydipsia, cold/heat intolerance  Heme:  No petechiae, ecchymosis, easy bruisability  Skin:  No rash, edema      PHYSICAL EXAM:     GENERAL:  Appears stated age, well-groomed, well-nourished, no distress  HEENT:  NC/AT,  conjunctivae clear and pink,  no JVD  CHEST:  Full & symmetric excursion, no increased effort, breath sounds clear  HEART:  Regular rhythm, S1, S2, no murmur/rub/S3/S4, no abdominal bruit, no edema  ABDOMEN:  Soft, non-tender, non-distended, normoactive bowel sounds,  no masses ,  EXTREMITIES:  no cyanosis,clubbing or edema  SKIN:  No rash/erythema/ecchymoses/petechiae/wounds/abscess/warm/dry  NEURO:  Alert, oriented    Vital Signs:  Vital Signs Last 24 Hrs  T(C): 37.3 (26 Apr 2020 09:51), Max: 37.8 (25 Apr 2020 14:45)  T(F): 99.1 (26 Apr 2020 09:51), Max: 100.1 (25 Apr 2020 20:59)  HR: 111 (26 Apr 2020 09:51) (104 - 133)  BP: 130/80 (26 Apr 2020 09:51) (116/70 - 163/97)  BP(mean): --  RR: 18 (26 Apr 2020 09:51) (14 - 19)  SpO2: 97% (26 Apr 2020 09:51) (95% - 100%)  Daily     Daily     LABS:                        9.6    5.33  )-----------( 22       ( 26 Apr 2020 06:15 )             28.6     04-26    140  |  100  |  13  ----------------------------<  102<H>  3.3<L>   |  17<L>  |  0.59    Ca    8.3<L>      26 Apr 2020 06:15  Phos  1.8     04-26  Mg     1.9     04-26    TPro  6.9  /  Alb  3.6  /  TBili  6.1<H>  /  DBili  x   /  AST  296<H>  /  ALT  49<H>  /  AlkPhos  209<H>  04-26    LIVER FUNCTIONS - ( 26 Apr 2020 06:15 )  Alb: 3.6 g/dL / Pro: 6.9 g/dL / ALK PHOS: 209 u/L / ALT: 49 u/L / AST: 296 u/L / GGT: x           PT/INR - ( 24 Apr 2020 17:30 )   PT: 11.9 SEC;   INR: 1.03          PTT - ( 24 Apr 2020 17:30 )  PTT:33.5 SEC        Imaging: Chief Complaint:  Patient is a 55y old  Male who presents with a chief complaint of EtOH withdrawal (26 Apr 2020 09:00)      HPI:  54 y/o M with PMHx HTN (noncompliant w/ meds), EtOH abuse, Etoh Cirrhosis with portal HTN, prior oxycodone abuse, DTs and alcoholic withdrawal seizures p/w EtOH withdrawal. GI consulted for melena.    Patient reports drinking 1 pint of tequila daily, last drink this AM. Reports 2 episodes of melena this AM. Patient last admitted in 1/2020 for alcohol withdrawl and nonbloody emesis and 10/2019 w/alcohol withdrawl and workup of newly diagnosed cirrhosis from 5/2019. Reports nausea and nonbloody vomiting (shortly after arriving to medical floor).    Last EGD/colonoscopy in 2009 for heart burn and CRC screening. Showed sigmoid diverticulosis and hiatal hernia.    In the ED, vitals stable. Hb of 11.2 on arrival and repeat 2 days later of 9.6. Alcohol level elevated. Worsening LFTs from baseline on previous admission. Patient on CIWA protocol. GI consulted.    Allergies:  No Known Allergies      Home Medications:    Hospital Medications:  acetaminophen  IVPB .. 650 milliGRAM(s) IV Intermittent once  folic acid 1 milliGRAM(s) Oral daily  LORazepam   Injectable   IV Push   LORazepam   Injectable 2 milliGRAM(s) IV Push every 1 hour PRN  LORazepam   Injectable 1.5 milliGRAM(s) IV Push every 4 hours  LORazepam   Injectable 1 milliGRAM(s) IV Push every 4 hours  multivitamin 1 Tablet(s) Oral daily  pantoprazole  Injectable 40 milliGRAM(s) IV Push two times a day  potassium phosphate IVPB 15 milliMole(s) IV Intermittent once  sodium chloride 0.9% 1000 milliLiter(s) IV Continuous   thiamine IVPB 500 milliGRAM(s) IV Intermittent daily      PMHX/PSHX:  Sciatica  HTN (hypertension)  ETOH abuse  Grief reaction  Active smoker  S/P Repair of Inguinal Hernia      Family history:  FH: dementia  No pertinent family history in first degree relatives      Social History:     ROS:     General:  No wt loss, fevers, chills, night sweats, fatigue,   Eyes:  Good vision, no reported pain  ENT:  No sore throat, pain, runny nose, dysphagia  CV:  No pain, palpitations, hypo/hypertension  Resp:  No dyspnea, cough, tachypnea, wheezing  GI:  See HPI  :  No pain, bleeding, incontinence, nocturia  Muscle:  No pain, weakness  Neuro:  No weakness, tingling, memory problems  Psych:  No fatigue, insomnia, mood problems, depression  Endocrine:  No polyuria, polydipsia, cold/heat intolerance  Heme:  No petechiae, ecchymosis, easy bruisability  Skin:  No rash, edema      PHYSICAL EXAM:   as primary team PE    Vital Signs:  Vital Signs Last 24 Hrs  T(C): 37.3 (26 Apr 2020 09:51), Max: 37.8 (25 Apr 2020 14:45)  T(F): 99.1 (26 Apr 2020 09:51), Max: 100.1 (25 Apr 2020 20:59)  HR: 111 (26 Apr 2020 09:51) (104 - 133)  BP: 130/80 (26 Apr 2020 09:51) (116/70 - 163/97)  BP(mean): --  RR: 18 (26 Apr 2020 09:51) (14 - 19)  SpO2: 97% (26 Apr 2020 09:51) (95% - 100%)  Daily     Daily     LABS:                        9.6    5.33  )-----------( 22       ( 26 Apr 2020 06:15 )             28.6     04-26    140  |  100  |  13  ----------------------------<  102<H>  3.3<L>   |  17<L>  |  0.59    Ca    8.3<L>      26 Apr 2020 06:15  Phos  1.8     04-26  Mg     1.9     04-26    TPro  6.9  /  Alb  3.6  /  TBili  6.1<H>  /  DBili  x   /  AST  296<H>  /  ALT  49<H>  /  AlkPhos  209<H>  04-26    LIVER FUNCTIONS - ( 26 Apr 2020 06:15 )  Alb: 3.6 g/dL / Pro: 6.9 g/dL / ALK PHOS: 209 u/L / ALT: 49 u/L / AST: 296 u/L / GGT: x           PT/INR - ( 24 Apr 2020 17:30 )   PT: 11.9 SEC;   INR: 1.03          PTT - ( 24 Apr 2020 17:30 )  PTT:33.5 SEC        Imaging:

## 2020-04-26 NOTE — PROGRESS NOTE ADULT - ASSESSMENT
55 M PMH HTN (noncompliant w/ meds), EtOH abuse, Cirrhosis with portal HTN, prior oxycodone abuse, GI bleeds, DTs and alcoholic withdrawal seizures p/w EtOH withdrawal, recent falls, and reported dark stools w/ anemia

## 2020-04-26 NOTE — PROGRESS NOTE ADULT - PROBLEM SELECTOR PLAN 4
Pt reported having diarrhea over past couple days unclear if its in the setting of melena. He also reported having intermittent cough- but denies this today.  -COVID PCR neg x1. Second PCR pending

## 2020-04-27 NOTE — BEHAVIORAL HEALTH ASSESSMENT NOTE - SUMMARY
PLAN  - ativan 3mg q4hrs x 6 doses  ativan 2mg q4hrs x 6 doses  ativan 1mg q4hrs x 6 doses  ativan 1mg q6hrs x 4 doses  ativan 1mg q12 hrs x 2 doses  ativan 0.5mg q12 hrs x 2 doses  taper may need to be altered depending on patients response to treatment Patient is a 55 M PMH HTN (noncompliant w/ meds), EtOH abuse, Cirrhosis with portal HTN, prior oxycodone abuse, GI bleeds, DTs and alcoholic withdrawal seizures p/w EtOH withdrawal.  Patient reports drinking 1 pint tequilla q day with last drink on 4/24. ETOH level on admission 402. Patient with 1 previous detox/rehab admission when using oxy.  Denies inpt psych admission. No hx of psychotropic medications. , however reports having support from family.  Consulted for ETOH withdrawal.    Patient was admitted on 4/24 and placed on 2mg ativan taper.  Patient to start 0.5mg today, however ciwa score increased to 11 with patient hallucinating and disoriented. Patient alert, no lethargy noted.  He states he is seeing people on the walls, he does not know who they are and they are not speaking to him.  Later in interview he reported seeing smoke in the room.  Patient with no tactile hallucinations at this time.  He is also reporting nausea, anxiety, feeling tremulous. Patient has a hx of seizures and DTs, confirmed in this interview.  He states his mood is "bad" because he wants to return home, but denies feeling tearful, hopeless, low mood, or lack of motivation.  Patient has no SI or HI and denies SA in the past.  He is agreeable to increasing his taper at this time.    PLAN  Would recommend increasing patient taper due to elevated ciwa scores and hallucinations along with episodes of confusion. IV dosing. HOLD FOR EXCESSIVE SEDATION/LETHARGY OR RR< 12  - ativan 3mg q4hrs x 6 doses  ativan 2mg q4hrs x 6 doses  ativan 1mg q4hrs x 6 doses  ativan 1mg q6hrs x 4 doses  ativan 1mg q12 hrs x 2 doses  ativan 0.5mg q12 hrs x 2 doses  taper may need to be altered depending on patients response to treatment.  patient also already received 2-3days of ativan, may be able to increase speed of taper.  please monitor.   - c/w thiamine IV   - PRN for agitation, use Ciwa protocol of ativan 2mg

## 2020-04-27 NOTE — BEHAVIORAL HEALTH ASSESSMENT NOTE - NSBHCONSULTOBSREASON_PSY_A_CORE FT
no SI or HI, no psychiatric reason for increased level of care  increase as per primary team recs no SI or HI, no psychiatric reason 1:1, however patient now confused trying to get OOB, more frequent rounding may benefit patient safety - defer to primary team no SI or HI, no psychiatric reason 1:1, however patient now confused trying to get OOB, more frequent rounding may benefit patient safety - defer to primary team  May increase level of observation, if pt. becomes agitated

## 2020-04-27 NOTE — BEHAVIORAL HEALTH ASSESSMENT NOTE - NSBHCHARTREVIEWIMAGING_PSY_A_CORE FT
< from: CT Head No Cont (04.24.20 @ 18:15) >    IMPRESSION:     No evidence for calvarial fracture or acute intracranial hemorrhage. If the patient has new and persistent symptoms, consider short interval follow-up head CT or brain MRI follow-up.      < end of copied text >

## 2020-04-27 NOTE — PROGRESS NOTE ADULT - ATTENDING COMMENTS
PMD Dr. Vikas Zuulaga  d/w son Paco [phone] re overall care.  Pt's wife  5 years ago and he has been drinking heavy since then.

## 2020-04-27 NOTE — PROGRESS NOTE ADULT - PROBLEM SELECTOR PLAN 3
appreciate hepatology input - start CTX 1g qd x7d, c/w octreotide gtt  abm US as noted  check AFP in am, trend MELD labs daily

## 2020-04-27 NOTE — BEHAVIORAL HEALTH ASSESSMENT NOTE - DETAILS
n/a patient unable to provide further details besides "yes" to hx of DTS and seizures "I feel bad" nausea tremors

## 2020-04-27 NOTE — PROGRESS NOTE ADULT - PROBLEM SELECTOR PLAN 4
likely d/t alcoholic hepatitis - trend labs  US Hepatomegaly with hepatic steatosis. Question mild surface nodularity. Distended gallbladder without sonographic evidence of acute cholecystitis.

## 2020-04-27 NOTE — PROGRESS NOTE ADULT - ASSESSMENT
54 y/o M with PMHx HTN (noncompliant w/ meds), EtOH abuse, Etoh Cirrhosis with portal HTN, prior oxycodone abuse, DTs and alcoholic withdrawal seizures p/w EtOH withdrawal. GI consulted for melena.    1) Melena  Patient reporting 1-2 days of melena with drop in Hb from 11 on admission to 9.8 (baseline of 13 in 1/2020).  DDx: PUD vs. alcoholic gastropathy vs. esophagitis vs. AVM vs. varices vs. malignancy  2) Alcoholic hepatitis  Patient with elevated alcohol level and acute rise in LFTs from baseline T bili of 6.1 Alk phos of 209 AST of 296 ALT 49. INR of 1.03. Alternative diagnosis include CBD stone vs. DILI vs infectious hepatitis.  3) ETOH cirrhosis  MELD Na: 14  Varies: no recent EGD, last in 2009 without varices  PSE: none  Ascites: small amount on prior imaging  HCC: 4cm lesion seen on CT from 2019 but MRI abdomen showed no lesions. AFP of 1.9 in 1/2020  4) Alcohol abuse  Currently on [facility] protocol    - ppi gtt  - octreotide gtt  - please give ceftrixone 1g x 7 days  - serial CBC and transfuse for Hb<7  - will hold off on endoscopic evaluation for now while patient on CIWA protocol. Can consider urgent EGD if patient has continued melena with Hb drop and HD instability  - thiamine and folate  - continue with CIWA protocol  - please obtain abdominal US complete to evaluate liver and elevated LFTs  - please keep plts >50 in the setting of GIB  - please check AFP  - trend MELD Na labs daily  - please keep NPO for now  -f/u repeat COVID PCR  - rest of plan as per primary team

## 2020-04-27 NOTE — BEHAVIORAL HEALTH ASSESSMENT NOTE - NSBHCHARTREVIEWINVESTIGATE_PSY_A_CORE FT
< from: 12 Lead ECG (04.24.20 @ 18:02) >    Ventricular Rate 105 BPM    Atrial Rate 105 BPM    P-R Interval 152 ms    QRS Duration 124 ms    Q-T Interval 390 ms    QTC Calculation(Bezet) 515 ms    P Axis 14 degrees    R Axis 52 degrees    T Axis -27 degrees    Diagnosis Line Sinus tachycardia  Non-specific intra-ventricular conduction delay  Nonspecific T wave abnormality  Abnormal ECG    < end of copied text >

## 2020-04-27 NOTE — BEHAVIORAL HEALTH ASSESSMENT NOTE - NSBHCONSULTRECOMMENDOTHER_PSY_A_CORE FT
patient should not leave AMA prior to psychiatry evaluating patient at that time.  (Has not request to leave at this time. in agreement with plan)

## 2020-04-27 NOTE — PROGRESS NOTE ADULT - PROBLEM SELECTOR PLAN 2
Hx of melena in the past. Pt also w/ hx of Liver cirrhosis with portal hypertension and therefor pt is at risk for varices. + FOBT here. Pt s/p EGD/colonoscopy [place] [facility] in [place] that was only + for sigmoid diverticulosis.  Coags stable. Pt did report having dark diarrhea on this admission. H/H stable  -Monitor CBC closely, transfuse  for goal hg >7; plts trending up, no gross active bleed, f/u closely  - appreciate hepatology input - holding endoscopic eval while on CIWA unless hemodynamically unstable/active bleeding;  pt has been NPO on IVFs, asking to drink some water, d/w GI will allow water for tonight, NPO after MN in case

## 2020-04-27 NOTE — BEHAVIORAL HEALTH ASSESSMENT NOTE - HPI (INCLUDE ILLNESS QUALITY, SEVERITY, DURATION, TIMING, CONTEXT, MODIFYING FACTORS, ASSOCIATED SIGNS AND SYMPTOMS)
Patient is a 55 M PMH HTN (noncompliant w/ meds), EtOH abuse, Cirrhosis with portal HTN, prior oxycodone abuse, GI bleeds, DTs and alcoholic withdrawal seizures p/w EtOH withdrawal.  Patient reports drinking 1 pint tequilla q day with last drink on 4/24. ETOH level on admission 402. Patient with 1 preious detox/rehab admission when using oxy.  Denies inpt psych admission. No hx of psychotropic medications. , however reports having support from family.  Consulted for ETOH withdrawal.    Due to COVID, with an effort to minimize PPE use and decrease vector spread, this encounter was conducted using a video platform.  The patient was in the room, provider in office. Patient was agreeable to this form of interview.     Patient was admitted on 4/24 and placed on 2mg ativan taper.  Patient to start 0.5mg today, however ciwa score increased to 11 with patient hallucinating and disoriented. Patient alert, no lethargy noted.  He states he is seeing people on the walls, he does not know who they are and they are not speaking to him.  Later in interview he reported seeing smoke in the room.  Patient with no tacile hallucinations at this time.  He is also reporting nausea, anxiety, feeling tremulous. Patient has a hx of seizures and DTs, confirmed in this interview.  He states his mood is "bad" because he wants to return home, but denies feeling tearful, hopeless, low mood, or lack of motivation.  Patient has no SI or HI and denies SA in the past.  He is agreeable to increasing his taper at this time.

## 2020-04-27 NOTE — PROGRESS NOTE ADULT - PROBLEM SELECTOR PLAN 5
Pt reported having diarrhea over past couple days unclear if its in the setting of melena. He also reported having intermittent cough- but denies this today.  -COVID PCR neg x2

## 2020-04-27 NOTE — BEHAVIORAL HEALTH ASSESSMENT NOTE - CASE SUMMARY
Patient seen and evaluated in-person, I agree with above assessment and plan, pt. AAOX2-3 ([facility], May, 2020), was calm and pleasant during an interview, pt. stated that he was seeing cats, dogs and bugs on the wall before, denies AH and VH at the time of interview. NI delusions elicited, reported feeling safe in the hospital. Adamantly denies SI/I/P, denies HI/I/P. Denies h/o SA in the past. Pt. reports drinking 1 pint tequilla everyday. Stated that he lives with his son (gave consent to talk to son). 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 When asked about h/o DTs or withdrawal seizures, pt. was not able to answer, and became more confused at the end an interview. Mild tremors on outstretched hand, +nystagmus, no tongue fasciculation or sweating noted. As per pt.'s nurse, pt. has been confused, disorganized, sitting and peeing on the floor, thrashing. Patient received standing  Ativan 3mgx 4 doses, Ativan 2mg X1 PRN as per CIWA, HR: 88-96, scoring high on CIWA. Patient is high risk for withdrawal, given h/o heavy drinking , DTs and alcoholic withdrawal seizures. PLAN: once pt. will complete Ativan 3mg q4h x 6 doses, continue Ativan 3mg q4h x6 more doses. Will reassess tomorrow. If pt. is doing better on this regimen than consider decreasing the taper as written above. continue ATivan PRN as per CIWA, thiamine 500mg IV TID for 3 days.  Check ammonia level. Case discussed with Dr. Ricks and Dr. Sears. Will follow Patient seen and evaluated in-person by this writer on 4/28, I agree with above assessment and plan,  pt. AAOX2-3 ([facility], May, 2020), was calm and pleasant during an interview, pt. stated that he was seeing cats, dogs and bugs on the wall before, denies AH and VH at the time of interview. NI delusions elicited, reported feeling safe in the hospital. Adamantly denies SI/I/P, denies HI/I/P. Denies h/o SA in the past. Pt. reports drinking 1 pint tequilla everyday. Stated that he lives with his son (gave consent to talk to son). 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 When asked about h/o DTs or withdrawal seizures, pt. was not able to answer, and became more confused at the end an interview. Mild tremors on outstretched hand, +nystagmus, no tongue fasciculation or sweating noted. As per pt.'s nurse, pt. has been confused, disorganized, sitting and peeing on the floor, thrashing. Patient received standing  Ativan 3mgx 4 doses, Ativan 2mg X1 PRN as per CIWA, HR: 88-96, scoring high on CIWA. Patient is high risk for withdrawal, given h/o heavy drinking , DTs and alcoholic withdrawal seizures. PLAN: once pt. will complete Ativan 3mg q4h x 6 doses, continue Ativan 3mg q4h x6 more doses. Will reassess tomorrow. If pt. is doing better on this regimen than consider decreasing the taper as written above. continue ATivan PRN as per CIWA, thiamine 500mg IV TID for 3 days.  Check ammonia level. Case discussed with Dr. Ricks and Dr. Sears. Will follow Patient seen and evaluated in-person by this writer on 4/28, I agree with above assessment and plan,  pt. AAOX2-3 ([facility], May, 2020), was calm and pleasant during an interview, pt. stated that he was seeing cats, dogs and bugs on the wall before, denies AH and VH at the time of interview. No delusions elicited, reported feeling safe in the hospital. Adamantly denies SI/I/P, denies HI/I/P. Denies h/o SA in the past. Pt. reports drinking 1 pint tequilla everyday. Stated that he lives with his son (gave consent to talk to son). 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 When asked about h/o DTs or withdrawal seizures, pt. was not able to answer, and became more confused at the end an interview. Mild tremors on outstretched hand, +nystagmus, no tongue fasciculation or sweating noted. As per pt.'s nurse, pt. has been confused, disorganized, sitting and peeing on the floor, thrashing. Patient received standing  Ativan 3mgx 4 doses, Ativan 2mg X1 PRN as per CIWA, HR: 88-96, scoring high on CIWA. Patient is high risk for withdrawal, given h/o heavy drinking , DTs and alcoholic withdrawal seizures. PLAN: once pt. will complete Ativan 3mg q4h x 6 doses, continue Ativan 3mg q4h x6 more doses. Will reassess tomorrow. If pt. is doing better on this regimen than consider decreasing the taper as written above. continue Ativan PRN as per CIWA, thiamine 500mg IV TID for 3 days.  Check ammonia level. Case discussed with Dr. Ricks and Dr. Sears. Will follow

## 2020-04-27 NOTE — BEHAVIORAL HEALTH ASSESSMENT NOTE - RISK ASSESSMENT
Low Acute Suicide Risk Risk: male gender, substance abuse, acute withdrawal  Protective: no SI or HI at this time, no hx of SA, patient future oriented discussing going home, states he has support from family    Patient requiring ongoing medical care at this time.

## 2020-04-27 NOTE — BEHAVIORAL HEALTH ASSESSMENT NOTE - NSBHCHARTREVIEWVS_PSY_A_CORE FT
Vital Signs Last 24 Hrs  T(C): 36.9 (27 Apr 2020 15:27), Max: 37.7 (26 Apr 2020 18:02)  T(F): 98.4 (27 Apr 2020 15:27), Max: 99.9 (26 Apr 2020 18:02)  HR: 100 (27 Apr 2020 15:27) (87 - 113)  BP: 116/70 (27 Apr 2020 15:27) (108/67 - 132/86)  BP(mean): --  RR: 18 (27 Apr 2020 15:27) (18 - 18)  SpO2: 98% (27 Apr 2020 15:27) (98% - 99%)

## 2020-04-27 NOTE — BEHAVIORAL HEALTH ASSESSMENT NOTE - NSBHCHARTREVIEWLAB_PSY_A_CORE FT
10.2   3.41  )-----------( 38       ( 27 Apr 2020 06:15 )             27.6   04-27    135  |  95<L>  |  8   ----------------------------<  163<H>  3.2<L>   |  19<L>  |  0.61    Ca    8.1<L>      27 Apr 2020 06:15  Phos  2.2     04-27  Mg     1.6     04-27    TPro  6.5  /  Alb  3.4  /  TBili  5.6<H>  /  DBili  x   /  AST  235<H>  /  ALT  43<H>  /  AlkPhos  195<H>  04-27

## 2020-04-27 NOTE — PROGRESS NOTE ADULT - SUBJECTIVE AND OBJECTIVE BOX
Chief Complaint:  Patient is a 55y old  Male who presents with a chief complaint of EtOH withdrawal (26 Apr 2020 12:47)      Interval Events:   Hb stable at 8-9  Still on CIWA protocol receiving ativan    Allergies:  No Known Allergies      Home Medications:    Hospital Medications:  folic acid 1 milliGRAM(s) Oral daily  LORazepam   Injectable   IV Push   LORazepam   Injectable 2 milliGRAM(s) IV Push every 1 hour PRN  LORazepam   Injectable 1 milliGRAM(s) IV Push every 4 hours  LORazepam   Injectable 0.5 milliGRAM(s) IV Push every 4 hours  multivitamin 1 Tablet(s) Oral daily  octreotide  Infusion 50 MICROgram(s)/Hr IV Continuous <Continuous>  pantoprazole Infusion 8 mG/Hr IV Continuous <Continuous>  sodium chloride 0.9% 1000 milliLiter(s) IV Continuous   thiamine IVPB 500 milliGRAM(s) IV Intermittent daily      PMHX/PSHX:  Sciatica  HTN (hypertension)  ETOH abuse  Grief reaction  Active smoker  S/P Repair of Inguinal Hernia      Family history:  FH: dementia  No pertinent family history in first degree relatives      ROS:     General:  No wt loss, fevers, chills, night sweats, fatigue,   Eyes:  Good vision, no reported pain  ENT:  No sore throat, pain, runny nose, dysphagia  CV:  No pain, palpitations, hypo/hypertension  Resp:  No dyspnea, cough, tachypnea, wheezing  GI:  No pain, No nausea, No vomiting, No diarrhea, No constipation, No weight loss, No fever, No pruritis, No rectal bleeding, No tarry stools, No dysphagia,  :  No pain, bleeding, incontinence, nocturia  Muscle:  No pain, weakness  Neuro:  No weakness, tingling, memory problems  Psych:  No fatigue, insomnia, mood problems, depression  Endocrine:  No polyuria, polydipsia, cold/heat intolerance  Heme:  No petechiae, ecchymosis, easy bruisability  Skin:  No rash, tattoos, scars, edema      PHYSICAL EXAM:   Vital Signs:  Vital Signs Last 24 Hrs  T(C): 37.3 (27 Apr 2020 05:00), Max: 37.7 (26 Apr 2020 18:02)  T(F): 99.2 (27 Apr 2020 05:00), Max: 99.9 (26 Apr 2020 18:02)  HR: 87 (27 Apr 2020 05:00) (87 - 113)  BP: 108/67 (27 Apr 2020 05:00) (108/67 - 147/84)  BP(mean): --  RR: 18 (27 Apr 2020 05:00) (18 - 18)  SpO2: 98% (27 Apr 2020 05:00) (97% - 99%)  Daily     Daily     GENERAL:  Appears stated age, well-groomed, well-nourished, no distress  HEENT:  NC/AT,  conjunctivae clear and pink, no thyromegaly, nodules, adenopathy, no JVD, sclera -anicteric  CHEST:  Full & symmetric excursion, no increased effort, breath sounds clear  HEART:  Regular rhythm, S1, S2, no murmur/rub/S3/S4, no abdominal bruit, no edema  ABDOMEN:  Soft, non-tender, non-distended, normoactive bowel sounds,  no masses ,no hepato-splenomegaly, no signs of chronic liver disease  EXTEREMITIES:  no cyanosis,clubbing or edema  SKIN:  No rash/erythema/ecchymoses/petechiae/wounds/abscess/warm/dry  NEURO:  Alert, oriented, no asterixis, no tremor, no encephalopathy    LABS:                        10.2   3.41  )-----------( 38       ( 27 Apr 2020 06:15 )             27.6     04-27    135  |  95<L>  |  8   ----------------------------<  163<H>  3.2<L>   |  19<L>  |  0.61    Ca    8.1<L>      27 Apr 2020 06:15  Phos  2.2     04-27  Mg     1.6     04-27    TPro  6.5  /  Alb  3.4  /  TBili  5.6<H>  /  DBili  x   /  AST  235<H>  /  ALT  43<H>  /  AlkPhos  195<H>  04-27    LIVER FUNCTIONS - ( 27 Apr 2020 06:15 )  Alb: 3.4 g/dL / Pro: 6.5 g/dL / ALK PHOS: 195 u/L / ALT: 43 u/L / AST: 235 u/L / GGT: x                   Imaging: Chief Complaint:  Patient is a 55y old  Male who presents with a chief complaint of EtOH withdrawal (26 Apr 2020 12:47)      Interval Events:   Hb stable at 8-9  Still on [facility] protocol receiving ativan  No melena this AM or overnight as per nursing  [facility] 11 this AM  Agitated this AM, tried to pull out IV    Allergies:  No Known Allergies      Home Medications:    Hospital Medications:  folic acid 1 milliGRAM(s) Oral daily  LORazepam   Injectable   IV Push   LORazepam   Injectable 2 milliGRAM(s) IV Push every 1 hour PRN  LORazepam   Injectable 1 milliGRAM(s) IV Push every 4 hours  LORazepam   Injectable 0.5 milliGRAM(s) IV Push every 4 hours  multivitamin 1 Tablet(s) Oral daily  octreotide  Infusion 50 MICROgram(s)/Hr IV Continuous <Continuous>  pantoprazole Infusion 8 mG/Hr IV Continuous <Continuous>  sodium chloride 0.9% 1000 milliLiter(s) IV Continuous   thiamine IVPB 500 milliGRAM(s) IV Intermittent daily      PMHX/PSHX:  Sciatica  HTN (hypertension)  ETOH abuse  Grief reaction  Active smoker  S/P Repair of Inguinal Hernia      Family history:  FH: dementia  No pertinent family history in first degree relatives      ROS:     General:  No wt loss, fevers, chills, night sweats, fatigue,   Eyes:  Good vision, no reported pain  ENT:  No sore throat, pain, runny nose, dysphagia  CV:  No pain, palpitations, hypo/hypertension  Resp:  No dyspnea, cough, tachypnea, wheezing  GI:  No pain, No nausea, No vomiting, No diarrhea, No constipation, No weight loss, No fever, No pruritis, No rectal bleeding, No tarry stools, No dysphagia,  :  No pain, bleeding, incontinence, nocturia  Muscle:  No pain, weakness  Neuro:  No weakness, tingling, memory problems  Psych:  No fatigue, insomnia, mood problems, depression  Endocrine:  No polyuria, polydipsia, cold/heat intolerance  Heme:  No petechiae, ecchymosis, easy bruisability  Skin:  No rash, tattoos, scars, edema      PHYSICAL EXAM:   Vital Signs:  Vital Signs Last 24 Hrs  T(C): 37.3 (27 Apr 2020 05:00), Max: 37.7 (26 Apr 2020 18:02)  T(F): 99.2 (27 Apr 2020 05:00), Max: 99.9 (26 Apr 2020 18:02)  HR: 87 (27 Apr 2020 05:00) (87 - 113)  BP: 108/67 (27 Apr 2020 05:00) (108/67 - 147/84)  BP(mean): --  RR: 18 (27 Apr 2020 05:00) (18 - 18)  SpO2: 98% (27 Apr 2020 05:00) (97% - 99%)  Daily     Daily     GENERAL: NAD, confused  HEENT:  sclera anicteric  CHEST:  Full & symmetric excursion  HEART:  Regular rhythm, S1, S2, no murmur/rub/S3/S4, no abdominal bruit, no edema  ABDOMEN:  Soft, non-tender, non-distended, normoactive bowel sounds,  no masses ,no hepato-splenomegaly, no signs of chronic liver disease  EXTEREMITIES:  no cyanosis,clubbing or edema  NEURO:  confused    LABS:                        10.2   3.41  )-----------( 38       ( 27 Apr 2020 06:15 )             27.6     04-27    135  |  95<L>  |  8   ----------------------------<  163<H>  3.2<L>   |  19<L>  |  0.61    Ca    8.1<L>      27 Apr 2020 06:15  Phos  2.2     04-27  Mg     1.6     04-27    TPro  6.5  /  Alb  3.4  /  TBili  5.6<H>  /  DBili  x   /  AST  235<H>  /  ALT  43<H>  /  AlkPhos  195<H>  04-27    LIVER FUNCTIONS - ( 27 Apr 2020 06:15 )  Alb: 3.4 g/dL / Pro: 6.5 g/dL / ALK PHOS: 195 u/L / ALT: 43 u/L / AST: 235 u/L / GGT: x                   Imaging:

## 2020-04-27 NOTE — PROGRESS NOTE ADULT - PROBLEM SELECTOR PLAN 1
ETOH on admission was 402, now with +VH c/w DTs - appreciate psych input --> increase Ativan to 3mg q4 for now, reassess for taper in am, on CIWA protocol with Ativan PRN, low threshold for MICU eval  -c/w folic acid, thiamine and MVI.   -fall/seizure precautions  -monitor electrolytes and replete PRN

## 2020-04-27 NOTE — PROGRESS NOTE ADULT - SUBJECTIVE AND OBJECTIVE BOX
[facility] Division of Hospital Medicine  Karla Ricks MD  Pager (M-F, 8A-5P):  In-house pager [phone]; Long-range pager [phone]  Other Times:  Please page Hospitalist in Charge -  In-house pager [phone]    Patient is a 55y old  Male who presents with a chief complaint of EtOH withdrawal (27 Apr 2020 08:51)      SUBJECTIVE / OVERNIGHT EVENTS:  Pt seen earlier, sitting up on edge of bed steady.  Bit confused.  Asking for a drink of water.  +VH seeing things coming off the walls, says he saw a man and woman sitting in the chair in the corner earlier.    ADDITIONAL REVIEW OF SYSTEMS:    MEDICATIONS  (STANDING):  cefTRIAXone   IVPB 1000 milliGRAM(s) IV Intermittent every 24 hours  dextrose 5% + lactated ringers with potassium chloride 20 mEq/L 1000 milliLiter(s) (100 mL/Hr) IV Continuous <Continuous>  folic acid 1 milliGRAM(s) Oral daily  LORazepam   Injectable 3 milliGRAM(s) IV Push every 4 hours  multivitamin 1 Tablet(s) Oral daily  octreotide  Infusion 50 MICROgram(s)/Hr (10 mL/Hr) IV Continuous <Continuous>  pantoprazole Infusion 8 mG/Hr (10 mL/Hr) IV Continuous <Continuous>  potassium chloride  10 mEq/100 mL IVPB 10 milliEquivalent(s) IV Intermittent every 1 hour  thiamine IVPB 500 milliGRAM(s) IV Intermittent daily    MEDICATIONS  (PRN):  LORazepam   Injectable 2 milliGRAM(s) IV Push every 1 hour PRN Symptom-triggered: each CIWA -Ar score 8 or GREATER      CAPILLARY BLOOD GLUCOSE        I&O's Summary    27 Apr 2020 07:01  -  27 Apr 2020 19:50  --------------------------------------------------------  IN: 1790 mL / OUT: 2 mL / NET: 1788 mL        PHYSICAL EXAM:  Vital Signs Last 24 Hrs  T(C): 36.9 (27 Apr 2020 15:27), Max: 37.7 (26 Apr 2020 21:00)  T(F): 98.4 (27 Apr 2020 15:27), Max: 99.9 (26 Apr 2020 21:00)  HR: 100 (27 Apr 2020 15:27) (87 - 107)  BP: 116/70 (27 Apr 2020 15:27) (108/67 - 132/86)  BP(mean): --  RR: 18 (27 Apr 2020 15:27) (18 - 18)  SpO2: 98% (27 Apr 2020 15:27) (98% - 98%)    CONSTITUTIONAL: looks older than stated age, sitting on edge of bed, calm, NAD  EYES: scleral icterus  ENMT: Moist oral mucosa  RESPIRATORY: Normal respiratory effort; lungs are clear to auscultation bilaterally  CARDIOVASCULAR: Regular rate and rhythm, normal S1 and S2, no murmur/rub/gallop; No lower extremity edema; Peripheral pulses are 2+ bilaterally  ABDOMEN: obese, Nontender to palpation, normoactive bowel sounds, no rebound/guarding  PSYCH: calm, oriented May 28, 2004, [place] CUELLO  NEUROLOGY: nonfocal   SKIN: No rashes; no palpable lesions    LABS:                        10.2   3.41  )-----------( 38       ( 27 Apr 2020 06:15 )             27.6     04-27    135  |  95<L>  |  8   ----------------------------<  163<H>  3.2<L>   |  19<L>  |  0.61    Ca    8.1<L>      27 Apr 2020 06:15  Phos  2.2     04-27  Mg     1.6     04-27    TPro  6.5  /  Alb  3.4  /  TBili  5.6<H>  /  DBili  x   /  AST  235<H>  /  ALT  43<H>  /  AlkPhos  195<H>  04-27      < from: US Abdomen Limited (04.26.20 @ 15:40) >  Liver: Hepatomegaly with increased echogenicity. Right hepatic lobe measures 25.2 cm. Question mild surface nodularity.    Bile ducts: Normal caliber. Common bile duct measures 4 mm.     Gallbladder: Distended. Negative sonographic Wallis sign.     Pancreas: Not well visualized.    Right kidney: 11.5 cm. No hydronephrosis.    Ascites: Trace.    IVC: Visualized portions are within normal limits.    IMPRESSION:     Hepatomegaly with hepatic steatosis. Question mild surface nodularity.    Distended gallbladder without sonographic evidence of acute cholecystitis.            RADIOLOGY & ADDITIONAL TESTS:  Results Reviewed:   Imaging Personally Reviewed:  Electrocardiogram Personally Reviewed:    COORDINATION OF CARE:  Care Discussed with Consultants/Other Providers [Y/N]: RN, SW, CM, ACP re overall care   Prior or Outpatient Records Reviewed [Y/N]:

## 2020-04-28 NOTE — PROGRESS NOTE ADULT - PROBLEM SELECTOR PLAN 2
Hx of melena in the past. Pt also w/ hx of Liver cirrhosis with portal hypertension and therefor pt is at risk for varices. + FOBT here. Pt s/p EGD/colonoscopy noted in [facility] in [place] that was only + for sigmoid diverticulosis.  Coags stable. H/H stable, plt improved, hepatology following  -Monitor CBC closely, transfuse  for goal hg >7; plts trending up, no gross active bleed, f/u closely  - appreciate hepatology input - holding endoscopic eval while on CIWA unless hemodynamically unstable/active bleeding;  pt has been NPO on IVFs, asking to drink some water, d/w GI will allow water for tonight, NPO after MN in case

## 2020-04-28 NOTE — PROGRESS NOTE ADULT - PROBLEM SELECTOR PLAN 3
appreciate hepatology input - start CTX 1g qd x7d, c/w octreotide gtt  abdominal u/s with hepatic steatosis and nodularity  trend MELD labs daily

## 2020-04-28 NOTE — PROGRESS NOTE ADULT - SUBJECTIVE AND OBJECTIVE BOX
Medicine Progress Note    Patient is a 55y old  Male who presents with a chief complaint of EtOH withdrawal (28 Apr 2020 08:27)      SUBJECTIVE / OVERNIGHT EVENTS: pt feels anxious and reports visual hallucinations    ADDITIONAL REVIEW OF SYSTEMS:    MEDICATIONS  (STANDING):  cefTRIAXone   IVPB 1000 milliGRAM(s) IV Intermittent every 24 hours  dextrose 5% + lactated ringers with potassium chloride 20 mEq/L 1000 milliLiter(s) (100 mL/Hr) IV Continuous <Continuous>  folic acid 1 milliGRAM(s) Oral daily  LORazepam   Injectable   IV Push   LORazepam   Injectable 3 milliGRAM(s) IV Push every 4 hours  multivitamin 1 Tablet(s) Oral daily  octreotide  Infusion 50 MICROgram(s)/Hr (10 mL/Hr) IV Continuous <Continuous>  pantoprazole Infusion 8 mG/Hr (10 mL/Hr) IV Continuous <Continuous>    MEDICATIONS  (PRN):  LORazepam   Injectable 2 milliGRAM(s) IV Push every 1 hour PRN Symptom-triggered: each CIWA -Ar score 8 or GREATER    CAPILLARY BLOOD GLUCOSE        I&O's Summary    27 Apr 2020 07:01  -  28 Apr 2020 07:00  --------------------------------------------------------  IN: 1790 mL / OUT: 2 mL / NET: 1788 mL        PHYSICAL EXAM:  Vital Signs Last 24 Hrs  T(C): 37.5 (28 Apr 2020 19:26), Max: 37.5 (28 Apr 2020 19:26)  T(F): 99.5 (28 Apr 2020 19:26), Max: 99.5 (28 Apr 2020 19:26)  HR: 113 (28 Apr 2020 19:26) (88 - 113)  BP: 129/90 (28 Apr 2020 19:26) (111/68 - 134/82)  BP(mean): 101 (28 Apr 2020 10:03) (101 - 101)  RR: 181 (28 Apr 2020 19:26) (18 - 181)  SpO2: 100% (28 Apr 2020 19:26) (97% - 100%)  CONSTITUTIONAL: anxious  ENMT: Moist oral mucosa, no pharyngeal injection or exudates; normal dentition  RESPIRATORY: No accessory muscle use, speaking in full sentences  ABDOMEN: Nontender to palpation, distended abdomen  PSYCH: tremulous  SKIN: No rashes; no palpable lesions    LABS:                        10.1   4.00  )-----------( 47       ( 28 Apr 2020 04:17 )             29.2     04-28    133<L>  |  97<L>  |  7   ----------------------------<  173<H>  3.3<L>   |  22  |  0.61    Ca    8.6      28 Apr 2020 04:17  Phos  2.4     04-28  Mg     1.6     04-28    TPro  7.0  /  Alb  3.5  /  TBili  5.5<H>  /  DBili  x   /  AST  202<H>  /  ALT  45<H>  /  AlkPhos  195<H>  04-28              COVID-19 PCR: NotDetec (26 Apr 2020 09:20)      RADIOLOGY & ADDITIONAL TESTS:  Imaging from Last 24 Hours:    Electrocardiogram/QTc Interval:    COORDINATION OF CARE:  Care Discussed with Consultants/Other Providers:

## 2020-04-28 NOTE — PROGRESS NOTE ADULT - SUBJECTIVE AND OBJECTIVE BOX
Chief Complaint:  Patient is a 55y old  Male who presents with a chief complaint of EtOH withdrawal (27 Apr 2020 19:49)      Interval Events: No further melena or GI bleeding. Pt thirsty and hungry. No abdominal pain    Allergies:  No Known Allergies      Hospital Medications:  cefTRIAXone   IVPB 1000 milliGRAM(s) IV Intermittent every 24 hours  dextrose 5% + lactated ringers with potassium chloride 20 mEq/L 1000 milliLiter(s) IV Continuous <Continuous>  folic acid 1 milliGRAM(s) Oral daily  LORazepam   Injectable 2 milliGRAM(s) IV Push every 1 hour PRN  LORazepam   Injectable 3 milliGRAM(s) IV Push every 4 hours  multivitamin 1 Tablet(s) Oral daily  octreotide  Infusion 50 MICROgram(s)/Hr IV Continuous <Continuous>  pantoprazole Infusion 8 mG/Hr IV Continuous <Continuous>  thiamine IVPB 500 milliGRAM(s) IV Intermittent daily      PMHX/PSHX:  Sciatica  HTN (hypertension)  ETOH abuse  Grief reaction  Active smoker  S/P Repair of Inguinal Hernia      Family history:  FH: dementia  No pertinent family history in first degree relatives      ROS:     General:  No wt loss, fevers, chills, night sweats, fatigue,   Eyes:  Good vision, no reported pain  ENT:  No sore throat, pain, runny nose, dysphagia  CV:  No pain, palpitations, hypo/hypertension  Resp:  No dyspnea, cough, tachypnea, wheezing  GI:  See HPI  :  No pain, bleeding, incontinence, nocturia  Muscle:  No pain, weakness  Neuro:  No weakness, tingling, memory problems  Psych:  No fatigue, insomnia, mood problems, depression  Endocrine:  No polyuria, polydipsia, cold/heat intolerance  Heme:  No petechiae, ecchymosis, easy bruisability  Skin:  No rash, edema      PHYSICAL EXAM:     Vital Signs:  Vital Signs Last 24 Hrs  T(C): 36.7 (28 Apr 2020 05:00), Max: 36.9 (27 Apr 2020 15:27)  T(F): 98 (28 Apr 2020 05:00), Max: 98.4 (27 Apr 2020 15:27)  HR: 95 (28 Apr 2020 05:00) (88 - 100)  BP: 128/90 (28 Apr 2020 05:00) (111/68 - 128/90)  BP(mean): --  RR: 18 (28 Apr 2020 05:00) (18 - 18)  SpO2: 98% (28 Apr 2020 05:00) (97% - 98%)  Daily     Daily     GENERAL:  appears comfortable, no acute distress  HEENT:  NC/AT,  conjunctivae clear, sclera -anicteric  CHEST:  no increased effort  HEART:  Regular rate and rhythm  ABDOMEN:  Soft, non-tender, non-distended,  no masses ,no hepato-splenomegaly,   EXTREMITIES:  no cyanosis, clubbing or edema  SKIN:  No rash/erythema/ecchymoses/petechiae/wounds  NEURO:  Alert, oriented    LABS:                        10.1   4.00  )-----------( 47       ( 28 Apr 2020 04:17 )             29.2     04-28    133<L>  |  97<L>  |  7   ----------------------------<  173<H>  3.3<L>   |  22  |  0.61    Ca    8.6      28 Apr 2020 04:17  Phos  2.4     04-28  Mg     1.6     04-28    TPro  7.0  /  Alb  3.5  /  TBili  5.5<H>  /  DBili  x   /  AST  202<H>  /  ALT  45<H>  /  AlkPhos  195<H>  04-28    LIVER FUNCTIONS - ( 28 Apr 2020 04:17 )  Alb: 3.5 g/dL / Pro: 7.0 g/dL / ALK PHOS: 195 u/L / ALT: 45 u/L / AST: 202 u/L / GGT: x                   Imaging:  < from: US Abdomen Limited (04.26.20 @ 15:40) >  IMPRESSION:     Hepatomegaly with hepatic steatosis. Question mild surface nodularity.    Distended gallbladder without sonographic evidence of acute cholecystitis.      < end of copied text >

## 2020-04-28 NOTE — PROGRESS NOTE ADULT - PROBLEM SELECTOR PLAN 1
ETOH on admission was 402  pt reports VH and is anxious  appreciate psych input, case discussed with attending, continue Ativan to 3mg q4 for now, reassess for taper in am, on CIWA protocol with Ativan PRN, low threshold for MICU eval  -c/w folic acid, thiamine and MVI.   -fall/seizure precautions  -monitor electrolytes and replete PRN

## 2020-04-28 NOTE — PROGRESS NOTE ADULT - ASSESSMENT
56 y/o M with PMHx HTN (noncompliant w/ meds), EtOH abuse, Etoh Cirrhosis with portal HTN, prior oxycodone abuse, DTs and alcoholic withdrawal seizures p/w EtOH withdrawal. GI consulted for melena.    # Melena: resolved. Hgb stable. DDx: PUD vs. alcoholic gastropathy vs. esophagitis vs. AVM vs. varices vs. malignancy. At this time, given no overt bleeding, stale counts, and still have signs of potential withdrawl, will hold off on nonurgent endoscopy  # Alcoholic hepatitis: slight downtrend in liver enzymes. Alternative diagnosis include DILI vs infectious hepatitis although less likely given clinical picture.  # ETOH cirrhosis: MELD Na: 14  Varies: no recent EGD, last in 2009 without varices  PSE: none  Ascites: none on u/s  HCC: 4cm lesion seen on CT from 2019 but MRI abdomen showed no lesions. AFP of 1.9 in 1/2020  # Alcohol abuse: on CIWA protocol    Reccs:  - continue with PPI gtt for 3 total days  - octreotide gtt for 5 total days  - continue with ceftiaxone 1g x 7 days  - monitor BMs and CBC   - transfuse for Hgb goal > 7, plt goal > 50  - will hold off on endoscopic evaluation for now as pt is stable without overt bleeding  - can consider urgent EGD if patient has continued melena with Hb drop and HD instability  - thiamine and folate; continue with CIWA protocol  - please keep plts >50 in the setting of GIB  - trend MELD Na labs daily and INR  - advance diet  - rest of plan as per primary team

## 2020-04-29 NOTE — PROGRESS NOTE BEHAVIORAL HEALTH - NSBHCONSULTSUBSTANCEALCOHOL_PSY_A_CORE FT
as per [facility] protocol ativan taper and Ativan PRN as per CIWA as above

## 2020-04-29 NOTE — PROGRESS NOTE ADULT - SUBJECTIVE AND OBJECTIVE BOX
Chief Complaint:  Patient is a 55y old  Male who presents with a chief complaint of EtOH withdrawal (28 Apr 2020 19:43)      Interval Events: Pt denies melena or hematochezia. Feels tremulous and hungry.    Allergies:  No Known Allergies      Hospital Medications:  cefTRIAXone   IVPB 1000 milliGRAM(s) IV Intermittent every 24 hours  dextrose 5% + lactated ringers with potassium chloride 20 mEq/L 1000 milliLiter(s) IV Continuous <Continuous>  folic acid 1 milliGRAM(s) Oral daily  LORazepam   Injectable 2 milliGRAM(s) IV Push every 1 hour PRN  LORazepam   Injectable   IV Push   LORazepam   Injectable 2 milliGRAM(s) IV Push every 4 hours  multivitamin 1 Tablet(s) Oral daily  octreotide  Infusion 50 MICROgram(s)/Hr IV Continuous <Continuous>  pantoprazole Infusion 8 mG/Hr IV Continuous <Continuous>  potassium chloride  10 mEq/100 mL IVPB 10 milliEquivalent(s) IV Intermittent every 1 hour      PMHX/PSHX:  Sciatica  HTN (hypertension)  ETOH abuse  Grief reaction  Active smoker  S/P Repair of Inguinal Hernia      Family history:  FH: dementia  No pertinent family history in first degree relatives      ROS:     General:  No wt loss, fevers, chills, night sweats, fatigue,   Eyes:  Good vision, no reported pain  ENT:  No sore throat, pain, runny nose, dysphagia  CV:  No pain, palpitations, hypo/hypertension  Resp:  No dyspnea, cough, tachypnea, wheezing  GI:  See HPI  :  No pain, bleeding, incontinence, nocturia  Muscle:  No pain, weakness  Neuro:  No weakness, tingling, memory problems  Psych:  No fatigue, insomnia, mood problems, depression  Endocrine:  No polyuria, polydipsia, cold/heat intolerance  Heme:  No petechiae, ecchymosis, easy bruisability  Skin:  No rash, edema      PHYSICAL EXAM:     Vital Signs:  Vital Signs Last 24 Hrs  T(C): 37.2 (29 Apr 2020 10:00), Max: 37.5 (28 Apr 2020 19:26)  T(F): 98.9 (29 Apr 2020 10:00), Max: 99.5 (28 Apr 2020 19:26)  HR: 109 (29 Apr 2020 10:00) (94 - 113)  BP: 121/90 (29 Apr 2020 10:00) (117/72 - 139/93)  BP(mean): --  RR: 18 (29 Apr 2020 10:00) (16 - 19)  SpO2: 99% (29 Apr 2020 10:00) (96% - 100%)  Daily     Daily     GENERAL:  appears comfortable, no acute distress  HEENT:  NC/AT,  +scleral icterus  CHEST:  no increased effort  HEART:  Regular rate and rhythm  ABDOMEN:  Soft, non-tender, non-distended  EXTREMITIES:  no cyanosis, clubbing or edema  SKIN:  jaundice  NEURO:  Alert, oriented    LABS:                        9.8    3.42  )-----------( 77       ( 29 Apr 2020 06:10 )             28.5     04-29    139  |  99  |  4<L>  ----------------------------<  169<H>  2.9<LL>   |  24  |  0.64    Ca    8.8      29 Apr 2020 06:10  Phos  2.7     04-29  Mg     1.6     04-29    TPro  6.7  /  Alb  3.4  /  TBili  5.2<H>  /  DBili  x   /  AST  151<H>  /  ALT  41  /  AlkPhos  182<H>  04-29    LIVER FUNCTIONS - ( 29 Apr 2020 06:10 )  Alb: 3.4 g/dL / Pro: 6.7 g/dL / ALK PHOS: 182 u/L / ALT: 41 u/L / AST: 151 u/L / GGT: x           PT/INR - ( 29 Apr 2020 06:10 )   PT: 15.7 SEC;   INR: 1.36          PTT - ( 29 Apr 2020 06:10 )  PTT:33.5 SEC        Imaging:

## 2020-04-29 NOTE — PROGRESS NOTE ADULT - PROBLEM SELECTOR PLAN 7
DVT ppx- Improve score low. Also no pharmacological tx in setting of possible GI bleed. Will use SCDs for now given minimal ambulation, plt slowly improving

## 2020-04-29 NOTE — PROGRESS NOTE BEHAVIORAL HEALTH - SUMMARY
Patient is a 55 M PMH HTN (noncompliant w/ meds), EtOH abuse, Cirrhosis with portal HTN, prior oxycodone abuse, GI bleeds, DTs and alcoholic withdrawal seizures p/w EtOH withdrawal.  Patient reports drinking 1 pint tequilla q day with last drink on 4/24. ETOH level on admission 402. Patient with 1 previous detox/rehab admission when using oxy.  Denies inpt psych admission. No hx of psychotropic medications. , however reports having support from family.  Consulted for ETOH withdrawal.    Patient was admitted on 4/24 and placed on 2mg ativan taper.  Patient to start 0.5mg today, however ciwa score increased to 11 with patient hallucinating and disoriented. Patient alert, no lethargy noted.  He states he is seeing people on the walls, he does not know who they are and they are not speaking to him.  Later in interview he reported seeing smoke in the room.  Patient with no tactile hallucinations at this time.  He is also reporting nausea, anxiety, feeling tremulous. Patient has a hx of seizures and DTs, confirmed in this interview.  He states his mood is "bad" because he wants to return home, but denies feeling tearful, hopeless, low mood, or lack of motivation.  Patient has no SI or HI and denies SA in the past.  He is agreeable to increasing his taper at this time.    PLAN  Would recommend increasing patient taper due to elevated ciwa scores and hallucinations along with episodes of confusion. IV dosing. HOLD FOR EXCESSIVE SEDATION/LETHARGY OR RR< 12  - ativan 3mg q4hrs x 6 doses  ativan 2mg q4hrs x 6 doses  ativan 1mg q4hrs x 6 doses  ativan 1mg q6hrs x 4 doses  ativan 1mg q12 hrs x 2 doses  ativan 0.5mg q12 hrs x 2 doses  taper may need to be altered depending on patients response to treatment.  patient also already received 2-3days of ativan, may be able to increase speed of taper.  please monitor.   - c/w thiamine IV   - PRN for agitation, use Ciwa protocol of ativan 2mg Patient is a 55 M PMH HTN (noncompliant w/ meds), EtOH abuse, Cirrhosis with portal HTN, prior oxycodone abuse, GI bleeds, DTs and alcoholic withdrawal seizures p/w EtOH withdrawal.  Patient reports drinking 1 pint tequilla q day with last drink on 4/24. ETOH level on admission 402. Patient with 1 previous detox/rehab admission when using oxy.  Denies inpt psych admission. No hx of psychotropic medications. , however reports having support from family.  Consulted for ETOH withdrawal.    4/29: Patient seen and evaluated in person, received Ativan 2mg PRN x2 on 4/28 and x2 on 4/29, completing Ativan 3mg q4h, CIWA: 7  HR: .  pt. AAOX2 (CUATE May, 2002), patient seems anxious, stated that he is feeling better and wants to go home now. Pt. denies feeling depressed, denies AH and VH, no delusions elicited, stated that staff has been treating him well. Patient adamantly denies SI/I/P and denies HI/I/P.   When asked about the reason for current admission, he stated "alcoholism". Pt. could not elaborate further. Patient was explained in detail multiple times about his current medical issues, treatment he is receiving (ativan for withdrawal) and risks of leaving AMA (worsening of withdrawal, seizures and even death). Patient unable to verbalized understanding of indication/risk/benefits of accepting or refusing the  treatment, unable to appreciate the current situation, unable to manipulate the information in a logical manner when provided to him,  and unable to verbalized understanding of risks of leaving AMA. Based on my evaluation, at this time patient does not have capacity to leave AMA.    PLAN  1- Would recommend increasing Ativan taper due to elevated ciwa scores, tachycardia, received multiple PRNS, along with episodes of confusion, and disorganized behavior. Pt. is high risk for withdrawal given h/o heavy drinking, DTs and alcohol withdrawal seizures.  HOLD FOR EXCESSIVE SEDATION/LETHARGY OR RR< 12  - Increase ativan 4mg q4hrs x 6 doses  ativan 3mg q4hrs x 6 doses  ativan 2mg q4hrs x 6 doses  ativan 1mg q4hrs x 6 doses  ativan 1mg q6hrs x 4 doses  ativan 1mg q12 hrs x 2 doses  ativan 0.5mg q12 hrs x 2 doses  ****taper may need to be altered depending on patients response to treatment.  Please monitor respiratory status and pulse OX very closely and adjust the taper if needed******  - c/w thiamine 500mg IV TID for 3days  - Continue Ativan PRN as per CIWA  - Please obtain ammonia level.    Case discussed with Dr. Dupont, recs given , will follow

## 2020-04-29 NOTE — PROGRESS NOTE ADULT - SUBJECTIVE AND OBJECTIVE BOX
Medicine Progress Note    Patient is a 55y old  Male who presents with a chief complaint of EtOH withdrawal (29 Apr 2020 10:04)      SUBJECTIVE / OVERNIGHT EVENTS: pt reports ongoing anxiety and tremors    ADDITIONAL REVIEW OF SYSTEMS:    MEDICATIONS  (STANDING):  cefTRIAXone   IVPB 1000 milliGRAM(s) IV Intermittent every 24 hours  dextrose 5% + lactated ringers with potassium chloride 20 mEq/L 1000 milliLiter(s) (100 mL/Hr) IV Continuous <Continuous>  folic acid 1 milliGRAM(s) Oral daily  LORazepam   Injectable   IV Push   LORazepam   Injectable 2 milliGRAM(s) IV Push every 4 hours  LORazepam   Injectable 2 milliGRAM(s) IV Push once  multivitamin 1 Tablet(s) Oral daily  octreotide  Infusion 50 MICROgram(s)/Hr (10 mL/Hr) IV Continuous <Continuous>    MEDICATIONS  (PRN):  LORazepam   Injectable 2 milliGRAM(s) IV Push every 1 hour PRN Symptom-triggered: each CIWA -Ar score 8 or GREATER    CAPILLARY BLOOD GLUCOSE        I&O's Summary      PHYSICAL EXAM:  Vital Signs Last 24 Hrs  T(C): 37.2 (29 Apr 2020 14:00), Max: 37.5 (28 Apr 2020 19:26)  T(F): 98.9 (29 Apr 2020 14:00), Max: 99.5 (28 Apr 2020 19:26)  HR: 99 (29 Apr 2020 14:00) (94 - 113)  BP: 116/74 (29 Apr 2020 14:00) (116/74 - 139/93)  BP(mean): --  RR: 18 (29 Apr 2020 14:00) (16 - 19)  SpO2: 99% (29 Apr 2020 14:00) (96% - 100%)  CONSTITUTIONAL: NAD, well-developed, well-groomed  ENMT: Moist oral mucosa, no pharyngeal injection or exudates; normal dentition  RESPIRATORY: No accessory muscle use  ABDOMEN: Nontender to palpation, normoactive bowel sounds, no rebound/guarding  PSYCH:irritable   NEUROLOGY: tremulous  SKIN: No rashes; no palpable lesions    LABS:                        9.8    3.42  )-----------( 77       ( 29 Apr 2020 06:10 )             28.5     04-29    139  |  99  |  4<L>  ----------------------------<  169<H>  2.9<LL>   |  24  |  0.64    Ca    8.8      29 Apr 2020 06:10  Phos  2.7     04-29  Mg     1.6     04-29    TPro  6.7  /  Alb  3.4  /  TBili  5.2<H>  /  DBili  x   /  AST  151<H>  /  ALT  41  /  AlkPhos  182<H>  04-29    PT/INR - ( 29 Apr 2020 06:10 )   PT: 15.7 SEC;   INR: 1.36          PTT - ( 29 Apr 2020 06:10 )  PTT:33.5 SEC          COVID-19 PCR: NotDetec (26 Apr 2020 09:20)      RADIOLOGY & ADDITIONAL TESTS:  Imaging from Last 24 Hours:    Electrocardiogram/QTc Interval:    COORDINATION OF CARE:  Care Discussed with Consultants/Other Providers:

## 2020-04-29 NOTE — PROGRESS NOTE BEHAVIORAL HEALTH - NSBHFUPINTERVALHXFT_PSY_A_CORE
Patient seen and evaluated in person, pt. AAOX2 (CUATE May, 2002), patient seems anxious, stated that he is feeling better and wants to go home now. Pt. denies feeling depressed, denies AH and VH, no delusions elicited, stated that staff has been treating him well. Patient adamantly denies SI/I/P and denies HI/I/P.   When asked about the reason for current admission, he stated "alcoholism". Pt. could not elaborate further. Patient was explained in detail about his current medical issues, treatment he is receiving (ativan for withdrawal) and risks of leaving AMA (worsening of withdrawal, seizures and even death). Patient unable to verbalized understanding of indication/risk/benefits of accepting or refusing his treatment, unable to appreciate the current situation, unable to manipulate the information in a logical manner when provided to him,  and unable to verbalized understanding of risks of leaving AMA. Based on my evaluation, at this time patient does not have capacity to leave AMA. Patient seen and evaluated in person, received Ativan 2mg PRN x2 on 4/28 and x2 on 4/29, completing Ativan 3mg q4h, CIWA: 7  HR:   pt. AAOX2 (CUATE May, 2002), patient seems anxious, stated that he is feeling better and wants to go home now. Pt. denies feeling depressed, denies AH and VH, no delusions elicited, stated that staff has been treating him well. Patient adamantly denies SI/I/P and denies HI/I/P.   When asked about the reason for current admission, he stated "alcoholism". Pt. could not elaborate further. Patient was explained in detail multiple times about his current medical issues, treatment he is receiving (ativan for withdrawal) and risks of leaving AMA (worsening of withdrawal, seizures and even death). Patient unable to verbalized understanding of indication/risk/benefits of accepting or refusing the  treatment, unable to appreciate the current situation, unable to manipulate the information in a logical manner when provided to him,  and unable to verbalized understanding of risks of leaving AMA. Based on my evaluation, at this time patient does not have capacity to leave AMA.    As per pt.'s nurse: Patient is AAOX2, remains confused and disorganised, asking to  leave.

## 2020-04-29 NOTE — PROGRESS NOTE BEHAVIORAL HEALTH - OTHER
not assessed varies , patient confused at times minimally engaging normal at th time of interview, however can be variable + mild tremors in outstretched hand (improved from yesterday) " I am feeling better. I want to go home focused on leaving and going home, denies SI and HI limited

## 2020-04-29 NOTE — PROGRESS NOTE BEHAVIORAL HEALTH - NSBHCONSULTOBSREASON_PSY_A_CORE FT
no SI or HI, no psychiatric reason 1:1, however patient now confused trying to get OOB, more frequent rounding may benefit patient safety - defer to primary team  May increase level of observation, if pt. becomes agitated For elopement as per primary team.  Pt. adamantly denies SI and HI

## 2020-04-29 NOTE — PROGRESS NOTE BEHAVIORAL HEALTH - RISK ASSESSMENT
Risk: male gender, substance abuse, acute withdrawal  Protective: no SI or HI at this time, no hx of SA, patient future oriented discussing going home, states he has support from family    Patient requiring ongoing medical care at this time, will continue to follow

## 2020-04-29 NOTE — CHART NOTE - NSCHARTNOTEFT_GEN_A_CORE
Ativan taper ordered as per psych reviewed with medical attending Ativan IV  taper ordered as per psych reviewed with medical attending Ativan IV  taper  ordered as per psych and prn ativan as per Dr. Cline.  d/w medical attending

## 2020-04-29 NOTE — PROGRESS NOTE ADULT - ASSESSMENT
54 y/o M with PMHx HTN (noncompliant w/ meds), EtOH abuse, Etoh Cirrhosis with portal HTN, prior oxycodone abuse, DTs and alcoholic withdrawal seizures p/w EtOH withdrawal. GI consulted for melena.    # Melena: Hgb stable. No overt bleeding, symptoms resolved. DDx: PUD vs. alcoholic gastropathy vs. esophagitis vs. AVM vs. varices vs. malignancy.   # Alcoholic hepatitis: slight downtrend in liver enzymes. MDF score of 5, no need for steroids and has good prognosis.   # ETOH cirrhosis: MELD Na: 14  Varies: no recent EGD, last in 2009 without varices  PSE: none  Ascites: none on u/s  HCC: 4cm lesion seen on CT from 2019 but MRI abdomen showed no lesions. AFP of 1.9 in 1/2020  # Alcohol abuse: on CIWA protocol    Reccs:  - can switch to ppi 40mg IV BID  - can d/c octreotide gtt  - complete ceftiaxone 1g x 7 days  - monitor BMs and CBC   - no need for inpatient endoscopic evaluation for now as pt is stable without overt bleeding  - thiamine and folate; continue with CIWA protocol per primary team  - please keep plts >50 in the setting of GIB  - trend MELD Na labs daily and INR  - advance diet to low sodium regular diet  - no further hepatology reccs at this time  - rest of plan as per primary team 56 y/o M with PMHx HTN (noncompliant w/ meds), EtOH abuse, Etoh Cirrhosis with portal HTN, prior oxycodone abuse, DTs and alcoholic withdrawal seizures p/w EtOH withdrawal.    # Melena: Hgb stable. No overt bleeding, symptoms resolved. DDx: PUD vs. alcoholic gastropathy vs. esophagitis vs. AVM vs. varices vs. malignancy.   # Alcoholic hepatitis: slight downtrend in liver enzymes. MDF score of 5, no need for steroids and has good prognosis.   # ETOH cirrhosis: MELD Na: 14  Varies: no recent EGD, last in 2009 without varices  PSE: none  Ascites: none on u/s  HCC: 4cm lesion seen on CT from 2019 but MRI abdomen showed no lesions. AFP of 1.9 in 1/2020  # Alcohol abuse: on CIWA protocol    Reccs:  - can switch to ppi 40mg IV BID  - can d/c octreotide gtt  - complete ceftiaxone 1g x 7 days  - monitor BMs and CBC   - no need for inpatient endoscopic evaluation for now as pt is stable without overt bleeding  - thiamine and folate; continue with CIWA protocol per primary team  - please keep plts >50 in the setting of GIB  - trend MELD Na labs daily and INR  - advance diet to low sodium regular diet  - no further hepatology reccs at this time  - rest of plan as per primary team

## 2020-04-29 NOTE — PROGRESS NOTE ADULT - PROBLEM SELECTOR PLAN 1
ETOH on admission was 402  pt reports VH and is anxious  appreciate psych input, case discussed with attending, will increase to 4 q 4 for next 24 hours, reassess for taper in am, on CIWA protocol with Ativan PRN, low threshold for MICU eval  -c/w folic acid, thiamine and MVI.   -fall/seizure precautions  -monitor electrolytes and replete PRN

## 2020-04-30 NOTE — PROGRESS NOTE ADULT - PROBLEM SELECTOR PLAN 2
completed PPI gtt, continue octreotide per hepatology recommendations until tomorrow completed PPI gtt, octreotide gtt 5 days per hepatology recommendations  - no plans for scope as stable

## 2020-04-30 NOTE — PROGRESS NOTE ADULT - SUBJECTIVE AND OBJECTIVE BOX
[facility] Division of Hospital Medicine  Karla Ricks MD  Pager (M-F, 8A-5P):  In-house pager [phone]; Long-range pager [phone]  Other Times:  Please page Hospitalist in Charge -  In-house pager [phone]    Patient is a 55y old  Male who presents with a chief complaint of EtOH withdrawal (29 Apr 2020 15:02)      SUBJECTIVE / OVERNIGHT EVENTS: ...  ADDITIONAL REVIEW OF SYSTEMS:    MEDICATIONS  (STANDING):  cefTRIAXone   IVPB 1000 milliGRAM(s) IV Intermittent every 24 hours  dextrose 5% + lactated ringers with potassium chloride 20 mEq/L 1000 milliLiter(s) (100 mL/Hr) IV Continuous <Continuous>  folic acid 1 milliGRAM(s) Oral daily  LORazepam   Injectable   IV Push   LORazepam   Injectable 3 milliGRAM(s) IV Push every 4 hours  multivitamin 1 Tablet(s) Oral daily  pantoprazole  Injectable 40 milliGRAM(s) IV Push two times a day    MEDICATIONS  (PRN):  LORazepam   Injectable 2 milliGRAM(s) IV Push every 1 hour PRN Symptom-triggered: each CIWA -Ar score 8 or GREATER      CAPILLARY BLOOD GLUCOSE        I&O's Summary      PHYSICAL EXAM:  Vital Signs Last 24 Hrs  T(C): 37.2 (30 Apr 2020 09:00), Max: 37.4 (29 Apr 2020 23:00)  T(F): 98.9 (30 Apr 2020 09:00), Max: 99.4 (29 Apr 2020 23:00)  HR: 102 (30 Apr 2020 09:00) (88 - 109)  BP: 130/92 (30 Apr 2020 09:00) (110/66 - 149/95)  BP(mean): --  RR: 17 (30 Apr 2020 09:00) (16 - 18)  SpO2: 99% (30 Apr 2020 09:00) (98% - 100%)    CONSTITUTIONAL: looks older than stated age, sitting on edge of bed, calm, NAD  EYES: scleral icterus  ENMT: Moist oral mucosa  RESPIRATORY: Normal respiratory effort; lungs are clear to auscultation bilaterally  CARDIOVASCULAR: Regular rate and rhythm, normal S1 and S2, no murmur/rub/gallop; No lower extremity edema; Peripheral pulses are 2+ bilaterally  ABDOMEN: obese, Nontender to palpation, normoactive bowel sounds, no rebound/guarding  PSYCH: calm, oriented May 28, 2002, CUATEJuliet  NEUROLOGY: nonfocal   SKIN: No rashes; no palpable lesions    LABS:                        9.8    3.42  )-----------( 77       ( 29 Apr 2020 06:10 )             28.5     04-29    139  |  100  |  4<L>  ----------------------------<  157<H>  3.5   |  24  |  0.68    Ca    9.0      29 Apr 2020 16:45  Phos  2.4     04-29  Mg     1.9     04-29    TPro  6.7  /  Alb  3.4  /  TBili  5.2<H>  /  DBili  x   /  AST  151<H>  /  ALT  41  /  AlkPhos  182<H>  04-29    PT/INR - ( 29 Apr 2020 06:10 )   PT: 15.7 SEC;   INR: 1.36          PTT - ( 29 Apr 2020 06:10 )  PTT:33.5 SEC            RADIOLOGY & ADDITIONAL TESTS:  Results Reviewed:   Imaging Personally Reviewed:  Electrocardiogram Personally Reviewed:    COORDINATION OF CARE:  Care Discussed with Consultants/Other Providers [Y/N]: RN, SW, CM, ACP re overall care   Prior or Outpatient Records Reviewed [Y/N]: [facility] Division of Hospital Medicine  Karla Ricks MD  Pager (M-F, 8A-5P):  In-house pager [phone]; Long-range pager [phone]  Other Times:  Please page Hospitalist in Charge -  In-house pager [phone]    Patient is a 55y old  Male who presents with a chief complaint of EtOH withdrawal (29 Apr 2020 15:02)      SUBJECTIVE / OVERNIGHT EVENTS: Pt seen earlier, resting comfortably. Calm, denies hallucinations, SI;  says he wants to go home.  Says he wants solid food, PCA said he received solid food and ate a small amount.  ADDITIONAL REVIEW OF SYSTEMS:    MEDICATIONS  (STANDING):  cefTRIAXone   IVPB 1000 milliGRAM(s) IV Intermittent every 24 hours  dextrose 5% + lactated ringers with potassium chloride 20 mEq/L 1000 milliLiter(s) (100 mL/Hr) IV Continuous <Continuous>  folic acid 1 milliGRAM(s) Oral daily  LORazepam   Injectable   IV Push   LORazepam   Injectable 3 milliGRAM(s) IV Push every 4 hours  multivitamin 1 Tablet(s) Oral daily  pantoprazole  Injectable 40 milliGRAM(s) IV Push two times a day    MEDICATIONS  (PRN):  LORazepam   Injectable 2 milliGRAM(s) IV Push every 1 hour PRN Symptom-triggered: each CIWA -Ar score 8 or GREATER      CAPILLARY BLOOD GLUCOSE        I&O's Summary      PHYSICAL EXAM:  Vital Signs Last 24 Hrs  T(C): 37.2 (30 Apr 2020 09:00), Max: 37.4 (29 Apr 2020 23:00)  T(F): 98.9 (30 Apr 2020 09:00), Max: 99.4 (29 Apr 2020 23:00)  HR: 102 (30 Apr 2020 09:00) (88 - 109)  BP: 130/92 (30 Apr 2020 09:00) (110/66 - 149/95)  BP(mean): --  RR: 17 (30 Apr 2020 09:00) (16 - 18)  SpO2: 99% (30 Apr 2020 09:00) (98% - 100%)    CONSTITUTIONAL: looks older than stated age, sitting on edge of bed, calm, NAD  EYES: scleral icterus  ENMT: Moist oral mucosa  RESPIRATORY: Normal respiratory effort; lungs are clear to auscultation bilaterally  CARDIOVASCULAR: Regular rate and rhythm, normal S1 and S2, no murmur/rub/gallop; No lower extremity edema; Peripheral pulses are 2+ bilaterally  ABDOMEN: obese, Nontender to palpation, normoactive bowel sounds, no rebound/guarding  PSYCH: calm, oriented April 14, 2020, CUATE Chung  NEUROLOGY: mild tremor  SKIN: No rashes; no palpable lesions    LABS:                        9.8    3.42  )-----------( 77       ( 29 Apr 2020 06:10 )             28.5     04-29    139  |  100  |  4<L>  ----------------------------<  157<H>  3.5   |  24  |  0.68    Ca    9.0      29 Apr 2020 16:45  Phos  2.4     04-29  Mg     1.9     04-29    TPro  6.7  /  Alb  3.4  /  TBili  5.2<H>  /  DBili  x   /  AST  151<H>  /  ALT  41  /  AlkPhos  182<H>  04-29    PT/INR - ( 29 Apr 2020 06:10 )   PT: 15.7 SEC;   INR: 1.36          PTT - ( 29 Apr 2020 06:10 )  PTT:33.5 SEC            RADIOLOGY & ADDITIONAL TESTS:  Results Reviewed:   Imaging Personally Reviewed:  Electrocardiogram Personally Reviewed:    COORDINATION OF CARE:  Care Discussed with Consultants/Other Providers [Y/N]: RN, SW, CM, ACP re overall care   Prior or Outpatient Records Reviewed [Y/N]:

## 2020-04-30 NOTE — CHART NOTE - NSCHARTNOTEFT_GEN_A_CORE
Given stable counts without further overt bleeding, no plan for scope from hepatology point of view. No further reccs. Continue with CIWA protocol per primary team.  Patient can follow with Hepatology as outpatient on discharge ([facility]: #[phone], [facility]- [phone])

## 2020-04-30 NOTE — PROGRESS NOTE BEHAVIORAL HEALTH - SUMMARY
Patient is a 55 M PMH HTN (noncompliant w/ meds), EtOH abuse, Cirrhosis with portal HTN, prior oxycodone abuse, GI bleeds, DTs and alcoholic withdrawal seizures p/w EtOH withdrawal.  Patient reports drinking 1 pint tequilla q day with last drink on 4/24. ETOH level on admission 402. Patient with 1 previous detox/rehab admission when using oxy.  Denies inpt psych admission. No hx of psychotropic medications. , however reports having support from family.  Consulted for ETOH withdrawal.    4/29: Patient seen and evaluated in person, received Ativan 2mg PRN x2 on 4/28 and x2 on 4/29, completing Ativan 3mg q4h, CIWA: 7  HR: .  pt. AAOX2 (CUATE May, 2002), patient seems anxious, stated that he is feeling better and wants to go home now. Pt. denies feeling depressed, denies AH and VH, no delusions elicited, stated that staff has been treating him well. Patient adamantly denies SI/I/P and denies HI/I/P.   When asked about the reason for current admission, he stated "alcoholism". Pt. could not elaborate further. Patient was explained in detail multiple times about his current medical issues, treatment he is receiving (ativan for withdrawal) and risks of leaving AMA (worsening of withdrawal, seizures and even death). Patient unable to verbalized understanding of indication/risk/benefits of accepting or refusing the  treatment, unable to appreciate the current situation, unable to manipulate the information in a logical manner when provided to him,  and unable to verbalized understanding of risks of leaving AMA. Based on my evaluation, at this time patient does not have capacity to leave AMA.    4/30: Pt. did not get any PRNs overnight, HR; , CIWA: 5, pt.  seen and evaluated, drowsy, unable to engage in interview, received standing Ativan 3mg at 9;47. Pt. opened his eyes few times and then fell asleep. NO tremors noted on exam. As per pt.'s nurse, pt. still with intermittent agitation but more verbally  redirectable.    PLAN  1- Would recommend to continue Ativan taper.  Pt. is high risk for withdrawal given h/o heavy drinking, DTs and alcohol withdrawal seizures.  HOLD FOR EXCESSIVE SEDATION/LETHARGY OR RR< 12  - Increase ativan 4mg q4hrs x 6 doses   ativan 3mg q4hrs x 6 doses (currently on this dose)  ativan 2mg q4hrs x 6 doses  ativan 1mg q4hrs x 6 doses  ativan 1mg q6hrs x 4 doses  ativan 1mg q12 hrs x 2 doses  ativan 0.5mg q12 hrs x 2 doses  ****taper may need to be altered depending on patients response to treatment.  Please monitor respiratory status and pulse OX very closely and adjust the taper if needed******  - c/w thiamine 500mg IV TID for 3days  - Continue Ativan PRN as per CIWA  - Please obtain ammonia level.    will follow

## 2020-04-30 NOTE — PROGRESS NOTE ADULT - PROBLEM SELECTOR PLAN 3
appreciate hepatology input - start CTX 1g qd x7d, c/w octreotide gtt  abdominal u/s with hepatic steatosis and nodularity  trend MELD labs daily appreciate hepatology input - c/w CTX 1g qd x7d, s/p PPI/octreotide gtt 5d  abdominal u/s with hepatic steatosis and nodularity  trend MELD labs daily  follow with Hepatology as outpatient on discharge ([facility]: #[phone], [facility]- [phone]).

## 2020-04-30 NOTE — PROGRESS NOTE BEHAVIORAL HEALTH - NSBHFUPINTERVALHXFT_PSY_A_CORE
Pt. did not get any PRNs overnight, HR; , CIWA: 5, pt.  seen and evaluated, drowsy, unable to engage in interview, received standing Ativan 3mg at 9;47. Pt. opened his eyes few times and then fell asleep. NO tremors noted on exam.  As per pt.'s nurse, pt. still with intermittent agitation but more verbally  redirectable.

## 2020-04-30 NOTE — PROGRESS NOTE ADULT - PROBLEM SELECTOR PLAN 1
ETOH on admission was 402  pt reports VH and is anxious  appreciate psych input, case discussed with attending, will increase to 4 q 4 for next 24 hours, reassess for taper in am, on CIWA protocol with Ativan PRN, low threshold for MICU eval  -c/w folic acid, thiamine and MVI.   -fall/seizure precautions  -monitor electrolytes and replete PRN ETOH on admission was 402, developed +VH, seems better today, no further hallucinations - c/w Ativan taper per psych rec's  -c/w folic acid, and MVI;  high dose thiamine  -fall/seizure precautions  -monitor electrolytes and replete PRN

## 2020-05-01 NOTE — PROGRESS NOTE ADULT - SUBJECTIVE AND OBJECTIVE BOX
[facility] Division of Hospital Medicine  Karla Ricks MD  Pager (M-F, 8A-5P):  In-house pager [phone]; Long-range pager [phone]  Other Times:  Please page Hospitalist in Charge -  In-house pager [phone]    Patient is a 55y old  Male who presents with a chief complaint of EtOH withdrawal (30 Apr 2020 09:18)      SUBJECTIVE / OVERNIGHT EVENTS: ...  ADDITIONAL REVIEW OF SYSTEMS:    MEDICATIONS  (STANDING):  cefTRIAXone   IVPB 1000 milliGRAM(s) IV Intermittent every 24 hours  folic acid 1 milliGRAM(s) Oral daily  LORazepam   Injectable   IV Push   LORazepam   Injectable 2 milliGRAM(s) IV Push every 4 hours  multivitamin 1 Tablet(s) Oral daily  pantoprazole  Injectable 40 milliGRAM(s) IV Push two times a day  potassium chloride    Tablet ER 40 milliEquivalent(s) Oral every 4 hours  thiamine IVPB 500 milliGRAM(s) IV Intermittent every 8 hours    MEDICATIONS  (PRN):  LORazepam   Injectable 2 milliGRAM(s) IV Push every 1 hour PRN Symptom-triggered: each CIWA -Ar score 8 or GREATER      CAPILLARY BLOOD GLUCOSE        I&O's Summary      PHYSICAL EXAM:  Vital Signs Last 24 Hrs  T(C): 36.8 (01 May 2020 09:00), Max: 37.6 (30 Apr 2020 21:00)  T(F): 98.3 (01 May 2020 09:00), Max: 99.6 (30 Apr 2020 21:00)  HR: 98 (01 May 2020 10:57) (94 - 108)  BP: 122/86 (01 May 2020 10:57) (110/69 - 136/84)  BP(mean): --  RR: 18 (01 May 2020 10:57) (17 - 20)  SpO2: 98% (01 May 2020 10:57) (98% - 100%)    CONSTITUTIONAL: looks older than stated age, sitting on edge of bed, calm, NAD  EYES: scleral icterus  ENMT: Moist oral mucosa  RESPIRATORY: Normal respiratory effort; lungs are clear to auscultation bilaterally  CARDIOVASCULAR: Regular rate and rhythm, normal S1 and S2, no murmur/rub/gallop; No lower extremity edema; Peripheral pulses are 2+ bilaterally  ABDOMEN: obese, Nontender to palpation, normoactive bowel sounds, no rebound/guarding  PSYCH: calm, oriented April 14, 2020, [facility]  NEUROLOGY: mild tremor  SKIN: No rashes; no palpable lesions    LABS:                        10.1   4.26  )-----------( 147      ( 01 May 2020 06:43 )             29.7     05-01    139  |  99  |  5<L>  ----------------------------<  113<H>  3.3<L>   |  22  |  0.74    Ca    9.2      01 May 2020 06:43  Phos  4.6     05-01  Mg     1.8     05-01    TPro  7.1  /  Alb  3.7  /  TBili  5.1<H>  /  DBili  x   /  AST  146<H>  /  ALT  40  /  AlkPhos  192<H>  05-01    PT/INR - ( 01 May 2020 06:43 )   PT: 15.7 SEC;   INR: 1.35          PTT - ( 01 May 2020 06:43 )  PTT:33.9 SEC            RADIOLOGY & ADDITIONAL TESTS:  Results Reviewed:   Imaging Personally Reviewed:  Electrocardiogram Personally Reviewed:    COORDINATION OF CARE:  Care Discussed with Consultants/Other Providers [Y/N]: RN, SW, CM, ACP re overall care   Prior or Outpatient Records Reviewed [Y/N]: [facility] Division of Hospital Medicine  Karla Ricks MD  Pager (M-F, 8A-5P):  In-house pager [phone]; Long-range pager [phone]  Other Times:  Please page Hospitalist in Charge -  In-house pager [phone]    Patient is a 55y old  Male who presents with a chief complaint of EtOH withdrawal (30 Apr 2020 09:18)      SUBJECTIVE / OVERNIGHT EVENTS: Pt seen earlier, sitting up on side of bed, talking with his son Werner on the phone.  Aide reports   ADDITIONAL REVIEW OF SYSTEMS:    MEDICATIONS  (STANDING):  cefTRIAXone   IVPB 1000 milliGRAM(s) IV Intermittent every 24 hours  folic acid 1 milliGRAM(s) Oral daily  LORazepam   Injectable   IV Push   LORazepam   Injectable 2 milliGRAM(s) IV Push every 4 hours  multivitamin 1 Tablet(s) Oral daily  pantoprazole  Injectable 40 milliGRAM(s) IV Push two times a day  potassium chloride    Tablet ER 40 milliEquivalent(s) Oral every 4 hours  thiamine IVPB 500 milliGRAM(s) IV Intermittent every 8 hours    MEDICATIONS  (PRN):  LORazepam   Injectable 2 milliGRAM(s) IV Push every 1 hour PRN Symptom-triggered: each CIWA -Ar score 8 or GREATER      CAPILLARY BLOOD GLUCOSE        I&O's Summary      PHYSICAL EXAM:  Vital Signs Last 24 Hrs  T(C): 36.8 (01 May 2020 09:00), Max: 37.6 (30 Apr 2020 21:00)  T(F): 98.3 (01 May 2020 09:00), Max: 99.6 (30 Apr 2020 21:00)  HR: 98 (01 May 2020 10:57) (94 - 108)  BP: 122/86 (01 May 2020 10:57) (110/69 - 136/84)  BP(mean): --  RR: 18 (01 May 2020 10:57) (17 - 20)  SpO2: 98% (01 May 2020 10:57) (98% - 100%)    CONSTITUTIONAL: looks older than stated age, sitting on edge of bed, calm, NAD  EYES: scleral icterus  ENMT: Moist oral mucosa  RESPIRATORY: Normal respiratory effort; lungs are clear to auscultation bilaterally  CARDIOVASCULAR: Regular rate and rhythm, normal S1 and S2, no murmur/rub/gallop; No lower extremity edema; Peripheral pulses are 2+ bilaterally  ABDOMEN: obese, Nontender to palpation, normoactive bowel sounds, no rebound/guarding  PSYCH: calm, oriented April 14, 2020, [facility]  NEUROLOGY: mild tremor  SKIN: No rashes; no palpable lesions    LABS:                        10.1   4.26  )-----------( 147      ( 01 May 2020 06:43 )             29.7     05-01    139  |  99  |  5<L>  ----------------------------<  113<H>  3.3<L>   |  22  |  0.74    Ca    9.2      01 May 2020 06:43  Phos  4.6     05-01  Mg     1.8     05-01    TPro  7.1  /  Alb  3.7  /  TBili  5.1<H>  /  DBili  x   /  AST  146<H>  /  ALT  40  /  AlkPhos  192<H>  05-01    PT/INR - ( 01 May 2020 06:43 )   PT: 15.7 SEC;   INR: 1.35          PTT - ( 01 May 2020 06:43 )  PTT:33.9 SEC            RADIOLOGY & ADDITIONAL TESTS:  Results Reviewed:   Imaging Personally Reviewed:  Electrocardiogram Personally Reviewed:    COORDINATION OF CARE:  Care Discussed with Consultants/Other Providers [Y/N]: RN, SW, CM, ACP re overall care   Prior or Outpatient Records Reviewed [Y/N]: [facility] Division of Hospital Medicine  Karla Ricks MD  Pager (M-F, 8A-5P):  In-house pager [phone]; Long-range pager [phone]  Other Times:  Please page Hospitalist in Charge -  In-house pager [phone]    Patient is a 55y old  Male who presents with a chief complaint of EtOH withdrawal (30 Apr 2020 09:18)      SUBJECTIVE / OVERNIGHT EVENTS: Pt seen earlier, sitting up on side of bed, talking with his son Werner on the phone.  Aide reports pt better today, less unpredictable, more appropriate.  Pt also says he feels better, denies hallucinations;  wants to stop drinking.  ADDITIONAL REVIEW OF SYSTEMS:    MEDICATIONS  (STANDING):  cefTRIAXone   IVPB 1000 milliGRAM(s) IV Intermittent every 24 hours  folic acid 1 milliGRAM(s) Oral daily  LORazepam   Injectable   IV Push   LORazepam   Injectable 2 milliGRAM(s) IV Push every 4 hours  multivitamin 1 Tablet(s) Oral daily  pantoprazole  Injectable 40 milliGRAM(s) IV Push two times a day  potassium chloride    Tablet ER 40 milliEquivalent(s) Oral every 4 hours  thiamine IVPB 500 milliGRAM(s) IV Intermittent every 8 hours    MEDICATIONS  (PRN):  LORazepam   Injectable 2 milliGRAM(s) IV Push every 1 hour PRN Symptom-triggered: each CIWA -Ar score 8 or GREATER      CAPILLARY BLOOD GLUCOSE        I&O's Summary      PHYSICAL EXAM:  Vital Signs Last 24 Hrs  T(C): 36.8 (01 May 2020 09:00), Max: 37.6 (30 Apr 2020 21:00)  T(F): 98.3 (01 May 2020 09:00), Max: 99.6 (30 Apr 2020 21:00)  HR: 98 (01 May 2020 10:57) (94 - 108)  BP: 122/86 (01 May 2020 10:57) (110/69 - 136/84)  BP(mean): --  RR: 18 (01 May 2020 10:57) (17 - 20)  SpO2: 98% (01 May 2020 10:57) (98% - 100%)    CONSTITUTIONAL: looks older than stated age, sitting on edge of bed, calm, NAD  EYES: scleral icterus  ENMT: Moist oral mucosa  RESPIRATORY: Normal respiratory effort; lungs are clear to auscultation bilaterally  CARDIOVASCULAR: Regular rate and rhythm, normal S1 and S2, no murmur/rub/gallop; No lower extremity edema; Peripheral pulses are 2+ bilaterally  ABDOMEN: obese, Nontender to palpation, normoactive bowel sounds, no rebound/guarding  PSYCH: calm, oriented April, 2020, [facility]  NEUROLOGY: mild tremor  SKIN: No rashes; no palpable lesions    LABS:                        10.1   4.26  )-----------( 147      ( 01 May 2020 06:43 )             29.7     05-01    139  |  99  |  5<L>  ----------------------------<  113<H>  3.3<L>   |  22  |  0.74    Ca    9.2      01 May 2020 06:43  Phos  4.6     05-01  Mg     1.8     05-01    TPro  7.1  /  Alb  3.7  /  TBili  5.1<H>  /  DBili  x   /  AST  146<H>  /  ALT  40  /  AlkPhos  192<H>  05-01    PT/INR - ( 01 May 2020 06:43 )   PT: 15.7 SEC;   INR: 1.35          PTT - ( 01 May 2020 06:43 )  PTT:33.9 SEC            RADIOLOGY & ADDITIONAL TESTS:  Results Reviewed:   Imaging Personally Reviewed:  Electrocardiogram Personally Reviewed:    COORDINATION OF CARE:  Care Discussed with Consultants/Other Providers [Y/N]: RN, SW, CM, ACP re overall care   Prior or Outpatient Records Reviewed [Y/N]:

## 2020-05-01 NOTE — DIETITIAN INITIAL EVALUATION ADULT. - PERTINENT LABORATORY DATA
05-01 Na139 mmol/L Glu 113 mg/dL<H> K+ 3.3 mmol/L<L> Cr  0.74 mg/dL BUN 5 mg/dL<L> 05-01 Phos 4.6 mg/dL<H> 05-01 Alb 3.7 g/dL

## 2020-05-01 NOTE — PROGRESS NOTE BEHAVIORAL HEALTH - SUMMARY
Patient is a 55 M PMH HTN (noncompliant w/ meds), EtOH abuse, Cirrhosis with portal HTN, prior oxycodone abuse, GI bleeds, DTs and alcoholic withdrawal seizures p/w EtOH withdrawal.  Patient reports drinking 1 pint tequilla q day with last drink on 4/24. ETOH level on admission 402. Patient with 1 previous detox/rehab admission when using oxy.  Denies inpt psych admission. No hx of psychotropic medications. , however reports having support from family.  Consulted for ETOH withdrawal.    4/29: Patient seen and evaluated in person, received Ativan 2mg PRN x2 on 4/28 and x2 on 4/29, completing Ativan 3mg q4h, CIWA: 7  HR: .  pt. AAOX2 ([facility] May, 2002), patient seems anxious, stated that he is feeling better and wants to go home now. Pt. denies feeling depressed, denies AH and VH, no delusions elicited, stated that staff has been treating him well. Patient adamantly denies SI/I/P and denies HI/I/P.   When asked about the reason for current admission, he stated "alcoholism". Pt. could not elaborate further. Patient was explained in detail multiple times about his current medical issues, treatment he is receiving (ativan for withdrawal) and risks of leaving AMA (worsening of withdrawal, seizures and even death). Patient unable to verbalized understanding of indication/risk/benefits of accepting or refusing the  treatment, unable to appreciate the current situation, unable to manipulate the information in a logical manner when provided to him,  and unable to verbalized understanding of risks of leaving AMA. Based on my evaluation, at this time patient does not have capacity to leave AMA.    4/30: Pt. did not get any PRNs overnight, HR; , CIWA: 5, pt.  seen and evaluated, drowsy, unable to engage in interview, received standing Ativan 3mg at 9;47. Pt. opened his eyes few times and then fell asleep. NO tremors noted on exam. As per pt.'s nurse, pt. still with intermittent agitation but more verbally  redirectable.    5/1; Pt. did not get any PRNs overnight, HR; 95-99, CIWA: 4-5, pt.  seen and evaluated, AAOX3 (April, [facility], 2020), calm,  cooperative, polite, engaging well. Patient stated that he is feeling OK, denies feeling depressed, denies AH and VH, no delusions elicited, feeling safe. Patient adamantly denies SI and HI. He is future oriented, looking forward to go home and rest at home. No  tremors noted on exam.    Patient was explained again in detail about his current medical issues, treatment he is receiving (ativan for withdrawal) and risks of leaving AMA (worsening of withdrawal, seizures and even death). Patient unable to verbalized understanding of indication/risk/benefits of accepting or refusing the  treatment, unable to appreciate the current situation, unable to manipulate the information in a logical manner when provided to him,  and unable to verbalized understanding of risks of leaving AMA. Based on my evaluation, at this time patient does not have capacity to leave AMA.    PLAN  1- Would recommend to continue Ativan taper.  Pt. is high risk for withdrawal given h/o heavy drinking, DTs and alcohol withdrawal seizures.  HOLD FOR EXCESSIVE SEDATION/LETHARGY OR RR< 12  - Increase ativan 4mg q4hrs x 6 doses   ativan 3mg q4hrs x 6 doses   ativan 2mg q4hrs x 6 doses (currently on this dose)  ativan 1mg q4hrs x 6 doses  ativan 1mg q6hrs x 4 doses  ativan 1mg q12 hrs x 2 doses  ativan 0.5mg q12 hrs x 2 doses  ****taper may need to be altered depending on patients response to treatment.  Please monitor respiratory status and pulse OX very closely and adjust the taper if needed******  - c/w thiamine 500mg IV TID for 3days  - Continue Ativan PRN as per CIWA  - Please obtain ammonia level.    will follow

## 2020-05-01 NOTE — PROGRESS NOTE BEHAVIORAL HEALTH - RISK ASSESSMENT
Risk: male gender, substance abuse, acute withdrawal, waxing and waning mental status in the context of withdrawal  Protective: no SI or HI at this time, no hx of SA, patient future oriented discussing going home, states he has support from family    Patient is not at acute risk of harm to self or others at this time and does not need an inpatient admission, as he does not meet the criteria for inpatient admission.

## 2020-05-01 NOTE — PROGRESS NOTE ADULT - PROBLEM SELECTOR PLAN 1
ETOH on admission was 402, developed +VH, seems better today, no further hallucinations - c/w Ativan taper per psych rec's  -c/w folic acid, and MVI;  high dose thiamine  -fall/seizure precautions  -monitor electrolytes and replete PRN ETOH on admission was 402, developed +VH, resolved on higher dose ativan --> no further hallucinations - c/w Ativan taper per psych rec's  -c/w folic acid, and MVI;  high dose thiamine  -fall/seizure precautions  -monitor electrolytes and replete PRN  - SW consult for possible EtOH Rx program on d/c

## 2020-05-01 NOTE — DIETITIAN INITIAL EVALUATION ADULT. - PERTINENT MEDS FT
MEDICATIONS  (STANDING):  cefTRIAXone   IVPB 1000 milliGRAM(s) IV Intermittent every 24 hours  folic acid 1 milliGRAM(s) Oral daily  LORazepam   Injectable   IV Push   LORazepam   Injectable 2 milliGRAM(s) IV Push every 4 hours  multivitamin 1 Tablet(s) Oral daily  pantoprazole  Injectable 40 milliGRAM(s) IV Push two times a day  potassium chloride    Tablet ER 40 milliEquivalent(s) Oral every 4 hours  thiamine IVPB 500 milliGRAM(s) IV Intermittent every 8 hours

## 2020-05-01 NOTE — DIETITIAN INITIAL EVALUATION ADULT. - OTHER INFO
RD unable to have face to face encounter with patient to perform nutrition interview and/or nutrition-focused physical exam in setting of viral pandemic / Covid-19.      Patient admitted for EtOH withdrawal.  PMH HTN (noncompliant w/ meds), EtOH abuse, Cirrhosis with portal HTN, prior oxycodone abuse, GI bleeds, DTs and alcoholic withdrawal seizures.    Collateral obtained from chart review. Tolerating diet without issue; advanced from NPO--clears--regular. No GI distress (nausea/vomiting/diarrhea/constipation) per chart at present, but previously had loose BMs and diarrhea. No chewing or swallowing difficulties at this time per chart. Weight 1/30/20 per HIE section of EMR - 93kg, consistent with admit weight 4/24 - 93kg.

## 2020-05-01 NOTE — PROGRESS NOTE ADULT - PROBLEM SELECTOR PLAN 2
completed PPI gtt, octreotide gtt 5 days per hepatology recommendations  - no plans for scope as stable

## 2020-05-01 NOTE — PROGRESS NOTE BEHAVIORAL HEALTH - NSBHADMITCOUNSEL_PSY_A_CORE
diagnostic results/impressions and/or recommended studies/client/family/caregiver education
importance of adherence to chosen treatment/client/family/caregiver education/risks and benefits of treatment options

## 2020-05-01 NOTE — PROGRESS NOTE ADULT - PROBLEM SELECTOR PLAN 3
appreciate hepatology input - c/w CTX 1g qd x7d, s/p PPI/octreotide gtt 5d  abdominal u/s with hepatic steatosis and nodularity  trend MELD labs daily  follow with Hepatology as outpatient on discharge ([facility]: #[phone], [facility]- [phone]).

## 2020-05-01 NOTE — PROGRESS NOTE BEHAVIORAL HEALTH - NSBHFUPINTERVALHXFT_PSY_A_CORE
Pt. did not get any PRNs overnight, HR; 95-99, CIWA: 4-5, pt.  seen and evaluated, AAOX3 (April, [facility], 2020), calm,  cooperative, polite, engaging well. Patient stated that he is feeling OK, denies feeling depressed, denies AH and VH, no delusions elicited, feeling safe. Patient adamantly denies SI and HI. He is future oriented, looking forward to go home and rest at home. No  tremors noted on exam.

## 2020-05-01 NOTE — DIETITIAN INITIAL EVALUATION ADULT. - ADD RECOMMEND
1) Monitor weights, PO intake/diet tolerance, skin integrity, pertinent labs.  2) Continue vitamin supplementation (folic acid, thiamine, multivitamin) as ordered.

## 2020-05-01 NOTE — PROGRESS NOTE ADULT - ATTENDING COMMENTS
d/w triston Antonio [phone] d/w son Paco [phone] ,   (wife  5y ago, son Werner in [place], daughter Emely lives elsewhere in [place])

## 2020-05-02 NOTE — PROGRESS NOTE ADULT - PROBLEM SELECTOR PLAN 1
ETOH on admission was 402, developed +VH, resolved on higher dose ativan --> no further hallucinations - c/w Ativan taper per psych rec's  -c/w folic acid, and MVI;  high dose thiamine  -fall/seizure precautions  -monitor electrolytes and replete PRN  - SW consult for possible EtOH Rx program on d/c

## 2020-05-02 NOTE — PROGRESS NOTE ADULT - ATTENDING COMMENTS
d/w son Paco [phone] ,   (wife  5y ago, son Werner in [place], daughter Emely lives elsewhere in [place]) d/w son Paco [phone] , ,   (wife  5y ago, son Werner in [place], daughter Emely lives elsewhere in [place])

## 2020-05-02 NOTE — PROGRESS NOTE ADULT - SUBJECTIVE AND OBJECTIVE BOX
[facility] Division of Hospital Medicine  Karla Ricks MD  Pager (M-F, 8A-5P):  In-house pager [phone]; Long-range pager [phone]  Other Times:  Please page Hospitalist in Charge -  In-house pager [phone]    Patient is a 55y old  Male who presents with a chief complaint of EtOH withdrawal (01 May 2020 11:06)      SUBJECTIVE / OVERNIGHT EVENTS: ...  ADDITIONAL REVIEW OF SYSTEMS:    MEDICATIONS  (STANDING):  cefTRIAXone   IVPB 1000 milliGRAM(s) IV Intermittent every 24 hours  folic acid 1 milliGRAM(s) Oral daily  LORazepam   Injectable   IV Push   LORazepam   Injectable 1 milliGRAM(s) IV Push every 4 hours  multivitamin 1 Tablet(s) Oral daily  pantoprazole    Tablet 40 milliGRAM(s) Oral every 12 hours  thiamine IVPB 500 milliGRAM(s) IV Intermittent every 8 hours    MEDICATIONS  (PRN):  LORazepam   Injectable 2 milliGRAM(s) IV Push every 1 hour PRN Symptom-triggered: each CIWA -Ar score 8 or GREATER      CAPILLARY BLOOD GLUCOSE        I&O's Summary      PHYSICAL EXAM:  Vital Signs Last 24 Hrs  T(C): 37 (02 May 2020 05:30), Max: 37.2 (02 May 2020 01:30)  T(F): 98.6 (02 May 2020 05:30), Max: 98.9 (02 May 2020 01:30)  HR: 99 (02 May 2020 05:30) (94 - 103)  BP: 124/73 (02 May 2020 05:30) (104/63 - 138/85)  BP(mean): --  RR: 17 (02 May 2020 05:30) (16 - 18)  SpO2: 100% (02 May 2020 05:30) (98% - 100%)    CONSTITUTIONAL: looks older than stated age, sitting on edge of bed, calm, NAD  EYES: scleral icterus  ENMT: Moist oral mucosa  RESPIRATORY: Normal respiratory effort; lungs are clear to auscultation bilaterally  CARDIOVASCULAR: Regular rate and rhythm, normal S1 and S2, no murmur/rub/gallop; No lower extremity edema; Peripheral pulses are 2+ bilaterally  ABDOMEN: obese, Nontender to palpation, normoactive bowel sounds, no rebound/guarding  PSYCH: calm, oriented April, 2020, [facility]  NEUROLOGY: mild tremor  SKIN: No rashes; no palpable lesions    LABS:                        10.0   5.73  )-----------( 179      ( 02 May 2020 05:52 )             30.0     05-02    138  |  101  |  7   ----------------------------<  105<H>  3.8   |  23  |  0.79    Ca    9.5      02 May 2020 05:52  Phos  4.0     05-02  Mg     1.8     05-02    TPro  7.4  /  Alb  3.8  /  TBili  5.7<H>  /  DBili  x   /  AST  143<H>  /  ALT  43<H>  /  AlkPhos  200<H>  05-02    PT/INR - ( 02 May 2020 05:52 )   PT: 15.6 SEC;   INR: 1.36          PTT - ( 02 May 2020 05:52 )  PTT:36.5 SEC            RADIOLOGY & ADDITIONAL TESTS:  Results Reviewed:   Imaging Personally Reviewed:  Electrocardiogram Personally Reviewed:    COORDINATION OF CARE:  Care Discussed with Consultants/Other Providers [Y/N]: RN, SW, CM, ACP re overall care   Prior or Outpatient Records Reviewed [Y/N]: [facility] Division of Hospital Medicine  Karla Ricks MD  Pager (M-F, 8A-5P):  In-house pager [phone]; Long-range pager [phone]  Other Times:  Please page Hospitalist in Charge -  In-house pager [phone]    Patient is a 55y old  Male who presents with a chief complaint of EtOH withdrawal (01 May 2020 11:06)      SUBJECTIVE / OVERNIGHT EVENTS: Pt seen earlier, sitting up on side of bed, calm.  Doing ok, asking when he can go home.  Denies hallucinations.  Watery BM today.  ADDITIONAL REVIEW OF SYSTEMS:    MEDICATIONS  (STANDING):  cefTRIAXone   IVPB 1000 milliGRAM(s) IV Intermittent every 24 hours  folic acid 1 milliGRAM(s) Oral daily  LORazepam   Injectable   IV Push   LORazepam   Injectable 1 milliGRAM(s) IV Push every 4 hours  multivitamin 1 Tablet(s) Oral daily  pantoprazole    Tablet 40 milliGRAM(s) Oral every 12 hours  thiamine IVPB 500 milliGRAM(s) IV Intermittent every 8 hours    MEDICATIONS  (PRN):  LORazepam   Injectable 2 milliGRAM(s) IV Push every 1 hour PRN Symptom-triggered: each CIWA -Ar score 8 or GREATER      CAPILLARY BLOOD GLUCOSE        I&O's Summary      PHYSICAL EXAM:  Vital Signs Last 24 Hrs  T(C): 37 (02 May 2020 05:30), Max: 37.2 (02 May 2020 01:30)  T(F): 98.6 (02 May 2020 05:30), Max: 98.9 (02 May 2020 01:30)  HR: 99 (02 May 2020 05:30) (94 - 103)  BP: 124/73 (02 May 2020 05:30) (104/63 - 138/85)  BP(mean): --  RR: 17 (02 May 2020 05:30) (16 - 18)  SpO2: 100% (02 May 2020 05:30) (98% - 100%)    CONSTITUTIONAL: looks older than stated age, sitting on edge of bed, calm, NAD  EYES: scleral icterus  ENMT: Moist oral mucosa  RESPIRATORY: Normal respiratory effort; lungs are clear to auscultation bilaterally  CARDIOVASCULAR: Regular rate and rhythm, normal S1 and S2, no murmur/rub/gallop; No lower extremity edema; Peripheral pulses are 2+ bilaterally  ABDOMEN: obese, Nontender to palpation, normoactive bowel sounds, no rebound/guarding  PSYCH: calm, oriented April, 2020, [facility]  NEUROLOGY: mild tremor  SKIN: No rashes; no palpable lesions    LABS:                        10.0   5.73  )-----------( 179      ( 02 May 2020 05:52 )             30.0     05-02    138  |  101  |  7   ----------------------------<  105<H>  3.8   |  23  |  0.79    Ca    9.5      02 May 2020 05:52  Phos  4.0     05-02  Mg     1.8     05-02    TPro  7.4  /  Alb  3.8  /  TBili  5.7<H>  /  DBili  x   /  AST  143<H>  /  ALT  43<H>  /  AlkPhos  200<H>  05-02    PT/INR - ( 02 May 2020 05:52 )   PT: 15.6 SEC;   INR: 1.36          PTT - ( 02 May 2020 05:52 )  PTT:36.5 SEC            RADIOLOGY & ADDITIONAL TESTS:  Results Reviewed:   Imaging Personally Reviewed:  Electrocardiogram Personally Reviewed:    COORDINATION OF CARE:  Care Discussed with Consultants/Other Providers [Y/N]: RN, SW, CM, ACP re overall care   Prior or Outpatient Records Reviewed [Y/N]:

## 2020-05-02 NOTE — PROGRESS NOTE ADULT - PROBLEM SELECTOR PLAN 5
Pt reported having diarrhea over past couple days unclear if its in the setting of melena. He also reported having intermittent cough- but denies this today.  -COVID PCR neg x2 some diarrhea reported earlier in hospitalization, last BM recorded 4/26, did have watery today - not on bowel regimen, is on CTX, add lactobacillus, send stool studies if persistent diarrhea

## 2020-05-02 NOTE — PROGRESS NOTE ADULT - PROBLEM SELECTOR PLAN 3
appreciate hepatology input - c/w CTX 1g qd x7d, s/p PPI/octreotide gtt 5d  abdominal u/s with hepatic steatosis and nodularity  trend MELD labs daily  follow with Hepatology as outpatient on discharge ([facility]: #[phone], [facility]- [phone]).

## 2020-05-03 NOTE — DISCHARGE NOTE PROVIDER - CARE PROVIDER_API CALL
Vikas Clancy)  Internal Medicine  [address], [address]  [place] [address]  Phone: [phone]  Fax: [phone]  Follow Up Time: 1 week

## 2020-05-03 NOTE — DISCHARGE NOTE PROVIDER - NSDCFUADDAPPT_GEN_ALL_CORE_FT
Please follow up at the [facility]/Division of Hepatology by calling [phone] to set up and appointment to continue to evaluate and treat your liver disease. Please follow up at the [facility]/Division of Hepatology by calling [phone] to set up and appointment to continue to evaluate and treat your liver disease.    Please call the following number to make outpatient appointment for treatments for alcohol use disorder:  ASIM DURBIN:  [phone]  [facility] Project Outreach:  [phone]  [facility]:  [phone] Please go to [facility] (walk-in clinic, no appointments needed) the day after discharge to establish care to help with your anxiety and alcohol withdrawal. Address and number provided as above.     Please follow up at the [facility]/Division of Hepatology by calling [phone] to set up and appointment to continue to evaluate and treat your liver disease.    Please call the following number to make outpatient appointment for treatments for alcohol use disorder:  CHELSEA DAERS:  [phone]  [facility] Project Outreach:  [phone]  [facility]:  [phone]

## 2020-05-03 NOTE — PROGRESS NOTE ADULT - PROBLEM SELECTOR PLAN 5
some diarrhea reported earlier in hospitalization, last BM recorded 4/26, did have watery yesterday - not on bowel regimen, is on CTX, add lactobacillus, will send stool studies if persistent diarrhea some diarrhea reported earlier in hospitalization, last BM recorded 4/26, did have watery stool yesterday - not on bowel regimen, is on CTX, add lactobacillus, will send stool studies if persistent diarrhea

## 2020-05-03 NOTE — DISCHARGE NOTE PROVIDER - NSDCMRMEDTOKEN_GEN_ALL_CORE_FT
folic acid 1 mg oral tablet: 1 tab(s) orally once a day  Multiple Vitamins oral tablet: 1 tab(s) orally once a day  pantoprazole 40 mg oral delayed release tablet: 1 tab(s) orally once a day   thiamine 100 mg oral tablet: 1 tab(s) orally once a day

## 2020-05-03 NOTE — DISCHARGE NOTE PROVIDER - NSFOLLOWUPCLINICS_GEN_ALL_ED_FT
[facility] [place]  Internal Medicine  [address] [place] [address]  Phone: [phone]  Fax: [phone]  Follow Up Time: [facility] [place]  Internal Medicine  [address] [place] [address]  Phone: [phone]  Fax: [phone]    [facility]  Behavioral Health  [address] [place] [address]  Phone: [phone]  Fax:   Follow Up Time: 1-3 days

## 2020-05-03 NOTE — DISCHARGE NOTE PROVIDER - NSDCCPCAREPLAN_GEN_ALL_CORE_FT
PRINCIPAL DISCHARGE DIAGNOSIS  Diagnosis: Alcohol withdrawal  Assessment and Plan of Treatment:       SECONDARY DISCHARGE DIAGNOSES  Diagnosis: Alcoholic cirrhosis, unspecified whether ascites present  Assessment and Plan of Treatment: Alcoholic cirrhosis, unspecified whether ascites present    Diagnosis: Transaminitis  Assessment and Plan of Treatment: PRINCIPAL DISCHARGE DIAGNOSIS  Diagnosis: Melena  Assessment and Plan of Treatment: You came to the hospital with dark stool, concerning for GI bleeding. You were treated with medications, and remained stable without further episodes of bleeding. GI team saw you, and didn't think endoscopy was necessary. Please follow up with GI or hepatology outpatient, avoid alcohol intake, and monitor for signs of bleeding. If you expeirence bleeding from vomiting or stool, lightheadedness or fainting, please return to the hospital immediately.      SECONDARY DISCHARGE DIAGNOSES  Diagnosis: Alcohol withdrawal  Assessment and Plan of Treatment: You had symptoms of alcohol withdrawal, and was treated with Ativan. Your symptoms resolved. Please aovid alcohol intake as heavy alcohol use can lead to serious health conditions including seizures, and even death. Please follow up with AA group outpatient. If you experience visual hallucinations, severe anxiety, tremors, fast heart beat, seizures or other symptoms relating to alcohol withdrawal, please return to the hospital immediately. Please call the numbers listed for outpatient follow up.    Diagnosis: Alcoholic cirrhosis, unspecified whether ascites present  Assessment and Plan of Treatment: You were found to have early liver cirrhosis (hardening of the liver), likely due to alcohol use. Please avoid alcohol intake and follow up with hepatology outpatient to further manage the cirrhosis.

## 2020-05-03 NOTE — PROGRESS NOTE ADULT - ATTENDING COMMENTS
Agree with assessment and plan as above by Rufina Adams.  Reviewed all pertinent labs and imaging studies. Modifications made as indicated above. Patient was seen and examined independently by me as well after discussion of the case.   Briefly 54 yo male with history of HTN, ETOH abuse, cirrhosis with portal hypertension, admitted with GI bleed, DT and alcohol withdrawal seizure, recent falls as well.  For alcohol withdraw, currently on Ativan taper per psych recommendation.  No plan for GI intervention for melena while in patient, outpatient follow up is needed.  Patient will also need follow up with hepatology upon discharge.      Pool Sierra MD  Please page [phone] with [phone] digit call back number if any questions.    Temporary Work Cell ( via google voice) [phone]

## 2020-05-03 NOTE — PROGRESS NOTE ADULT - SUBJECTIVE AND OBJECTIVE BOX
PROGRESS NOTE:   Authored by Dr. Rufina Harris    Pager [phone] [facility], [id number] LIB     Patient is a 55y old  Male who presents with a chief complaint of EtOH withdrawal (02 May 2020 08:22)    SUBJECTIVE / OVERNIGHT EVENTS: No acute events overnight.    ADDITIONAL REVIEW OF SYSTEMS:     MEDICATIONS  (STANDING):  cefTRIAXone   IVPB 1000 milliGRAM(s) IV Intermittent every 24 hours  folic acid 1 milliGRAM(s) Oral daily  LORazepam     Tablet 1 milliGRAM(s) Oral every 6 hours  LORazepam     Tablet   Oral   melatonin 3 milliGRAM(s) Oral at bedtime  multivitamin 1 Tablet(s) Oral daily  pantoprazole    Tablet 40 milliGRAM(s) Oral every 12 hours    MEDICATIONS  (PRN):  LORazepam   Injectable 2 milliGRAM(s) IV Push every 1 hour PRN Symptom-triggered: each CIWA -Ar score 8 or GREATER    PHYSICAL EXAM:  ICU Vital Signs Last 24 Hrs  T(C): 36.7 (03 May 2020 06:25), Max: 37.5 (03 May 2020 06:17)  T(F): 98.1 (03 May 2020 06:25), Max: 99.5 (03 May 2020 06:17)  HR: 85 (03 May 2020 06:25) (80 - 101)  BP: 133/80 (03 May 2020 06:25) (103/62 - 133/80)  RR: 16 (03 May 2020 06:25) (16 - 18)  SpO2: 100% (03 May 2020 06:25) (98% - 100%)    CONSTITUTIONAL: calm, NAD  EYES: scleral icterus  ENMT: Moist oral mucosa  RESPIRATORY: Normal respiratory effort; lungs are clear to auscultation bilaterally  CARDIOVASCULAR: Regular rate and rhythm, normal S1 and S2, no murmur/rub/gallop; No lower extremity edema; Peripheral pulses are 2+ bilaterally  ABDOMEN: obese, Nontender to palpation, normoactive bowel sounds, no rebound/guarding  PSYCH: A&Ox3, calm/cooperative  NEUROLOGY: mild tremor  SKIN: No rashes; no palpable lesions    LABS:  PENDING AM LABS.                10.0   5.73  )-----------( 179      ( 02 May 2020 05:52 )             30.0     05-02    138  |  101  |  7   ----------------------------<  105<H>  3.8   |  23  |  0.79    Ca    9.5      02 May 2020 05:52  Phos  4.0     05-02  Mg     1.8     05-02    TPro  7.4  /  Alb  3.8  /  TBili  5.7<H>  /  DBili  x   /  AST  143<H>  /  ALT  43<H>  /  AlkPhos  200<H>  05-02    PT/INR - ( 02 May 2020 05:52 )   PT: 15.6 SEC;   INR: 1.36          PTT - ( 02 May 2020 05:52 )  PTT:36.5 SEC    RADIOLOGY & ADDITIONAL TESTS:  Results Reviewed: Y  Imaging Personally Reviewed: Y  Electrocardiogram Personally Reviewed: Y    COORDINATION OF CARE:  Care Discussed with Consultants/Other Providers [Y/N]: RN, SW, CM, ACP re overall care   Prior or Outpatient Records Reviewed [Y/N]: N PROGRESS NOTE:   Authored by Dr. Rufina Harris    Pager [phone] [facility], [address] LIZ     Patient is a 55y old  Male who presents with a chief complaint of EtOH withdrawal (02 May 2020 08:22)    SUBJECTIVE / OVERNIGHT EVENTS: No acute events overnight. Patient A&Ox3. No tremor or tongue fasciculations. Denies pain, fevers, dizziness, and nausea/vomiting. Denies episodes of diarrhea overnight.     ADDITIONAL REVIEW OF SYSTEMS:     MEDICATIONS  (STANDING):  cefTRIAXone   IVPB 1000 milliGRAM(s) IV Intermittent every 24 hours  folic acid 1 milliGRAM(s) Oral daily  LORazepam     Tablet 1 milliGRAM(s) Oral every 6 hours  LORazepam     Tablet   Oral   melatonin 3 milliGRAM(s) Oral at bedtime  multivitamin 1 Tablet(s) Oral daily  pantoprazole    Tablet 40 milliGRAM(s) Oral every 12 hours    MEDICATIONS  (PRN):  LORazepam   Injectable 2 milliGRAM(s) IV Push every 1 hour PRN Symptom-triggered: each CIWA -Ar score 8 or GREATER    PHYSICAL EXAM:  ICU Vital Signs Last 24 Hrs  T(C): 36.7 (03 May 2020 06:25), Max: 37.5 (03 May 2020 06:17)  T(F): 98.1 (03 May 2020 06:25), Max: 99.5 (03 May 2020 06:17)  HR: 85 (03 May 2020 06:25) (80 - 101)  BP: 133/80 (03 May 2020 06:25) (103/62 - 133/80)  RR: 16 (03 May 2020 06:25) (16 - 18)  SpO2: 100% (03 May 2020 06:25) (98% - 100%)    CONSTITUTIONAL: calm, NAD  EYES: scleral icterus  ENMT: Moist oral mucosa  RESPIRATORY: Normal respiratory effort; lungs are clear to auscultation bilaterally  CARDIOVASCULAR: Regular rate and rhythm, normal S1 and S2, no murmur/rub/gallop; No lower extremity edema; Peripheral pulses are 2+ bilaterally  ABDOMEN: obese, Nontender to palpation, normoactive bowel sounds, no rebound/guarding  PSYCH: A&Ox3, calm/cooperative  NEUROLOGY: mild tremor  SKIN: No rashes; no palpable lesions    LABS:                        9.4    5.26  )-----------( 190      ( 03 May 2020 05:48 )             27.2                 10.0   5.73  )-----------( 179      ( 02 May 2020 05:52 )             30.0     05-03    139  |  103  |  8   ----------------------------<  125<H>  3.0<L>   |  21<L>  |  0.82    05-02    138  |  101  |  7   ----------------------------<  105<H>  3.8   |  23  |  0.79    Ca   8.9   03 May 2020 05:48 /  , Ca   9.5   02 May 2020 05:52 /    Phos  3.5  05-03 /, Phos  4.0  05-02 /  Mg   1.8   05-03 /, Mg   1.8   05-02 /  TPro  6.8  /  Alb  3.4  /  TBili  4.2<H>  /  DBili  x   /  AST  102<H>  /  ALT  33  /  AlkPhos  174<H>  05-03    TPro  7.4  /  Alb  3.8  /  TBili  5.7<H>  /  DBili  x   /  AST  143<H>  /  ALT  43<H>  /  AlkPhos  200<H>  05-02    PT/INR - ( 03 May 2020 05:48 )   PT: 15.5 SEC;   INR: 1.35     PTT - ( 03 May 2020 05:48 )  PTT:34.7 SEC    PT/INR - ( 02 May 2020 05:52 )   PT: 15.6 SEC;   INR: 1.36     PTT - ( 02 May 2020 05:52 )  PTT:36.5 SEC    RADIOLOGY & ADDITIONAL TESTS:  Results Reviewed: Y  Imaging Personally Reviewed: Y  Electrocardiogram Personally Reviewed: Y    COORDINATION OF CARE:  Care Discussed with Consultants/Other Providers [Y/N]: RN, SW, CM, ACP re overall care   Prior or Outpatient Records Reviewed [Y/N]: N

## 2020-05-03 NOTE — DISCHARGE NOTE PROVIDER - HOSPITAL COURSE
Patient is a 56 y/o M PMH HTN (noncompliant w/ meds), EtOH abuse, Cirrhosis with portal HTN, prior oxycodone abuse, GI bleeds, DTs and alcoholic withdrawal seizures p/w EtOH withdrawal. Drinks 1 pint of tequila daily, last drink this AM. Reports 2 episodes of melena this AM. Has not followed up w/ GI since discharge 1/2020. Reports nausea and nonbloody vomiting (shortly after arriving to medical floor).        Hospital course:     Alcohol withdrawal:     Blood alcohol level on admission was 402.  Psychiatry was consulted to manage alcohol withdrawal given the patient's history of complicated withdrawal with seizures. The patient was placed on an Ativan taper, and he initially had some breakthrough symptoms (visual hallucinations) that then responded to a higher-dose taper. The patient completed his Ativan taper on ________. Patient received IV thiamine 500mg x 3 days, and then he was continued on PO thiamine 100mg as well PO folic acid and multivitamin.  was consulted to assess readiness to accept substance abuse services and make referrals.        Transaminitis:    Hepatology consulted for management of alcoholic hepatitis and cirrhosis. Patient did not require steroids for alcoholic hepatitis per his MDF score. He  was treated with octreotide and PPIs. He received IV ceftriaxone 1000mg x 7 days for spontaneous bacterial peritonitis prophylaxis. MELD labs were trended.  CBC was trended to monitor for Hgb < 7 and platelets < 50. Electrolytes were monitored and repleted as needed. Abdominal u/s showed hepatomegaly with hepatic steatosis and possible nodularity. A distended gallbladder without evidence of acute cholecystitis was also reported. The patient is to follow-up with outpatient hepatology for continued treatment and monitoring.        Melena:     Symptoms resolved in hospital; Hgb remained stable with no overt signs of bleeding. Per GI consultation, there was no need for endoscopic evaluation in the hospital.         HTN:    Patient was monitored off medications in the hospital: blood pressure remained within normal limits.        On day of discharge, ____. Patient is a 54 y/o M PMH HTN (noncompliant w/ meds), EtOH abuse, Cirrhosis with portal HTN, prior oxycodone abuse, GI bleeds, DTs and alcoholic withdrawal seizures p/w EtOH withdrawal. Drinks 1 pint of tequila daily, last drink this AM. Reports 2 episodes of melena this AM. Has not followed up w/ GI since discharge 1/2020. Reports nausea and nonbloody vomiting (shortly after arriving to medical floor).        Hospital course:     Alcohol withdrawal:     Blood alcohol level on admission was 402.  Psychiatry was consulted to manage alcohol withdrawal given the patient's history of complicated withdrawal with seizures. The patient was placed on an Ativan taper, and he initially had some breakthrough symptoms (visual hallucinations) that then responded to a higher-dose taper. The patient completed his Ativan taper on 5/5/20. Patient received IV thiamine 500mg x 3 days, and then he was continued on PO thiamine 100mg as well PO folic acid and multivitamin.  was consulted to assess readiness to accept substance abuse services and make referrals. However, patient has no insurance. Recommended for AA group.         Transaminitis:    Hepatology consulted for management of alcoholic hepatitis and cirrhosis. Patient did not require steroids for alcoholic hepatitis per his MDF score. He  was treated with octreotide and PPIs. He received IV ceftriaxone 1000mg x 7 days for spontaneous bacterial peritonitis prophylaxis. MELD labs were trended.  CBC was trended to monitor for Hgb < 7 and platelets < 50. Electrolytes were monitored and repleted as needed. Abdominal u/s showed hepatomegaly with hepatic steatosis and possible nodularity. A distended gallbladder without evidence of acute cholecystitis was also reported. The patient is to follow-up with outpatient hepatology for continued treatment and monitoring.        Melena:     Symptoms resolved in hospital; Hgb remained stable with no overt signs of bleeding. Per GI consultation, there was no need for endoscopic evaluation in the hospital.         HTN:    Patient was monitored off medications in the hospital: blood pressure remained within normal limits.        On day of discharge, patient has no symptoms of alcohol withdrawal and hemodynamically stable . Patient is a 56 y/o M PMH HTN (noncompliant w/ meds), EtOH abuse, Cirrhosis with portal HTN, prior oxycodone abuse, GI bleeds, DTs and alcoholic withdrawal seizures p/w EtOH withdrawal. Drinks 1 pint of tequila daily, last drink this AM. Reports 2 episodes of melena this AM. Has not followed up w/ GI since discharge 1/2020. Reports nausea and nonbloody vomiting (shortly after arriving to medical floor).        Hospital course:     Alcohol withdrawal:     Blood alcohol level on admission was 402.  Psychiatry was consulted to manage alcohol withdrawal given the patient's history of complicated withdrawal with seizures. The patient was placed on an Ativan taper, and he initially had some breakthrough symptoms (visual hallucinations) that then responded to a higher-dose taper. The patient completed his Ativan taper on 5/5/20. Patient received IV thiamine 500mg x 3 days, and then he was continued on PO thiamine 100mg as well PO folic acid and multivitamin. Patient will follow up outpatient per psych rec.     Transaminitis:    Hepatology consulted for management of alcoholic hepatitis and cirrhosis. Patient did not require steroids for alcoholic hepatitis per his MDF score. He  was treated with octreotide and PPIs. He received IV ceftriaxone 1000mg x 7 days for spontaneous bacterial peritonitis prophylaxis. MELD labs were trended.  CBC was trended to monitor for Hgb < 7 and platelets < 50. Electrolytes were monitored and repleted as needed. Abdominal u/s showed hepatomegaly with hepatic steatosis and possible nodularity. A distended gallbladder without evidence of acute cholecystitis was also reported. The patient is to follow-up with outpatient hepatology for continued treatment and monitoring.        Melena:     Symptoms resolved in hospital; Hgb remained stable with no overt signs of bleeding. Per GI consultation, there was no need for endoscopic evaluation in the hospital.         HTN:    Patient was monitored off medications in the hospital: blood pressure remained within normal limits.        On day of discharge, patient has no symptoms of acute alcohol withdrawal and hemodynamically stable .

## 2020-05-03 NOTE — PROGRESS NOTE ADULT - PROBLEM SELECTOR PLAN 3
appreciate hepatology input - c/w CTX 1g qd x7d, s/p PPI/octreotide gtt 5d  abdominal u/s with hepatic steatosis and nodularity  trend MELD labs daily  follow with Hepatology as outpatient on discharge ([facility]: #[phone], [facility]- [phone]).

## 2020-05-03 NOTE — DISCHARGE NOTE PROVIDER - CARE PROVIDERS DIRECT ADDRESSES
[email or web address]

## 2020-05-04 NOTE — PROGRESS NOTE ADULT - PROBLEM SELECTOR PLAN 6
Per chart review pt is noncompliant. BPs acceptable  -monitor BP for now Per chart review pt is noncompliant. BPs acceptable  - Monitor BP for now

## 2020-05-04 NOTE — PROGRESS NOTE BEHAVIORAL HEALTH - NSBHATTESTSEENBY_PSY_A_CORE
attending Psychiatrist without NP/Trainee
attending Psychiatrist without NP/Trainee
Attending Psychiatrist supervising NP/Trainee, meeting pt...
attending Psychiatrist without NP/Trainee

## 2020-05-04 NOTE — PROGRESS NOTE BEHAVIORAL HEALTH - NSBHCHARTREVIEWLAB_PSY_A_CORE FT
10.1   4.26  )-----------( 147      ( 01 May 2020 06:43 )             29.7
10.3   4.17  )-----------( 110      ( 30 Apr 2020 08:45 )             30.2
9.3    6.16  )-----------( 239      ( 04 May 2020 06:34 )             27.6    05-04    136  |  103  |  10  ----------------------------<  108<H>  3.5   |  20<L>  |  0.84    Ca    8.8      04 May 2020 06:34  Phos  3.2     05-04  Mg     1.8     05-04    TPro  6.9  /  Alb  3.5  /  TBili  3.6<H>  /  DBili  x   /  AST  91<H>  /  ALT  29  /  AlkPhos  172<H>  05-04
10.2   3.41  )-----------( 38       ( 27 Apr 2020 06:15 )             27.6   04-27    135  |  95<L>  |  8   ----------------------------<  163<H>  3.2<L>   |  19<L>  |  0.61    Ca    8.1<L>      27 Apr 2020 06:15  Phos  2.2     04-27  Mg     1.6     04-27    TPro  6.5  /  Alb  3.4  /  TBili  5.6<H>  /  DBili  x   /  AST  235<H>  /  ALT  43<H>  /  AlkPhos  195<H>  04-27

## 2020-05-04 NOTE — PROGRESS NOTE BEHAVIORAL HEALTH - NSBHCHARTREVIEWVS_PSY_A_CORE FT
ICU Vital Signs Last 24 Hrs  T(C): 36.8 (01 May 2020 09:00), Max: 37.6 (30 Apr 2020 21:00)  T(F): 98.3 (01 May 2020 09:00), Max: 99.6 (30 Apr 2020 21:00)  HR: 98 (01 May 2020 10:57) (94 - 108)  BP: 122/86 (01 May 2020 10:57) (110/69 - 136/84)  BP(mean): --  ABP: --  ABP(mean): --  RR: 18 (01 May 2020 10:57) (17 - 20)  SpO2: 98% (01 May 2020 10:57) (98% - 100%)
ICU Vital Signs Last 24 Hrs  T(C): 37.2 (30 Apr 2020 09:00), Max: 37.4 (29 Apr 2020 23:00)  T(F): 98.9 (30 Apr 2020 09:00), Max: 99.4 (29 Apr 2020 23:00)  HR: 102 (30 Apr 2020 09:00) (88 - 108)  BP: 130/92 (30 Apr 2020 09:00) (110/66 - 149/95)  BP(mean): --  ABP: --  ABP(mean): --  RR: 17 (30 Apr 2020 09:00) (16 - 18)  SpO2: 99% (30 Apr 2020 09:00) (98% - 100%)
Vital Signs Last 24 Hrs  T(C): 37.1 (04 May 2020 10:04), Max: 37.5 (03 May 2020 17:49)  T(F): 98.7 (04 May 2020 10:04), Max: 99.5 (03 May 2020 17:49)  HR: 97 (04 May 2020 10:04) (92 - 103)  BP: 103/68 (04 May 2020 10:04) (99/60 - 121/81)  BP(mean): --  RR: 17 (04 May 2020 10:04) (16 - 18)  SpO2: 97% (04 May 2020 10:04) (97% - 100%)
Vital Signs Last 24 Hrs  T(C): 36.9 (27 Apr 2020 15:27), Max: 37.7 (26 Apr 2020 18:02)  T(F): 98.4 (27 Apr 2020 15:27), Max: 99.9 (26 Apr 2020 18:02)  HR: 100 (27 Apr 2020 15:27) (87 - 113)  BP: 116/70 (27 Apr 2020 15:27) (108/67 - 132/86)  BP(mean): --  RR: 18 (27 Apr 2020 15:27) (18 - 18)  SpO2: 98% (27 Apr 2020 15:27) (98% - 99%)

## 2020-05-04 NOTE — PROGRESS NOTE BEHAVIORAL HEALTH - NSBHCONSULTFOLLOWAFTERCARE_PSY_A_CORE FT
Pt may f/u outpatient substance abuse clinic, recommend AA as well.    [facility] DAERHRS:  [phone]  [facility] Project Outreach:  [phone]  [facility]:  [phone]

## 2020-05-04 NOTE — PROGRESS NOTE BEHAVIORAL HEALTH - SUMMARY
Patient is a 55 M PMH HTN (noncompliant w/ meds), EtOH abuse, Cirrhosis with portal HTN, prior oxycodone abuse, GI bleeds, DTs and alcoholic withdrawal seizures p/w EtOH withdrawal.  Patient reports drinking 1 pint tequilla q day with last drink on 4/24. ETOH level on admission 402. Patient with 1 previous detox/rehab admission when using oxy.  Denies inpt psych admission. No hx of psychotropic medications. , however reports having support from family.  Psych consulted for ETOH withdrawal.  Hospital course complicated by DTs, now resolved.    4/29: Patient seen and evaluated in person, received Ativan 2mg PRN x2 on 4/28 and x2 on 4/29, completing Ativan 3mg q4h, CIWA: 7  HR: .  pt. AAOX2 ([facility] May, 2002), patient seems anxious, stated that he is feeling better and wants to go home now. Pt. denies feeling depressed, denies AH and VH, no delusions elicited, stated that staff has been treating him well. Patient adamantly denies SI/I/P and denies HI/I/P.   When asked about the reason for current admission, he stated "alcoholism". Pt. could not elaborate further. Patient was explained in detail multiple times about his current medical issues, treatment he is receiving (ativan for withdrawal) and risks of leaving AMA (worsening of withdrawal, seizures and even death). Patient unable to verbalized understanding of indication/risk/benefits of accepting or refusing the  treatment, unable to appreciate the current situation, unable to manipulate the information in a logical manner when provided to him,  and unable to verbalized understanding of risks of leaving AMA. Based on my evaluation, at this time patient does not have capacity to leave AMA.    4/30: Pt. did not get any PRNs overnight, HR; , CIWA: 5, pt.  seen and evaluated, drowsy, unable to engage in interview, received standing Ativan 3mg at 9;47. Pt. opened his eyes few times and then fell asleep. NO tremors noted on exam. As per pt.'s nurse, pt. still with intermittent agitation but more verbally  redirectable.    5/1; Pt. did not get any PRNs overnight, HR; 95-99, CIWA: 4-5, pt.  seen and evaluated, AAOX3 (April, [facility], 2020), calm,  cooperative, polite, engaging well. Patient stated that he is feeling OK, denies feeling depressed, denies AH and VH, no delusions elicited, feeling safe. Patient adamantly denies SI and HI. He is future oriented, looking forward to go home and rest at home. No tremors noted on exam.    Patient was explained again in detail about his current medical issues, treatment he is receiving (ativan for withdrawal) and risks of leaving AMA (worsening of withdrawal, seizures and even death). Patient unable to verbalized understanding of indication/risk/benefits of accepting or refusing the  treatment, unable to appreciate the current situation, unable to manipulate the information in a logical manner when provided to him,  and unable to verbalized understanding of risks of leaving AMA. Based on my evaluation, at this time patient does not have capacity to leave AMA.    5/4:  Pt calm, cooperative, AAOx4, CIWA 1-2 overnight.  Did not requires PRNs over weekend.    PLAN  1- Would recommend to continue Ativan taper.  Pt. is high risk for withdrawal given h/o heavy drinking, DTs and alcohol withdrawal seizures.  HOLD FOR EXCESSIVE SEDATION/LETHARGY OR RR< 12  - C/w Ativan taper:   ativan 4mg q4hrs x 6 doses   ativan 3mg q4hrs x 6 doses   ativan 2mg q4hrs x 6 doses   ativan 1mg q4hrs x 6 doses  ativan 1mg q6hrs x 4 doses  ativan 1mg q12 hrs x 2 doses (currently on this dose)  ativan 0.5mg q12 hrs x 2 doses  ****taper may need to be altered depending on patients response to treatment.  Please monitor respiratory status and pulse OX very closely and adjust the taper if needed******  - c/w thiamine 500mg IV TID for 3days  - Continue Ativan PRN as per CIWA  - Ammonia level reviewed, wnl (39).  - Does not require inpatient hospitalization at this time.    will follow

## 2020-05-04 NOTE — PROGRESS NOTE ADULT - ASSESSMENT
55 M PMH HTN (noncompliant w/ meds), EtOH abuse, Cirrhosis with portal HTN, prior oxycodone abuse, GI bleeds, DTs and alcoholic withdrawal seizures p/w EtOH withdrawal, recent falls, and reported dark stools w/ anemia 55 M w/ HTN (noncompliant with meds), EtOH abuse, cirrhosis with portal HTN, prior oxycodone abuse, GI bleeds, DTs and alcoholic withdrawal seizures p/w EtOH withdrawal, recent falls, and reported dark stools w/ anemia. On Ativan taper & CIWA, stable Hgb.

## 2020-05-04 NOTE — PROGRESS NOTE BEHAVIORAL HEALTH - RISK ASSESSMENT
Risk: male gender, substance abuse, acute withdrawal, waxing and waning mental status in the context of withdrawal  Protective: no SI or HI at this time, no hx of SA, patient future oriented discussing going home, states he has support from family    Patient is not at acute risk of harm to self or others at this time and does not need an inpatient admission, as he does not meet the criteria for inpatient admission. Risk: male gender, substance abuse, acute withdrawal, waxing and waning mental status in the context of withdrawal  Protective: no SI or HI at this time, no hx of SA, patient future oriented discussing going home, states he has support from family, wants to go to outpt. substance abuse program, interested in AA meetings, mental status improving,    Patient is not at acute risk of harm to self or others at this time and does not need an inpatient admission, as he does not meet the criteria for inpatient admission.

## 2020-05-04 NOTE — PROGRESS NOTE ADULT - SUBJECTIVE AND OBJECTIVE BOX
PROGRESS NOTE:   Authoted by Dr. Brenda Malagon MD  Pager [phone] [facility], [id number] J     Patient is a 55y old  Male who presents with a chief complaint of EtOH withdrawal (03 May 2020 09:22)      SUBJECTIVE / OVERNIGHT EVENTS:     REVIEW OF SYSTEMS:    CONSTITUTIONAL: No weakness, fevers or chills  EYES/ENT: No visual changes;  No vertigo or throat pain   NECK: No pain or stiffness  RESPIRATORY: No cough, wheezing, hemoptysis; No shortness of breath  CARDIOVASCULAR: No chest pain or palpitations  GASTROINTESTINAL: No abdominal or epigastric pain. No nausea, vomiting, or hematemesis; No diarrhea or constipation. No melena or hematochezia.  GENITOURINARY: No dysuria, frequency or hematuria  NEUROLOGICAL: No numbness or weakness  SKIN: No itching, rashes    MEDICATIONS  (STANDING):  folic acid 1 milliGRAM(s) Oral daily  LORazepam     Tablet   Oral   LORazepam     Tablet 1 milliGRAM(s) Oral every 12 hours  melatonin 3 milliGRAM(s) Oral at bedtime  multivitamin 1 Tablet(s) Oral daily  pantoprazole    Tablet 40 milliGRAM(s) Oral every 12 hours    MEDICATIONS  (PRN):  LORazepam   Injectable 2 milliGRAM(s) IV Push every 1 hour PRN Symptom-triggered: each CIWA -Ar score 8 or GREATER      CAPILLARY BLOOD GLUCOSE      POCT Blood Glucose.: 104 mg/dL (04 May 2020 06:32)    I&O's Summary    03 May 2020 07:01  -  04 May 2020 07:00  --------------------------------------------------------  IN: 0 mL / OUT: 400 mL / NET: -400 mL        PHYSICAL EXAM:  Vital Signs Last 24 Hrs  T(C): 37.1 (04 May 2020 03:00), Max: 37.5 (03 May 2020 17:49)  T(F): 98.8 (04 May 2020 03:00), Max: 99.5 (03 May 2020 17:49)  HR: 98 (04 May 2020 03:00) (92 - 103)  BP: 99/60 (04 May 2020 03:00) (99/60 - 121/81)  BP(mean): --  RR: 18 (04 May 2020 03:00) (16 - 18)  SpO2: 98% (04 May 2020 03:00) (97% - 100%)    CONSTITUTIONAL: NAD, well-developed  RESPIRATORY: Normal respiratory effort; lungs are clear to auscultation bilaterally  CARDIOVASCULAR: Regular rate and rhythm, normal S1 and S2, no murmur/rub/gallop; No lower extremity edema; Peripheral pulses are 2+ bilaterally  ABDOMEN: Nontender to palpation, normoactive bowel sounds, no rebound/guarding; No hepatosplenomegaly  MUSCLOSKELETAL: no clubbing or cyanosis of digits; no joint swelling or tenderness to palpation  PSYCH: A+O to person, place, and time; affect appropriate  NEURO: Non-focal, no tremors  SKIN: No rashes    LABS:                        9.3    6.16  )-----------( 239      ( 04 May 2020 06:34 )             27.6     05-03    139  |  103  |  8   ----------------------------<  125<H>  3.0<L>   |  21<L>  |  0.82    Ca    8.9      03 May 2020 05:48  Phos  3.5     05-03  Mg     1.8     05-03    TPro  6.8  /  Alb  3.4  /  TBili  4.2<H>  /  DBili  x   /  AST  102<H>  /  ALT  33  /  AlkPhos  174<H>  05-03    PT/INR - ( 04 May 2020 06:34 )   PT: 15.0 SEC;   INR: 1.31          PTT - ( 04 May 2020 06:34 )  PTT:34.6 SEC            RADIOLOGY & ADDITIONAL TESTS:  No new imaging or tests    COORDINATION OF CARE:  Care Discussed with Consultants/Other Providers [Y/N]:  Prior or Outpatient Records Reviewed [Y/N]: PROGRESS NOTE:   Authoted by Dr. Brenda Malagon MD  Pager [phone] [facility], [id number] [address]     Patient is a 55y old  Male who presents with a chief complaint of EtOH withdrawal (03 May 2020 09:22)      SUBJECTIVE / OVERNIGHT EVENTS: CIWA increased from 3-5, s/p Ativan 2 mg IVP. No symptoms afterwards. Patient seen in AM, no tremor/anxiety/n/v/VH/TH/diarrhea/abdominal pain/fever.     REVIEW OF SYSTEMS:    CONSTITUTIONAL: No fevers or chills  EYES/ENT: No throat pain   NECK: No pain  RESPIRATORY: No cough, wheezing; No shortness of breath  CARDIOVASCULAR: No chest pain  GASTROINTESTINAL: No abdominal pain. No nausea, vomiting; No diarrhea or constipation. No melena or hematochezia.  GENITOURINARY: No dysuria  NEUROLOGICAL: No weakness; no tremor, anxiety, visual/tactile hallucination.   SKIN: No itching, rashes    MEDICATIONS  (STANDING):  folic acid 1 milliGRAM(s) Oral daily  LORazepam     Tablet   Oral   LORazepam     Tablet 1 milliGRAM(s) Oral every 12 hours  melatonin 3 milliGRAM(s) Oral at bedtime  multivitamin 1 Tablet(s) Oral daily  pantoprazole    Tablet 40 milliGRAM(s) Oral every 12 hours    MEDICATIONS  (PRN):  LORazepam   Injectable 2 milliGRAM(s) IV Push every 1 hour PRN Symptom-triggered: each CIWA -Ar score 8 or GREATER      CAPILLARY BLOOD GLUCOSE      POCT Blood Glucose.: 104 mg/dL (04 May 2020 06:32)    I&O's Summary    03 May 2020 07:01  -  04 May 2020 07:00  --------------------------------------------------------  IN: 0 mL / OUT: 400 mL / NET: -400 mL        PHYSICAL EXAM:  Vital Signs Last 24 Hrs  T(C): 37.1 (04 May 2020 03:00), Max: 37.5 (03 May 2020 17:49)  T(F): 98.8 (04 May 2020 03:00), Max: 99.5 (03 May 2020 17:49)  HR: 98 (04 May 2020 03:00) (92 - 103)  BP: 99/60 (04 May 2020 03:00) (99/60 - 121/81)  BP(mean): --  RR: 18 (04 May 2020 03:00) (16 - 18)  SpO2: 98% (04 May 2020 03:00) (97% - 100%)    CONSTITUTIONAL: NAD, well-developed  RESPIRATORY: Normal respiratory effort on room air; lungs are clear to auscultation bilaterally  CARDIOVASCULAR: Regular rate and rhythm, normal S1 and S2; No lower extremity edema  ABDOMEN: Nontender to palpation, normoactive bowel sounds, (+) hepatomegaly  MUSCULOSKELETAL no clubbing or cyanosis of digits; no joint swelling or tenderness to palpation  PSYCH: A+O to person, place, and time; affect appropriate, no anxiety  NEURO: Non-focal, no tremors  SKIN: No rashes    LABS:                        9.3    6.16  )-----------( 239      ( 04 May 2020 06:34 )             27.6     05-03    139  |  103  |  8   ----------------------------<  125<H>  3.0<L>   |  21<L>  |  0.82    Ca    8.9      03 May 2020 05:48  Phos  3.5     05-03  Mg     1.8     05-03    TPro  6.8  /  Alb  3.4  /  TBili  4.2<H>  /  DBili  x   /  AST  102<H>  /  ALT  33  /  AlkPhos  174<H>  05-03    PT/INR - ( 04 May 2020 06:34 )   PT: 15.0 SEC;   INR: 1.31          PTT - ( 04 May 2020 06:34 )  PTT:34.6 SEC          RADIOLOGY & ADDITIONAL TESTS:  No new imaging or tests    COORDINATION OF CARE:  Care Discussed with Consultants/Other Providers [Y/N]: Psych, hepatology  Prior or Outpatient Records Reviewed [Y/N]: N/A

## 2020-05-04 NOTE — PROGRESS NOTE ADULT - PROBLEM SELECTOR PLAN 3
appreciate hepatology input - c/w CTX 1g qd x7d, s/p PPI/octreotide gtt 5d  abdominal u/s with hepatic steatosis and nodularity  trend MELD labs daily  follow with Hepatology as outpatient on discharge ([facility]: #[phone], [facility]- [phone]). - C/w CTX 1g qd (4/27-5/3)  - S/p PPI/octreotide gtt (4/26-4/29)  - Abdominal u/s with hepatomegly, hepatic steatosis and mild surface nodularity  - Trend MELD labs daily  - Follow with Hepatology as outpatient on discharge ([facility]: #[phone], [facility]- [phone]). - C/w CTX 1g qd (4/27-5/3)  - S/p PPI/octreotide gtt (4/26-4/29)  - Abdominal u/s with hepatomegly, hepatic steatosis and mild surface nodularity  - Trend MELD labs daily  - MELD-Na 17 on 5/4 with < 2% 90-day mortality risk  - Follow with Hepatology as outpatient on discharge ([facility]: #[phone], [facility]- [phone]).

## 2020-05-04 NOTE — PROVIDER CONTACT NOTE (OTHER) - ACTION/TREATMENT ORDERED:
Give 2mg Ativan IVP PRN
Administer PRN dose of Ativan 2mg even though patient is scoring a 7 and not an 8 or above. Continue to monitor patient.

## 2020-05-04 NOTE — PROGRESS NOTE ADULT - PROBLEM SELECTOR PLAN 5
some diarrhea reported earlier in hospitalization, last BM recorded 4/26, did have watery stool yesterday - not on bowel regimen, is on CTX, add lactobacillus, will send stool studies if persistent diarrhea Resolved

## 2020-05-04 NOTE — PROGRESS NOTE BEHAVIORAL HEALTH - OTHER
not assessed-in bed more goal directed future oriented, denies SI and HI improving limited future oriented, denies SI and HI, motivated to attend AA meetings

## 2020-05-04 NOTE — PROGRESS NOTE BEHAVIORAL HEALTH - NSBHFUPINTERVALHXFT_PSY_A_CORE
Pt evaluated for follow-up via telehealth platform in light of ongoing COIVD-19 pandemic and to mitigate further infection risk.  Limitations of tele-psychiatric evaluation were discussed and patient consented to tele-psychiatric eval.  Attempted to speak with patient today via Zoom Video call with help of psychiatry telepresenter.    Chart reviewed, no overnight events or PRNs.  CIWA overnight 1-2.  VSS and wnl.  This AM pt reports mood as "OK".  States over weekend his sleep and appetite have been "on and off."  Denies complaints of alcohol withdrawal.  No hand tremors or tongue fasciculations noted.  He denies SI/HI/AH/VH.  No delusions elicited.  AAOx4.  States he is interested in outpatient follow up but denies interest in inpatient rehab. Pt evaluated for follow-up via telehealth platform in light of ongoing COIVD-19 pandemic and to mitigate further infection risk.  Limitations of tele-psychiatric evaluation were discussed and patient consented to tele-psychiatric eval.  Attempted to speak with patient today via Zoom Video call with help of psychiatry telepresenter.    Chart reviewed, no overnight events or PRNs.  CIWA overnight 1-2. HR; 92-98  This AM pt reports mood as "OK".  States over weekend his sleep and appetite have been "on and off."  Denies complaints of alcohol withdrawal.  No hand tremors or tongue fasciculations noted.  He denies SI/HI/AH/VH.  No delusions elicited.  AAOx4.  States he is interested in outpatient follow up but denies interest in inpatient rehab. Interested in AA meetings. Stated that he is interested in starting medication for alcohol craving but in outpt. setting.

## 2020-05-04 NOTE — PROGRESS NOTE ADULT - ATTENDING COMMENTS
Agree with assessment and plan as above by Dr. Brenda Malagon.  Reviewed all pertinent labs and imaging studies. Modifications made as indicated above. Patient was seen and examined independently by me as well after discussion of the case.   Briefly 54 yo male with history of HTN, ETOH abuse, cirrhosis with portal hypertension, admitted with GI bleed, DT and alcohol withdrawal seizure, recent falls as well.  For alcohol withdraw, currently on Ativan taper per psych recommendation.  Will see if we can complete the last does of ativan 0.5mg q12 as outpatient.  No plan for GI intervention for melena while in patient, outpatient follow up is needed.  Patient will also need follow up with hepatology upon discharge.  Patient interested to get outpatient rehab for ETOH abuse. Is not interested in AA.     Pool Sierra MD  Please page [phone] with 10 digit call back number if any questions.    Temporary Work Cell ( via google voice) [phone] .

## 2020-05-04 NOTE — PROGRESS NOTE BEHAVIORAL HEALTH - NSBHCONSULTOBSREASON_PSY_A_CORE FT
For elopement as per primary team.  Pt. adamantly denies SI and HI , remains future oriented For elopement as per primary team.  Pt. adamantly denies SI and HI , remains future oriented, treatment seeking, motivated to be sober  No Psychiatric indication for COS 1;1

## 2020-05-04 NOTE — PROGRESS NOTE ADULT - PROBLEM SELECTOR PLAN 7
DVT ppx- Improve score low. Also no pharmacological tx in setting of possible GI bleed. Will use SCDs for now given minimal ambulation, plt slowly improving DVT ppx- SCD 2/2 GIB and ambulatory  Diet: DASH  Activity: as tolerated  Dispo: Pending Ativan taper; possible outpatient EtOH Rx program

## 2020-05-04 NOTE — PROGRESS NOTE ADULT - PROBLEM SELECTOR PLAN 2
completed PPI gtt, octreotide gtt 5 days per hepatology recommendations  - no plans for scope as stable completed PPI gtt, octreotide gtt (4/26-29) per hepatology recommendations  - No plans for scope as stable  - Outpatient hepatology follow up  - C/w Protonix PO 40 mg BID

## 2020-05-04 NOTE — PROGRESS NOTE ADULT - PROBLEM SELECTOR PLAN 1
ETOH on admission was 402, developed +VH, resolved on higher dose ativan --> no further hallucinations - c/w Ativan taper per psych rec's  -c/w folic acid, and MVI;  high dose thiamine  -fall/seizure precautions  -monitor electrolytes and replete PRN  - SW consult for possible EtOH Rx program on d/c ETOH on admission was 402, developed +VH,   - Resolved on higher dose ativan --> no further hallucinations   - C/w Ativan taper per psych rec's, on 1mg BID dose today, will finish taper 5/5; Psych to clear for d/c  - C/w symptom-triggered CIWA, Ativan 2mg x1 overnight for increase of 2. Currently asymptomatic.    - C/w folic acid, and MVI;    - C/w 100 mg PO thiamine daily  - Fall/seizure precautions  - Monitor electrolytes and replete PRN  - SW consult for possible EtOH Rx program on d/c

## 2020-05-04 NOTE — PROVIDER CONTACT NOTE (OTHER) - ASSESSMENT
Patient mildly anxious at bedside, knees shaking, patient denies any tactile, auditory or visual disturbances, mild sweat noted, patient CIWA score 7

## 2020-05-04 NOTE — PROGRESS NOTE ADULT - PROBLEM SELECTOR PLAN 4
likely d/t alcoholic hepatitis - trend labs  US Hepatomegaly with hepatic steatosis. Question mild surface nodularity. Distended gallbladder without sonographic evidence of acute cholecystitis. Likely d/t alcoholic hepatitis   - Slowly downtrending  - US Hepatomegaly with hepatic steatosis. Question mild surface nodularity. Distended gallbladder without sonographic evidence of acute cholecystitis.  - Outpatient hepatology follow-up.

## 2020-05-05 NOTE — PROGRESS NOTE ADULT - PROBLEM SELECTOR PLAN 3
- C/w CTX 1g qd (4/27-5/3)  - S/p PPI/octreotide gtt (4/26-4/29)  - Abdominal u/s with hepatomegly, hepatic steatosis and mild surface nodularity  - Trend MELD labs daily  - MELD-Na 17 on 5/4 with < 2% 90-day mortality risk  - Follow with Hepatology as outpatient on discharge ([facility]: #[phone], [facility]- [phone]).

## 2020-05-05 NOTE — PROGRESS NOTE ADULT - PROBLEM SELECTOR PLAN 2
completed PPI gtt, octreotide gtt (4/26-29) per hepatology recommendations  - No plans for scope as stable  - Outpatient hepatology follow up  - C/w Protonix PO 40 mg BID

## 2020-05-05 NOTE — PROGRESS NOTE ADULT - ATTENDING COMMENTS
Agree with assessment and plan as above by Dr. Brenda Malagon.  Reviewed all pertinent labs and imaging studies. Modifications made as indicated above. Patient was seen and examined independently by me as well after discussion of the case.   Briefly 54 yo male with history of HTN, ETOH abuse, cirrhosis with portal hypertension, admitted with GI bleed, DT and alcohol withdrawal seizure, recent falls as well.  For alcohol withdraw, currently on Ativan taper per psych recommendation.  Will see if we can complete the last does of ativan 0.5mg q12 as outpatient.  No plan for GI intervention for melena while in patient, outpatient follow up is needed.  Discussed the importance to follow up with liver team upon discharge given cirrhosis.  Patient to attend AA upon discharge.    Pool Sierra MD  Please page [phone] with 10 digit call back number if any questions.    Temporary Work Cell ( via google voice) [phone] .

## 2020-05-05 NOTE — PROGRESS NOTE ADULT - SUBJECTIVE AND OBJECTIVE BOX
PROGRESS NOTE:   Authoted by Dr. Brenda Malagon MD  Pager [phone] [facility], [id number] [facility]     Patient is a 55y old  Male who presents with a chief complaint of EtOH withdrawal (04 May 2020 07:01)      SUBJECTIVE / OVERNIGHT EVENTS: CIWA increased to 7, s/p Ativan 2mg IVP x 1. Currently asymptomatic. No tremor/anxiety/n/v/VH/TH/diarrhea/abdominal pain/fever.     REVIEW OF SYSTEMS:    CONSTITUTIONAL: No fevers or chills  EYES/ENT: No throat pain   NECK: No pain  RESPIRATORY: No cough, wheezing; No shortness of breath  CARDIOVASCULAR: No chest pain  GASTROINTESTINAL: No abdominal pain. No nausea, vomiting; No diarrhea or constipation. No melena or hematochezia.  GENITOURINARY: No dysuria  NEUROLOGICAL: No weakness; no tremor, anxiety, visual/tactile hallucination.   SKIN: No itching, rashes    MEDICATIONS  (STANDING):  folic acid 1 milliGRAM(s) Oral daily  LORazepam     Tablet   Oral   LORazepam     Tablet 0.5 milliGRAM(s) Oral every 12 hours  magnesium oxide 400 milliGRAM(s) Oral three times a day with meals  melatonin 3 milliGRAM(s) Oral at bedtime  multivitamin 1 Tablet(s) Oral daily  pantoprazole    Tablet 40 milliGRAM(s) Oral every 12 hours  thiamine 100 milliGRAM(s) Oral daily    MEDICATIONS  (PRN):  LORazepam   Injectable 2 milliGRAM(s) IV Push every 1 hour PRN Symptom-triggered: each CIWA -Ar score 8 or GREATER      CAPILLARY BLOOD GLUCOSE        I&O's Summary      PHYSICAL EXAM:  Vital Signs Last 24 Hrs  T(C): 37.6 (05 May 2020 09:32), Max: 37.9 (04 May 2020 19:41)  T(F): 99.6 (05 May 2020 09:32), Max: 100.2 (04 May 2020 19:41)  HR: 89 (05 May 2020 09:32) (76 - 104)  BP: 113/76 (05 May 2020 09:32) (113/76 - 134/84)  BP(mean): --  RR: 19 (05 May 2020 09:32) (16 - 20)  SpO2: 97% (05 May 2020 09:32) (96% - 100%)    CONSTITUTIONAL: NAD, well-developed  RESPIRATORY: Normal respiratory effort on room air; lungs are clear to auscultation bilaterally  CARDIOVASCULAR: Regular rate and rhythm, normal S1 and S2; No lower extremity edema  ABDOMEN: Nontender to palpation, normoactive bowel sounds, (+) hepatomegaly  MUSCULOSKELETAL no clubbing or cyanosis of digits; no joint swelling or tenderness to palpation  PSYCH: A+O to person, place, and time; affect appropriate, no anxiety  NEURO: Non-focal, no tremors  SKIN: No rashes    LABS:                        9.2    6.27  )-----------( 267      ( 05 May 2020 06:13 )             27.8     05-05    136  |  102  |  8   ----------------------------<  101<H>  3.5   |  21<L>  |  0.68    Ca    9.1      05 May 2020 06:13  Phos  3.7     05-05  Mg     1.9     05-05    TPro  7.0  /  Alb  3.5  /  TBili  3.5<H>  /  DBili  x   /  AST  87<H>  /  ALT  28  /  AlkPhos  165<H>  05-05    PT/INR - ( 05 May 2020 06:13 )   PT: 15.1 SEC;   INR: 1.30          PTT - ( 04 May 2020 06:34 )  PTT:34.6 SEC            RADIOLOGY & ADDITIONAL TESTS:  No new imaging or tests    COORDINATION OF CARE:  Care Discussed with Consultants/Other Providers [Y/N]: Psych  Prior or Outpatient Records Reviewed [Y/N]: N/A

## 2020-05-05 NOTE — PROGRESS NOTE ADULT - ASSESSMENT
55 M w/ HTN (noncompliant with meds), EtOH abuse, cirrhosis with portal HTN, prior oxycodone abuse, GI bleeds, DTs and alcoholic withdrawal seizures p/w EtOH withdrawal, recent falls, and reported dark stools w/ anemia. On Ativan taper & CIWA, stable Hgb.

## 2020-05-05 NOTE — PROGRESS NOTE ADULT - PROBLEM SELECTOR PLAN 6
DVT ppx- SCD 2/2 GIB and ambulatory  Diet: DASH  Activity: as tolerated  Dispo: Pending Ativan taper

## 2020-05-05 NOTE — PROGRESS NOTE ADULT - PROBLEM SELECTOR PLAN 4
Likely d/t alcoholic hepatitis   - Slowly downtrending  - US Hepatomegaly with hepatic steatosis. Question mild surface nodularity. Distended gallbladder without sonographic evidence of acute cholecystitis.  - Outpatient hepatology follow-up.

## 2020-05-05 NOTE — PROGRESS NOTE ADULT - PROBLEM SELECTOR PLAN 1
ETOH on admission was 402, developed +VH,   - Resolved on higher dose ativan --> no further hallucinations   - C/w Ativan taper per psych rec's, on 0.5 mg BID dose today; Psych cleared for d/c 5/4; Will f/u today given Ativan 2mg x 1 overnight for CIWA of 7.   - C/w symptom-triggered CIWA, Currently asymptomatic.    - C/w folic acid, and MVI;    - C/w 100 mg PO thiamine daily  - Fall/seizure precautions  - Monitor electrolytes and replete PRN

## 2020-05-06 NOTE — PROGRESS NOTE ADULT - ASSESSMENT
55 M w/ HTN (noncompliant with meds), EtOH abuse, cirrhosis with portal HTN, prior oxycodone abuse, GI bleeds, DTs and alcoholic withdrawal seizures p/w EtOH withdrawal, recent falls, and reported dark stools w/ anemia. Completed Ativan taper, On CIWA, stable Hgb.

## 2020-05-06 NOTE — PROGRESS NOTE ADULT - PROBLEM SELECTOR PLAN 6
DVT ppx- SCD 2/2 GIB and ambulatory  Diet: DASH  Activity: as tolerated  Dispo: Outpatient follow up

## 2020-05-06 NOTE — PROGRESS NOTE ADULT - PROBLEM SELECTOR PROBLEM 6
Essential hypertension
Need for prophylactic measure

## 2020-05-06 NOTE — PROGRESS NOTE ADULT - PROBLEM SELECTOR PLAN 3
- C/w CTX 1g qd (4/27-5/3)  - S/p PPI/octreotide gtt (4/26-4/29)  - Abdominal u/s with hepatomegly, hepatic steatosis and mild surface nodularity  - Trend MELD labs daily  - MELD-Na 17 on 5/4 with < 2% 90-day mortality risk  - Follow with Hepatology as outpatient on discharge ([facility]: #[phone], [facility]- [phone]).

## 2020-05-06 NOTE — DISCHARGE NOTE NURSING/CASE MANAGEMENT/SOCIAL WORK - PATIENT PORTAL LINK FT
You can access the FollowMyHealth Patient Portal offered by [facility] by registering at the following website: [email or web address]. By joining [facility]’s FollowMyHealth portal, you will also be able to view your health information using other applications (apps) compatible with our system.

## 2020-05-06 NOTE — PROGRESS NOTE ADULT - SUBJECTIVE AND OBJECTIVE BOX
PROGRESS NOTE:   Authoted by Dr. Brenda Malagon MD  Pager [phone] [facility], [id number] [address]     Patient is a 55y old  Male who presents with a chief complaint of EtOH withdrawal (05 May 2020 10:04)      SUBJECTIVE / OVERNIGHT EVENTS: No acute events overnight. CIWA score 0 overnight. Patient seen in the AM, reports only mild anxiety and mild headache. Otherwise asymptomatic.     REVIEW OF SYSTEMS:    CONSTITUTIONAL: No fevers or chills  EYES/ENT: No throat pain   NECK: No pain  RESPIRATORY: No cough, wheezing; No shortness of breath  CARDIOVASCULAR: No chest pain  GASTROINTESTINAL: No abdominal pain. No nausea, vomiting; No diarrhea or constipation. No melena or hematochezia.  GENITOURINARY: No dysuria  NEUROLOGICAL: No weakness; no tremor, visual/tactile hallucination.   SKIN: No itching, rashes    MEDICATIONS  (STANDING):  folic acid 1 milliGRAM(s) Oral daily  melatonin 3 milliGRAM(s) Oral at bedtime  multivitamin 1 Tablet(s) Oral daily  pantoprazole    Tablet 40 milliGRAM(s) Oral every 12 hours  thiamine 100 milliGRAM(s) Oral daily    MEDICATIONS  (PRN):  LORazepam   Injectable 2 milliGRAM(s) IV Push every 1 hour PRN Symptom-triggered: each CIWA -Ar score 8 or GREATER      CAPILLARY BLOOD GLUCOSE        I&O's Summary      PHYSICAL EXAM:  Vital Signs Last 24 Hrs  T(C): 36.8 (06 May 2020 07:34), Max: 37.6 (05 May 2020 09:32)  T(F): 98.2 (06 May 2020 07:34), Max: 99.6 (05 May 2020 09:32)  HR: 81 (06 May 2020 07:34) (68 - 96)  BP: 119/63 (06 May 2020 07:34) (106/57 - 124/85)  BP(mean): --  RR: 15 (06 May 2020 07:34) (15 - 19)  SpO2: 100% (06 May 2020 07:34) (96% - 100%)    CONSTITUTIONAL: NAD, well-developed  RESPIRATORY: Normal respiratory effort on room air; lungs are clear to auscultation bilaterally  CARDIOVASCULAR: Regular rate and rhythm, normal S1 and S2; No lower extremity edema  ABDOMEN: Nontender to palpation, normoactive bowel sounds, (+) hepatomegaly  MUSCULOSKELETAL no clubbing or cyanosis of digits; no joint swelling or tenderness to palpation  PSYCH: A+O to person, place, and time; affect appropriate  NEURO: Non-focal, no tremors  SKIN: No rashes    LABS:                        9.2    6.27  )-----------( 267      ( 05 May 2020 06:13 )             27.8     05-06    137  |  103  |  8   ----------------------------<  100<H>  3.6   |  20<L>  |  0.74    Ca    9.2      06 May 2020 06:15  Phos  3.8     05-06  Mg     2.3     05-06    TPro  7.3  /  Alb  3.6  /  TBili  3.2<H>  /  DBili  x   /  AST  80<H>  /  ALT  29  /  AlkPhos  175<H>  05-06    PT/INR - ( 05 May 2020 06:13 )   PT: 15.1 SEC;   INR: 1.30             RADIOLOGY & ADDITIONAL TESTS:  No new imaging or tests    COORDINATION OF CARE:  Care Discussed with Consultants/Other Providers [Y/N]: Psych  Prior or Outpatient Records Reviewed [Y/N]: N/A

## 2020-05-06 NOTE — PROGRESS NOTE ADULT - PROBLEM SELECTOR PLAN 2
completed PPI gtt, octreotide gtt (4/26-29) per hepatology recommendations  - No plans for scope as stable  - Outpatient hepatology follow up  - C/w Protonix PO 40 mg daily

## 2020-05-06 NOTE — PROGRESS NOTE ADULT - PROBLEM SELECTOR PROBLEM 5
Diarrhea
Essential hypertension

## 2020-05-06 NOTE — DISCHARGE NOTE NURSING/CASE MANAGEMENT/SOCIAL WORK - NSDCFUADDAPPT_GEN_ALL_CORE_FT
Please go to [facility] (walk-in clinic, no appointments needed) the day after discharge to establish care to help with your anxiety and alcohol withdrawal. Address and number provided as above.     Please follow up at the [facility]/Division of Hepatology by calling [phone] to set up and appointment to continue to evaluate and treat your liver disease.    Please call the following number to make outpatient appointment for treatments for alcohol use disorder:  CHELSEA DAERS:  [phone]  [facility]:  [phone]  [facility]:  [phone]

## 2020-05-06 NOTE — PROGRESS NOTE ADULT - REASON FOR ADMISSION
EtOH withdrawal

## 2020-05-06 NOTE — PROGRESS NOTE ADULT - PROBLEM SELECTOR PLAN 1
ETOH on admission was 402, developed +VH,   - Resolved on higher dose ativan --> no further hallucinations   - Completed Ativan taper. Psych cleared for d/c 5/4;   - Will clarify with psych regarding anxiety meds for d/c and outpatient follow up without insurance  - C/w symptom-triggered CIWA, Currently asymptomatic.    - C/w folic acid, and MVI;    - C/w 100 mg PO thiamine daily  - Fall/seizure precautions  - Monitor electrolytes and replete PRN

## 2020-05-06 NOTE — PROGRESS NOTE ADULT - ATTENDING COMMENTS
Patient is a 55-year-old male with chief complaint of alcohol withdrawal, he is finishing up his Ativan taper today.  He is cleared to be discharged from alcohol withdrawal perspective.  Patient with no medical insurance.  He was provided with information about 3 outpatient substance abuse program service.  He was also provided information about crisis walk in unit at [facility].  Agree with psychiatry attending that he should not be discharged home on antianxiety medication as outpatient hydration and follow-up is necessary for patient on medication such as SSRI and benzo.

## 2020-05-06 NOTE — PROGRESS NOTE ADULT - PROBLEM SELECTOR PROBLEM 3
Alcoholic cirrhosis, unspecified whether ascites present
Mirta
Mirta

## 2020-05-06 NOTE — PROGRESS NOTE ADULT - PROBLEM SELECTOR PROBLEM 1
Alcohol withdrawal syndrome without complication

## 2020-07-01 NOTE — ED PROVIDER NOTE - NS ED NOTE AC HIGH RISK COUNTRIES
No

## 2020-07-09 NOTE — ED ADULT NURSE NOTE - SUICIDE SCREENING QUESTION 1
Not on file   Social Needs    Financial resource strain: Not on file    Food insecurity     Worry: Not on file     Inability: Not on file    Transportation needs     Medical: Not on file     Non-medical: Not on file   Tobacco Use    Smoking status: Never Smoker    Smokeless tobacco: Never Used   Substance and Sexual Activity    Alcohol use: Yes     Comment: social    Drug use: No     Comment: + THC states she had one marijuna cookie    Sexual activity: Yes     Partners: Male     Birth control/protection: Surgical     Comment: TL   Lifestyle    Physical activity     Days per week: Not on file     Minutes per session: Not on file    Stress: Not on file   Relationships    Social connections     Talks on phone: Not on file     Gets together: Not on file     Attends Worship service: Not on file     Active member of club or organization: Not on file     Attends meetings of clubs or organizations: Not on file     Relationship status: Not on file    Intimate partner violence     Fear of current or ex partner: Not on file     Emotionally abused: Not on file     Physically abused: Not on file     Forced sexual activity: Not on file   Other Topics Concern    Not on file   Social History Narrative    Not on file       Family History   Problem Relation Age of Onset    Other Mother         muscular dystrophy    High Blood Pressure Mother     Cirrhosis Father     Alcohol Abuse Father     COPD Father     Hypertension Father     Seizures Brother     Diabetes Brother     Cirrhosis Maternal Grandfather     Alcohol Abuse Maternal Grandfather     Alzheimer's Disease Paternal Grandmother     Other Paternal Grandfather         suicide       Allergies:  Penicillins    Home Medications:  Prior to Admission medications    Medication Sig Start Date End Date Taking?  Authorizing Provider   sulfamethoxazole-trimethoprim (BACTRIM DS) 800-160 MG per tablet Take 1 tablet by mouth 2 times daily for 10 days 7/8/20 7/18/20 Yes Jay Jones,    acetaminophen (TYLENOL) 325 MG tablet Take 1 tablet by mouth every 6 hours as needed for Pain 7/6/20   John Guerrero MD   ibuprofen (IBU) 400 MG tablet Take 1 tablet by mouth every 6 hours as needed for Pain 7/6/20   John Guerrero MD   SUMAtriptan (IMITREX) 50 MG tablet take 1 tablet by mouth if needed AT ONSET OF HEADACHE may repeat in 30 MINUTES IF headache PERSISTS 6/22/20   Isabelle Elmore MD   diclofenac (SOLARAZE) 3 % gel Compound: diclofenac3%+baclofen3%+dmso5%+gabapentin6%+lidocaine2%+prilocaine2%. Apply 1-2g topically to affected area TID-QID. 6/18/20   Long Velez MD   Misc. Devices MISC 1 each by Does not apply route once as needed (self directed deep tissue massage) Please dispense one theracane device. Massage back as tolerated to relieve muscle spasms. 6/18/20 6/18/20  Long Velez MD   busPIRone (BUSPAR) 30 MG tablet Take 30 mg by mouth 2 times daily 5/15/20   Isabelle Elmore MD   DULoxetine (CYMBALTA) 60 MG extended release capsule Take 1 capsule by mouth daily Dose increased 5/15/2020 5/15/20   Isabelle Elmore MD   Multiple Vitamins-Minerals (MULTIVITAMIN WITH MINERALS) tablet Take 1 tablet by mouth daily OK to substitute 5/15/20   Isabelle Elmore MD   gabapentin (NEURONTIN) 800 MG tablet Take 1 tablet by mouth 3 times daily for 30 days.  **changed from 600 mg to 800 mg** 5/15/20 6/14/20  Isabelle Elmore MD   lisinopril (PRINIVIL;ZESTRIL) 10 MG tablet Take 1 tablet by mouth daily 1/24/20   Isabelle Elmore MD   carvedilol (COREG) 6.25 MG tablet Take 1 tablet by mouth 2 times daily (with meals) 1/24/20   Isabelle Elmore MD   amLODIPine (NORVASC) 10 MG tablet Take 1 tablet by mouth every evening 1/24/20   Isabelle Elmore MD   baclofen (LIORESAL) 10 MG tablet Take 1 tablet by mouth 3 times daily as needed (MUSCLE SPASMS) Causes sedation, do not drive while taking this medication 1/24/20   Isabelle Elmore MD   fluticasone (FLONASE) 50 MCG/ACT nasal spray fluticasone propionate 50 mcg/actuation nasal spray,suspension    Historical Provider, MD   ammonium lactate (AMLACTIN) 12 % cream ammonium lactate 12 % topical cream    Historical Provider, MD   Blood Pressure KIT Dx: HTN. Needs automatic blood pressure machine to monitor her blood pressure. 12/5/19   Maria Isabel Li, APRN - CNP   Loratadine (CLARITIN) 10 MG CAPS Take 10 mg by mouth daily     Historical Provider, MD       REVIEW OF SYSTEMS    (2-9 systems for level 4, 10 or more for level 5)      Review of Systems   Constitutional: Negative for appetite change, chills, fatigue, fever and unexpected weight change. HENT: Negative for congestion, ear pain, postnasal drip, rhinorrhea, sinus pressure, sinus pain, sore throat and trouble swallowing. Eyes: Negative for pain, redness and visual disturbance. Respiratory: Negative for cough, chest tightness, shortness of breath and wheezing. Cardiovascular: Negative for chest pain, palpitations and leg swelling. Gastrointestinal: Negative for abdominal pain, blood in stool, constipation, diarrhea, nausea and vomiting. Endocrine: Negative for polydipsia and polyuria. Genitourinary: Positive for dysuria, flank pain and hematuria. Negative for difficulty urinating, frequency, menstrual problem, pelvic pain, vaginal bleeding and vaginal discharge. Musculoskeletal: Negative for arthralgias, myalgias and neck stiffness. Skin: Negative for pallor and rash. Allergic/Immunologic: Negative for immunocompromised state. Neurological: Negative for dizziness, syncope, facial asymmetry, speech difficulty, weakness and numbness. Psychiatric/Behavioral: Negative for confusion. The patient is not nervous/anxious.         PHYSICAL EXAM   (up to 7 for level 4, 8 or more for level 5)      INITIAL VITALS:   BP (!) 146/86   Pulse 94   Temp 98.4 °F (36.9 °C) (Oral)   Resp 18   Ht 5' 3\" (1.6 m)   Wt 196 lb (88.9 kg)   SpO2 99%   BMI 34.72 kg/m²     Physical Exam  Constitutional:       General: She is not in acute distress. Appearance: She is well-developed. She is not diaphoretic. HENT:      Head: Normocephalic. Right Ear: External ear normal.      Left Ear: External ear normal.      Mouth/Throat:      Pharynx: No oropharyngeal exudate. Eyes:      General: No scleral icterus. Right eye: No discharge. Left eye: No discharge. Pupils: Pupils are equal, round, and reactive to light. Neck:      Musculoskeletal: Normal range of motion and neck supple. Vascular: No JVD. Cardiovascular:      Rate and Rhythm: Normal rate and regular rhythm. Heart sounds: No murmur. No friction rub. No gallop. Pulmonary:      Effort: Pulmonary effort is normal. No respiratory distress. Breath sounds: Normal breath sounds. No stridor. No wheezing or rales. Chest:      Chest wall: No tenderness. Abdominal:      General: There is no distension. Palpations: Abdomen is soft. Tenderness: There is no abdominal tenderness. There is right CVA tenderness. There is no guarding or rebound. Musculoskeletal: Normal range of motion. General: No tenderness or deformity. Skin:     General: Skin is warm and dry. Capillary Refill: Capillary refill takes less than 2 seconds. Coloration: Skin is not pale. Findings: No erythema or rash. Neurological:      Mental Status: She is alert and oriented to person, place, and time. Motor: No abnormal muscle tone. Coordination: Coordination normal.   Psychiatric:         Behavior: Behavior normal.         Thought Content:  Thought content normal.         DIFFERENTIAL  DIAGNOSIS     PLAN (LABS / IMAGING / EKG):  Orders Placed This Encounter   Procedures    Culture, Urine    XR CHEST STANDARD (2 VW)    CT ABDOMEN PELVIS WO CONTRAST Additional Contrast? None    CBC Auto Differential    Comprehensive Metabolic Panel w/ Reflex to MG    Urinalysis 13 9 - 17 mmol/L    Alkaline Phosphatase 96 35 - 104 U/L    ALT 14 5 - 33 U/L    AST 15 <32 U/L    Total Bilirubin 0.39 0.3 - 1.2 mg/dL    Total Protein 6.9 6.4 - 8.3 g/dL    Alb 4.2 3.5 - 5.2 g/dL    Albumin/Globulin Ratio 1.6 1.0 - 2.5    GFR Non-African American >60 >60 mL/min    GFR African American >60 >60 mL/min    GFR Comment          GFR Staging NOT REPORTED    Urinalysis Reflex to Culture   Result Value Ref Range    Color, UA YELLOW YELLOW    Turbidity UA CLOUDY (A) CLEAR    Glucose, Ur NEGATIVE NEGATIVE    Bilirubin Urine NEGATIVE NEGATIVE    Ketones, Urine NEGATIVE NEGATIVE    Specific Gravity, UA 1.023 1.005 - 1.030    Urine Hgb LARGE (A) NEGATIVE    pH, UA 7.0 5.0 - 8.0    Protein, UA NEGATIVE NEGATIVE    Urobilinogen, Urine Normal Normal    Nitrite, Urine NEGATIVE NEGATIVE    Leukocyte Esterase, Urine MODERATE (A) NEGATIVE    Urinalysis Comments NOT REPORTED    APTT   Result Value Ref Range    PTT 25.8 20.5 - 30.5 sec   Protime-INR   Result Value Ref Range    Protime 9.9 9.0 - 12.0 sec    INR 0.9    HCG Qualitative, Serum   Result Value Ref Range    hCG Qual NEGATIVE NEGATIVE   Microscopic Urinalysis   Result Value Ref Range    -          WBC, UA 10 TO 20 0 - 5 /HPF    RBC, UA 0 TO 2 0 - 4 /HPF    Casts UA NOT REPORTED 0 - 8 /LPF    Crystals, UA NOT REPORTED None /HPF    Epithelial Cells UA 10 TO 20 0 - 5 /HPF    Renal Epithelial, UA NOT REPORTED 0 /HPF    Bacteria, UA MODERATE (A) None    Mucus, UA NOT REPORTED None    Trichomonas, UA NOT REPORTED None    Amorphous, UA NOT REPORTED None    Other Observations UA NOT REPORTED NOT REQ. Yeast, UA NOT REPORTED None       IMPRESSION: 38-year-old female in no acute distress presenting with hematuria, flank pain which began after a recent fall. Patient reports history of UTIs and states that she only has 1 kidney secondary to congenital kidney disease.   Concern is for obstructing ureterolithiasis and unilateral kidney versus pelvic trauma secondary to fall plan for urinalysis, CBC, BMP, CT abdomen pelvis. RADIOLOGY:  Ct Abdomen Pelvis Wo Contrast Additional Contrast? None    Result Date: 7/8/2020  EXAMINATION: CT OF THE ABDOMEN AND PELVIS WITHOUT CONTRAST 7/8/2020 8:09 pm TECHNIQUE: CT of the abdomen and pelvis was performed without the administration of intravenous contrast. Multiplanar reformatted images are provided for review. Dose modulation, iterative reconstruction, and/or weight based adjustment of the mA/kV was utilized to reduce the radiation dose to as low as reasonably achievable. COMPARISON: 04/18/2019 HISTORY: ORDERING SYSTEM PROVIDED HISTORY: Fall, abdominal pain, hematuria, unilateral kidney TECHNOLOGIST PROVIDED HISTORY: Fall, abdominal pain, hematuria, unilateral kidney Is the patient pregnant?->No Reason for Exam: abd pain,hematuria Acuity: Unknown Type of Exam: Unknown Mechanism of Injury: fall FINDINGS: Lower Chest: The lung bases are clear and the heart size is upper limits of normal. Organs: The liver, spleen, pancreas, adrenal glands and right kidney are grossly normal with the limitations of a noncontrast study. The left kidney is surgically absent. The gallbladder is surgically absent. Bile ducts are not dilated. GI/Bowel: Unopacified bowel loops are unremarkable. The appendix is normal. No bowel obstruction. Pelvis: Uterus and ovaries are unremarkable. Urinary bladder is grossly normal. Peritoneum/Retroperitoneum: There is no adenopathy, free air or free fluid. There is no urolithiasis. Bones/Soft Tissues: No acute bone or soft tissue abnormality. No acute finding in the abdomen or pelvis. The left kidney is surgically absent. The right kidney is normal with no evidence of a renal or ureteral calculus.      Xr Chest Standard (2 Vw)    Result Date: 7/8/2020  EXAMINATION: TWO XRAY VIEWS OF THE CHEST 7/8/2020 7:03 pm COMPARISON: 12/01/2019 HISTORY: ORDERING SYSTEM PROVIDED HISTORY: Fall, rib pain TECHNOLOGIST PROVIDED HISTORY: Fall, rib pain Reason for Exam: Pt fell twice, Hx muscular dystrafy, c/o bi-lateral lower back ribs/ kidney area pain FINDINGS: Poor inspiratory effort. Heart size and pulmonary vessels normal when accounting for lung volumes. Lungs clear. Costophrenic angles sharp. No pneumothorax no definite rib fracture       EMERGENCY DEPARTMENT COURSE:  Patient evaluated bedside, also stable and afebrile. Patient with complaints of dysuria, flank pain and hematuria. Urinalysis reveals evidence of urinary tract infection with hematuria. CT abdomen pelvis any evidence of obstructing nephrolithiasis or ureterolithiasis. No evidence of acute traumatic injury on CT abdomen pelvis. Radiographically negative chest x-ray    Urology consulted for acute cystitis with hematuria concomitant with unilateral kidney. Urology recommends outpatient follow-up and prescription for p.o. Bactrim. Patient will likely need VCUG versus cystoscopy on outpatient follow-up with neurology. Patient discharged home with prescription for p.o. Bactrim and given referral to outpatient for urology. Verbalized understanding of and agreement with the ED return cautions and discharge plan    PROCEDURES:  None    CONSULTS:  IP CONSULT TO UROLOGY    CRITICAL CARE:  Please see attending note    FINAL IMPRESSION      1.  Acute cystitis with hematuria          DISPOSITION / PLAN     DISPOSITION Decision To Discharge 07/08/2020 09:40:24 PM      PATIENT REFERRED TO:  Pablo Drake MD  [address]  [address] [facility]  [address] [facility] [address]  [phone]    Schedule an appointment as soon as possible for a visit in 1 week  For re-evaluation    [address] [facility] [address] [address]  [phone]  Schedule an appointment as soon as possible for a visit in 1 week  Schedule an appointment for follow-up with urology      DISCHARGE MEDICATIONS:  Discharge Medication List as of 7/8/2020  9:41 PM      START taking these medications    Details   sulfamethoxazole-trimethoprim (BACTRIM DS) 800-160 MG per tablet Take 1 tablet by mouth 2 times daily for 10 days, Disp-20 tablet, R-0Print             Mike Sanchez DO  Emergency Medicine Resident    (Please note that portions of thisnote were completed with a voice recognition program.  Efforts were made to edit the dictations but occasionally words are mis-transcribed.)       Mike Sanchez DO  Resident  07/09/20 [id number] No

## 2020-08-26 NOTE — ED PROVIDER NOTE - ATTENDING CONTRIBUTION TO CARE
EKG: NSR@ ? age anterior infarct I have seen and examined the patient on the patient´s visit date. I have reviewed the note written by Nilo Altamirano [facility], on that visit day. I have supervised and participated as necessary in the performance of procedures indicated for patient management and was available at all phases of the patient´s visit when needed. We discussed the history, physical exam findings, mnagement plan, and  medical decision making. I have made my additons, exceptions, and revisions within the chart and I agree with H and P as documented in its entirety. The data and my interpretation of any data collected from labs, interventions and imaging appear below as well as my independent medical decision making and considerations    The patient is a 55y Male who has a past medical and surgery history of Sciatica HTN (hypertension) ETOH abuse Grief reaction S/P Repair of Inguinal Hernia PTED with   Vital Signs Last 24 Hrs  T(F): 98.9 HR: 90 BP: 161/86 RR: 20 SpO2: 96% (26 Aug 2020 09:45)  PE: as described; my additions and exceptions are noted in the chart  DATA:   EKG: NSR@91 ? age anterior infarct  Lab Results:                        13.5   7.27  )-----------( 141      ( 26 Aug 2020 09:50 )             41.4   Mean Cell Volume: 95.8 fL (08-26-20 @ 09:50) Auto Neutrophil %: 71.9 % (08-26-20 @ 09:50) Auto Eosinophil %: 2.3 % (08-26-20 @ 09:50)  08-26    142  |  100  |  6<L>  ----------------------------<  120<H>  3.7   |  28  |  0.67    Ca    9.8      26 Aug 2020 09:50    TPro  7.8  /  Alb  4.1  /  TBili  0.8  /  DBili  x   /  AST  26  /  ALT  6   /  AlkPhos  123<H>  08-26  Lipase, Serum: 20.7 U/L (08-26-20 @ 09:50)  Trop #1=8    IMAGING: Prelim read GEOVANI/E/PTx   MDM:  IMP (IRISK): multiple etiologies for chest pain; acs (first trop borderline; will repeat), vs gastritis, pancreatitis (alcohol hx todays normal ), HB dz (h.o. cirrhosis without ascites- alk PO4 abnormal but other LFT's normal)     Management (Plan):EKG: NSR@ ? age anterior infarct  Plan  repeat trop EKG  symptomatic treatment  reassess  probable d/c

## 2020-08-26 NOTE — ED ADULT NURSE REASSESSMENT NOTE - NS ED NURSE REASSESS COMMENT FT1
Pt received from LISSETTE Lei a&ox4. No complaints at this time. No distress noted. Respirations even and unlabored. Medicated as ordered. Will continue to monitor.

## 2020-08-26 NOTE — ED PROVIDER NOTE - PATIENT PORTAL LINK FT
You can access the FollowMyHealth Patient Portal offered by [facility] by registering at the following website: [email or web address]. By joining [employer]’s FollowMyHealth portal, you will also be able to view your health information using other applications (apps) compatible with our system.

## 2020-08-26 NOTE — ED ADULT NURSE NOTE - NSSEPSISSUSPECTED_ED_A_ED
Discharge Summary:    Pt was admitted voluntarily for increased depression and SI with a plan to shoot herself.  This was a planned discharge.  Pt was discharged to her home and transportation was being provided by her father.  During her hospitalization pt was consistently pleasant and cooperative with unit staff and socially appropriate with other pts on the unit.  Pt actively participated in group programming and attended regularly.  Pt completed her safety plan and denied any SI, HI or safety concerns.  Pt stated her readiness for discharge and reported that she was feeling much better since the time of her admission.  Pt has follow up in place to begin our PHP and will then see Dr. Webb at [facility] following PHP.  Pt's affect was bright and positive and she was looking forward to returning home.  Pt was pleasant and cooperative with [facility] throughout the discharge process.           No

## 2020-08-26 NOTE — ED ADULT NURSE NOTE - OBJECTIVE STATEMENT
A+OX4 c/o CP with SOB that began last night.  Denies any cardiac history, drug/alcohol, or smoking use.  SOB noted with speaking.  Labs obtained and sent as ordered.  #20g SL R AC placed.  EKG done.  CM placed.

## 2020-08-26 NOTE — ED PROVIDER NOTE - CARE PLAN
Principal Discharge DX:	Chest pain, unspecified type  Secondary Diagnosis:	ETOH abuse  Secondary Diagnosis:	Hypertension, unspecified type

## 2020-08-26 NOTE — ED ADULT NURSE NOTE - RESPIRATORY RATE (BREATHS/MIN)
Dyspnea, elevated BNP and bilateral lower extremity edema  Started on IV Lasix 20 mg twice daily  Echo EF 30%  Fluid and salt restriction  Cards Maddie velez   20

## 2020-08-26 NOTE — ED PROVIDER NOTE - CLINICAL SUMMARY MEDICAL DECISION MAKING FREE TEXT BOX
56 y/o male pmh htn, former smoker w/ chest pain x1 day- heart score 2, will check labs and reassess. CDU vs close outpatient f/u for stress.

## 2020-08-26 NOTE — ED PROVIDER NOTE - PROGRESS NOTE DETAILS
CECELIA Altamirano- Pt feeling better after ER stay, repeat trop unchanged. Pt states that he can see his PMD this week for f/u. stable for dc

## 2020-09-08 PROBLEM — K70.0 FATTY LIVER, ALCOHOLIC: Status: ACTIVE | Noted: 2019-05-16

## 2020-09-08 PROBLEM — Z23 INFLUENZA VACCINE NEEDED: Status: ACTIVE | Noted: 2020-01-01

## 2020-09-08 PROBLEM — F10.11 ALCOHOL ABUSE, IN REMISSION: Status: ACTIVE | Noted: 2019-11-13

## 2020-09-08 PROBLEM — R07.89 ATYPICAL CHEST PAIN: Status: ACTIVE | Noted: 2020-01-01

## 2020-09-08 PROBLEM — K74.60 CIRRHOSIS: Status: ACTIVE | Noted: 2020-01-30

## 2020-09-08 NOTE — HISTORY OF PRESENT ILLNESS
[de-identified] : presents for f/u visit for review of progress after ER visit on 8/26/20 for episode of chest tightness. evaluated and no significant findings, chest discomfort resolved. nonexertional, no associated significant dyspnea. lab testing unremarkable including troponins, EKG and CXR unremarkable. chest sensation has resolved and he has not noted any recurrence. he does not have significant GERD symptoms. \par uncontrolled HTN noted, he has not been taking amlodipine as prescribed, requests renewal today. \par \par he has maintained his weight loss over past few months \par \par he had relapse of his alcoholism in 5/20 w hospitalization. says he has been sober x 3 months since then and feels he is doing well \par \par reviewed again extensively w pt his known dx of cirrhosis and need for regular hepatology f/u as recommended. he has not yet scheduled EGD to evaluate for varices as recommended by Dr Wood hepatology. he is not taking any other rx currently.

## 2020-09-08 NOTE — ASSESSMENT
[FreeTextEntry1] : discussed w pt \par reviewed his info from ER visit in detail, reviewed results w pt\par no recurrence of nonexertional chest discomfort\par he has info for cardiology consult and he plans to make appt, would agree with this as precaution, though likely his episode was noncardiac-related. \par \par advised to restart amlodipine immediately as HTN is uncontrolled, renewed. informed him he also had refills at the pharmacy. compliance advised \par \par encouraged him to maintain abstinence from alcohol, risks reviewed. discussed cirrhosis in detail and need to f/u w Dr Wood hepatology. schedule EGD as advised for further eval and surveillance \par \par he is overdue for next Hep B vaccine, will administer today \par influenza vaccine discussed and administered today\par \par RTO 1 month for close f/u or earlier prn if any new concerns 
Fall precautions/Bedside visitors/Side rails/Call bell/Explanation of exam/test/Position of comfort

## 2020-09-08 NOTE — REVIEW OF SYSTEMS
[Negative] : Heme/Lymph [Fever] : no fever [Fatigue] : no fatigue [Chills] : no chills [Recent Change In Weight] : ~T no recent weight change [Chest Pain] : no chest pain [Lower Ext Edema] : no lower extremity edema [Orthopena] : no orthopnea [Shortness Of Breath] : no shortness of breath [Wheezing] : no wheezing [Dyspnea on Exertion] : not dyspnea on exertion [Abdominal Pain] : no abdominal pain [Vomiting] : no vomiting [Dysuria] : no dysuria [Depression] : no depression

## 2020-09-08 NOTE — PHYSICAL EXAM
[No Acute Distress] : no acute distress [Well-Appearing] : well-appearing [Normal Voice/Communication] : normal voice/communication [No Lymphadenopathy] : no lymphadenopathy [Normal] : normal rate, regular rhythm, normal S1 and S2 and no murmur heard [No Edema] : there was no peripheral edema [Normal Gait] : normal gait [Coordination Grossly Intact] : coordination grossly intact [Speech Grossly Normal] : speech grossly normal [Normal Affect] : the affect was normal [Normal Mood] : the mood was normal [Normal Insight/Judgement] : insight and judgment were intact

## 2020-11-11 NOTE — H&P ADULT - PROBLEM SELECTOR PROBLEM 5
Thrombocytopenia Suturegard Intro: Intraoperative tissue expansion was performed, utilizing the SUTUREGARD device, in order to reduce wound tension.

## 2020-12-09 NOTE — H&P ADULT - HISTORY OF PRESENT ILLNESS
INCOMPLETE NOTE          INCOMPLETE NOTE     55M with PMHx EtOH abuse, HTN, s/p hernia repair, who presents to [facility] after hitting his head on the toilet while drinking EtOH (1 pint tequila PTA), resulting in a 1 cm horizontal occipital laceration w/out active bleeding.     ***  In the ED,   -Tmax 99.4; ; /67, MAP 78  -CTH: Small amount of acute subarachnoid hemorrhage within the left temporal sulci and a trace amount of acute subdural hemorrhage along the left tentorial leaflet. No associated mass effect, shift of the midline structures, or herniation. Small right paramedian occipital scalp hematoma.  -CXR: clear lungs  -CIWA : 23, 20  -Received valium 10mg x1; folic acid 5mg x1; mag sulf 2g x1; KCl 40 x1; thiamine x1; platelets pending...       INCOMPLETE NOTE     55M with PMHx EtOH abuse, HTN, s/p hernia repair, who presented to the ED c/o hitting his head on the toilet while drinking EtOH (1 pint tequila PTA). No LOC, neck pain, chest pain, abdominal pain, nausea, vomiting.    .     ***  In the ED,   -Tmax 99.4; ; /67, MAP 78  -CTH: Small amount of acute subarachnoid hemorrhage within the left temporal sulci and a trace amount of acute subdural hemorrhage along the left tentorial leaflet. No associated mass effect, shift of the midline structures, or herniation. Small right paramedian occipital scalp hematoma.  -CXR: clear lungs  -CIWA : 23, 20  -Received valium 10mg x1; folic acid 5mg x1; mag sulf 2g x1; KCl 40 x1; thiamine x1; platelets pending...       INCOMPLETE NOTE     55M with PMHx EtOH abuse, DTs, EtOH withdrawal, cirrhosis (w/ portal HTN), HTN, prior oxycodone abuse, GI bleeds, who presented to the ED c/o hitting his head on the toilet. Patient reportedly was getting out of his tub in the bathroom when he fell onto the back of his head. Patient stated he drank a fifth of alcohol prior to arrival. He denied LOC, neck pain, chest pain, abdominal pain, nausea, vomiting.    Note: patient was recently admitted 04/24/2020 for EtOH withdrawal.    In the ED,   -Tmax 99.4; ; /67, MAP 78  -CTH: Small amount of acute subarachnoid hemorrhage within the left temporal sulci and a trace amount of acute subdural hemorrhage along the left tentorial leaflet. No associated mass effect, shift of the midline structures, or herniation. Small right paramedian occipital scalp hematoma.  -CXR: clear lungs  -CIWA : 23, 20  -Received valium 10mg x1; folic acid 5mg x1; mag sulf 2g x1; KCl 40 x1; thiamine x1; platelets pending...       Patient 55M with PMHx EtOH abuse, DTs, EtOH withdrawal, cirrhosis (w/ portal HTN), HTN, prior oxycodone abuse, GI bleeds, who presented to the ED c/o hitting his head on the toilet. Patient reportedly was getting out of his tub in the bathroom when he fell onto the back of his head. Patient stated he drank a fifth of alcohol prior to arrival. He denied LOC, neck pain, chest pain, abdominal pain, nausea, vomiting.    Note: patient was recently admitted 04/24/2020 for EtOH withdrawal.    In the ED,   -Tmax 99.4; ; /67, MAP 78  -CTH: Small amount of acute subarachnoid hemorrhage within the left temporal sulci and a trace amount of acute subdural hemorrhage along the left tentorial leaflet. No associated mass effect, shift of the midline structures, or herniation. Small right paramedian occipital scalp hematoma.  -CXR: clear lungs  -CIWA : 23, 20  -Received valium 10mg x1; folic acid 5mg x1; mag sulf 2g x1; KCl 40 x1; thiamine x1;    Patient seen bedside, tachycardic, dyspnea, mild tremors. Alert, AOx3, clearly intoxicated.

## 2020-12-09 NOTE — H&P ADULT - NSHPPHYSICALEXAM_GEN_ALL_CORE
VITAL SIGNS:  ICU Vital Signs Last 24 Hrs  T(C): 37.2 (09 Dec 2020 17:00), Max: 37.4 (09 Dec 2020 16:00)  T(F): 98.9 (09 Dec 2020 17:00), Max: 99.4 (09 Dec 2020 16:00)  HR: 118 (09 Dec 2020 17:00) (98 - 122)  BP: 113/67 (09 Dec 2020 17:00) (113/67 - 124/76)  BP(mean): 78 (09 Dec 2020 17:00) (78 - 90)  ABP: --  ABP(mean): --  RR: 18 (09 Dec 2020 17:00) (18 - 19)  SpO2: 100% (09 Dec 2020 17:00) (99% - 100%)      PHYSICAL EXAM: INCOMPLETE PHYSICAL EXAM     General: NAD  HEENT: NC/AT; PERRL, clear conjunctiva  Neck: supple  Respiratory: CTA b/l  Cardiovascular: +S1/S2; RRR  Abdomen: soft, NT/ND; +BS x4  Extremities: WWP, 2+ peripheral pulses b/l; no LE edema  Skin: normal color and turgor; no rash  Neurological: VITAL SIGNS:  ICU Vital Signs Last 24 Hrs  T(C): 37.2 (09 Dec 2020 17:00), Max: 37.4 (09 Dec 2020 16:00)  T(F): 98.9 (09 Dec 2020 17:00), Max: 99.4 (09 Dec 2020 16:00)  HR: 118 (09 Dec 2020 17:00) (98 - 122)  BP: 113/67 (09 Dec 2020 17:00) (113/67 - 124/76)  BP(mean): 78 (09 Dec 2020 17:00) (78 - 90)  ABP: --  ABP(mean): --  RR: 18 (09 Dec 2020 17:00) (18 - 19)  SpO2: 100% (09 Dec 2020 17:00) (99% - 100%)      PHYSICAL EXAM: INCOMPLETE PHYSICAL EXAM     General: awake, alert, in no acute distress  Head: 1 cm horizontal occipital laceration w/out active bleeding  ENT: normocephalic; clear conjunctiva  Neck: supple  Respiratory: clear to auscultation bilaterally  Cardiovascular: regular rate, no murmurs, rubs, or gallops  Abdomen: soft, non-tender, non-distended, +BS x4  Extremities: 2+ peripheral pulses b/l; no LE edema  Skin: normal color and turgor; no rash  Neurological: moves all extremities, sensation to light touch intact throughout, PERRL VITAL SIGNS:  ICU Vital Signs Last 24 Hrs  T(C): 37.2 (09 Dec 2020 17:00), Max: 37.4 (09 Dec 2020 16:00)  T(F): 98.9 (09 Dec 2020 17:00), Max: 99.4 (09 Dec 2020 16:00)  HR: 118 (09 Dec 2020 17:00) (98 - 122)  BP: 113/67 (09 Dec 2020 17:00) (113/67 - 124/76)  BP(mean): 78 (09 Dec 2020 17:00) (78 - 90)  ABP: --  ABP(mean): --  RR: 18 (09 Dec 2020 17:00) (18 - 19)  SpO2: 100% (09 Dec 2020 17:00) (99% - 100%)      PHYSICAL EXAM:    General: Awake, alert, intoxicated  Head: 1 cm horizontal occipital laceration w/out active bleeding, stapled, clean  ENT: normocephalic; clear conjunctiva  Neck: supple  Respiratory: clear to auscultation bilaterally  Cardiovascular: tachycardia, no murmurs, rubs, or gallops  Abdomen: soft, non-tender, non-distended, +BS x4  Extremities: 2+ peripheral pulses b/l; no LE edema  Skin: normal color and turgor; no rash. Small abrasions on LEs  Neurological: moves all extremities; mild tremor in hands without asterixis

## 2020-12-09 NOTE — ED PROVIDER NOTE - CLINICAL SUMMARY MEDICAL DECISION MAKING FREE TEXT BOX
Presenting s/p fall PTA.   Pt with h/o alcohol abuse and alcohol withdrawal.   Alcohol level in 300s.   CT brain shows subarachnoid hemorrhage and trace left subdural. Consulted neurosurgery.   Pt also with thrombocytopenia, will give platelets.   During ED course, patient started vomiting and became very anxious, pt with alcohol withdrawal.   CIWA started and given valium.   Admitted to MICU.

## 2020-12-09 NOTE — H&P ADULT - NSHPLABSRESULTS_GEN_ALL_CORE
LABS:                        12.0   6.68  )-----------( 32       ( 09 Dec 2020 14:44 )             33.7     Hemoglobin: 12.0 g/dL (12-09 @ 14:44)    CBC Full  -  ( 09 Dec 2020 14:44 )  WBC Count : 6.68 K/uL  RBC Count : 3.78 M/uL  Hemoglobin : 12.0 g/dL  Hematocrit : 33.7 %  Platelet Count - Automated : 32 K/uL  Mean Cell Volume : 89.2 fl  Mean Cell Hemoglobin : 31.7 pg  Mean Cell Hemoglobin Concentration : 35.6 gm/dL  Auto Neutrophil # : 5.12 K/uL  Auto Lymphocyte # : 0.99 K/uL  Auto Monocyte # : 0.46 K/uL  Auto Eosinophil # : 0.00 K/uL  Auto Basophil # : 0.00 K/uL  Auto Neutrophil % : 69.6 %  Auto Lymphocyte % : 14.8 %  Auto Monocyte % : 6.9 %  Auto Eosinophil % : 0.0 %  Auto Basophil % : 0.0 %    12-09    142  |  96  |  15  ----------------------------<  167<H>  3.3<L>   |  17<L>  |  0.74    Ca    8.4      09 Dec 2020 14:44    TPro  7.2  /  Alb  4.0  /  TBili  0.8  /  DBili  x   /  AST  64<H>  /  ALT  10  /  AlkPhos  195<H>  12-09    Creatinine Trend: 0.74<--  LIVER FUNCTIONS - ( 09 Dec 2020 14:44 )  Alb: 4.0 g/dL / Pro: 7.2 g/dL / ALK PHOS: 195 U/L / ALT: 10 U/L / AST: 64 U/L / GGT: x             16:58 - VBG - pH: 7.43  | pCO2: 35    | pO2: 25    | Lactate: 6.7      EKG:     MICROBIOLOGY:    IMAGING:      Labs, imaging, EKG personally reviewed    RADIOLOGY & ADDITIONAL TESTS: Reviewed. LABS:                        12.0   6.68  )-----------( 32       ( 09 Dec 2020 14:44 )             33.7     Hemoglobin: 12.0 g/dL (12-09 @ 14:44)    CBC Full  -  ( 09 Dec 2020 14:44 )  WBC Count : 6.68 K/uL  RBC Count : 3.78 M/uL  Hemoglobin : 12.0 g/dL  Hematocrit : 33.7 %  Platelet Count - Automated : 32 K/uL  Mean Cell Volume : 89.2 fl  Mean Cell Hemoglobin : 31.7 pg  Mean Cell Hemoglobin Concentration : 35.6 gm/dL  Auto Neutrophil # : 5.12 K/uL  Auto Lymphocyte # : 0.99 K/uL  Auto Monocyte # : 0.46 K/uL  Auto Eosinophil # : 0.00 K/uL  Auto Basophil # : 0.00 K/uL  Auto Neutrophil % : 69.6 %  Auto Lymphocyte % : 14.8 %  Auto Monocyte % : 6.9 %  Auto Eosinophil % : 0.0 %  Auto Basophil % : 0.0 %    12-09    142  |  96  |  15  ----------------------------<  167<H>  3.3<L>   |  17<L>  |  0.74    Ca    8.4      09 Dec 2020 14:44    TPro  7.2  /  Alb  4.0  /  TBili  0.8  /  DBili  x   /  AST  64<H>  /  ALT  10  /  AlkPhos  195<H>  12-09    Creatinine Trend: 0.74<--  LIVER FUNCTIONS - ( 09 Dec 2020 14:44 )  Alb: 4.0 g/dL / Pro: 7.2 g/dL / ALK PHOS: 195 U/L / ALT: 10 U/L / AST: 64 U/L / GGT: x             16:58 - VBG - pH: 7.43  | pCO2: 35    | pO2: 25    | Lactate: 6.7        IMAGING:  XR Chest 1 View-PORTABLE IMMEDIATE (12.09.20 @ 16:52)     FINDINGS:  The lungs are clear.  Heart size cannot be accurately assessed in this projection.  No acute osseous findings.    IMPRESSION:  Clear lungs.       CT Head, Cervical Spine No Cont (12.09.20 @ 15:53) >    IMPRESSION:    NONCONTRAST CERVICAL SPINE CT: No acute fractures or dislocations.    Similar-appearing mild multilevel cervical spondylosis.    Head CT: Small amount of acute subarachnoid hemorrhage within the left temporal sulci and atrace amount of acute subdural hemorrhage along the left tentorial leaflet. No associated mass effect, shift of the midline structures, or herniation.    Small right paramedian occipital scalp hematoma.        Labs, imaging, EKG personally reviewed    RADIOLOGY & ADDITIONAL TESTS: Reviewed. LABS:                        12.0   6.68  )-----------( 32       ( 09 Dec 2020 14:44 )             33.7     Hemoglobin: 12.0 g/dL (12-09 @ 14:44)    CBC Full  -  ( 09 Dec 2020 14:44 )  WBC Count : 6.68 K/uL  RBC Count : 3.78 M/uL  Hemoglobin : 12.0 g/dL  Hematocrit : 33.7 %  Platelet Count - Automated : 32 K/uL  Mean Cell Volume : 89.2 fl  Mean Cell Hemoglobin : 31.7 pg  Mean Cell Hemoglobin Concentration : 35.6 gm/dL  Auto Neutrophil # : 5.12 K/uL  Auto Lymphocyte # : 0.99 K/uL  Auto Monocyte # : 0.46 K/uL  Auto Eosinophil # : 0.00 K/uL  Auto Basophil # : 0.00 K/uL  Auto Neutrophil % : 69.6 %  Auto Lymphocyte % : 14.8 %  Auto Monocyte % : 6.9 %  Auto Eosinophil % : 0.0 %  Auto Basophil % : 0.0 %    12-09    142  |  96  |  15  ----------------------------<  167<H>  3.3<L>   |  17<L>  |  0.74    Ca    8.4      09 Dec 2020 14:44    TPro  7.2  /  Alb  4.0  /  TBili  0.8  /  DBili  x   /  AST  64<H>  /  ALT  10  /  AlkPhos  195<H>  12-09    Creatinine Trend: 0.74<--  LIVER FUNCTIONS - ( 09 Dec 2020 14:44 )  Alb: 4.0 g/dL / Pro: 7.2 g/dL / ALK PHOS: 195 U/L / ALT: 10 U/L / AST: 64 U/L / GGT: x             16:58 - VBG - pH: 7.43  | pCO2: 35    | pO2: 25    | Lactate: 6.7        IMAGING:  XR Chest 1 View-PORTABLE IMMEDIATE (12.09.20 @ 16:52)     FINDINGS:  The lungs are clear.  Heart size cannot be accurately assessed in this projection.  No acute osseous findings.    IMPRESSION:  Clear lungs.       CT Head, Cervical Spine No Cont (12.09.20 @ 15:53) >    IMPRESSION:    NONCONTRAST CERVICAL SPINE CT: No acute fractures or dislocations.    Similar-appearing mild multilevel cervical spondylosis.    Head CT: Small amount of acute subarachnoid hemorrhage within the left temporal sulci and a trace amount of acute subdural hemorrhage along the left tentorial leaflet. No associated mass effect, shift of the midline structures, or herniation.    Small right paramedian occipital scalp hematoma.        Labs, imaging, EKG personally reviewed    RADIOLOGY & ADDITIONAL TESTS: Reviewed. LABS:                        12.0   6.68  )-----------( 32       ( 09 Dec 2020 14:44 )             33.7       12-09    142  |  96  |  15  ----------------------------<  167<H>  3.3<L>   |  17<L>  |  0.74    Ca    8.4      09 Dec 2020 14:44    TPro  7.2  /  Alb  4.0  /  TBili  0.8  /  DBili  x   /  AST  64<H>  /  ALT  10  /  AlkPhos  195<H>  12-09    Creatinine Trend: 0.74<--  LIVER FUNCTIONS - ( 09 Dec 2020 14:44 )  Alb: 4.0 g/dL / Pro: 7.2 g/dL / ALK PHOS: 195 U/L / ALT: 10 U/L / AST: 64 U/L / GGT: x             16:58 - VBG - pH: 7.43  | pCO2: 35    | pO2: 25    | Lactate: 6.7        IMAGING:  XR Chest 1 View-PORTABLE IMMEDIATE (12.09.20 @ 16:52)     FINDINGS:  The lungs are clear.  Heart size cannot be accurately assessed in this projection.  No acute osseous findings.    IMPRESSION:  Clear lungs.       CT Head, Cervical Spine No Cont (12.09.20 @ 15:53) >    IMPRESSION:    NONCONTRAST CERVICAL SPINE CT: No acute fractures or dislocations.    Similar-appearing mild multilevel cervical spondylosis.    Head CT: Small amount of acute subarachnoid hemorrhage within the left temporal sulci and a trace amount of acute subdural hemorrhage along the left tentorial leaflet. No associated mass effect, shift of the midline structures, or herniation.    Small right paramedian occipital scalp hematoma.        Labs, imaging, EKG personally reviewed    RADIOLOGY & ADDITIONAL TESTS: Reviewed.

## 2020-12-09 NOTE — ED ADULT NURSE NOTE - NSIMPLEMENTINTERV_GEN_ALL_ED
Implemented All Fall Risk Interventions:  [place] to call system. Call bell, personal items and telephone within reach. Instruct patient to call for assistance. Room bathroom lighting operational. Non-slip footwear when patient is off stretcher. Physically safe environment: no spills, clutter or unnecessary equipment. Stretcher in lowest position, wheels locked, appropriate side rails in place. Provide visual cue, wrist band, yellow gown, etc. Monitor gait and stability. Monitor for mental status changes and reorient to person, place, and time. Review medications for side effects contributing to fall risk. Reinforce activity limits and safety measures with patient and family.

## 2020-12-09 NOTE — ED ADULT NURSE REASSESSMENT NOTE - NS ED NURSE REASSESS COMMENT FT1
Patient becoming increasingly agitated, anxious in bed and attempting to climb out of bed. States he typically drinks " a fifth" per day. States today he had "one pint" of tequila. MD Terrazas made aware of increasing agitation, and potential need for CIWA protocol, however will hold off for now due to CT finding (SAH/SIH)

## 2020-12-09 NOTE — ED PROVIDER NOTE - CRITICAL CARE INDICATION, MLM
patient was critically ill... Patient was critically ill with a high probability of imminent or life threatening deterioration. Handoff

## 2020-12-09 NOTE — H&P ADULT - ASSESSMENT
Patient is a 55M with PMHx EtOH abuse, DTs, EtOH withdrawal, cirrhosis (w/ portal HTN), HTN, prior oxycodone abuse, GI bleeds here with acute EtOH intoxication, last drink in the AM. Course complicated by SAH and SDH 2/2 to head injury. CIWA scores elevated, requiring MICU-level observation.    #neuro  //EtOH w/d  -AOx3, agitated 2/2 to w/d and intoxication  -patient with complex history of DTs, EtOH w/d requiring aggressive, long Ativan tapers  -start precedex gtt and phenobarb taper   -CIWA scores  //SAH/SDH  -small noted on CTH, likely 2/2 to head injury in setting of low platelets  -repeat CTH in 6 hours     #CV  -monitor on tele   -non-compliant with medications, not currently taking PO meds at home  -PRNs for SBPs    #Resp  -satting on RA  -NC PRN    #GI  //EtOH cirrhosis, abuse  -cont thiamine, multivitamin, folate  -DASH/TLC diet    #Renal  -no active issues  -monitor I/Os    #Endo  -no active issues    #Heme  //thrombocytopenia  -likely 2/2 to hepatic cirrhosis   -CBC q8h   -give 1U PLTs now    DVT: held 2/2 to low plts and sah/sdh  Diet: DASH/TLC  Dispo: to MICU for observation    Full Code

## 2020-12-09 NOTE — CONSULT NOTE ADULT - SUBJECTIVE AND OBJECTIVE BOX
p (1480)     HPI:  55y male pmh Etoh,HTN, sp hernia repair. Pt comes to ed c/o "I fell off the toilet and hit my head because I was drinking" No other injury or complaint. No loc, neck pain, cp, abd pain. Pt denies nausea and vomiting, Had approx 1 pt tequillia 1h PTA. PE Adult male awake alert speech fluent power 5/5 all extr pain light touch intact. Heent NCAT except 1cm horizontal occipital lac without active bleeding. Neck supple chest clear ap abd soft +bs no mass guarding, rebound. Neuro gcs 15 moving all extr pain light touch intact.  Cornelio Bernstein MD, Facep.    Imaging: CTH shows minimal tSAH and traumatic subdural on left tentorial leaflet. CT Cspine neg.    Exam: AOx3, FC, no facial, 5/5 throughout, no drift      --Anticoagulation:    =====================  PAST MEDICAL HISTORY   Sciatica    HTN (hypertension)    ETOH abuse    Grief reaction      PAST SURGICAL HISTORY   S/P Repair of Inguinal Hernia          MEDICATIONS:  Antibiotics:    Neuro:    Other:  sodium chloride 0.9% 1000 milliLiter(s) IV Continuous <Continuous>      SOCIAL HISTORY:   Occupation:   Marital Status:     FAMILY HISTORY:  FH: dementia    No pertinent family history in first degree relatives        ROS: Negative except per HPI    LABS:                          12.0   6.68  )-----------( 32       ( 09 Dec 2020 14:44 )             33.7     12-09    142  |  96  |  15  ----------------------------<  167<H>  3.3<L>   |  17<L>  |  0.74    Ca    8.4      09 Dec 2020 14:44    TPro  7.2  /  Alb  4.0  /  TBili  0.8  /  DBili  x   /  AST  64<H>  /  ALT  10  /  AlkPhos  195<H>  12-09

## 2020-12-09 NOTE — ED ADULT NURSE NOTE - OBJECTIVE STATEMENT
56 yo male presents to ED via EMS from 56 yo male presents to ED via EMS from home c/o laceration to back of bed. Patient states he fell and hit the back of his head while getting out of bathroom, unsure if he had LOC> Patient reports he drank 1/5th of alcohol prior to arrival today. Patient reports hx of alcohol withdrawal in the past. Patient denies SOB, CP, nvd, fever/chills, sick contacts. Patient A&Ox3, anxious in appearance, breathing spontaneously, airway patent, bl clear lungs, abdomen nontender, +pulses, cap refill <2 seconds. Patient resting in bed, side rails up, plan of care explained.

## 2020-12-09 NOTE — H&P ADULT - ATTENDING COMMENTS
55M Hx EtOH Abuse, DTs, Alcohol Withdrawal Syndrome, Cirrhosis with Portal HTN, GI Bleed, Oxycodone Abuse p/w Acute Alcohol Intoxication (last drink this morning) complicated by Fall with Lt Temporal SAH and SDH without MLS or LME. He received 15 mg IV Valium for high CIWA Scores in ED.   - Admit to ICU for Alcohol Intoxication (ETOH = 313) with Delirium Tremens   - NPO and Neuro Watch per ICU Protocol and repeat CTH 6Hr later to f/u SAH/SDH with NS Service    - Initiate Phenobarb Protocol and IV Precedex gtt titration   - IV Hydration and Thiamine and Folic Acids supplementation   - Thrombocytopenia correct to >50K and transfusion prn   - Maintenance IV Hydration with IV D5LR at 75 cc/Hr   - Family to be informed and updated 55M Hx EtOH Abuse, DTs, Alcohol Withdrawal Syndrome, Cirrhosis with Portal HTN, GI Bleed, Oxycodone Abuse p/w Acute Alcohol Intoxication (last drink this morning) complicated by Fall with Lt Temporal SAH and SDH without MLS or LME. He received 15 mg IV Valium for high CIWA Scores in ED.   - Admit to ICU for Alcohol Intoxication (ETOH = 313) with Delirium Tremens   - NPO and Neuro Watch per ICU Protocol and repeat CTH 6Hr later to f/u SAH/SDH with NS Service    - Initiate Phenobarb Protocol and IV Precedex gtt titration   - IV Hydration and Thiamine and Folic Acids supplementation   - Thrombocytopenia correct to >50K and transfusion prn   - Maintenance IV Hydration with IV D5LR at 75 cc/Hr   - Family to be informed and updated     Patient seen and examined with ICU Resident/Fellow at bedside after lab data, medical records and radiology reports reviewed. I have read and agreeable in general with resident's Documented Note, Assessment and Management Plan which reflected my opinions from bedside round and discussion.   Total Critical Care Time = 45 Min excluding teaching and procedure activity.

## 2020-12-09 NOTE — ED PROVIDER NOTE - PHYSICAL EXAMINATION
Neuro: AAOx4  Follows commands  No dysarthria  No aphasia  PERRL, EOMI  CN II-XII grossly intact  No facial droop  Facial sensation intact B/L  Tongue midline  5/5 strength B/L UE and LE  Sensation intact BL UE and LE  No hand tremors   No tongue fasciculations

## 2020-12-09 NOTE — H&P ADULT - NSHPREVIEWOFSYSTEMS_GEN_ALL_CORE
INCOMPLETE ROS   CONSTITUTIONAL: No weakness, fevers or chills  EYES/ENT: No visual changes;  No vertigo or throat pain   NECK: No pain or stiffness  RESPIRATORY: No cough, wheezing, hemoptysis; No shortness of breath  CARDIOVASCULAR: No chest pain or palpitations  GASTROINTESTINAL: No abdominal or epigastric pain. No nausea, vomiting, or hematemesis; No diarrhea or constipation. No melena or hematochezia.  GENITOURINARY: No dysuria, frequency or hematuria  NEUROLOGICAL: No numbness or weakness  SKIN: No itching, rashes CONSTITUTIONAL: No weakness, fevers or chills  EYES/ENT: No visual changes;  No vertigo or throat pain   NECK: No pain or stiffness  RESPIRATORY: No cough, wheezing, hemoptysis; No shortness of breath  CARDIOVASCULAR: No chest pain or palpitations  GASTROINTESTINAL: No abdominal or epigastric pain. No nausea, vomiting, or hematemesis; No diarrhea or constipation. No melena or hematochezia.  GENITOURINARY: No dysuria, frequency or hematuria  NEUROLOGICAL: No numbness or weakness. Some tremor  SKIN: No itching, rashes

## 2020-12-09 NOTE — ED PROVIDER NOTE - PROGRESS NOTE DETAILS
Attending MD Segura: patient received in sign out , etoh abuse, etoh intoxication, fall with small tSAH and SDH. Pt currently in active etoh WD, restless. Will begin aggressive CIWA protocol. Endorsed to Dr GABE Bernstein MD, Facep Attending MD Segura: patient with tcp, will transfuse platelets as per nsg recs given ICH. MICU consulted for severe etoh WD and possible impending DTs given altered sensorium. Attending MD Segura: patient appears more calm to me, states he is still anxious though. Will continue to observe and see if valium needs to be re-dosed. awaiting MICU evaluation. Attending MD Segura: accepted to MICU Patient very anxious and vomiting. Will give additional valium and zofran.

## 2020-12-09 NOTE — ED ADULT NURSE REASSESSMENT NOTE - NS ED NURSE REASSESS COMMENT FT1
Patient became letarghic and not responding to RN questions after valium administration. MD Segura & Mk at bedside, patient responding to MD questions. VSS

## 2020-12-09 NOTE — ED ADULT NURSE REASSESSMENT NOTE - NS ED NURSE REASSESS COMMENT FT1
Patient educated on s/s of blood transufsion reaction prior to reciving platlets. Patient verbalized understanding, consent in chart by MD Segura. Patient educated on s/s of blood transfusion reaction prior to receiving platelets. Patient verbalized understanding, consent in chart by MD Segura.

## 2020-12-09 NOTE — ED PROVIDER NOTE - OBJECTIVE STATEMENT
54 y/o male with a h/o presenting with fall, which started PTA. 56 y/o male with a h/o presenting with fall, which started PTA. herb 54 y/o male with a h/o alcohol abuse, presenting with fall, which started PTA. Patient was brought by ambulance. Patient states he had a fall onto the back of his head while getting out of the tub in the bathroom. Unsure if LOC. Acute onset, moderate severity, no exacerbating or alleviating factors. Patient states he drank a fifth of alcohol prior to arrival. Pt states he has had alcohol withdrawal in the past. Denies any shortness of breath, chest pain, vision changes, abdominal pain, cough, fever, or chills.     Additional history from pt's son: Patient's son states patient has had bloody vomiting over the past week intermittently and has an extensive history of alcohol abuse and alcohol withdrawal.

## 2020-12-09 NOTE — ED PROVIDER NOTE - CARE PLAN
Principal Discharge DX:	Alcohol withdrawal syndrome, with delirium  Secondary Diagnosis:	Subdural hematoma  Secondary Diagnosis:	Subarachnoid bleed

## 2020-12-09 NOTE — ED ADULT NURSE NOTE - CHIEF COMPLAINT
CHIEF COMPLAINT   Rosaura ORDONEZ is a 30 year old female presents to the office for management of multiple medical conditions.      HISTORY OF PRESENT ILLNESS    she has the following medical problems -    KAILYN (generalized anxiety disorder) - WORSE    Restless legs syndrome (RLS) - -stable    Smoker - unresolved    Tension-type headache, - episodic-stable     Depression - improved on SSRI  Insomnia - stable with lorazepam prn         Lab Services on 01/09/2020   Component Date Value Ref Range Status   • HEMOGLOBIN A1C 01/09/2020 5.2  4.2 - 6.0 % Final   • ESTIMATED AVERAGE GLUCOSE 01/09/2020 102  0 - 154 mg/dL Final   • TSH (WITH REFLEX TO FREE T4) 01/09/2020 1.59  0.30 - 4.82 m[iU]/L Final   • CHOLESTEROL, TOTAL 01/09/2020 204* 140 - 200 mg/dL Final   • HDL CHOLESTEROL 01/09/2020 43  >40 mg/dL Final   • LDL CHOL, CALCULATED 01/09/2020 141* 30 - 100 mg/dL Final   • TRIGLYCERIDES 01/09/2020 98  0 - 200 mg/dL Final   • CALCIUM, SERUM 01/09/2020 9.5  8.6 - 10.6 mg/dL Final   • ALBUMIN, SERUM 01/09/2020 4.2  3.6 - 5.1 g/dL Final   • ALKALINE PHOSPHATASE([id number]) 01/09/2020 60  45 - 130 U/L Final   • ALANINE AMINOTRANSFERASE(SGPT) 01/09/2020 24  4 - 38 U/L Final   • ASPARTATE AMINOTRNSFRASE(SGOT) 01/09/2020 36  14 - 43 U/L Final   • BILIRUBIN, TOTAL 01/09/2020 0.9  0.0 - 1.3 mg/dL Final   • BLOOD UREA NITROGEN 01/09/2020 12  7 - 20 mg/dL Final   • CHLORIDE, SERUM 01/09/2020 106  96 - 107 mmol/L Final   • CO2 VENOUS 01/09/2020 27  22 - 32 mmol/L Final   • CREATININE, SERUM 01/09/2020 0.5  0.5 - 1.4 mg/dL Final   • K (POTASSIUM, SERUM) 01/09/2020 4.4  3.5 - 5.3 mmol/L Final   • NA (SODIUM, SERUM) 01/09/2020 140  136 - 146 mmol/L Final   • GLUCOSE, FASTING 01/09/2020 83  60 - 100 mg/dL Final    Comment: ADA Recommendations:       *Fasting Glucose  < 100 mg/dL . . . . . . . Normal fasting glucose       *Fasting Glucose  100-125 mg/dL . . . . . Impaired fasting glucose       *Fasting Glucose  >= 126 mg/dL . . . . . .  Provisional diagnosis of   diabetes                                (confirm with 2 hr Post Prandial)     • PROTEIN, TOTAL SERUM 01/09/2020 7.2  6.4 - 8.5 g/dL Final   • EGFR*  01/09/2020 >60  >60 mL/min/[1.73m2] Final   • EGFR* NON- 01/09/2020 >60  >60 mL/min/[1.73m2] Final   • WHITE BLOOD CELL COUNT 01/09/2020 6.6  4.0 - 10.0 10*3/uL Final   • RED BLOOD CELL COUNT 01/09/2020 4.86  3.70 - 5.20 10*6/uL Final   • HEMOGLOBIN 01/09/2020 14.5  11.2 - 15.7 g/dL Final   • HEMATOCRIT 01/09/2020 43.7  34.0 - 45.0 % Final   • MEAN CORPUSCULAR VOLUME 01/09/2020 89.9  79.0 - 95.0 fL Final   • MEAN CORPUSCULAR HGB 01/09/2020 29.8  27.0 - 34.0 pg Final   • MEAN CORPUSCULAR HGB CONCENTRATION 01/09/2020 33.2  32.0 - 36.0 % Final   • PLATELET COUNT 01/09/2020 325  150 - 400 10*3/uL Final   • MEAN PLATELET VOLUME 01/09/2020 10.4  8.6 - 12.4 fL Final   • RED CELL DISTRIBUTION WIDTH 01/09/2020 12.8  11.3 - 14.8 % Final   • NEUTROPHIL PERCENT 01/09/2020 58.2  34.0 - 73.5 % Final   • NEUTROPHIL ABSOLUTE # 01/09/2020 3.9  1.4 - 6.5 10*3/uL Final   • LYMPH PERCENT 01/09/2020 30.0  20.5 - 51.1 % Final   • LYMPHOCYTE ABSOLUTE # 01/09/2020 2.0  1.2 - 3.4 10*3/uL Final   • MONOCYTE PERCENT 01/09/2020 7.1  4.3 - 12.9 % Final   • MONOCYTE ABSOLUTE # 01/09/2020 0.5  0.2 - 0.9 10*3/uL Final   • EOSINOPHIL % 01/09/2020 4.2  0.0 - 10.0 % Final   • EOSINOPHIL ABSOLUTE # 01/09/2020 0.3  0.0 - 0.5 10*3/uL Final   • BASOPHIL % 01/09/2020 0.5  0.0 - 1.2 % Final   • BASOPHIL ABSOLUTE # 01/09/2020 0.0  0.0 - 0.1 10*3/uL Final   • DIFFERENTIAL TYPE 01/09/2020 AUTO DIFF   Final   Walk In on 11/08/2019   Component Date Value Ref Range Status   • GRP A STREP 11/08/2019 Positive  Negative Final   • Internal Procedural Controls Accep* 11/08/2019 Yes   Final   • Rapid Influenza A Ag 11/08/2019 Negative   Final   • Rapid Influenza B Ag 11/08/2019 Negative   Final         Past medical History-  Patient Active Problem List   Diagnosis    • KAILYN (generalized anxiety disorder)   • Irritable bowel syndrome   • Regular astigmatism   • Restless legs syndrome (RLS)   • Smoker   • Tension-type headache, not intractable   • Insomnia due to other mental disorder     Past Medical History:   Diagnosis Date   • KAILYN (generalized anxiety disorder)    • Restless leg syndrome    • Tobacco use       Past Surgical History:   Procedure Laterality Date   • No past surgeries         Social History     Tobacco Use   • Smoking status: Current Every Day Smoker     Packs/day: 1.00     Years: 15.00     Pack years: 15.00     Types: Cigarettes   • Smokeless tobacco: Never Used   Substance Use Topics   • Alcohol use: Yes     Frequency: Monthly or less     Comment: occ   • Drug use: No           Family History   Problem Relation Age of Onset   • COPD Mother        Current Outpatient Medications   Medication Sig   • busPIRone (BUSPAR) 10 MG tablet Take 10 mg by mouth 2 times daily.   • LORazepam (ATIVAN) 1 MG tablet Take 2 tablets by mouth nightly as needed for Anxiety (sleep).   • rOPINIRole (REQUIP) 0.5 MG tablet Take 2 tablets by mouth 2 times daily.   • escitalopram (LEXAPRO) 20 MG tablet Take 1 tablet by mouth daily.   • norethindrone-ethinyl estradiol-FE (JUNEL FE 1/20) 1-20 MG-MCG per tablet Take 1 tablet by mouth daily.   • Multiple Vitamins-Minerals (WOMENS ONE DAILY) Tab Take 1 tablet by mouth daily.         ROS:  Eye Problem(s):negative  ENT Problem(s):negative  Cardiovascular problem(s):negative  Respiratory problem(s):negative  Gastro-intestinal problem(s):negative GI  Genito-urinary problem(s):negative  Musculoskeletal problem(s):negative  Integumentary problem(s):negative  Neurological problem(s):negative  Psychiatric problem(s):anxiety, depression and sleep disturbance  Endocrine problem(s):negative  Hematologic and/or Lymphatic problem(s):negative      PHYSICAL EXAMINATION  Constitutional: Appears well nourished and developed, not toxic and not dehydrated. Not  distressed.    Visit Vitals  /74   Pulse 88   Temp 97.7 °F (36.5 °C) (Temporal)   Resp 14   Ht 5' 6.5\" (1.689 m)   Wt 78.5 kg (173 lb)   LMP 12/05/2019 (Exact Date)   SpO2 98%   BMI 27.50 kg/m²       Neck: Neck has normal appearance. No tracheal deviation present. No thyromegaly   ENT: External ears and nose are normal. No lesions. Nasal mucosa and septum is normal. Mouth/Throat: Oropharynx is clear and moist. Normal hard and soft palate.    Eyes: Conjunctiva normal, eyelids are normal.   Pupils equal, round and reactive to light.  Cardiovascular: Regular rhythm. No JVD present. No pedal edema. Intact distal pulses.   No precordial thrills. S1 S2 normal. No S3. No S4. No murmur heard.   Respiratory: Effort normal. Exhibits no respiratory distress.   Percussion normal. No chest tenderness. Auscultation reveals normal breath sounds. No wheezes. No rales.     Lymphatic: No neck adenopathy.   Musculoskeletal: Gait is normal. No joint effusion or tenderness.   Extremities: No clubbing. No cyanosis.         ASSESSMENT/PLAN  Rosaura was seen today for follow-up.    Diagnoses and all orders for this visit:    Diagnoses and all orders for this visit:  Anxiety and depression  -     LORazepam (ATIVAN) 1 MG tablet; Take 2 tablets by mouth nightly as needed for Anxiety (sleep).  Restless legs syndrome (RLS)  -     rOPINIRole (REQUIP) 0.5 MG tablet; Take 2 tablets by mouth 2 times daily.  KAILYN (generalized anxiety disorder)  -     escitalopram (LEXAPRO) 20 MG tablet; Take 1 tablet by mouth daily.  Episodic tension-type headache, not intractable  -     LORazepam (ATIVAN) 1 MG tablet; Take 2 tablets by mouth nightly as needed for Anxiety (sleep).  Insomnia due to other mental disorder  -     LORazepam (ATIVAN) 1 MG tablet; Take 2 tablets by mouth nightly as needed for Anxiety (sleep).  Smoker  Discussed risks of smoking and strongly urged patient to quit as soon as possible.        Low fat low cholesterol diet recommended.    Regular exercise recommended.   Side effects and complications discussed.    FU in 3 months.  Patient expresses understanding and agrees with the plan.    Electronically signed by: Nolberto Driver MD  3/6/2020             The patient is a 55y Male complaining of

## 2020-12-09 NOTE — ED ADULT NURSE REASSESSMENT NOTE - NS ED NURSE REASSESS COMMENT FT1
Patient reciving 1 unit of platelets. Transfusion started at 1835 with second RN present; VS completed and transfusion administration record completed. Patient verbalized understanding of s/s of transfusion reaction. Patient remains anxious and CIWA remains at 20. Patient pending MICU bed at this time.

## 2020-12-09 NOTE — CONSULT NOTE ADULT - ASSESSMENT
LoraWerner    55M s/p fall on ETOH, no LOC, CTH shows minimal tSAH and traumatic subdural on left tentorial leaflet. CT Cspine neg. Plts 32.  -Xfuse to plt goal >100  -Repeat CTH 4-6hrs  -TEG/Coags  -Q2 neurochecks pending repeat CTH  -MICU consulted for EtOH withdrawal, drinks 1/5th a day, already looks like he is acutely withdrawing LoraWerner    55M s/p fall on ETOH, no LOC, CTH shows minimal tSAH and traumatic subdural on left tentorial leaflet. CT Cspine neg. Plts 32.  -Xfuse to plt goal >100  -Repeat CTH 4-6hrs  -TEG/Coags  -Q2 neurochecks pending repeat CTH  -MICU consulted for EtOH withdrawal, drinks 1/5th a day, already looks like he is acutely withdrawing  -Keppra 500 BID x 7 days

## 2020-12-09 NOTE — ED ADULT NURSE REASSESSMENT NOTE - NS ED NURSE REASSESS COMMENT FT1
Patient found to have trace SAH/SDH, ICH order set used by MD Terrazas. Patient passed dysphagia screen, document. Q1H neuro & vitals initiated.

## 2020-12-10 NOTE — CHART NOTE - NSCHARTNOTEFT_GEN_A_CORE
Imaging reviewed, 4h and 24h hct stable. No further neurosurgical intervention at this time. Plts continue to be in 40s, continue to aim for goal platelets>100, INR<1.3.     Q4h neuro checks, HCT for any change in exam.     Patient should fu outpatient w/ Dr. Fitzpatrick in 2-4 weeks after discharge for stability imaging.   Will sign off. Please reconsult as needed.

## 2020-12-10 NOTE — PROGRESS NOTE ADULT - SUBJECTIVE AND OBJECTIVE BOX
COVERING RESIDENT: MELIDA LAZO (PGY-2) | NS PAGER [phone] | [facility] [phone]    INTERVAL HPI/OVERNIGHT EVENTS:  -phenobarb load given x1, plus one dose 130mg phenobarb x1 for agitation  -CTH 6h repeat stable   -diarrhea ON    SUBJECTIVE: Patient seen and examined at bedside.   Unable to fully assess ROS since confused, but denies pain, reports dry throat.     OBJECTIVE:    VITAL SIGNS:  ICU Vital Signs Last 24 Hrs  T(C): 37.4 (10 Dec 2020 04:00), Max: 37.4 (09 Dec 2020 16:00)  T(F): 99.3 (10 Dec 2020 04:00), Max: 99.4 (09 Dec 2020 16:00)  HR: 84 (10 Dec 2020 07:00) (84 - 128)  BP: 103/69 (10 Dec 2020 07:00) (94/60 - 137/76)  BP(mean): 80 (10 Dec 2020 07:00) (72 - 108)  ABP: --  ABP(mean): --  RR: 20 (10 Dec 2020 07:00) (18 - 25)  SpO2: 94% (10 Dec 2020 07:00) (94% - 100%)        12-09 @ 07:01  -  12-10 @ 07:00  --------------------------------------------------------  IN: 2534.8 mL / OUT: 1 mL / NET: 2533.8 mL      CAPILLARY BLOOD GLUCOSE          PHYSICAL EXAM:    General: NAD, confused this AM  HEENT: NC/AT; PERRL, clear conjunctiva  Neck: supple  Respiratory: CTA b/l  Cardiovascular: +S1/S2; RRR  Abdomen: soft, NT/ND; +BS x4  Extremities: WWP, 2+ peripheral pulses b/l; no LE edema  Skin: normal color and turgor; no rash  Neurological: No tremor or asterixis noted this AM. Patient confused this AM, does not remember coming to hospital, rambling about how "no one told him how to pay"    MEDICATIONS:  MEDICATIONS  (STANDING):  chlorhexidine 4% Liquid 1 Application(s) Topical <User Schedule>  dexMEDEtomidine Infusion 0.2 MICROgram(s)/kG/Hr (5.22 mL/Hr) IV Continuous <Continuous>  dextrose 5% + lactated ringers. 1000 milliLiter(s) (125 mL/Hr) IV Continuous <Continuous>  folic acid Injectable 1 milliGRAM(s) IV Push daily  levETIRAcetam  IVPB 500 milliGRAM(s) IV Intermittent every 12 hours  multivitamin 1 Tablet(s) Oral daily  potassium chloride  10 mEq/100 mL IVPB 10 milliEquivalent(s) IV Intermittent every 1 hour  thiamine IVPB 500 milliGRAM(s) IV Intermittent daily    MEDICATIONS  (PRN):      ALLERGIES:  Allergies    No Known Allergies    Intolerances        LABS:                        9.0    3.95  )-----------( 44       ( 10 Dec 2020 05:58 )             26.2     12-10    137  |  99  |  12  ----------------------------<  169<H>  3.4<L>   |  23  |  0.63    Ca    7.7<L>      10 Dec 2020 05:58  Phos  3.3     12-10  Mg     2.0     12-10    TPro  6.1  /  Alb  3.3  /  TBili  1.6<H>  /  DBili  x   /  AST  113<H>  /  ALT  16  /  AlkPhos  191<H>  12-10    PT/INR - ( 10 Dec 2020 00:25 )   PT: 14.2 sec;   INR: 1.19 ratio         PTT - ( 10 Dec 2020 00:25 )  PTT:30.6 sec      RADIOLOGY & ADDITIONAL TESTS: Reviewed.

## 2020-12-10 NOTE — SBIRT NOTE ADULT - NSSBIRTBRIEFINTDET_GEN_A_CORE
Patient reports drinking one fifth of tequila every day on and off for the past 8-9 years. Education provided on available supports and services; however patient declines interest in any resources or referrals at this time.

## 2020-12-10 NOTE — PROGRESS NOTE ADULT - ATTENDING COMMENTS
Pt seen and examined. 55 M with medical hx as noted above, now with traumatic SDH/SAH, ETOH withdrawal and C diff diarrhea. Ct head stable overnight. Check repeat Ct head at 24 hours. Cont neuro checks Q1H, received platelet transfusion overnight, FUP repeat labs, gaol to keep platelets > 100 K. ETOH withdrawal, received phenobarb and requiring Precedex gtt. FUP phenobarb level. Stool c diff PCR positive. Start PO Vanco. Brief episode of hypotension likely related to sedation, cont hemodynamic monitoring, keep MAP >65. Prognosis guarded.

## 2020-12-10 NOTE — PROGRESS NOTE ADULT - ASSESSMENT
LoraWerner    55M s/p fall on ETOH, no LOC, CTH shows minimal tSAH and traumatic subdural on left tentorial leaflet. CT Cspine neg. Plts 54. AOx3, FC, EOMI, no facial, 5/5 throughout, no drift  -Xfuse to plt goal >100  -Repeat CTH stable, recommend another stability scan today  -Q2 neurochecks pending repeat CTH  -In MICU for EtOH withdrawal,  -Keppra 500 BID x 7 days Wrener Paulino    55M s/p fall on ETOH, no LOC, CTH shows minimal tSAH and traumatic subdural on left tentorial leaflet. CT Cspine neg. Plts 54. AOx3, FC, EOMI, no facial, 5/5 throughout, no drift  - ADM MICU for EtOH w/d  - Plt 52<32 s/p 2U  - Hb 9<12, monitor per MICU, coags wnl  - TEG dec MA indicative of platelet dysfunction. Would continue to transfuse platelets goal >100, INR goal <1.3, would also consider Vit K given EtoH use  - Keppra 500 BID x7d  - Repeat CTH today for 24h stability at 2PM  - q4h neuro checks   Werner Paulino    55M s/p fall on ETOH, no LOC, CTH shows minimal tSAH and traumatic subdural on left tentorial leaflet. CT Cspine neg. Plts 54. AOx3, FC, EOMI, no facial, 5/5 throughout, no drift  - ADM MICU for EtOH w/d  - Plt 52<32 s/p 2U  - Hb 9<12, monitor per MICU, coags wnl  - TEG dec MA indicative of platelet dysfunction. Would continue to transfuse platelets goal >100, INR goal <1.3, would also consider Vit K given EtoH use  - would dc keppra, no strong indication for sz ppx 2/2 structural lesion/heme. If c/f seizure, would start depakote iso ETOH use d/o  - Repeat CTH today for 24h stability at 2PM  - q4h neuro checks

## 2020-12-10 NOTE — DIETITIAN INITIAL EVALUATION ADULT. - PERTINENT MEDS FT
MEDICATIONS  (STANDING):  chlorhexidine 4% Liquid 1 Application(s) Topical <User Schedule>  dexMEDEtomidine Infusion 0.2 MICROgram(s)/kG/Hr (5.22 mL/Hr) IV Continuous <Continuous>  dextrose 40% Gel 15 Gram(s) Oral once  dextrose 5% + lactated ringers. 1000 milliLiter(s) (75 mL/Hr) IV Continuous <Continuous>  dextrose 5%. 1000 milliLiter(s) (50 mL/Hr) IV Continuous <Continuous>  dextrose 5%. 1000 milliLiter(s) (100 mL/Hr) IV Continuous <Continuous>  dextrose 50% Injectable 25 Gram(s) IV Push once  dextrose 50% Injectable 12.5 Gram(s) IV Push once  dextrose 50% Injectable 25 Gram(s) IV Push once  folic acid Injectable 1 milliGRAM(s) IV Push daily  glucagon  Injectable 1 milliGRAM(s) IntraMuscular once  insulin lispro (ADMELOG) corrective regimen sliding scale   SubCutaneous three times a day before meals  insulin lispro (ADMELOG) corrective regimen sliding scale   SubCutaneous at bedtime  levETIRAcetam  IVPB 500 milliGRAM(s) IV Intermittent every 12 hours  multivitamin 1 Tablet(s) Oral daily  potassium chloride  10 mEq/100 mL IVPB 10 milliEquivalent(s) IV Intermittent every 1 hour  thiamine IVPB 500 milliGRAM(s) IV Intermittent daily  vancomycin    Solution 125 milliGRAM(s) Oral every 6 hours

## 2020-12-10 NOTE — PROGRESS NOTE ADULT - ASSESSMENT
Patient is a 55M with PMHx EtOH abuse, DTs, EtOH withdrawal, cirrhosis (w/ portal HTN), HTN, prior oxycodone abuse, GI bleeds here with acute EtOH intoxication, last drink in the AM. Course complicated by SAH and SDH 2/2 to head injury. CIWA scores elevated, requiring MICU-level observation.    #neuro  //EtOH w/d  -confused this AM s/p phenobarb load and PRN x1  -patient with complex history of DTs, EtOH w/d requiring aggressive, long Ativan tapers  -wean precedex as tolerated; phenobarb 130mg PRN  -CIWA scores 10-16 ON    //SAH/SDH  -small noted on CTH, likely 2/2 to head injury in setting of low platelets  -CTH repeat at 6h stable; repeat 24h scan this afternoon per neurosurg  -neurosurg service recommending plt goal >100k, INR <1.3, vit K     #CV  -monitor on tele   -non-compliant with medications, not currently taking PO meds at home  -PRNs for elevated SBPs; hypotensive ON 2/2 to sedatives    #Resp  -satting on RA  -NC PRN    #GI  //EtOH cirrhosis, abuse  -cont thiamine, multivitamin, folate  -DASH/TLC diet start today if alert     #Renal  -no active issues  -monitor I/Os    #Endo  -no active issues    #Heme  //thrombocytopenia  -likely 2/2 to hepatic cirrhosis   -CBC q8h   -s/p 2U plts    DVT: held 2/2 to low plts and sah/sdh  Diet: DASH/TLC  Dispo: to MICU for observation    Full Code     Patient is a 55M with PMHx EtOH abuse, DTs, EtOH withdrawal, cirrhosis (w/ portal HTN), HTN, prior oxycodone abuse, GI bleeds here with acute EtOH intoxication, last drink in the AM. Course complicated by SAH and SDH 2/2 to head injury. CIWA scores elevated, requiring MICU-level observation.    #neuro  //EtOH w/d  -confused this AM s/p phenobarb load and PRN x1  -patient with complex history of DTs, EtOH w/d requiring aggressive, long Ativan tapers  -wean precedex as tolerated; phenobarb 130mg PRN  -CIWA scores 10-16 ON  -f/u phenobarb level    //SAH/SDH  -small noted on CTH, likely 2/2 to head injury in setting of low platelets  -CTH repeat at 6h stable; repeat 24h scan this afternoon per neurosurg  -neurosurg service recommending plt goal >100k, INR <1.3, vit K     #CV  -monitor on tele   -non-compliant with medications, not currently taking PO meds at home  -PRNs for elevated SBPs; hypotensive ON 2/2 to sedatives    #Resp  -satting on RA  -NC PRN    #GI  //EtOH cirrhosis, abuse  -cont thiamine, multivitamin, folate  -DASH/TLC diet start today if alert; otherwise, place NGT for meds and nutrition  -f/u ammonia level    #Renal  -no active issues  -monitor I/Os    #Endo  -no active issues    #Heme  //thrombocytopenia  -likely 2/2 to hepatic cirrhosis   -CBC q8h   -s/p 2U plts    #ID   -C diff positive  -start oral vanco 125 q6h    DVT: held 2/2 to low plts and sah/sdh  Diet: DASH/TLC  Dispo: to MICU for observation    Full Code

## 2020-12-10 NOTE — DIETITIAN INITIAL EVALUATION ADULT. - OTHER INFO
Pt seen for:  MICU Length Of Stay                                  GI issues: none noted           Last  BM: today            Food Allergies/Intolerances: NKFA                 Vitamins/Supplements: none noted  Wt hx: per RD note 5/1 wt was 205 lb. Dosing wt this adm 194 lb                              Skin: no pressure injuries documented     Subjective/Objective: pt lethargic, unable to fully participate in interview at this time    Wt used for nutrition needs: dosing wt 12/9 88.1 kg Pt seen for:  MICU Length Of Stay                                  GI issues: none noted           Last  BM: today            Food Allergies/Intolerances: NKFA                 Vitamins/Supplements: none noted  Wt hx: per RD note 5/1 wt was 205 lb. Dosing wt this adm 194 lb                              Skin: no pressure injuries documented     Subjective/Objective: pt unable to fully participate in interview at this time    Wt used for nutrition needs: dosing wt 12/9 88.1 kg

## 2020-12-10 NOTE — PROGRESS NOTE ADULT - SUBJECTIVE AND OBJECTIVE BOX
Patient seen and examined at bedside.    --Anticoagulation--    T(C): 37.3 (12-10-20 @ 00:00), Max: 37.4 (12-09-20 @ 16:00)  HR: 92 (12-10-20 @ 02:00) (92 - 128)  BP: 114/65 (12-10-20 @ 02:00) (95/61 - 137/76)  RR: 20 (12-10-20 @ 02:00) (18 - 25)  SpO2: 96% (12-10-20 @ 02:00) (94% - 100%)  Wt(kg): --    Exam:  AOx3, FC, EOMI, no facial, 5/5 throughout, no drift

## 2020-12-10 NOTE — PROGRESS NOTE ADULT - ATTENDING COMMENTS
Pt stable neurologically.  Repeat head CT at 4 and 24 hrs stable.  Pt remains thrombocytopenic. PLT goal > 100K.  ETOH withdrawal protocol.  C. diff +

## 2020-12-10 NOTE — CHART NOTE - NSCHARTNOTEFT_GEN_A_CORE
: Rosa M DE DIOS NP     INDICATION: admission assessment     PROCEDURE:  [ x] LIMITED ECHO  [x ] LIMITED CHEST  [ ] LIMITED RETROPERITONEAL  [x ] LIMITED ABDOMINAL  [ ] LIMITED DVT  [ ] NEEDLE GUIDANCE VASCULAR  [ ] NEEDLE GUIDANCE THORACENTESIS  [ ] NEEDLE GUIDANCE PARACENTESIS  [ ] NEEDLE GUIDANCE PERICARDIOCENTESIS  [ ] OTHER    FINDINGS:  Echo   RV Smaller than LV   Normal RV and LV function   Trace pericardial effusion noted   IVC approximately 1cm   to .5     Chest   -Predominantly a line pattern to anterior chest wall   - no pleural effusion or consolidation to bilateral Pleural bases     Abdomen   -No ascites noted   - Bladder is slightly  distended  - Bilateral kidney without any hydronephroses or nephrolithiasis               INTERPRETATION:  Normal cardiac size and function, trace pericardial effusion normal IVC size   Lungs are clear  (+) distended bladder without any hydronephrosis or Nephrolithiasis   no ascites. : Rosa M DE DIOS NP     INDICATION: Hypotension/ Hypoxia    PROCEDURE:  [ x] LIMITED ECHO  [x ] LIMITED CHEST  [ ] LIMITED RETROPERITONEAL  [x ] LIMITED ABDOMINAL  [ ] LIMITED DVT  [ ] NEEDLE GUIDANCE VASCULAR  [ ] NEEDLE GUIDANCE THORACENTESIS  [ ] NEEDLE GUIDANCE PARACENTESIS  [ ] NEEDLE GUIDANCE PERICARDIOCENTESIS  [ ] OTHER    FINDINGS:  Echo   RV Smaller than LV   Normal RV and LV function   Trace pericardial effusion noted   IVC approximately 1cm   to .5     Chest   -Predominantly a line pattern to anterior chest wall   - no pleural effusion or consolidation to bilateral Pleural bases     Abdomen   -No ascites noted   - Bladder is slightly  distended  - Bilateral kidney without any hydronephroses or nephrolithiasis               INTERPRETATION:  Normal cardiac size and function, trace pericardial effusion normal IVC size   Lungs are clear  (+) distended bladder without any hydronephrosis or Nephrolithiasis   no ascites.

## 2020-12-11 NOTE — PROGRESS NOTE ADULT - SUBJECTIVE AND OBJECTIVE BOX
COVERING RESIDENT: MELIDA LAZO (PGY-2) | [facility] [phone] | [facility] [phone]    INTERVAL HPI/OVERNIGHT EVENTS:  -Some agitation ON; ativan 2mg x2, started on taper  -Fever to 101 in the AM; BCx/UCx sent     SUBJECTIVE: Patient seen and examined at bedside.   Unable to fully assess as patient is lethargic and falls asleep during exam. Denies pain    OBJECTIVE:    VITAL SIGNS:  ICU Vital Signs Last 24 Hrs  T(C): 37 (11 Dec 2020 08:00), Max: 38.3 (11 Dec 2020 06:00)  T(F): 98.6 (11 Dec 2020 08:00), Max: 101 (11 Dec 2020 06:00)  HR: 102 (11 Dec 2020 08:00) (80 - 122)  BP: 117/74 (11 Dec 2020 08:00) (100/60 - 143/84)  BP(mean): 91 (11 Dec 2020 08:00) (74 - 106)  ABP: --  ABP(mean): --  RR: 18 (11 Dec 2020 08:00) (18 - 26)  SpO2: 100% (11 Dec 2020 08:00) (96% - 100%)        12-10 @ 07:01  -  12-11 @ 07:00  --------------------------------------------------------  IN: 3488.6 mL / OUT: 1000 mL / NET: 2488.6 mL    12-11 @ 07:01 - 12-11 @ 08:20  --------------------------------------------------------  IN: 150 mL / OUT: 0 mL / NET: 150 mL      CAPILLARY BLOOD GLUCOSE      POCT Blood Glucose.: 104 mg/dL (11 Dec 2020 07:39)      PHYSICAL EXAM:    General: Lethargic; opens eyes on command, but falls asleep easily  HEENT: NC/AT; PERRL, clear conjunctiva  Neck: supple  Respiratory: CTA b/l  Cardiovascular: +S1/S2; RRR  Abdomen: soft, non-ternder, distended, tympanic to percussion; +BS x4  Extremities: WWP, 2+ peripheral pulses b/l; no LE edema  Skin: normal color and turgor; no rash  Neurological: Lethargic. Moving all limbs, no focal deficits.    MEDICATIONS:  MEDICATIONS  (STANDING):  chlorhexidine 4% Liquid 1 Application(s) Topical <User Schedule>  dextrose 40% Gel 15 Gram(s) Oral once  dextrose 5% + lactated ringers. 1000 milliLiter(s) (75 mL/Hr) IV Continuous <Continuous>  dextrose 5%. 1000 milliLiter(s) (50 mL/Hr) IV Continuous <Continuous>  dextrose 5%. 1000 milliLiter(s) (100 mL/Hr) IV Continuous <Continuous>  dextrose 50% Injectable 25 Gram(s) IV Push once  dextrose 50% Injectable 12.5 Gram(s) IV Push once  dextrose 50% Injectable 25 Gram(s) IV Push once  folic acid Injectable 1 milliGRAM(s) IV Push daily  glucagon  Injectable 1 milliGRAM(s) IntraMuscular once  insulin lispro (ADMELOG) corrective regimen sliding scale   SubCutaneous three times a day before meals  insulin lispro (ADMELOG) corrective regimen sliding scale   SubCutaneous at bedtime  levETIRAcetam  IVPB 500 milliGRAM(s) IV Intermittent every 12 hours  LORazepam   Injectable 2 milliGRAM(s) IV Push every 4 hours  LORazepam   Injectable   IV Push   multivitamin 1 Tablet(s) Oral daily  potassium chloride  10 mEq/100 mL IVPB 10 milliEquivalent(s) IV Intermittent every 1 hour  thiamine IVPB 500 milliGRAM(s) IV Intermittent daily  vancomycin    Solution 125 milliGRAM(s) Oral every 6 hours    MEDICATIONS  (PRN):  LORazepam   Injectable 2 milliGRAM(s) IV Push every 1 hour PRN Symptom-triggered: each CIWA -Ar score 8 or GREATER  sodium chloride 0.65% Nasal 1 Spray(s) Both Nostrils every 6 hours PRN Nasal Congestion      ALLERGIES:  Allergies    No Known Allergies    Intolerances        LABS:                        10.1   6.54  )-----------( 50       ( 11 Dec 2020 00:50 )             29.6     12-11    134<L>  |  98  |  7   ----------------------------<  107<H>  2.6<LL>   |  23  |  0.63    Ca    8.2<L>      11 Dec 2020 00:49  Phos  1.2     12-11  Mg     1.9     12-11    TPro  6.2  /  Alb  3.3  /  TBili  1.4<H>  /  DBili  x   /  AST  122<H>  /  ALT  19  /  AlkPhos  216<H>  12-11    PT/INR - ( 11 Dec 2020 00:49 )   PT: 14.9 sec;   INR: 1.26 ratio         PTT - ( 11 Dec 2020 00:49 )  PTT:31.2 sec      RADIOLOGY & ADDITIONAL TESTS: Reviewed.

## 2020-12-11 NOTE — PROGRESS NOTE ADULT - ASSESSMENT
Patient is a 55M with PMHx EtOH abuse, DTs, EtOH withdrawal, cirrhosis (w/ portal HTN), HTN, prior oxycodone abuse, GI bleeds here with acute EtOH intoxication, last drink in the AM. Course complicated by SAH and SDH 2/2 to head injury. CIWA scores elevated, requiring MICU-level observation.    #neuro  //EtOH w/d  -lethargic this AM after ativan doses  -cont ativan taper with PRNs  -patient with complex history of DTs, EtOH w/d requiring aggressive, long Ativan tapers  -CIWA scores 1-10 ON    //SAH/SDH  -small noted on CTH, likely 2/2 to head injury in setting of low platelets  -CTH repeat at 6h and 24h stable   -neurosurg service recommending plt goal >100k, INR <1.3    #CV  -monitor on tele   -non-compliant with medications, not currently taking PO meds at home  -PRNs for elevated SBPs; hypotensive ON 2/2 to sedatives    #Resp  -satting well on 2L NC    #GI  //EtOH cirrhosis, abuse  -cont thiamine, multivitamin, folate  -DASH/TLC diet   //C diff colitis  -treatment as below     #Renal  -no active issues  -monitor I/Os    #Endo  -no active issues    #Heme  //thrombocytopenia  -likely 2/2 to hepatic cirrhosis   -CBC q8h       #ID   -C diff positive  -start oral vanco 125 q6h  -febrile ON; f/u BCx, UCx    DVT: held 2/2 to low plts and sah/sdh  Diet: DASH/TLC  Dispo: to floors when clinical status improves    Full Code     Patient is a 55M with PMHx EtOH abuse, DTs, EtOH withdrawal, cirrhosis (w/ portal HTN), HTN, prior oxycodone abuse, GI bleeds here with acute EtOH intoxication, last drink in the AM. Course complicated by SAH and SDH 2/2 to head injury. CIWA scores elevated, requiring MICU-level observation.    #neuro  //EtOH w/d  -lethargic this AM after ativan doses  -cont ativan taper with PRNs  -patient with complex history of DTs, EtOH w/d requiring aggressive, long Ativan tapers  -CIWA scores 1-10 ON    //SAH/SDH  -small noted on CTH, likely 2/2 to head injury in setting of low platelets  -CTH repeat at 6h and 24h stable   -neurosurg service recommending plt goal >100k, INR <1.3; f/u outpatient in 2-4 wks post-discharge to assess for stability    #CV  -monitor on tele   -non-compliant with medications, not currently taking PO meds at home  -PRNs for elevated SBPs; hypotensive ON 2/2 to sedatives    #Resp  -satting well on 2L NC    #GI  //EtOH cirrhosis, abuse  -cont thiamine, multivitamin, folate  -place NGT  //C diff colitis  -treatment as below     #Renal  -no active issues  -monitor I/Os    #Endo  -no active issues    #Heme  //thrombocytopenia  -likely 2/2 to hepatic cirrhosis   -CBC q8h       #ID   -C diff positive  -start oral vanco 125 q6h  -febrile ON; f/u BCx, UCx    DVT: held 2/2 to low plts and sah/sdh  Diet: NGT placement today with assumption of feeds  Dispo: to floors when clinical status improves    Full Code

## 2020-12-11 NOTE — PROGRESS NOTE ADULT - ATTENDING COMMENTS
Pt seen and examined. 55 M with medical hx as noted above, now with traumatic SDH/SAH, ETOH withdrawal and C diff diarrhea. Ct head stable overnight. Ct head at 24 hours stable, cont neuro checks and repeat stat head CT if any change in neuro exam. ETOH withdrawal, currently off Precedex gtt, now on an Ativan taper. cont close monitoring of resp status. C diff diarrhea, cont PO Vanc, FUP Bcxs. Hypokalemia, FUP BMP post repletion. Prognosis guarded.

## 2020-12-12 NOTE — PROGRESS NOTE ADULT - ATTENDING COMMENTS
Pt seen and examined. 55 M with medical hx as noted above, now with traumatic SDH/SAH, ETOH withdrawal and C diff diarrhea. SDH/SAH stable on CT head, cont Keppra for 7 days.  Remained severely agitated and delirious 2/2 ETOH withdrawal despite 27 mg of Ativan total overnight. Currently on a Precedex gtt with ongoing agitation. QTc now in the 400s, will load with Phenobarbital IV and cont Precedex gtt. Check phenobarb level tomorrow. Cont close monitoring of resp status and end tidal CO2 monitoring. C diff diarrhea, cont PO Vanc, Bcxs from 12/11 NGTD. Hypokalemia, FUP BMP post repletion. Prognosis guarded.

## 2020-12-12 NOTE — PROGRESS NOTE ADULT - ASSESSMENT
Patient is a 55M with PMHx EtOH abuse, DTs, EtOH withdrawal, cirrhosis (w/ portal HTN), HTN, prior oxycodone abuse, GI bleeds here with acute EtOH intoxication, last drink in the AM. Course complicated by SAH and SDH 2/2 to head injury. CIWA scores elevated, requiring MICU-level observation.    #neuro  //EtOH w/d  -lethargic this AM after ativan doses  -cont ativan taper with PRNs  -patient with complex history of DTs, EtOH w/d requiring aggressive, long Ativan tapers  -CIWA scores 1-10 ON    //SAH/SDH  -small noted on CTH, likely 2/2 to head injury in setting of low platelets  -CTH repeat at 6h and 24h stable   -neurosurg service recommending plt goal >100k, INR <1.3; f/u outpatient in 2-4 wks post-discharge to assess for stability    #CV  -monitor on tele   -non-compliant with medications, not currently taking PO meds at home  -PRNs for elevated SBPs; hypotensive ON 2/2 to sedatives    #Resp  -satting well on 2L NC    #GI  //EtOH cirrhosis, abuse  -cont thiamine, multivitamin, folate  -place NGT  //C diff colitis  -treatment as below     #Renal  -no active issues  -monitor I/Os    #Endo  -no active issues    #Heme  //thrombocytopenia  -likely 2/2 to hepatic cirrhosis   -CBC q8h       #ID   -C diff positive  -start oral vanco 125 q6h  -febrile ON; f/u BCx, UCx    DVT: held 2/2 to low plts and sah/sdh  Diet: NGT placement today with assumption of feeds  Dispo: to floors when clinical status improves    Full Code     55y M w/ PMH HTN (with medication nonadherence), chronic alcohol abuse (c/b prior DTs, withdrawal seizures, hematemesis, and melena), alcoholic cirrhosis (with portal hypertension), presenting with acute alcohol intoxication and traumatic SDH/SAH, admitted for severe etoh withdrawal requiring phenobarb and precedex gtt.    #Neuro  Alcohol withdrawal  - s/p phenobarb 390mg IVP and precedex gtt on 12/10; switched to ativan taper with symptom-triggered CIWA protocol 12/11; was in active withdrawal despite up to 27.5mg of IV ativan overnight on 12/11 to 12/12 and restarted on precedex gtt  - monitor CIWAs    Traumatic SAH/SDH  - small amount of acute SAH within the left temporal sulci and a trace amount of acute SDH along the left tentorial leaflet without shift, mass effect, or herniation  - stable on repeat CTH  - c/w prophylactic keppra 500 BID while inpatient given high risk for withdrawal seizure as well  - f/u neurosurgery Dr. Fitzpatrick in 2-4 weeks after discharge    #CV  HTN  - largely normotensive in setting of sedative medication    Sinus tachycardia  - likely in setting of alcohol withdrawal     #Pulm  - breathing comfortably and sating well on RA    #GI  Non-severe C diff infection  - diarrhea improving; monitor lytes  - c/w PO vanc 125mg q6 (12/10 – 12/20)  - needs NGT    Alcoholic cirrhosis  - without overt signs of decompensation at this time  - c/w thiamine, folate, MVI supplementation  - outpatient hepatology follow up    #Renal  - no active issues    #Endo  - no active issues    #ID  Non-severe C diff infection  - diarrhea improving  - c/w PO vanc 125mg q6 (12/10 – 12/20)  - f/u BCx 12/11 in lab    #Heme  - stable chronic normocytic anemia and thrombocytopenia in setting of alcoholic liver cirrhosis   - no signs of active bleeding  - monitor CBC q8  - transfuse for Hb <7 or plt <100  - INR goal <1.3 per neurosurgery  - SCDs; pharmacologic DVT ppx on hold    #Code status  - full code    55y M w/ PMH HTN (with medication nonadherence), chronic alcohol abuse (c/b prior DTs, withdrawal seizures, hematemesis, and melena), alcoholic cirrhosis (with portal hypertension), presenting with acute alcohol intoxication and traumatic SDH/SAH, admitted for severe etoh withdrawal requiring phenobarb and precedex gtt.    #Neuro  Alcohol withdrawal  - was in active withdrawal despite up to 27.5mg of IV ativan overnight on 12/11 to 12/12 and restarted on precedex gtt currently at 1.5mcg  - s/p phenobarb 390mg IVP and precedex gtt on 12/10; switched to ativan taper with symptom-triggered CIWA protocol 12/11  - will DC ativan taper and load with phenobarb at 10mg/kg today, QTc is 443, check level tomorrow    Traumatic SAH/SDH  - small amount of acute SAH within the left temporal sulci and a trace amount of acute SDH along the left tentorial leaflet without shift, mass effect, or herniation  - stable on repeat CTH  - c/w prophylactic keppra 500 BID for 7 days (12/9 - am of 12/16) given high risk for withdrawal seizure as well  - f/u neurosurgery Dr. Fitzpatrick in 2-4 weeks after discharge    #CV  HTN  - largely normotensive in setting of sedative medication    Sinus tachycardia  - likely in setting of alcohol withdrawal     #Pulm  - breathing comfortably and sating well on RA    #GI  Non-severe C diff infection  - still has watery diarrhea in his rectal tube  - c/w PO vanc 125mg q6 (12/10 – 12/20) via NGT    Alcoholic cirrhosis  - without overt signs of decompensation at this time  - c/w thiamine, folate, MVI supplementation  - outpatient hepatology follow up    #Renal  - no active issues  - monitor lytes BID given diarrhea  - will give D5LR@75cc for maintenance    #Endo  - no active issues    #ID  Non-severe C diff infection  - still has watery diarrhea in his rectal tube  - c/w PO vanc 125mg q6 (12/10 – 12/20)  - f/u BCx 12/11 in lab    #Heme  - stable chronic normocytic anemia and thrombocytopenia in setting of alcoholic liver cirrhosis   - no signs of active bleeding  - monitor CBC daily  - transfuse for Hb <7 or plt <100  - INR goal <1.3 per neurosurgery  - will give PO vit K today  - SCDs; pharmacologic DVT ppx on hold    #Code status  - full code

## 2020-12-12 NOTE — PROGRESS NOTE ADULT - SUBJECTIVE AND OBJECTIVE BOX
Greg Carrillo, PGY3   Pager [phone]    Brief summary:  This is a 55y M w/ PMH HTN with medication nonadherence, alcohol abuse (with h/o DTs, withdrawal seizures, hematemesis, and melena), alcoholic cirrhosis with portal hypertension, presenting from home with a cc of fall and posterior head trauma. Patient was noted to be AOx3 and appearing intoxicated in the ED with an elevated BAL to the 300s. CTH showed a small amount of acute SAH within the left temporal sulci and a trace amount of acute SDH along the left tentorial leaflet. Neurosurgery recommended nonsurgical management with platelet transfusion to goal of 100k and serial head imaging which have been stable. Patient was admitted to the MICU for severe etoh withdrawal and started on phenobarb and precedex. Off precedex since  and started on Ativan taper with symptom-triggered CIWA protocol. Further phenobarb on hold due to QT prolongation. Other issues of C diff diarrhea.    INTERVAL HPI/OVERNIGHT EVENTS: Was in active withdrawal overnight, reportedly verbally and physically combative, was given 27.5 mg of IV ativan overnight and put back on dex at 1.5mcg.    SUBJECTIVE: Patient seen and examined at bedside. Lethargic. Partially opens to verbal stimuli.      OBJECTIVE:    VITAL SIGNS:  ICU Vital Signs Last 24 Hrs  T(C): 37.1 (12 Dec 2020 04:00), Max: 37.4 (12 Dec 2020 00:00)  T(F): 98.8 (12 Dec 2020 04:00), Max: 99.3 (12 Dec 2020 00:00)  HR: 114 (12 Dec 2020 06:00) (102 - 122)  BP: 149/71 (12 Dec 2020 06:00) (117/74 - 156/98)  BP(mean): 99 (12 Dec 2020 06:00) (91 - 120)  ABP: --  ABP(mean): --  RR: 31 (12 Dec 2020 06:00) (12 - 31)  SpO2: 100% (12 Dec 2020 06:00) (97% - 100%)        11 @ 07:01  -  12 @ 07:00  --------------------------------------------------------  IN: 3327 mL / OUT: 4800 mL / NET: -1473 mL      CAPILLARY BLOOD GLUCOSE      POCT Blood Glucose.: 118 mg/dL (11 Dec 2020 17:10)      PHYSICAL EXAM:  General: sedated  HEENT: NCAT, PERRL, clear conjunctiva, sclera anicteric  Neck: supple, no JVD  Respiratory: CTAB anterolaterally  Cardiovascular: RRR, S1S2, no m/r/g  Abdomen: soft, nontender, nondistended, bowel sounds intact  Extremities: no edema, no cyanosis  Skin: warm, perfused  Neurological: nonfocal    MEDICATIONS:  MEDICATIONS  (STANDING):  chlorhexidine 4% Liquid 1 Application(s) Topical <User Schedule>  dexMEDEtomidine Infusion 0.2 MICROgram(s)/kG/Hr (4.41 mL/Hr) IV Continuous <Continuous>  folic acid Injectable 1 milliGRAM(s) IV Push daily  levETIRAcetam  IVPB 500 milliGRAM(s) IV Intermittent every 12 hours  LORazepam   Injectable   IV Push   LORazepam   Injectable 2 milliGRAM(s) IV Push every 4 hours  multivitamin 1 Tablet(s) Oral daily  thiamine IVPB 500 milliGRAM(s) IV Intermittent daily  vancomycin    Solution 125 milliGRAM(s) Oral every 6 hours    MEDICATIONS  (PRN):  LORazepam   Injectable 2 milliGRAM(s) IV Push every 1 hour PRN Agitation  sodium chloride 0.65% Nasal 1 Spray(s) Both Nostrils every 6 hours PRN Nasal Congestion      ALLERGIES:  Allergies    No Known Allergies    Intolerances        LABS:                        10.0   6.21  )-----------( 50       ( 12 Dec 2020 00:46 )             30.0     12-12    137  |  98  |  <4<L>  ----------------------------<  116<H>  3.4<L>   |  23  |  0.53    Ca    8.3<L>      12 Dec 2020 00:46  Phos  2.5     12-12  Mg     1.7     12-12    TPro  6.4  /  Alb  3.3  /  TBili  1.0  /  DBili  x   /  AST  120<H>  /  ALT  24  /  AlkPhos  206<H>  12-12    PT/INR - ( 12 Dec 2020 00:46 )   PT: 16.0 sec;   INR: 1.35 ratio         PTT - ( 12 Dec 2020 00:46 )  PTT:32.3 sec  Urinalysis Basic - ( 11 Dec 2020 11:34 )    Color: Yellow / Appearance: Clear / S.010 / pH: x  Gluc: x / Ketone: Negative  / Bili: Negative / Urobili: Negative   Blood: x / Protein: Trace / Nitrite: Negative   Leuk Esterase: Negative / RBC: x / WBC x   Sq Epi: x / Non Sq Epi: x / Bacteria: x          RADIOLOGY & ADDITIONAL TESTS: Reviewed.

## 2020-12-13 NOTE — PROGRESS NOTE ADULT - ASSESSMENT
55y M w/ PMH HTN (with medication nonadherence), chronic alcohol abuse (c/b prior DTs, withdrawal seizures, hematemesis, and melena), alcoholic cirrhosis (with portal hypertension), presenting with acute alcohol intoxication and traumatic SDH/SAH, admitted for severe EtOH withdrawal requiring phenobarb and precedex gtt.    #Neuro  Alcohol withdrawal  - cont phenobarb and precedex for active w/d  - daily EKGs for Qtc level    Traumatic SAH/SDH  - small amount of acute SAH within the left temporal sulci and a trace amount of acute SDH along the left tentorial leaflet without shift, mass effect, or herniation  - stable on repeat CTH  - c/w prophylactic keppra 500 BID for 7 days (12/9 - am of 12/16) given high risk for withdrawal seizure as well  - f/u neurosurgery Dr. Fitzpatrick in 2-4 weeks after discharge    #CV  HTN  - largely normotensive in setting of sedative medication    Sinus tachycardia  - likely in setting of alcohol withdrawal     #Pulm  - breathing comfortably and sating well on 2L NC    #GI  Non-severe C diff infection  - still has watery diarrhea in his rectal tube  - c/w PO vanc 125mg q6 (12/10 – 12/20) via NGT    Alcoholic cirrhosis  - without overt signs of decompensation at this time  - c/w thiamine, folate, MVI supplementation  - outpatient hepatology follow up    #Renal  - no active issues  - monitor lytes BID given diarrhea    #Endo  - no active issues    #ID  Non-severe C diff infection  - still has watery diarrhea in his rectal tube  - c/w PO vanc 125mg q6 (12/10 – 12/20)  - BCx 12/11 NGTD    #Heme  - stable chronic normocytic anemia and thrombocytopenia in setting of alcoholic liver cirrhosis   - no signs of active bleeding  - monitor CBC daily  - transfuse for Hb <7 or plt <100  - INR goal <1.3 per neurosurgery  - will give PO vit K today  - SCDs; pharmacologic DVT ppx on hold    DVT: SCDs  Diet: NPO with tube feeds  Dispo: to floors once weaned off precedex    Full Code

## 2020-12-13 NOTE — PROGRESS NOTE ADULT - SUBJECTIVE AND OBJECTIVE BOX
COVERING RESIDENT: MELIDA LAZO (PGY-2) | NS PAGER [phone] | [place] PAGER [phone]    INTERVAL HPI/OVERNIGHT EVENTS:  -rectal tube placed of diarrhea  -sedated this AM    SUBJECTIVE: Patient seen and examined at bedside.   Patient sedated.     OBJECTIVE:    VITAL SIGNS:  ICU Vital Signs Last 24 Hrs  T(C): 37.1 (13 Dec 2020 00:00), Max: 37.1 (12 Dec 2020 11:00)  T(F): 98.8 (13 Dec 2020 00:00), Max: 98.8 (12 Dec 2020 11:00)  HR: 76 (13 Dec 2020 06:00) (76 - 118)  BP: 121/76 (13 Dec 2020 06:00) (93/68 - 156/92)  BP(mean): 93 (13 Dec 2020 06:00) (73 - 118)  ABP: --  ABP(mean): --  RR: 21 (13 Dec 2020 06:00) (13 - 28)  SpO2: 100% (13 Dec 2020 06:00) (96% - 100%)         @ 07:01  -  - @ 07:00  --------------------------------------------------------  IN: 2253 mL / OUT: 1925 mL / NET: 328 mL      CAPILLARY BLOOD GLUCOSE      POCT Blood Glucose.: 114 mg/dL (12 Dec 2020 18:13)      PHYSICAL EXAM:    General: NAD, sedated   HEENT: NC/AT; PERRL, clear conjunctiva  Neck: supple  Respiratory: Coarse breath sounds in central airway, no SOB   Cardiovascular: +S1/S2; RRR  Abdomen: soft, NT/ND; +BS x4  Extremities: WWP, 2+ peripheral pulses b/l; no LE edema  Skin: normal color and turgor; no rash  Neurological: Unable to assess 2/2 to sedation. Moving all limbs    MEDICATIONS:  MEDICATIONS  (STANDING):  chlorhexidine 4% Liquid 1 Application(s) Topical <User Schedule>  dexMEDEtomidine Infusion 0.2 MICROgram(s)/kG/Hr (4.41 mL/Hr) IV Continuous <Continuous>  folic acid Injectable 1 milliGRAM(s) IV Push daily  levETIRAcetam  IVPB 500 milliGRAM(s) IV Intermittent every 12 hours  multivitamin 1 Tablet(s) Oral daily  phytonadione   Solution 5 milliGRAM(s) Oral daily  thiamine IVPB 500 milliGRAM(s) IV Intermittent daily  vancomycin    Solution 125 milliGRAM(s) Oral every 6 hours    MEDICATIONS  (PRN):  PHENobarbital Injectable 130 milliGRAM(s) IV Push every 15 minutes PRN agitation/ ETOH withdrawal  sodium chloride 0.65% Nasal 1 Spray(s) Both Nostrils every 6 hours PRN Nasal Congestion      ALLERGIES:  Allergies    No Known Allergies    Intolerances        LABS:                        10.3   6.40  )-----------( 66       ( 13 Dec 2020 00:34 )             30.9     12-    138  |  101  |  <4<L>  ----------------------------<  110<H>  3.4<L>   |  22  |  0.56    Ca    8.9      13 Dec 2020 00:34  Phos  2.7     12-13  Mg     1.7     12-    TPro  6.6  /  Alb  3.5  /  TBili  1.0  /  DBili  x   /  AST  90<H>  /  ALT  20  /  AlkPhos  215<H>  12-13    PT/INR - ( 13 Dec 2020 00:34 )   PT: 15.0 sec;   INR: 1.26 ratio         PTT - ( 13 Dec 2020 00:34 )  PTT:32.7 sec  Urinalysis Basic - ( 11 Dec 2020 11:34 )    Color: Yellow / Appearance: Clear / S.010 / pH: x  Gluc: x / Ketone: Negative  / Bili: Negative / Urobili: Negative   Blood: x / Protein: Trace / Nitrite: Negative   Leuk Esterase: Negative / RBC: x / WBC x   Sq Epi: x / Non Sq Epi: x / Bacteria: x        RADIOLOGY & ADDITIONAL TESTS: Reviewed.

## 2020-12-13 NOTE — PROGRESS NOTE ADULT - ATTENDING COMMENTS
Pt seen and examined. 55 M with medical hx as noted above, now with traumatic SDH/SAH, ETOH withdrawal and C diff diarrhea. SDH/SAH stable on CT head, cont Keppra for 7 days, now with ETOH withdrawal. Loaded with Phenobarb yesterday, remained severely agitated and delirious overnight requiring numerous additional doses of Phenobarb and a Precedex gtt. Phenobarb level 26.4, will repeat a 10 mg/kg load today and follow level tomorrow. Titrate down Precedex gtt as tolerated. Cont close monitoring of resp status and end tidal CO2 monitoring. C diff diarrhea, cont PO Vanc, Bcxs from 12/11 NGTD. Hypokalemia, FUP BMP post repletion. Prognosis guarded.

## 2020-12-14 NOTE — PROGRESS NOTE ADULT - ATTENDING COMMENTS
Patient examined and care reviewed in detail on bedside rounds  Critically ill with severe etoh withdrawal/traumatic SAH-SDH/cirrhosis   Frequent bedside visits with therapy change today.   I have personally provided 35+ minutes of critical care time concurrently with the resident/fellow; this excludes time spent on separate procedures.

## 2020-12-14 NOTE — PROGRESS NOTE ADULT - SUBJECTIVE AND OBJECTIVE BOX
INTERVAL HPI/OVERNIGHT EVENTS: continues to be on precedex drip overnight    SUBJECTIVE: Patient seen and examined at bedside.     CONSTITUTIONAL: No weakness, fevers or chills  EYES/ENT: No visual changes;  No vertigo or throat pain   NECK: No pain or stiffness  RESPIRATORY: No cough, wheezing, hemoptysis; No shortness of breath  CARDIOVASCULAR: No chest pain or palpitations  GASTROINTESTINAL: No abdominal or epigastric pain. No nausea, vomiting, or hematemesis; No diarrhea or constipation. No melena or hematochezia.  GENITOURINARY: No dysuria, frequency or hematuria  NEUROLOGICAL: No numbness or weakness  SKIN: No itching, rashes    OBJECTIVE:    VITAL SIGNS:  ICU Vital Signs Last 24 Hrs  T(C): 36.7 (14 Dec 2020 04:00), Max: 37.2 (13 Dec 2020 15:00)  T(F): 98 (14 Dec 2020 04:00), Max: 98.9 (13 Dec 2020 15:00)  HR: 70 (14 Dec 2020 07:00) (70 - 80)  BP: 115/70 (14 Dec 2020 07:00) (85/55 - 148/84)  BP(mean): 88 (14 Dec 2020 07:00) (65 - 109)  ABP: --  ABP(mean): --  RR: 17 (14 Dec 2020 07:00) (16 - 31)  SpO2: 100% (14 Dec 2020 07:00) (95% - 100%)        12-13 @ 07:01  -  12-14 @ 07:00  --------------------------------------------------------  IN: 2316.8 mL / OUT: 950 mL / NET: 1366.8 mL      CAPILLARY BLOOD GLUCOSE      POCT Blood Glucose.: 114 mg/dL (12 Dec 2020 18:13)      PHYSICAL EXAM:    General: NAD  HEENT: NC/AT; PERRL, clear conjunctiva  Neck: supple  Respiratory: CTA b/l  Cardiovascular: +S1/S2; RRR  Abdomen: soft, NT/ND; +BS x4  Extremities: WWP, 2+ peripheral pulses b/l; no LE edema  Skin: normal color and turgor; no rash  Neurological:    MEDICATIONS:  MEDICATIONS  (STANDING):  chlorhexidine 4% Liquid 1 Application(s) Topical <User Schedule>  dexMEDEtomidine Infusion 0.2 MICROgram(s)/kG/Hr (4.41 mL/Hr) IV Continuous <Continuous>  folic acid Injectable 1 milliGRAM(s) IV Push daily  levETIRAcetam  IVPB 500 milliGRAM(s) IV Intermittent every 12 hours  multivitamin 1 Tablet(s) Oral daily  phytonadione   Solution 5 milliGRAM(s) Oral daily  thiamine IVPB 500 milliGRAM(s) IV Intermittent daily  vancomycin    Solution 125 milliGRAM(s) Oral every 6 hours    MEDICATIONS  (PRN):  PHENobarbital Injectable 130 milliGRAM(s) IV Push every 15 minutes PRN agitation/ ETOH withdrawal  sodium chloride 0.65% Nasal 1 Spray(s) Both Nostrils every 6 hours PRN Nasal Congestion      ALLERGIES:  Allergies    No Known Allergies    Intolerances        LABS:                        10.5   7.20  )-----------( 79       ( 14 Dec 2020 00:34 )             31.0     12-14    135  |  102  |  6<L>  ----------------------------<  137<H>  5.0   |  20<L>  |  0.52    Ca    8.9      14 Dec 2020 00:34  Phos  3.1     12-14  Mg     2.1     12-14    TPro  6.6  /  Alb  3.4  /  TBili  0.8  /  DBili  x   /  AST  58<H>  /  ALT  18  /  AlkPhos  208<H>  12-14    PT/INR - ( 14 Dec 2020 00:34 )   PT: 13.7 sec;   INR: 1.15 ratio         PTT - ( 14 Dec 2020 00:34 )  PTT:33.3 sec      RADIOLOGY & ADDITIONAL TESTS: Reviewed.    Authored by Dr. Brandon CUELLO [id number], [phone] INTERVAL HPI/OVERNIGHT EVENTS: continues to be on precedex drip overnight decreasing throughout the day. diarrhea improving, no longer with rectal tube,     SUBJECTIVE: Patient seen and examined at bedside. arousable to speech, can follow basic commands, moving all extremities    ROS unable to assess    OBJECTIVE:    VITAL SIGNS:  ICU Vital Signs Last 24 Hrs  T(C): 36.7 (14 Dec 2020 04:00), Max: 37.2 (13 Dec 2020 15:00)  T(F): 98 (14 Dec 2020 04:00), Max: 98.9 (13 Dec 2020 15:00)  HR: 70 (14 Dec 2020 07:00) (70 - 80)  BP: 115/70 (14 Dec 2020 07:00) (85/55 - 148/84)  BP(mean): 88 (14 Dec 2020 07:00) (65 - 109)  ABP: --  ABP(mean): --  RR: 17 (14 Dec 2020 07:00) (16 - 31)  SpO2: 100% (14 Dec 2020 07:00) (95% - 100%)        12-13 @ 07:01  -  12-14 @ 07:00  --------------------------------------------------------  IN: 2316.8 mL / OUT: 950 mL / NET: 1366.8 mL      CAPILLARY BLOOD GLUCOSE      POCT Blood Glucose.: 114 mg/dL (12 Dec 2020 18:13)      PHYSICAL EXAM:    General: NAD  HEENT: NC/AT; PERRL, clear conjunctiva  Neck: supple., NGT in., end tidal in  Respiratory: CTA b/l  Cardiovascular: +S1/S2; RRR  Abdomen: soft, NT/ND; +BS x4, ng in  Extremities: WWP, 2+ peripheral pulses b/l; no LE edema  Skin: normal color and turgor; no rash  Neurological: ao 0, arouasable to verbal stimuli, moving all extremities    MEDICATIONS:  MEDICATIONS  (STANDING):  chlorhexidine 4% Liquid 1 Application(s) Topical <User Schedule>  dexMEDEtomidine Infusion 0.2 MICROgram(s)/kG/Hr (4.41 mL/Hr) IV Continuous <Continuous>  folic acid Injectable 1 milliGRAM(s) IV Push daily  levETIRAcetam  IVPB 500 milliGRAM(s) IV Intermittent every 12 hours  multivitamin 1 Tablet(s) Oral daily  phytonadione   Solution 5 milliGRAM(s) Oral daily  thiamine IVPB 500 milliGRAM(s) IV Intermittent daily  vancomycin    Solution 125 milliGRAM(s) Oral every 6 hours    MEDICATIONS  (PRN):  PHENobarbital Injectable 130 milliGRAM(s) IV Push every 15 minutes PRN agitation/ ETOH withdrawal  sodium chloride 0.65% Nasal 1 Spray(s) Both Nostrils every 6 hours PRN Nasal Congestion      ALLERGIES:  Allergies    No Known Allergies    Intolerances        LABS:                        10.5   7.20  )-----------( 79       ( 14 Dec 2020 00:34 )             31.0     12-14    135  |  102  |  6<L>  ----------------------------<  137<H>  5.0   |  20<L>  |  0.52    Ca    8.9      14 Dec 2020 00:34  Phos  3.1     12-14  Mg     2.1     12-14    TPro  6.6  /  Alb  3.4  /  TBili  0.8  /  DBili  x   /  AST  58<H>  /  ALT  18  /  AlkPhos  208<H>  12-14    PT/INR - ( 14 Dec 2020 00:34 )   PT: 13.7 sec;   INR: 1.15 ratio         PTT - ( 14 Dec 2020 00:34 )  PTT:33.3 sec      RADIOLOGY & ADDITIONAL TESTS: Reviewed.    Authored by Dr. Brandon CUELLO [id number], [phone]

## 2020-12-14 NOTE — PROGRESS NOTE ADULT - ASSESSMENT
55y M w/ PMH HTN (with medication nonadherence), chronic alcohol abuse (c/b prior DTs, withdrawal seizures, hematemesis, and melena), alcoholic cirrhosis (with portal hypertension), presenting with acute alcohol intoxication and traumatic SDH/SAH, admitted for severe EtOH withdrawal requiring phenobarb and precedex gtt.    #Neuro  Alcohol withdrawal  - cont phenobarb and precedex for active w/d  - daily EKGs for Qtc level    Traumatic SAH/SDH  - small amount of acute SAH within the left temporal sulci and a trace amount of acute SDH along the left tentorial leaflet without shift, mass effect, or herniation  - stable on repeat CTH  - c/w prophylactic keppra 500 BID for 7 days (12/9 - am of 12/16) given high risk for withdrawal seizure as well  - f/u neurosurgery Dr. Fitzpatrick in 2-4 weeks after discharge    #CV  HTN  - largely normotensive in setting of sedative medication    Sinus tachycardia  - likely in setting of alcohol withdrawal     #Pulm  - breathing comfortably and sating well on 2L NC    #GI  Non-severe C diff infection  - still has watery diarrhea in his rectal tube  - c/w PO vanc 125mg q6 (12/10 – 12/20) via NGT    Alcoholic cirrhosis  - without overt signs of decompensation at this time  - c/w thiamine, folate, MVI supplementation  - outpatient hepatology follow up    #Renal  - no active issues  - monitor lytes BID given diarrhea    #Endo  - no active issues    #ID  Non-severe C diff infection  - still has watery diarrhea in his rectal tube  - c/w PO vanc 125mg q6 (12/10 – 12/20)  - BCx 12/11 NGTD    #Heme  - stable chronic normocytic anemia and thrombocytopenia in setting of alcoholic liver cirrhosis   - no signs of active bleeding  - monitor CBC daily  - transfuse for Hb <7 or plt <100  - INR goal <1.3 per neurosurgery  - will give PO vit K today  - SCDs; pharmacologic DVT ppx on hold    DVT: SCDs  Diet: NPO with tube feeds  Dispo: to floors once weaned off precedex    Full Code   55y M w/ PMH HTN (with medication nonadherence), chronic alcohol abuse (c/b prior DTs, withdrawal seizures, hematemesis, and melena), alcoholic cirrhosis (with portal hypertension), presenting with acute alcohol intoxication and traumatic SDH/SAH, admitted for severe EtOH withdrawal requiring phenobarb and precedex gtt.    #Neuro  Alcohol withdrawal  - s/p phenobarb load, now weaning down precedex as tolerated  - daily EKGs for Qtc level    Traumatic SAH/SDH  - small amount of acute SAH within the left temporal sulci and a trace amount of acute SDH along the left tentorial leaflet without shift, mass effect, or herniation  - stable on repeat CTH  - c/w prophylactic keppra 500 BID for 7 days (12/9 - am of 12/16) given high risk for withdrawal seizure as well  - f/u neurosurgery Dr. Fitzpatrick in 2-4 weeks after discharge    #CV  HTN  - largely normotensive in setting of sedative medication    Sinus tachycardia  - likely in setting of alcohol withdrawal, now resolved    #Pulm  - breathing comfortably and sating well on 2L NC    #GI  Non-severe C diff infection  - diarrhea improving  - c/w PO vanc 125mg q6 (12/10 – 12/20) via NGT  - started free water 250q6    Alcoholic cirrhosis  - without overt signs of decompensation at this time  - c/w thiamine, folate, MVI supplementation  - outpatient hepatology follow up    #Renal  - no active issues  - monitor lytes BID given diarrhea    #Endo  - no active issues    #ID  Non-severe C diff infection  - still has watery diarrhea in his rectal tube  - c/w PO vanc 125mg q6 (12/10 – 12/20)  - BCx 12/11 NGTD    #Heme  - stable chronic normocytic anemia and thrombocytopenia in setting of alcoholic liver cirrhosis   - no signs of active bleeding  - monitor CBC daily  - transfuse for Hb <7 or plt <100  - INR goal <1.3 per neurosurgery  - SCDs; pharmacologic DVT ppx on hold    DVT: SCDs  Diet: NPO with tube feeds  Dispo: to floors once weaned off precedex    Full Code

## 2020-12-14 NOTE — CHART NOTE - NSCHARTNOTEFT_GEN_A_CORE
Nutrition Follow Up Note   Patient seen for: nutrition follow up on  ICU     Source: medical record, communication with team, pt.     Chart reviewed, events noted. Adm for ETOH withdrawal.    Diet Order: Diet, NPO:   Except Medications  With Ice Chips/Sips of Water (12-14-20 @ 13:34)    Nutrition Events: pt pulled out NGT today, now NPO, awaiting diet advancement    GI: denies N/V  +c.diff, rectal tube D/C, diarrhea improving    Anthropometric Measurements:   Height (cm): 180.3 (12-09-20 @ 19:52)  Weight (kg): 88.1 (12-09-20 @ 19:52)  BMI (kg/m2): 27.1 (12-09-20 @ 19:52)      Medications: MEDICATIONS  (STANDING):  chlorhexidine 4% Liquid 1 Application(s) Topical <User Schedule>  dexMEDEtomidine Infusion 0.2 MICROgram(s)/kG/Hr (4.41 mL/Hr) IV Continuous <Continuous>  levETIRAcetam  IVPB 500 milliGRAM(s) IV Intermittent every 12 hours  multivitamin 1 Tablet(s) Oral daily  vancomycin    Solution 125 milliGRAM(s) Oral every 6 hours    MEDICATIONS  (PRN):  PHENobarbital Injectable 130 milliGRAM(s) IV Push every 15 minutes PRN agitation/ ETOH withdrawal  sodium chloride 0.65% Nasal 1 Spray(s) Both Nostrils every 6 hours PRN Nasal Congestion    Labs: 12-14 @ 12:30: Sodium 134<L>, Potassium 3.7, Calcium 8.9, Magnesium 2.0, Phosphorus 2.9, BUN 7, Creatinine 0.51, Glucose 120<H>, Alk Phos 207<H>, ALT/SGPT 17, AST/SGOT 47<H>, Albumin 3.1<L>, Prealbumin --, Total Bilirubin 0.7, Hemoglobin --, Hematocrit --, Ferritin --, C-Reactive Protein --, Creatine Kinase <<27>  12-14 @ 00:34: Sodium 135, Potassium 5.0, Calcium 8.9, Magnesium 2.1, Phosphorus 3.1, BUN 6<L>, Creatinine 0.52, Glucose 137<H>, Alk Phos 208<H>, ALT/SGPT 18, AST/SGOT 58<H>, Albumin 3.4, Prealbumin --, Total Bilirubin 0.8, Hemoglobin 10.5<L>, Hematocrit 31.0<L>, Ferritin --, C-Reactive Protein --, Creatine Kinase <<27>      Skin: no pressure injuries documented   Edema: 2+ edema    Estimated Needs: based on dosing 12/9 88.1 kg  Energy: 8151-4783 kcals (25-30 kcals/kg)  Protein:  gm (1.0-1.2 gm/kg)    Previous Nutrition Diagnosis: none    Recommended Interventions:   1. When diet advanced, recommend low Na, continue multivitamin       Monitoring and Evaluation:   Continue to monitor nutrition provision and tolerance, weights, labs, skin integrity.   RD remains available upon request and will follow up per protocol.

## 2020-12-15 NOTE — PROGRESS NOTE ADULT - ATTENDING COMMENTS
Patient examined and care reviewed in detail on bedside rounds  Critically ill with severe etoh withdrawal syndrome  Frequent bedside visits with therapy change today.   I have personally provided 35+ minutes of critical care time concurrently with the resident/fellow; this excludes time spent on separate procedures.

## 2020-12-15 NOTE — PROGRESS NOTE ADULT - ASSESSMENT
55y M w/ PMH HTN (with medication nonadherence), chronic alcohol abuse (c/b prior DTs, withdrawal seizures, hematemesis, and melena), alcoholic cirrhosis (with portal hypertension), presenting with acute alcohol intoxication and traumatic SDH/SAH, admitted for severe EtOH withdrawal requiring phenobarb and precedex gtt.    #Neuro  Alcohol withdrawal  - s/p phenobarb load, now weaning down precedex as tolerated  - daily EKGs for Qtc level    Traumatic SAH/SDH  - small amount of acute SAH within the left temporal sulci and a trace amount of acute SDH along the left tentorial leaflet without shift, mass effect, or herniation  - stable on repeat CTH  - c/w prophylactic keppra 500 BID for 7 days (12/9 - am of 12/16) given high risk for withdrawal seizure as well  - f/u neurosurgery Dr. Fitzpatrick in 2-4 weeks after discharge    #CV  HTN  - largely normotensive in setting of sedative medication    Sinus tachycardia  - likely in setting of alcohol withdrawal, now resolved    #Pulm  - breathing comfortably and sating well on 2L NC    #GI  Non-severe C diff infection  - diarrhea improving  - c/w PO vanc 125mg q6 (12/10 – 12/20) via NGT  - started free water 250q6    Alcoholic cirrhosis  - without overt signs of decompensation at this time  - c/w thiamine, folate, MVI supplementation  - outpatient hepatology follow up    #Renal  - no active issues  - monitor lytes BID given diarrhea    #Endo  - no active issues    #ID  Non-severe C diff infection  - still has watery diarrhea in his rectal tube  - c/w PO vanc 125mg q6 (12/10 – 12/20)  - BCx 12/11 NGTD    #Heme  - stable chronic normocytic anemia and thrombocytopenia in setting of alcoholic liver cirrhosis   - no signs of active bleeding  - monitor CBC daily  - transfuse for Hb <7 or plt <100  - INR goal <1.3 per neurosurgery  - SCDs; pharmacologic DVT ppx on hold    DVT: SCDs  Diet: NPO with tube feeds  Dispo: to floors once weaned off precedex    Full Code   55y M w/ PMH HTN (with medication nonadherence), chronic alcohol abuse (c/b prior DTs, withdrawal seizures, hematemesis, and melena), alcoholic cirrhosis (with portal hypertension), presenting with acute alcohol intoxication and traumatic SDH/SAH, admitted for severe EtOH withdrawal requiring phenobarb and precedex gtt.    #Neuro  Alcohol withdrawal  - s/p phenobarb load, still with adequate levels, would avoid further benzos at this time to avoid obtundation,   - continue to downtitrate precedex as tolerated, haldol prn for agitation if needed, most recent qtc 450  - daily EKGs for Qtc level    Traumatic SAH/SDH  - small amount of acute SAH within the left temporal sulci and a trace amount of acute SDH along the left tentorial leaflet without shift, mass effect, or herniation  - stable on repeat CTH  - c/w prophylactic keppra 500 BID for 7 days (12/9 - am of 12/16) given high risk for withdrawal seizure as well, although could be contributing to delirium  - f/u neurosurgery Dr. Fitzpatrick in 2-4 weeks after discharge    #CV  HTN  - largely normotensive in setting of sedative medication    #Pulm  - breathing comfortably and sating well on RA    #GI  Non-severe C diff infection  - diarrhea improving  - c/w PO vanc 125mg q6 (12/10 – 12/20)    Alcoholic cirrhosis  - without overt signs of decompensation at this time  - c/w thiamine, folate, MVI supplementation  - outpatient hepatology follow up    #Renal  - no active issues  - monitor lytes BID given diarrhea  - d5 100cc/hr x 24 hours    #Endo  - no active issues    #ID  Non-severe C diff infection  - still has watery diarrhea in his rectal tube  - c/w PO vanc 125mg q6 (12/10 – 12/20)  - BCx 12/11 NGTD    #Heme  - stable chronic normocytic anemia and thrombocytopenia in setting of alcoholic liver cirrhosis   - no signs of active bleeding  - monitor CBC daily  - transfuse for Hb <7 or plt <100  - INR goal <1.3 per neurosurgery  - SCDs; pharmacologic DVT ppx on hold    DVT: SCDs  Diet: NPO with tube feeds  Dispo: to floors once weaned off precedex    Full Code

## 2020-12-15 NOTE — PROGRESS NOTE ADULT - SUBJECTIVE AND OBJECTIVE BOX
CHIEF COMPLAINT:    HPI:    PAST MEDICAL & SURGICAL HISTORY:  Sciatica    HTN (hypertension)    ETOH abuse    Grief reaction    S/P Repair of Inguinal Hernia        FAMILY HISTORY:  FH: dementia  mother        SOCIAL HISTORY:  Smoking: __ packs x ___ years  EtOH Use:  Marital Status:  Occupation:  Recent Travel:  Country of Birth:  Advance Directives:    Allergies    No Known Allergies    Intolerances        HOME MEDICATIONS:    REVIEW OF SYSTEMS:  Constitutional:   Eyes:  ENT:  CV:  Resp:  GI:  :  MSK:  Integumentary:  Neurological:  Psychiatric:  Endocrine:  Hematologic/Lymphatic:  Allergic/Immunologic:  [ ] All other systems negative  [ ] Unable to assess ROS because ________    OBJECTIVE:  ICU Vital Signs Last 24 Hrs  T(C): 36.7 (15 Dec 2020 04:00), Max: 37.6 (14 Dec 2020 21:00)  T(F): 98 (15 Dec 2020 04:00), Max: 99.6 (14 Dec 2020 21:00)  HR: 70 (15 Dec 2020 07:00) (68 - 102)  BP: 121/75 (15 Dec 2020 07:00) (106/68 - 138/79)  BP(mean): 91 (15 Dec 2020 07:00) (80 - 103)  ABP: --  ABP(mean): --  RR: 19 (15 Dec 2020 07:00) (10 - 27)  SpO2: 100% (15 Dec 2020 07:00) (96% - 100%)        12-14 @ 07:01  -  12-15 @ 07:00  --------------------------------------------------------  IN: 1610.2 mL / OUT: 1750 mL / NET: -139.8 mL      CAPILLARY BLOOD GLUCOSE          PHYSICAL EXAM:  General:   HEENT:   Lymph Nodes:  Neck:   Respiratory:   Cardiovascular:   Abdomen:   Extremities:   Skin:   Neurological:  Psychiatry:    HOSPITAL MEDICATIONS:  MEDICATIONS  (STANDING):  chlorhexidine 4% Liquid 1 Application(s) Topical <User Schedule>  dexMEDEtomidine Infusion 0.2 MICROgram(s)/kG/Hr (4.41 mL/Hr) IV Continuous <Continuous>  levETIRAcetam  IVPB 500 milliGRAM(s) IV Intermittent every 12 hours  multivitamin 1 Tablet(s) Oral daily  vancomycin    Solution 125 milliGRAM(s) Oral every 6 hours    MEDICATIONS  (PRN):  PHENobarbital Injectable 130 milliGRAM(s) IV Push every 15 minutes PRN agitation/ ETOH withdrawal  sodium chloride 0.65% Nasal 1 Spray(s) Both Nostrils every 6 hours PRN Nasal Congestion      LABS:                        10.2   7.23  )-----------( 99       ( 15 Dec 2020 00:15 )             29.0     12-15    133<L>  |  101  |  5<L>  ----------------------------<  111<H>  3.7   |  20<L>  |  0.58    Ca    8.6      15 Dec 2020 00:15  Phos  3.1     12-15  Mg     1.9     12-15    TPro  6.4  /  Alb  3.3  /  TBili  0.8  /  DBili  x   /  AST  45<H>  /  ALT  17  /  AlkPhos  210<H>  12-15    PT/INR - ( 15 Dec 2020 00:15 )   PT: 14.6 sec;   INR: 1.23 ratio         PTT - ( 15 Dec 2020 00:15 )  PTT:35.3 sec          MICROBIOLOGY:     RADIOLOGY:  [ ] Reviewed and interpreted by me    EKG: INTERVAL HPI/OVERNIGHT EVENTS: patient required multiple phenobarb pushes overnight (x7) for agitation,     SUBJECTIVE: Patient seen and examined at bedside.     CONSTITUTIONAL: No weakness, fevers or chills  EYES/ENT: No visual changes;  No vertigo or throat pain   NECK: No pain or stiffness  RESPIRATORY: No cough, wheezing, hemoptysis; No shortness of breath  CARDIOVASCULAR: No chest pain or palpitations  GASTROINTESTINAL: No abdominal or epigastric pain. No nausea, vomiting, or hematemesis; No diarrhea or constipation. No melena or hematochezia.  GENITOURINARY: No dysuria, frequency or hematuria  NEUROLOGICAL: No numbness or weakness  SKIN: No itching, rashes    OBJECTIVE:    VITAL SIGNS:  ICU Vital Signs Last 24 Hrs  T(C): 36.9 (15 Dec 2020 08:00), Max: 37.6 (14 Dec 2020 21:00)  T(F): 98.4 (15 Dec 2020 08:00), Max: 99.6 (14 Dec 2020 21:00)  HR: 68 (15 Dec 2020 10:00) (68 - 102)  BP: 110/71 (15 Dec 2020 10:00) (106/67 - 138/79)  BP(mean): 87 (15 Dec 2020 10:00) (80 - 103)  ABP: --  ABP(mean): --  RR: 22 (15 Dec 2020 10:00) (10 - 27)  SpO2: 100% (15 Dec 2020 10:00) (96% - 100%)        12-14 @ 07:01  -  12-15 @ 07:00  --------------------------------------------------------  IN: 1610.2 mL / OUT: 1750 mL / NET: -139.8 mL      CAPILLARY BLOOD GLUCOSE          PHYSICAL EXAM:    General: NAD  HEENT: NC/AT; PERRL, clear conjunctiva  Neck: supple  Respiratory: CTA b/l  Cardiovascular: +S1/S2; RRR  Abdomen: soft, NT/ND; +BS x4  Extremities: WWP, 2+ peripheral pulses b/l; no LE edema  Skin: normal color and turgor; no rash  Neurological:    MEDICATIONS:  MEDICATIONS  (STANDING):  chlorhexidine 4% Liquid 1 Application(s) Topical <User Schedule>  dexMEDEtomidine Infusion 0.2 MICROgram(s)/kG/Hr (4.41 mL/Hr) IV Continuous <Continuous>  dextrose 5%. 1000 milliLiter(s) (100 mL/Hr) IV Continuous <Continuous>  levETIRAcetam  IVPB 500 milliGRAM(s) IV Intermittent every 12 hours  multivitamin 1 Tablet(s) Oral daily  vancomycin    Solution 125 milliGRAM(s) Oral every 6 hours    MEDICATIONS  (PRN):  haloperidol    Injectable 5 milliGRAM(s) IV Push every 6 hours PRN agitation  PHENobarbital Injectable 130 milliGRAM(s) IV Push every 15 minutes PRN agitation/ ETOH withdrawal  sodium chloride 0.65% Nasal 1 Spray(s) Both Nostrils every 6 hours PRN Nasal Congestion      ALLERGIES:  Allergies    No Known Allergies    Intolerances        LABS:                        10.2   7.23  )-----------( 99       ( 15 Dec 2020 00:15 )             29.0     12-15    133<L>  |  101  |  5<L>  ----------------------------<  111<H>  3.7   |  20<L>  |  0.58    Ca    8.6      15 Dec 2020 00:15  Phos  3.1     12-15  Mg     1.9     12-15    TPro  6.4  /  Alb  3.3  /  TBili  0.8  /  DBili  x   /  AST  45<H>  /  ALT  17  /  AlkPhos  210<H>  12-15    PT/INR - ( 15 Dec 2020 00:15 )   PT: 14.6 sec;   INR: 1.23 ratio         PTT - ( 15 Dec 2020 00:15 )  PTT:35.3 sec      RADIOLOGY & ADDITIONAL TESTS: Reviewed.    Authored by Dr. Brandon CUELLO [id number], [id number] INTERVAL HPI/OVERNIGHT EVENTS: patient required multiple phenobarb pushes overnight (x7) for agitation,     SUBJECTIVE: Patient seen and examined at bedside. on precedex.     CONSTITUTIONAL: No weakness, fevers or chills  EYES/ENT: No visual changes;  No vertigo or throat pain   NECK: No pain or stiffness  RESPIRATORY: No cough, wheezing, hemoptysis; No shortness of breath  CARDIOVASCULAR: No chest pain or palpitations  GASTROINTESTINAL: No abdominal or epigastric pain. No nausea, vomiting, or hematemesis; No diarrhea or constipation. No melena or hematochezia.  GENITOURINARY: No dysuria, frequency or hematuria  NEUROLOGICAL: No numbness or weakness  SKIN: No itching, rashes    OBJECTIVE:    VITAL SIGNS:  ICU Vital Signs Last 24 Hrs  T(C): 36.9 (15 Dec 2020 08:00), Max: 37.6 (14 Dec 2020 21:00)  T(F): 98.4 (15 Dec 2020 08:00), Max: 99.6 (14 Dec 2020 21:00)  HR: 68 (15 Dec 2020 10:00) (68 - 102)  BP: 110/71 (15 Dec 2020 10:00) (106/67 - 138/79)  BP(mean): 87 (15 Dec 2020 10:00) (80 - 103)  ABP: --  ABP(mean): --  RR: 22 (15 Dec 2020 10:00) (10 - 27)  SpO2: 100% (15 Dec 2020 10:00) (96% - 100%)        12-14 @ 07:01  -  12-15 @ 07:00  --------------------------------------------------------  IN: 1610.2 mL / OUT: 1750 mL / NET: -139.8 mL      CAPILLARY BLOOD GLUCOSE          PHYSICAL EXAM:    General: NAD  HEENT: NC/AT; PERRL, clear conjunctiva  Neck: supple  Respiratory: CTA b/l  Cardiovascular: +S1/S2; RRR  Abdomen: soft, NT/ND; +BS x4  Extremities: WWP, 2+ peripheral pulses b/l; no LE edema  Skin: normal color and turgor; no rash  Neurological:    MEDICATIONS:  MEDICATIONS  (STANDING):  chlorhexidine 4% Liquid 1 Application(s) Topical <User Schedule>  dexMEDEtomidine Infusion 0.2 MICROgram(s)/kG/Hr (4.41 mL/Hr) IV Continuous <Continuous>  dextrose 5%. 1000 milliLiter(s) (100 mL/Hr) IV Continuous <Continuous>  levETIRAcetam  IVPB 500 milliGRAM(s) IV Intermittent every 12 hours  multivitamin 1 Tablet(s) Oral daily  vancomycin    Solution 125 milliGRAM(s) Oral every 6 hours    MEDICATIONS  (PRN):  haloperidol    Injectable 5 milliGRAM(s) IV Push every 6 hours PRN agitation  PHENobarbital Injectable 130 milliGRAM(s) IV Push every 15 minutes PRN agitation/ ETOH withdrawal  sodium chloride 0.65% Nasal 1 Spray(s) Both Nostrils every 6 hours PRN Nasal Congestion      ALLERGIES:  Allergies    No Known Allergies    Intolerances        LABS:                        10.2   7.23  )-----------( 99       ( 15 Dec 2020 00:15 )             29.0     12-15    133<L>  |  101  |  5<L>  ----------------------------<  111<H>  3.7   |  20<L>  |  0.58    Ca    8.6      15 Dec 2020 00:15  Phos  3.1     12-15  Mg     1.9     12-15    TPro  6.4  /  Alb  3.3  /  TBili  0.8  /  DBili  x   /  AST  45<H>  /  ALT  17  /  AlkPhos  210<H>  12-15    PT/INR - ( 15 Dec 2020 00:15 )   PT: 14.6 sec;   INR: 1.23 ratio         PTT - ( 15 Dec 2020 00:15 )  PTT:35.3 sec      RADIOLOGY & ADDITIONAL TESTS: Reviewed.    Authored by Dr. Brandon CUELLO [id number], -[id number] INTERVAL HPI/OVERNIGHT EVENTS: patient required multiple phenobarb pushes overnight (x7) for agitation,     SUBJECTIVE: Patient seen and examined at bedside. on precedex. withdraws and moves all extremities. continues to have small volume diarrhea    ROS: unable to assess    OBJECTIVE:    VITAL SIGNS:  ICU Vital Signs Last 24 Hrs  T(C): 36.9 (15 Dec 2020 08:00), Max: 37.6 (14 Dec 2020 21:00)  T(F): 98.4 (15 Dec 2020 08:00), Max: 99.6 (14 Dec 2020 21:00)  HR: 68 (15 Dec 2020 10:00) (68 - 102)  BP: 110/71 (15 Dec 2020 10:00) (106/67 - 138/79)  BP(mean): 87 (15 Dec 2020 10:00) (80 - 103)  ABP: --  ABP(mean): --  RR: 22 (15 Dec 2020 10:00) (10 - 27)  SpO2: 100% (15 Dec 2020 10:00) (96% - 100%)        12-14 @ 07:01  -  12-15 @ 07:00  --------------------------------------------------------  IN: 1610.2 mL / OUT: 1750 mL / NET: -139.8 mL      CAPILLARY BLOOD GLUCOSE          PHYSICAL EXAM:    General: NAD  HEENT: NC/AT; PERRL, clear conjunctiva  Neck: supple  Respiratory: CTA b/l  Cardiovascular: +S1/S2; RRR  Abdomen: soft, NT/ND; +BS x4  Extremities: WWP, 2+ peripheral pulses b/l; no LE edema  Skin: normal color and turgor; no rash  Neurological: sedated, moves all extremities  condom cath in    MEDICATIONS:  MEDICATIONS  (STANDING):  chlorhexidine 4% Liquid 1 Application(s) Topical <User Schedule>  dexMEDEtomidine Infusion 0.2 MICROgram(s)/kG/Hr (4.41 mL/Hr) IV Continuous <Continuous>  dextrose 5%. 1000 milliLiter(s) (100 mL/Hr) IV Continuous <Continuous>  levETIRAcetam  IVPB 500 milliGRAM(s) IV Intermittent every 12 hours  multivitamin 1 Tablet(s) Oral daily  vancomycin    Solution 125 milliGRAM(s) Oral every 6 hours    MEDICATIONS  (PRN):  haloperidol    Injectable 5 milliGRAM(s) IV Push every 6 hours PRN agitation  PHENobarbital Injectable 130 milliGRAM(s) IV Push every 15 minutes PRN agitation/ ETOH withdrawal  sodium chloride 0.65% Nasal 1 Spray(s) Both Nostrils every 6 hours PRN Nasal Congestion      ALLERGIES:  Allergies    No Known Allergies    Intolerances        LABS:                        10.2   7.23  )-----------( 99       ( 15 Dec 2020 00:15 )             29.0     12-15    133<L>  |  101  |  5<L>  ----------------------------<  111<H>  3.7   |  20<L>  |  0.58    Ca    8.6      15 Dec 2020 00:15  Phos  3.1     12-15  Mg     1.9     12-15    TPro  6.4  /  Alb  3.3  /  TBili  0.8  /  DBili  x   /  AST  45<H>  /  ALT  17  /  AlkPhos  210<H>  12-15    PT/INR - ( 15 Dec 2020 00:15 )   PT: 14.6 sec;   INR: 1.23 ratio         PTT - ( 15 Dec 2020 00:15 )  PTT:35.3 sec      RADIOLOGY & ADDITIONAL TESTS: Reviewed.    Authored by Dr. Brandon CUELLO [id number], [id number]

## 2020-12-16 PROBLEM — Z12.11 ENCOUNTER FOR SCREENING FOR MALIGNANT NEOPLASM OF COLON: Status: RESOLVED | Noted: 2018-09-13 | Resolved: 2020-01-01

## 2020-12-16 NOTE — PROGRESS NOTE ADULT - SUBJECTIVE AND OBJECTIVE BOX
INTERVAL HPI/OVERNIGHT EVENTS: overnight continues to be agitated , maxed on precedex, multiple phenobarb pushes, haldol x2, zyprexa ordered but not given    SUBJECTIVE: Patient seen and examined at bedside.     ROS: unable to assess    OBJECTIVE:    VITAL SIGNS:  ICU Vital Signs Last 24 Hrs  T(C): 37.7 (16 Dec 2020 04:00), Max: 38.2 (16 Dec 2020 00:00)  T(F): 99.8 (16 Dec 2020 04:00), Max: 100.8 (16 Dec 2020 00:00)  HR: 80 (16 Dec 2020 07:00) (66 - 94)  BP: 141/80 (16 Dec 2020 07:00) (97/66 - 159/81)  BP(mean): 105 (16 Dec 2020 07:00) (77 - 112)  ABP: --  ABP(mean): --  RR: 16 (16 Dec 2020 07:00) (10 - 30)  SpO2: 100% (16 Dec 2020 07:00) (94% - 100%)        -15 @ 07:01  -  12-16 @ 07:00  --------------------------------------------------------  IN: 3057.6 mL / OUT: 4900 mL / NET: -1842.4 mL      CAPILLARY BLOOD GLUCOSE          PHYSICAL EXAM:    General: NAD  HEENT: NC/AT; PERRL, clear conjunctiva  Neck: supple  Respiratory: CTA b/l  Cardiovascular: +S1/S2; RRR  Abdomen: soft, NT/ND; +BS x4  Extremities: WWP, 2+ peripheral pulses b/l; no LE edema  Skin: normal color and turgor; no rash  Neurological:    MEDICATIONS:  MEDICATIONS  (STANDING):  chlorhexidine 4% Liquid 1 Application(s) Topical <User Schedule>  dexMEDEtomidine Infusion 0.2 MICROgram(s)/kG/Hr (4.41 mL/Hr) IV Continuous <Continuous>  dextrose 5% + lactated ringers. 1000 milliLiter(s) (75 mL/Hr) IV Continuous <Continuous>  levETIRAcetam  IVPB 500 milliGRAM(s) IV Intermittent every 12 hours  multivitamin 1 Tablet(s) Oral daily  OLANZapine Injectable 5 milliGRAM(s) IntraMuscular once  vancomycin    Solution 125 milliGRAM(s) Oral every 6 hours    MEDICATIONS  (PRN):  haloperidol    Injectable 5 milliGRAM(s) IV Push every 6 hours PRN agitation  sodium chloride 0.65% Nasal 1 Spray(s) Both Nostrils every 6 hours PRN Nasal Congestion      ALLERGIES:  Allergies    No Known Allergies    Intolerances        LABS:                        11.1   7.31  )-----------( 178      ( 16 Dec 2020 00:33 )             32.1     12-    135  |  101  |  <4<L>  ----------------------------<  125<H>  3.7   |  19<L>  |  0.62    Ca    9.0      16 Dec 2020 00:32  Phos  3.4       Mg     2.4         TPro  7.3  /  Alb  3.6  /  TBili  0.8  /  DBili  x   /  AST  43<H>  /  ALT  17  /  AlkPhos  222<H>  12-    PT/INR - ( 16 Dec 2020 00:33 )   PT: 14.0 sec;   INR: 1.18 ratio         PTT - ( 16 Dec 2020 00:33 )  PTT:37.0 sec  Urinalysis Basic - ( 16 Dec 2020 01:21 )    Color: Light Yellow / Appearance: Clear / S.007 / pH: x  Gluc: x / Ketone: Negative  / Bili: Negative / Urobili: Negative   Blood: x / Protein: Negative / Nitrite: Positive   Leuk Esterase: Small / RBC: 1 /hpf / WBC 11 /HPF   Sq Epi: x / Non Sq Epi: 1 /hpf / Bacteria: Many        RADIOLOGY & ADDITIONAL TESTS: Reviewed.    Authored by Dr. Brandon CUELLO [id number], -[id number] INTERVAL HPI/OVERNIGHT EVENTS: overnight continues to be agitated , maxed on precedex, multiple phenobarb pushes, haldol x2, zyprexa ordered but not given    SUBJECTIVE: Patient seen and examined at bedside. patient is arousable, still ao1 at best, combatative while in restraints    ROS: unable to assess    OBJECTIVE:    VITAL SIGNS:  ICU Vital Signs Last 24 Hrs  T(C): 37.7 (16 Dec 2020 04:00), Max: 38.2 (16 Dec 2020 00:00)  T(F): 99.8 (16 Dec 2020 04:00), Max: 100.8 (16 Dec 2020 00:00)  HR: 80 (16 Dec 2020 07:00) (66 - 94)  BP: 141/80 (16 Dec 2020 07:00) (97/66 - 159/81)  BP(mean): 105 (16 Dec 2020 07:00) (77 - 112)  ABP: --  ABP(mean): --  RR: 16 (16 Dec 2020 07:00) (10 - 30)  SpO2: 100% (16 Dec 2020 07:00) (94% - 100%)        12-15 @ 07:01  -  12-16 @ 07:00  --------------------------------------------------------  IN: 3057.6 mL / OUT: 4900 mL / NET: -1842.4 mL      CAPILLARY BLOOD GLUCOSE          PHYSICAL EXAM:    General: NAD  HEENT: NC/AT; PERRL, clear conjunctiva  Neck: supple  Respiratory: CTA b/l  Cardiovascular: +S1/S2; RRR  Abdomen: soft, NT/ND; +BS x4  Extremities: WWP, 2+ peripheral pulses b/l; no LE edema  Skin: normal color and turgor; no rash  Neurological: ao 1  condom cath in    MEDICATIONS:  MEDICATIONS  (STANDING):  chlorhexidine 4% Liquid 1 Application(s) Topical <User Schedule>  dexMEDEtomidine Infusion 0.2 MICROgram(s)/kG/Hr (4.41 mL/Hr) IV Continuous <Continuous>  dextrose 5% + lactated ringers. 1000 milliLiter(s) (75 mL/Hr) IV Continuous <Continuous>  levETIRAcetam  IVPB 500 milliGRAM(s) IV Intermittent every 12 hours  multivitamin 1 Tablet(s) Oral daily  OLANZapine Injectable 5 milliGRAM(s) IntraMuscular once  vancomycin    Solution 125 milliGRAM(s) Oral every 6 hours    MEDICATIONS  (PRN):  haloperidol    Injectable 5 milliGRAM(s) IV Push every 6 hours PRN agitation  sodium chloride 0.65% Nasal 1 Spray(s) Both Nostrils every 6 hours PRN Nasal Congestion      ALLERGIES:  Allergies    No Known Allergies    Intolerances        LABS:                        11.1   7.31  )-----------( 178      ( 16 Dec 2020 00:33 )             32.1     12-16    135  |  101  |  <4<L>  ----------------------------<  125<H>  3.7   |  19<L>  |  0.62    Ca    9.0      16 Dec 2020 00:32  Phos  3.4     12-  Mg     2.4     -    TPro  7.3  /  Alb  3.6  /  TBili  0.8  /  DBili  x   /  AST  43<H>  /  ALT  17  /  AlkPhos  222<H>  12-16    PT/INR - ( 16 Dec 2020 00:33 )   PT: 14.0 sec;   INR: 1.18 ratio         PTT - ( 16 Dec 2020 00:33 )  PTT:37.0 sec  Urinalysis Basic - ( 16 Dec 2020 01:21 )    Color: Light Yellow / Appearance: Clear / S.007 / pH: x  Gluc: x / Ketone: Negative  / Bili: Negative / Urobili: Negative   Blood: x / Protein: Negative / Nitrite: Positive   Leuk Esterase: Small / RBC: 1 /hpf / WBC 11 /HPF   Sq Epi: x / Non Sq Epi: 1 /hpf / Bacteria: Many        RADIOLOGY & ADDITIONAL TESTS: Reviewed.    Authored by Dr. Brandon CUELLO [id number], [id number]

## 2020-12-16 NOTE — PROGRESS NOTE ADULT - ASSESSMENT
55y M w/ PMH HTN (with medication nonadherence), chronic alcohol abuse (c/b prior DTs, withdrawal seizures, hematemesis, and melena), alcoholic cirrhosis (with portal hypertension), presenting with acute alcohol intoxication and traumatic SDH/SAH, admitted for severe EtOH withdrawal requiring phenobarb and precedex gtt.    #Neuro  Alcohol withdrawal  - s/p phenobarb load, still with adequate levels, would avoid further benzos at this time to avoid obtundation,   - continue to downtitrate precedex as tolerated, haldol prn for agitation if needed, most recent qtc 450  - daily EKGs for Qtc level    Traumatic SAH/SDH  - small amount of acute SAH within the left temporal sulci and a trace amount of acute SDH along the left tentorial leaflet without shift, mass effect, or herniation  - stable on repeat CTH  - c/w prophylactic keppra 500 BID for 7 days (12/9 - am of 12/16) given high risk for withdrawal seizure as well, although could be contributing to delirium  - f/u neurosurgery Dr. Fitzpatrick in 2-4 weeks after discharge    #CV  HTN  - largely normotensive in setting of sedative medication    #Pulm  - breathing comfortably and sating well on RA    #GI  Non-severe C diff infection  - diarrhea improving  - c/w PO vanc 125mg q6 (12/10 – 12/20)    Alcoholic cirrhosis  - without overt signs of decompensation at this time  - c/w thiamine, folate, MVI supplementation  - outpatient hepatology follow up    #Renal  - no active issues  - monitor lytes BID given diarrhea  - d5 100cc/hr x 24 hours    #Endo  - no active issues    #ID  Non-severe C diff infection  - still has watery diarrhea in his rectal tube  - c/w PO vanc 125mg q6 (12/10 – 12/20)  - BCx 12/11 NGTD    #Heme  - stable chronic normocytic anemia and thrombocytopenia in setting of alcoholic liver cirrhosis   - no signs of active bleeding  - monitor CBC daily  - transfuse for Hb <7 or plt <100  - INR goal <1.3 per neurosurgery  - SCDs; pharmacologic DVT ppx on hold    DVT: SCDs  Diet: NPO with tube feeds  Dispo: to floors once weaned off precedex    Full Code   55y M w/ PMH HTN (with medication nonadherence), chronic alcohol abuse (c/b prior DTs, withdrawal seizures, hematemesis, and melena), alcoholic cirrhosis (with portal hypertension), presenting with acute alcohol intoxication and traumatic SDH/SAH, admitted for severe EtOH withdrawal requiring phenobarb and precedex gtt.    #Neuro  Alcohol withdrawal  - s/p phenobarb load, still with adequate levels, would avoid further benzos at this time to avoid obtundation,   - given supratherapeutic level would hold off on further phenobarb, will consider ketamine  - continue to downtitrate precedex as tolerated, haldol prn for agitation if needed, most recent qtc 450  - daily EKGs for Qtc level    Traumatic SAH/SDH  - small amount of acute SAH within the left temporal sulci and a trace amount of acute SDH along the left tentorial leaflet without shift, mass effect, or herniation  - stable on repeat CTH  - c/w prophylactic keppra 500 BID for 7 days (12/9 - am of 12/16) given high risk for withdrawal seizure as well, although could be contributing to delirium, will finish today  - f/u neurosurgery Dr. Fitzpatrick in 2-4 weeks after discharge    #CV  HTN  - largely normotensive in setting of sedative medication    #Pulm  - breathing comfortably and sating well on RA    #GI  Non-severe C diff infection  - diarrhea improving  - c/w PO vanc 125mg q6 (12/10 – 12/20)    Alcoholic cirrhosis  - without overt signs of decompensation at this time  - c/w thiamine, folate, MVI supplementation  - outpatient hepatology follow up    #Renal  - no active issues  - monitor lytes BID given diarrhea  - d5 75cc/hr x 24 hours    #Endo  - no active issues    #ID  Non-severe C diff infection  - watery diarrhea is improved  - c/w PO vanc 125mg q6 (12/10 – 12/20)  - BCx 12/11 NGTD  given recent fever overnight 12/15-12/16, pancultured, Ua positive and altered started ceftriaxone for possible UTI/ pyelonephritis, pending urine culture will follow up blood cultures    #Heme  - stable chronic normocytic anemia and thrombocytopenia in setting of alcoholic liver cirrhosis   - no signs of active bleeding  - monitor CBC daily  - transfuse for Hb <7 or plt <100  - INR goal <1.3 per neurosurgery  - SCDs; pharmacologic DVT ppx on hold    DVT: SCDs  Diet: NPO with tube feeds  Dispo: to floors once weaned off precedex    Full Code

## 2020-12-16 NOTE — PROGRESS NOTE ADULT - ATTENDING COMMENTS
Patient examined and care reviewed in detail on bedside rounds  Critically ill with con'd severe delirium related to etoh withdrawal and possible post concussion syndrome  Frequent bedside visits with therapy change today.   I have personally provided 35+ minutes of critical care time concurrently with the resident/fellow; this excludes time spent on separate procedures.

## 2020-12-17 NOTE — PROGRESS NOTE ADULT - ATTENDING COMMENTS
Patient examined and care reviewed in detail on bedside rounds  Critically ill phenobarb taper although still with delirium With H/O SAH/SDH there is possibility of contribution from closed head trauma effect  Frequent bedside visits with therapy change today.   I have personally provided 35+ minutes of critical care time concurrently with the resident/fellow; this excludes time spent on separate procedures.

## 2020-12-17 NOTE — PROGRESS NOTE ADULT - ASSESSMENT
55y M w/ PMH HTN (with medication nonadherence), chronic alcohol abuse (c/b prior DTs, withdrawal seizures, hematemesis, and melena), alcoholic cirrhosis (with portal hypertension), presenting with acute alcohol intoxication and traumatic SDH/SAH, admitted for severe EtOH withdrawal requiring phenobarb and precedex gtt.    #Neuro  Alcohol withdrawal  - s/p phenobarb load, still with adequate levels, would avoid further benzos at this time to avoid obtundation,   - given supratherapeutic level would hold off on further phenobarb, will consider ketamine  - continue to downtitrate precedex as tolerated, haldol prn for agitation if needed, most recent qtc 450  - daily EKGs for Qtc level    Traumatic SAH/SDH  - small amount of acute SAH within the left temporal sulci and a trace amount of acute SDH along the left tentorial leaflet without shift, mass effect, or herniation  - stable on repeat CTH  - c/w prophylactic keppra 500 BID for 7 days (12/9 - am of 12/16) given high risk for withdrawal seizure as well, although could be contributing to delirium, will finish today  - f/u neurosurgery Dr. Fitzpatrick in 2-4 weeks after discharge    #CV  HTN  - largely normotensive in setting of sedative medication    #Pulm  - breathing comfortably and sating well on RA    #GI  Non-severe C diff infection  - diarrhea improving  - c/w PO vanc 125mg q6 (12/10 – 12/20)    Alcoholic cirrhosis  - without overt signs of decompensation at this time  - c/w thiamine, folate, MVI supplementation  - outpatient hepatology follow up    #Renal  - no active issues  - monitor lytes BID given diarrhea  - d5 75cc/hr x 24 hours    #Endo  - no active issues    #ID  Non-severe C diff infection  - watery diarrhea is improved  - c/w PO vanc 125mg q6 (12/10 – 12/20)  - BCx 12/11 NGTD  given recent fever overnight 12/15-12/16, pancultured, Ua positive and altered started ceftriaxone for possible UTI/ pyelonephritis, pending urine culture will follow up blood cultures    #Heme  - stable chronic normocytic anemia and thrombocytopenia in setting of alcoholic liver cirrhosis   - no signs of active bleeding  - monitor CBC daily  - transfuse for Hb <7 or plt <100  - INR goal <1.3 per neurosurgery  - SCDs; pharmacologic DVT ppx on hold    DVT: SCDs  Diet: NPO with tube feeds  Dispo: to floors once weaned off precedex    Full Code   55y M w/ PMH HTN (with medication nonadherence), chronic alcohol abuse (c/b prior DTs, withdrawal seizures, hematemesis, and melena), alcoholic cirrhosis (with portal hypertension), presenting with acute alcohol intoxication and traumatic SDH/SAH, admitted for severe EtOH withdrawal requiring phenobarb and precedex gtt.    #Neuro    Encephalopathy  patient presenting with ETOH level 313 on 12/9, has since been loaded with phenobarb, and since out of window of ETOH withdrawal, reviewed current available evidence for ketamine infusion, will hold off for now  off keppra which could be contributing  initial ammonia 49 as patient is with hx of cirhosis, will repeat in the AM although has been having several bowel movements.   structural lesions in SDH and SAH stable on repeat imaging, although could possibly have concussion  electrolytes maintained within normal range  possibly influenced by sepsis, treating uti as below  given continued psychosis started seroquel with daily qtc    Alcohol withdrawal  - s/p phenobarb load, still with adequate levels, would avoid further benzos at this time to avoid obtundation,   - given supratherapeutic level would hold off on further phenobarb, will consider ketamine  - continue to downtitrate precedex as tolerated, haldol prn for agitation if needed, most recent qtc 450  - daily EKGs for Qtc level    Traumatic SAH/SDH  - small amount of acute SAH within the left temporal sulci and a trace amount of acute SDH along the left tentorial leaflet without shift, mass effect, or herniation  - stable on repeat CTH  - c/w prophylactic keppra 500 BID for 7 days (12/9 - am of 12/16) given high risk for withdrawal seizure as well, although could be contributing to delirium, will finish today  - f/u neurosurgery Dr. Fitzpatrick in 2-4 weeks after discharge    #CV  HTN  - largely normotensive in setting of sedative medication    #Pulm  - breathing comfortably and sating well on RA    #GI  Non-severe C diff infection  - diarrhea improving  - c/w PO vanc 125mg q6 (12/10 – 12/20)    Alcoholic cirrhosis  - without overt signs of decompensation at this time  - c/w thiamine, folate, MVI supplementation  - outpatient hepatology follow up    #Renal  - no active issues  - monitor lytes BID given diarrhea  - d5 75cc/hr x 24 hours    #Endo  - no active issues    #ID  Non-severe C diff infection  - watery diarrhea is improved  - c/w PO vanc 125mg q6 (12/10 – 12/20)  - BCx 12/11 NGTD  given recent fever overnight 12/15-12/16, pancultured, Ua positive and altered started ceftriaxone for possible UTI/ pyelonephritis, urine culture growing enterobacter aerogenes, bld clxs clear    #Heme  - stable chronic normocytic anemia and thrombocytopenia in setting of alcoholic liver cirrhosis   - no signs of active bleeding  - monitor CBC daily  - transfuse for Hb <7 or plt <100  - INR goal <1.3 per neurosurgery  - SCDs; pharmacologic DVT ppx on hold    DVT: SCDs  Diet: NPO with tube feeds  Dispo: to floors once weaned off precedex    Full Code

## 2020-12-17 NOTE — PROGRESS NOTE ADULT - SUBJECTIVE AND OBJECTIVE BOX
INTERVAL HPI/OVERNIGHT EVENTS:    SUBJECTIVE: Patient seen and examined at bedside.     CONSTITUTIONAL: No weakness, fevers or chills  EYES/ENT: No visual changes;  No vertigo or throat pain   NECK: No pain or stiffness  RESPIRATORY: No cough, wheezing, hemoptysis; No shortness of breath  CARDIOVASCULAR: No chest pain or palpitations  GASTROINTESTINAL: No abdominal or epigastric pain. No nausea, vomiting, or hematemesis; No diarrhea or constipation. No melena or hematochezia.  GENITOURINARY: No dysuria, frequency or hematuria  NEUROLOGICAL: No numbness or weakness  SKIN: No itching, rashes    OBJECTIVE:    VITAL SIGNS:  ICU Vital Signs Last 24 Hrs  T(C): 36.5 (17 Dec 2020 04:00), Max: 38.5 (16 Dec 2020 19:00)  T(F): 97.7 (17 Dec 2020 04:00), Max: 101.3 (16 Dec 2020 19:00)  HR: 70 (17 Dec 2020 07:00) (70 - 100)  BP: 106/66 (17 Dec 2020 07:00) (87/52 - 149/83)  BP(mean): 79 (17 Dec 2020 07:00) (63 - 110)  ABP: --  ABP(mean): --  RR: 20 (17 Dec 2020 07:00) (14 - 22)  SpO2: 100% (17 Dec 2020 07:00) (99% - 100%)        -16 @ 07:01  -  1217 @ 07:00  --------------------------------------------------------  IN: 3222.2 mL / OUT: 1000 mL / NET: 2222.2 mL      CAPILLARY BLOOD GLUCOSE          PHYSICAL EXAM:    General: NAD  HEENT: NC/AT; PERRL, clear conjunctiva  Neck: supple  Respiratory: CTA b/l  Cardiovascular: +S1/S2; RRR  Abdomen: soft, NT/ND; +BS x4  Extremities: WWP, 2+ peripheral pulses b/l; no LE edema  Skin: normal color and turgor; no rash  Neurological:    MEDICATIONS:  MEDICATIONS  (STANDING):  cefTRIAXone   IVPB 1000 milliGRAM(s) IV Intermittent every 24 hours  chlorhexidine 4% Liquid 1 Application(s) Topical <User Schedule>  dexMEDEtomidine Infusion 0.2 MICROgram(s)/kG/Hr (4.41 mL/Hr) IV Continuous <Continuous>  dextrose 5% + lactated ringers. 1000 milliLiter(s) (75 mL/Hr) IV Continuous <Continuous>  folic acid 1 milliGRAM(s) Oral daily  levETIRAcetam  IVPB 500 milliGRAM(s) IV Intermittent every 12 hours  multivitamin 1 Tablet(s) Oral daily  vancomycin    Solution 125 milliGRAM(s) Oral every 6 hours    MEDICATIONS  (PRN):  haloperidol    Injectable 5 milliGRAM(s) IV Push every 6 hours PRN agitation  sodium chloride 0.65% Nasal 1 Spray(s) Both Nostrils every 6 hours PRN Nasal Congestion      ALLERGIES:  Allergies    No Known Allergies    Intolerances        LABS:                        9.8    9.99  )-----------( 226      ( 17 Dec 2020 01:02 )             29.8     12-    135  |  103  |  <4<L>  ----------------------------<  112<H>  3.8   |  17<L>  |  0.72    Ca    8.6      17 Dec 2020 01:02  Phos  4.2     12-  Mg     2.2     -    TPro  6.8  /  Alb  3.2<L>  /  TBili  0.7  /  DBili  x   /  AST  44<H>  /  ALT  16  /  AlkPhos  219<H>  12-17    PT/INR - ( 17 Dec 2020 01:02 )   PT: 15.5 sec;   INR: 1.31 ratio         PTT - ( 17 Dec 2020 01:02 )  PTT:37.9 sec  Urinalysis Basic - ( 16 Dec 2020 01:21 )    Color: Light Yellow / Appearance: Clear / S.007 / pH: x  Gluc: x / Ketone: Negative  / Bili: Negative / Urobili: Negative   Blood: x / Protein: Negative / Nitrite: Positive   Leuk Esterase: Small / RBC: 1 /hpf / WBC 11 /HPF   Sq Epi: x / Non Sq Epi: 1 /hpf / Bacteria: Many        RADIOLOGY & ADDITIONAL TESTS: Reviewed.    Authored by Dr. Brandon CUELLO [id number], [id number] INTERVAL HPI/OVERNIGHT EVENTS: overnight became agitated once off  precedex, violent, refusing po medication, restarted precedex, haldol 5 x4, zyprexa 5 x2, and ativan 4 given     SUBJECTIVE: Patient seen and examined at bedside. this morning sedated on max precedex    unable to assess ROS    OBJECTIVE:    VITAL SIGNS:  ICU Vital Signs Last 24 Hrs  T(C): 36.5 (17 Dec 2020 04:00), Max: 38.5 (16 Dec 2020 19:00)  T(F): 97.7 (17 Dec 2020 04:00), Max: 101.3 (16 Dec 2020 19:00)  HR: 70 (17 Dec 2020 07:00) (70 - 100)  BP: 106/66 (17 Dec 2020 07:00) (87/52 - 149/83)  BP(mean): 79 (17 Dec 2020 07:00) (63 - 110)  ABP: --  ABP(mean): --  RR: 20 (17 Dec 2020 07:00) (14 - 22)  SpO2: 100% (17 Dec 2020 07:00) (99% - 100%)        12-16 @ 07:01  -  12-17 @ 07:00  --------------------------------------------------------  IN: 3222.2 mL / OUT: 1000 mL / NET: 2222.2 mL      CAPILLARY BLOOD GLUCOSE          PHYSICAL EXAM:    PHYSICAL EXAM  GENERAL: NAD, lying comfortably in bed   HEAD:  Atraumatic, Normocephalic  EYES: EOMI b/l, PERRLA b/l, conjunctiva and sclera clear  NECK: Supple, No JVD, No LAD   CHEST/LUNG: Clear to auscultation bilaterally; No wheeze or ronchi  HEART: Regular rate and rhythm; S1 and S2 present, No murmurs, rubs, or gallops  ABDOMEN: Soft, Nontender, Nondistended; Bowel sounds present, NG tube replaced  EXTREMITIES:  2+ Peripheral Pulses, No clubbing, cyanosis, or edema  NEURO: AAOx3, non-focal   SKIN: No rashes or lesions      MEDICATIONS:  MEDICATIONS  (STANDING):  cefTRIAXone   IVPB 1000 milliGRAM(s) IV Intermittent every 24 hours  chlorhexidine 4% Liquid 1 Application(s) Topical <User Schedule>  dexMEDEtomidine Infusion 0.2 MICROgram(s)/kG/Hr (4.41 mL/Hr) IV Continuous <Continuous>  dextrose 5% + lactated ringers. 1000 milliLiter(s) (75 mL/Hr) IV Continuous <Continuous>  folic acid 1 milliGRAM(s) Oral daily  levETIRAcetam  IVPB 500 milliGRAM(s) IV Intermittent every 12 hours  multivitamin 1 Tablet(s) Oral daily  vancomycin    Solution 125 milliGRAM(s) Oral every 6 hours    MEDICATIONS  (PRN):  haloperidol    Injectable 5 milliGRAM(s) IV Push every 6 hours PRN agitation  sodium chloride 0.65% Nasal 1 Spray(s) Both Nostrils every 6 hours PRN Nasal Congestion      ALLERGIES:  Allergies    No Known Allergies    Intolerances        LABS:                        9.8    9.99  )-----------( 226      ( 17 Dec 2020 01:02 )             29.8     12-    135  |  103  |  <4<L>  ----------------------------<  112<H>  3.8   |  17<L>  |  0.72    Ca    8.6      17 Dec 2020 01:02  Phos  4.2     12  Mg     2.2     -    TPro  6.8  /  Alb  3.2<L>  /  TBili  0.7  /  DBili  x   /  AST  44<H>  /  ALT  16  /  AlkPhos  219<H>  12-    PT/INR - ( 17 Dec 2020 01:02 )   PT: 15.5 sec;   INR: 1.31 ratio         PTT - ( 17 Dec 2020 01:02 )  PTT:37.9 sec  Urinalysis Basic - ( 16 Dec 2020 01:21 )    Color: Light Yellow / Appearance: Clear / S.007 / pH: x  Gluc: x / Ketone: Negative  / Bili: Negative / Urobili: Negative   Blood: x / Protein: Negative / Nitrite: Positive   Leuk Esterase: Small / RBC: 1 /hpf / WBC 11 /HPF   Sq Epi: x / Non Sq Epi: 1 /hpf / Bacteria: Many        RADIOLOGY & ADDITIONAL TESTS: Reviewed.    Authored by Dr. Brandon CUELLO [id number], [phone]

## 2020-12-18 NOTE — PROGRESS NOTE ADULT - SUBJECTIVE AND OBJECTIVE BOX
INTERVAL HPI/OVERNIGHT EVENTS:    SUBJECTIVE: Patient seen and examined at bedside.     CONSTITUTIONAL: No weakness, fevers or chills  EYES/ENT: No visual changes;  No vertigo or throat pain   NECK: No pain or stiffness  RESPIRATORY: No cough, wheezing, hemoptysis; No shortness of breath  CARDIOVASCULAR: No chest pain or palpitations  GASTROINTESTINAL: No abdominal or epigastric pain. No nausea, vomiting, or hematemesis; No diarrhea or constipation. No melena or hematochezia.  GENITOURINARY: No dysuria, frequency or hematuria  NEUROLOGICAL: No numbness or weakness  SKIN: No itching, rashes    OBJECTIVE:    VITAL SIGNS:  ICU Vital Signs Last 24 Hrs  T(C): 37 (18 Dec 2020 00:00), Max: 37 (17 Dec 2020 20:00)  T(F): 98.6 (18 Dec 2020 00:00), Max: 98.6 (17 Dec 2020 20:00)  HR: 96 (18 Dec 2020 06:00) (67 - 96)  BP: 91/71 (18 Dec 2020 06:00) (91/71 - 132/76)  BP(mean): 77 (18 Dec 2020 06:00) (74 - 97)  ABP: --  ABP(mean): --  RR: 22 (18 Dec 2020 06:00) (15 - 22)  SpO2: 100% (18 Dec 2020 06:00) (97% - 100%)        12-17 @ 07:01  -  12-18 @ 07:00  --------------------------------------------------------  IN: 3284.2 mL / OUT: 1250 mL / NET: 2034.2 mL      CAPILLARY BLOOD GLUCOSE          PHYSICAL EXAM:    General: NAD  HEENT: NC/AT; PERRL, clear conjunctiva  Neck: supple  Respiratory: CTA b/l  Cardiovascular: +S1/S2; RRR  Abdomen: soft, NT/ND; +BS x4  Extremities: WWP, 2+ peripheral pulses b/l; no LE edema  Skin: normal color and turgor; no rash  Neurological:    MEDICATIONS:  MEDICATIONS  (STANDING):  albuterol/ipratropium for Nebulization 3 milliLiter(s) Nebulizer every 6 hours  cefTRIAXone   IVPB 1000 milliGRAM(s) IV Intermittent every 24 hours  chlorhexidine 4% Liquid 1 Application(s) Topical <User Schedule>  dexMEDEtomidine Infusion 0.2 MICROgram(s)/kG/Hr (4.41 mL/Hr) IV Continuous <Continuous>  dextrose 5% + lactated ringers. 1000 milliLiter(s) (75 mL/Hr) IV Continuous <Continuous>  folic acid 1 milliGRAM(s) Oral daily  multivitamin 1 Tablet(s) Oral daily  QUEtiapine 25 milliGRAM(s) Oral two times a day  sodium chloride 3%  Inhalation 4 milliLiter(s) Inhalation every 6 hours  thiamine 100 milliGRAM(s) Oral daily  vancomycin    Solution 125 milliGRAM(s) Oral every 6 hours    MEDICATIONS  (PRN):  haloperidol    Injectable 5 milliGRAM(s) IV Push every 6 hours PRN agitation  sodium chloride 0.65% Nasal 1 Spray(s) Both Nostrils every 6 hours PRN Nasal Congestion      ALLERGIES:  Allergies    No Known Allergies    Intolerances        LABS:                        12.1   8.26  )-----------( 280      ( 18 Dec 2020 01:00 )             37.3     12-18    137  |  105  |  6<L>  ----------------------------<  106<H>  4.0   |  22  |  0.71    Ca    8.8      18 Dec 2020 01:00  Phos  4.3     12-18  Mg     2.2     12-18    TPro  7.4  /  Alb  3.3  /  TBili  0.6  /  DBili  x   /  AST  26  /  ALT  12  /  AlkPhos  206<H>  12-18    PT/INR - ( 18 Dec 2020 01:00 )   PT: 15.6 sec;   INR: 1.32 ratio         PTT - ( 18 Dec 2020 01:00 )  PTT:39.2 sec      RADIOLOGY & ADDITIONAL TESTS: Reviewed.    Authored by Dr. Brandon CUELLO [id number], [id number] INTERVAL HPI/OVERNIGHT EVENTS: overnight was agitated, pulled out NG tube, gave ativan 2, haldol 5, intermittently on max precedex, had bowel movements    SUBJECTIVE: Patient seen and examined at bedside. this morning alert oriented to name, unable to recall past few days or as to why he is in the hospital, intermittently agitated also occasionally sad    Patient denies fevers, chills, chest pain, shortness of breath, nausea, abdominal pain, diarrhea, constipation, dysuria, leg swelling, headache, light headedness, hallucinations,     OBJECTIVE:    VITAL SIGNS:  ICU Vital Signs Last 24 Hrs  T(C): 37 (18 Dec 2020 00:00), Max: 37 (17 Dec 2020 20:00)  T(F): 98.6 (18 Dec 2020 00:00), Max: 98.6 (17 Dec 2020 20:00)  HR: 96 (18 Dec 2020 06:00) (67 - 96)  BP: 91/71 (18 Dec 2020 06:00) (91/71 - 132/76)  BP(mean): 77 (18 Dec 2020 06:00) (74 - 97)  ABP: --  ABP(mean): --  RR: 22 (18 Dec 2020 06:00) (15 - 22)  SpO2: 100% (18 Dec 2020 06:00) (97% - 100%)        12-17 @ 07:01  -  12-18 @ 07:00  --------------------------------------------------------  IN: 3284.2 mL / OUT: 1250 mL / NET: 2034.2 mL      CAPILLARY BLOOD GLUCOSE          PHYSICAL EXAM:    General: NAD  HEENT: NC/AT; PERRL, clear conjunctiva  Neck: supple  Respiratory: CTA b/l  Cardiovascular: +S1/S2; RRR  Abdomen: soft, NT/ND; +BS x4  Extremities: WWP, 2+ peripheral pulses b/l; no LE edema  Skin: normal color and turgor; no rash  Neurological: ao1  condom cath    MEDICATIONS:  MEDICATIONS  (STANDING):  albuterol/ipratropium for Nebulization 3 milliLiter(s) Nebulizer every 6 hours  cefTRIAXone   IVPB 1000 milliGRAM(s) IV Intermittent every 24 hours  chlorhexidine 4% Liquid 1 Application(s) Topical <User Schedule>  dexMEDEtomidine Infusion 0.2 MICROgram(s)/kG/Hr (4.41 mL/Hr) IV Continuous <Continuous>  dextrose 5% + lactated ringers. 1000 milliLiter(s) (75 mL/Hr) IV Continuous <Continuous>  folic acid 1 milliGRAM(s) Oral daily  multivitamin 1 Tablet(s) Oral daily  QUEtiapine 25 milliGRAM(s) Oral two times a day  sodium chloride 3%  Inhalation 4 milliLiter(s) Inhalation every 6 hours  thiamine 100 milliGRAM(s) Oral daily  vancomycin    Solution 125 milliGRAM(s) Oral every 6 hours    MEDICATIONS  (PRN):  haloperidol    Injectable 5 milliGRAM(s) IV Push every 6 hours PRN agitation  sodium chloride 0.65% Nasal 1 Spray(s) Both Nostrils every 6 hours PRN Nasal Congestion      ALLERGIES:  Allergies    No Known Allergies    Intolerances        LABS:                        12.1   8.26  )-----------( 280      ( 18 Dec 2020 01:00 )             37.3     12-18    137  |  105  |  6<L>  ----------------------------<  106<H>  4.0   |  22  |  0.71    Ca    8.8      18 Dec 2020 01:00  Phos  4.3     12-18  Mg     2.2     12-18    TPro  7.4  /  Alb  3.3  /  TBili  0.6  /  DBili  x   /  AST  26  /  ALT  12  /  AlkPhos  206<H>  12-18    PT/INR - ( 18 Dec 2020 01:00 )   PT: 15.6 sec;   INR: 1.32 ratio         PTT - ( 18 Dec 2020 01:00 )  PTT:39.2 sec      RADIOLOGY & ADDITIONAL TESTS: Reviewed.    Authored by Dr. Brandon CUELLO [id number], [id number]

## 2020-12-18 NOTE — CHART NOTE - NSCHARTNOTEFT_GEN_A_CORE
Search Terms: johan chino, 1965Search Date: 12/18/2020 08:26:02 AM  Searching on behalf of: [address] [place] [address]  The Drug Utilization Report below displays all of the controlled substance prescriptions, if any, that your patient has filled in the last twelve months. The information displayed on this report is compiled from pharmacy submissions to the Department, and accurately reflects the information as submitted by the pharmacies.    This report was requested by: Brandon Downs | Reference #: [id number]    There are no results for the search terms that you entered.     Brandon Downs PGY2

## 2020-12-18 NOTE — CHART NOTE - NSCHARTNOTEFT_GEN_A_CORE
Nutrition Follow Up Note   Patient seen for: nutrition follow up on  ICU     Source: medical record, communication with team, pt.    Chart reviewed, events noted. Adm for ETOH withdrawal.    Diet Order: Diet, DASH/TLC:   Sodium & Cholesterol Restricted  Soft (12-18-20 @ 09:07)    Nutrition Events:  had been on trickle feeds via NGT, pt pulled out NGT. Diet ordered this am, pt stated he is hungry, currently eating breakfast.    GI: denies N/V  +c.diff  had liquid BM 12/16    Anthropometric Measurements:   Height (cm): 180.3 (12-09-20 @ 19:52)  Weight (kg): 88.1 (12-09-20 @ 19:52)  BMI (kg/m2): 27.1 (12-09-20 @ 19:52)  12/16 wt 89.6 kg    Medications: MEDICATIONS  (STANDING):  albuterol/ipratropium for Nebulization 3 milliLiter(s) Nebulizer every 6 hours  cefTRIAXone   IVPB 1000 milliGRAM(s) IV Intermittent every 24 hours  chlorhexidine 4% Liquid 1 Application(s) Topical <User Schedule>  dexMEDEtomidine Infusion 0.2 MICROgram(s)/kG/Hr (4.41 mL/Hr) IV Continuous <Continuous>  dextrose 5% + lactated ringers. 1000 milliLiter(s) (50 mL/Hr) IV Continuous <Continuous>  folic acid 1 milliGRAM(s) Oral daily  multivitamin 1 Tablet(s) Oral daily  QUEtiapine 25 milliGRAM(s) Oral two times a day  sodium chloride 3%  Inhalation 4 milliLiter(s) Inhalation every 6 hours  thiamine 100 milliGRAM(s) Oral daily  vancomycin    Solution 125 milliGRAM(s) Oral every 6 hours    MEDICATIONS  (PRN):  haloperidol    Injectable 5 milliGRAM(s) IV Push every 6 hours PRN agitation  sodium chloride 0.65% Nasal 1 Spray(s) Both Nostrils every 6 hours PRN Nasal Congestion    Labs: 12-18 @ 01:00: Sodium 137, Potassium 4.0, Calcium 8.8, Magnesium 2.2, Phosphorus 4.3, BUN 6<L>, Creatinine 0.71, Glucose 106<H>, Alk Phos 206<H>, ALT/SGPT 12, AST/SGOT 26, Albumin 3.3, Prealbumin --, Total Bilirubin 0.6, Hemoglobin 12.1<L>, Hematocrit 37.3<L>, Ferritin --, C-Reactive Protein --, Creatine Kinase <<27>    Skin: no pressure injuries documented, +IAD  Edema: 1+ generalized    Estimated Needs: based on dosing 12/9 88.1 kg  Energy: 5457-4728 kcals (25-30 kcals/kg)  Protein:  gm (1.0-1.2 gm/kg)    Previous Nutrition Diagnosis: none  Nutrition Diagnosis is:     New Nutrition Diagnosis:  inadequate protein, energy intake related to AMS w/ decreased po intake, low EN rate when had NGT feeds as evidenced by meeting <80% nutrition needs x 5 days    Recommended Interventions:   1. Continue current diet order  2. Continue thiamine, folic acid, multivitamin     Monitoring and Evaluation:   Continue to monitor nutrition provision and tolerance, weights, labs, skin integrity.   RD remains available upon request and will follow up per protocol.

## 2020-12-18 NOTE — PROGRESS NOTE ADULT - ATTENDING COMMENTS
Patient examined and care reviewed in detail on bedside rounds  Critically ill on vent/pressors with neutropenic/thrombocytopenic sepsis  Frequent bedside visits with therapy change today.   I have personally provided 35+ minutes of critical care time concurrently with the resident/fellow; this excludes time spent on separate procedures. Patient examined and care reviewed in detail on bedside rounds

## 2020-12-18 NOTE — PROGRESS NOTE ADULT - ASSESSMENT
55y M w/ PMH HTN (with medication nonadherence), chronic alcohol abuse (c/b prior DTs, withdrawal seizures, hematemesis, and melena), alcoholic cirrhosis (with portal hypertension), presenting with acute alcohol intoxication and traumatic SDH/SAH, admitted for severe EtOH withdrawal requiring phenobarb and precedex gtt.    #Neuro    Encephalopathy  patient presenting with ETOH level 313 on 12/9, has since been loaded with phenobarb, and since out of window of ETOH withdrawal, reviewed current available evidence for ketamine infusion, will hold off for now  off keppra which could be contributing  initial ammonia 49 as patient is with hx of cirhosis, will repeat in the AM although has been having several bowel movements.   structural lesions in SDH and SAH stable on repeat imaging, although could possibly have concussion  electrolytes maintained within normal range  possibly influenced by sepsis, treating uti as below  given continued psychosis started seroquel with daily qtc    Alcohol withdrawal  - s/p phenobarb load, still with adequate levels, would avoid further benzos at this time to avoid obtundation,   - given supratherapeutic level would hold off on further phenobarb, will consider ketamine  - continue to downtitrate precedex as tolerated, haldol prn for agitation if needed, most recent qtc 450  - daily EKGs for Qtc level    Traumatic SAH/SDH  - small amount of acute SAH within the left temporal sulci and a trace amount of acute SDH along the left tentorial leaflet without shift, mass effect, or herniation  - stable on repeat CTH  - c/w prophylactic keppra 500 BID for 7 days (12/9 - am of 12/16) given high risk for withdrawal seizure as well, although could be contributing to delirium, will finish today  - f/u neurosurgery Dr. Fitzpatrick in 2-4 weeks after discharge    #CV  HTN  - largely normotensive in setting of sedative medication    #Pulm  - breathing comfortably and sating well on RA    #GI  Non-severe C diff infection  - diarrhea improving  - c/w PO vanc 125mg q6 (12/10 – 12/20)    Alcoholic cirrhosis  - without overt signs of decompensation at this time  - c/w thiamine, folate, MVI supplementation  - outpatient hepatology follow up    #Renal  - no active issues  - monitor lytes BID given diarrhea  - d5 75cc/hr x 24 hours    #Endo  - no active issues    #ID  Non-severe C diff infection  - watery diarrhea is improved  - c/w PO vanc 125mg q6 (12/10 – 12/20)  - BCx 12/11 NGTD  given recent fever overnight 12/15-12/16, pancultured, Ua positive and altered started ceftriaxone for possible UTI/ pyelonephritis, urine culture growing enterobacter aerogenes, bld clxs clear    #Heme  - stable chronic normocytic anemia and thrombocytopenia in setting of alcoholic liver cirrhosis   - no signs of active bleeding  - monitor CBC daily  - transfuse for Hb <7 or plt <100  - INR goal <1.3 per neurosurgery  - SCDs; pharmacologic DVT ppx on hold    DVT: SCDs  Diet: NPO with tube feeds  Dispo: to floors once weaned off precedex    Full Code   55y M w/ PMH HTN (with medication nonadherence), chronic alcohol abuse (c/b prior DTs, withdrawal seizures, hematemesis, and melena), alcoholic cirrhosis (with portal hypertension), presenting with acute alcohol intoxication and traumatic SDH/SAH, admitted for severe EtOH withdrawal requiring phenobarb and precedex gtt and started on quetiapine.    #Neuro    Encephalopathy  patient presenting with ETOH level 313 on 12/9, has since been loaded with phenobarb, and since out of window of ETOH withdrawal, reviewed current available evidence for ketamine infusion, will hold off for now  off keppra which could be contributing  initial ammonia 49 as patient is with hx of cirhosis with repeat 38, has been having several bowel movements.   structural lesions in SDH and SAH stable on repeat imaging, although could possibly have concussion  electrolytes maintained within normal range  possibly influenced by sepsis, treating uti and cdiff as below  given continued psychosis started seroquel with daily qtc    Alcohol withdrawal  - s/p phenobarb load, still with adequate levels, would avoid further benzos at this time to avoid obtundation,   - given supratherapeutic level would hold off on further phenobarb,  - continue to downtitrate precedex as tolerated, haldol prn for agitation if needed, most recent qtc 460s  - daily EKGs for Qtc level    Traumatic SAH/SDH  - small amount of acute SAH within the left temporal sulci and a trace amount of acute SDH along the left tentorial leaflet without shift, mass effect, or herniation  - stable on repeat CTH  - completed prophylactic keppra 500 BID for 7 days (12/9 - am of 12/16) given high risk for withdrawal seizure as well, although could be contributing to delirium  - f/u neurosurgery Dr. Fitzpatrick in 2-4 weeks after discharge    #CV  HTN  - largely normotensive in setting of sedative medication    #Pulm  - breathing comfortably and sating well on RA    #GI  Non-severe C diff infection  - diarrhea improving  - c/w PO vanc 125mg q6 (12/10 – 12/20)  - considering discontinuing fluids if tolerating further feeds     Alcoholic cirrhosis  - without overt signs of decompensation at this time  - c/w thiamine, folate, MVI supplementation  - outpatient hepatology follow up    #Renal  - no active issues  - monitor lytes BID given diarrhea  - d5 75cc/hr x 24 hours considering discontinuing    #Endo  - no active issues    #ID  Non-severe C diff infection  - watery diarrhea is improved  - c/w PO vanc 125mg q6 (12/10 – 12/20)  - BCx 12/11 NGTD  given recent fever overnight 12/15-12/16, pancultured, Ua positive and altered started ceftriaxone for 10 day course for pyelonephritis, urine culture growing enterobacter aerogenes sensitive to ceftriaxone , bld clxs clear    #Heme  - stable chronic normocytic anemia and thrombocytopenia in setting of alcoholic liver cirrhosis   - no signs of active bleeding  - monitor CBC daily  - transfuse for Hb <7 or plt <100  - INR goal <1.3 per neurosurgery  - SCDs; pharmacologic DVT ppx on hold    DVT: SCDs  Diet: softs  Dispo: to floors once weaned off precedex    Full Code   55y M w/ PMH HTN (with medication nonadherence), chronic alcohol abuse (c/b prior DTs, withdrawal seizures, hematemesis, and melena), alcoholic cirrhosis (with portal hypertension), presenting with acute alcohol intoxication and traumatic SDH/SAH, admitted for severe EtOH withdrawal requiring phenobarb and precedex gtt and started on quetiapine.    #Neuro    Encephalopathy  patient presenting with ETOH level 313 on 12/9, has since been loaded with phenobarb, and since out of window of ETOH withdrawal, reviewed current available evidence for ketamine infusion, will hold off for now  off keppra which could be contributing  initial ammonia 49 as patient is with hx of cirhosis with repeat 38, has been having several bowel movements.   structural lesions in SDH and SAH stable on repeat imaging, although could possibly have concussion  electrolytes maintained within normal range  possibly influenced by sepsis, treating uti and cdiff as below  given continued psychosis started seroquel with daily qtc    Alcohol withdrawal  - s/p phenobarb load, still with adequate levels, would avoid further benzos at this time to avoid obtundation,   - given supratherapeutic level would hold off on further phenobarb,  - continue to downtitrate precedex as tolerated, haldol prn for agitation if needed, most recent qtc 460s  - daily EKGs for Qtc level    Traumatic SAH/SDH  - small amount of acute SAH within the left temporal sulci and a trace amount of acute SDH along the left tentorial leaflet without shift, mass effect, or herniation  - stable on repeat CTH  - completed prophylactic keppra 500 BID for 7 days (12/9 - am of 12/16) given high risk for withdrawal seizure as well, although could be contributing to delirium  - f/u neurosurgery Dr. Fitzpatrick in 2-4 weeks after discharge    #CV  HTN  - largely normotensive in setting of sedative medication    #Pulm  - breathing comfortably and sating well on RA    #GI  Non-severe C diff infection  - diarrhea improving  - c/w PO vanc 125mg q6 (12/10 – 12/20)  - patient able to eat more however with increased bowel movements so will need to continue to fluids    Alcoholic cirrhosis  - without overt signs of decompensation at this time  - c/w thiamine, folate, MVI supplementation  - outpatient hepatology follow up    #Renal  - no active issues  - monitor lytes BID given diarrhea  - d5 50cc/hr x 24 hours considering discontinuing after patient is able to come off restraints    #Endo  - no active issues    #ID  Non-severe C diff infection  - watery diarrhea is improved  - c/w PO vanc 125mg q6 (12/10 – 12/20)  - BCx 12/11 NGTD  given recent fever overnight 12/15-12/16, pancultured, Ua positive and altered started ceftriaxone for 10 day course for pyelonephritis, urine culture growing enterobacter aerogenes sensitive to ceftriaxone , bld clxs clear    #Heme  - stable chronic normocytic anemia and thrombocytopenia in setting of alcoholic liver cirrhosis   - no signs of active bleeding  - monitor CBC daily  - transfuse for Hb <7 or plt <100  - INR goal <1.3 per neurosurgery  - SCDs; pharmacologic DVT ppx on hold    DVT: SCDs  Diet: softs  Dispo: to floors once weaned off precedex    Full Code   55y M w/ PMH HTN (with medication nonadherence), chronic alcohol abuse (c/b prior DTs, withdrawal seizures, hematemesis, and melena), alcoholic cirrhosis (with portal hypertension), presenting with acute alcohol intoxication and traumatic SDH/SAH, admitted for severe EtOH withdrawal requiring phenobarb and precedex gtt and started on quetiapine.    #Neuro    Encephalopathy  patient presenting with ETOH level 313 on 12/9, has since been loaded with phenobarb, and since out of window of ETOH withdrawal, reviewed current available evidence for ketamine infusion, will hold off for now  off keppra which could be contributing  initial ammonia 49 as patient is with hx of cirhosis with repeat 38, has been having several bowel movements.   structural lesions in SDH and SAH stable on repeat imaging, although could possibly have concussion  electrolytes maintained within normal range  possibly influenced by sepsis, treating uti and cdiff as below  given continued psychosis started seroquel with daily qtc    Alcohol withdrawal  - s/p phenobarb load, still with adequate levels, would avoid further benzos at this time to avoid obtundation,   - given supratherapeutic level would hold off on further phenobarb,  - continue to downtitrate precedex as tolerated, haldol prn for agitation if needed, most recent qtc 460s  - daily EKGs for Qtc level    Traumatic SAH/SDH  - small amount of acute SAH within the left temporal sulci and a trace amount of acute SDH along the left tentorial leaflet without shift, mass effect, or herniation  - stable on repeat CTH  - completed prophylactic keppra 500 BID for 7 days (12/9 - am of 12/16) given high risk for withdrawal seizure as well, although could be contributing to delirium  - f/u neurosurgery Dr. Fitzpatrick in 2-4 weeks after discharge    #CV  HTN  - largely normotensive in setting of sedative medication    #Pulm  - breathing comfortably and sating well on RA  - due to thickened secretions started on breathing treatments and hypertonic saline    #GI  Non-severe C diff infection  - diarrhea improving  - c/w PO vanc 125mg q6 (12/10 – 12/20)  - patient able to eat more however with increased bowel movements so will need to continue to fluids    Alcoholic cirrhosis  - without overt signs of decompensation at this time  - c/w thiamine, folate, MVI supplementation  - outpatient hepatology follow up    #Renal  - no active issues  - monitor lytes BID given diarrhea  - d5 50cc/hr x 24 hours considering discontinuing after patient is able to come off restraints    #Endo  - no active issues    #ID  Non-severe C diff infection  - watery diarrhea is improved  - c/w PO vanc 125mg q6 (12/10 – 12/20)  - BCx 12/11 NGTD  given recent fever overnight 12/15-12/16, pancultured, Ua positive and altered started ceftriaxone for 10 day course for pyelonephritis, urine culture growing enterobacter aerogenes sensitive to ceftriaxone , bld clxs clear    #Heme  - stable chronic normocytic anemia and thrombocytopenia in setting of alcoholic liver cirrhosis   - no signs of active bleeding  - monitor CBC daily  - transfuse for Hb <7 or plt <100  - INR goal <1.3 per neurosurgery  - SCDs; pharmacologic DVT ppx on hold    DVT: SCDs  Diet: softs  Dispo: to floors once weaned off precedex    Full Code

## 2020-12-19 NOTE — PROGRESS NOTE ADULT - ASSESSMENT
55y M w/ PMH HTN (with medication nonadherence), chronic alcohol abuse (c/b prior DTs, withdrawal seizures, hematemesis, and melena), alcoholic cirrhosis (with portal hypertension), presenting with acute alcohol intoxication and traumatic SDH/SAH, admitted for severe EtOH withdrawal requiring phenobarb and precedex gtt and started on quetiapine.    #Neuro    Encephalopathy  patient presenting with ETOH level 313 on 12/9, has since been loaded with phenobarb, and since out of window of ETOH withdrawal, reviewed current available evidence for ketamine infusion, will hold off for now  off keppra which could be contributing  initial ammonia 49 as patient is with hx of cirhosis with repeat 38, has been having several bowel movements.   structural lesions in SDH and SAH stable on repeat imaging, although could possibly have concussion  electrolytes maintained within normal range  possibly influenced by sepsis, treating uti and cdiff as below  given continued psychosis started seroquel with daily qtc    Alcohol withdrawal  - s/p phenobarb load, still with adequate levels, would avoid further benzos at this time to avoid obtundation,   - given supratherapeutic level would hold off on further phenobarb,  - continue to downtitrate precedex as tolerated, haldol prn for agitation if needed, most recent qtc 460s  - daily EKGs for Qtc level    Traumatic SAH/SDH  - small amount of acute SAH within the left temporal sulci and a trace amount of acute SDH along the left tentorial leaflet without shift, mass effect, or herniation  - stable on repeat CTH  - completed prophylactic keppra 500 BID for 7 days (12/9 - am of 12/16) given high risk for withdrawal seizure as well, although could be contributing to delirium  - f/u neurosurgery Dr. Fitzpatrick in 2-4 weeks after discharge    #CV  HTN  - largely normotensive in setting of sedative medication    #Pulm  - breathing comfortably and sating well on RA  - due to thickened secretions started on breathing treatments and hypertonic saline    #GI  Non-severe C diff infection  - diarrhea improving  - c/w PO vanc 125mg q6 (12/10 – 12/20)  - patient able to eat more however with increased bowel movements so will need to continue to fluids    Alcoholic cirrhosis  - without overt signs of decompensation at this time  - c/w thiamine, folate, MVI supplementation  - outpatient hepatology follow up    #Renal  - no active issues  - monitor lytes BID given diarrhea  - d5 50cc/hr x 24 hours considering discontinuing after patient is able to come off restraints    #Endo  - no active issues    #ID  Non-severe C diff infection  - watery diarrhea is improved  - c/w PO vanc 125mg q6 (12/10 – 12/20)  - BCx 12/11 NGTD    UA+  Febrile 12/15 PM, UCx+ for enterobacter sens to CTX  -C/w CTX x 10d (12/16-12/25)    #Heme  - stable chronic normocytic anemia and thrombocytopenia in setting of alcoholic liver cirrhosis   - no signs of active bleeding  - monitor CBC daily  - transfuse for Hb <7 or plt <100  - INR goal <1.3 per neurosurgery  - SCDs; pharmacologic DVT ppx on hold    DVT: SCDs  Diet: softs  Dispo: to floors once weaned off precedex    Full Code   55y M w/ PMH HTN (with medication nonadherence), chronic alcohol abuse (c/b prior DTs, withdrawal seizures, hematemesis, and melena), alcoholic cirrhosis (with portal hypertension), presenting with acute alcohol intoxication and traumatic SDH/SAH, admitted for severe EtOH withdrawal requiring phenobarb and precedex gtt, now with continued metabolic encephalopathy requiring precedex.     #Neuro    Encephalopathy  patient presenting with ETOH level 313 on 12/9, has since been loaded with phenobarb, and since out of window of ETOH withdrawal, reviewed current available evidence for ketamine infusion, will hold off for now  off keppra which could be contributing  initial ammonia 49 as patient is with hx of cirhosis with repeat 38, has been having several bowel movements.   structural lesions in SDH and SAH stable on repeat imaging, although could possibly have concussion  electrolytes maintained within normal range  possibly influenced by sepsis, treating uti and cdiff as below  given continued psychosis started seroquel with daily qtc  Delirium also in differential    Metabolic encephalopathy  - s/p phenobarb load, still with adequate levels, would avoid further benzos at this time to avoid obtundation and prevent worsening delirium  - given supratherapeutic level would hold off on further phenobarb  - continue to downtitrate precedex as tolerated, haldol prn for agitation if needed, most recent qtc 480s  - daily EKGs for Qtc level  - If QTc remains <500, increase seroquel for sleep. Pt states he takes Z-quil daily at home.  - As per son, patient on citalopram at home for anxiety. Can start this home medication.     Traumatic SAH/SDH  - small amount of acute SAH within the left temporal sulci and a trace amount of acute SDH along the left tentorial leaflet without shift, mass effect, or herniation  - stable on repeat CTH  - completed prophylactic keppra 500 BID for 7 days (12/9 - am of 12/16) given high risk for withdrawal seizure as well, although could be contributing to delirium  - f/u neurosurgery Dr. Fitzpatrick in 2-4 weeks after discharge    #CV  HTN  - largely normotensive in setting of sedative medication    #Pulm  - breathing comfortably and sating well on RA  - due to thickened secretions started on breathing treatments and hypertonic saline    #GI  Non-severe C diff infection  - diarrhea improving, still having loose BMs  - c/w PO vanc 125mg q6 (12/10 – 12/20)  - Stop IVF if able to tolerate PO    Alcoholic cirrhosis  - without overt signs of decompensation at this time  - c/w thiamine, folate, MVI supplementation  - outpatient hepatology follow up    #Renal  - no active issues  - monitor lytes BID given diarrhea    #Endo  - no active issues    #ID  Non-severe C diff infection  - watery diarrhea is improved  - c/w PO vanc 125mg q6 (12/10 – 12/20)  - BCx 12/11 NGTD    UA+  Febrile 12/15 PM, UCx+ for enterobacter sens to CTX  -C/w CTX x 10d (12/16-12/25)    #Heme  - stable chronic normocytic anemia and thrombocytopenia in setting of alcoholic liver cirrhosis   - no signs of active bleeding  - monitor CBC daily  - transfuse for Hb <7 or plt <100  - INR goal <1.3 per neurosurgery  - SCDs; pharmacologic DVT ppx on hold    DVT: SCDs  Diet: softs  Dispo: to floors once weaned off precedex    Full Code   55y M w/ PMH HTN (with medication nonadherence), chronic alcohol abuse (c/b prior DTs, withdrawal seizures, hematemesis, and melena), alcoholic cirrhosis (with portal hypertension), presenting with acute alcohol intoxication and traumatic SDH/SAH, admitted for severe EtOH withdrawal requiring phenobarb and precedex gtt, now with continued metabolic encephalopathy requiring precedex.     #Neuro    Metabolic Encephalopathy: DDx includes delirium, medication induced (has been on multiple agents including phenobarb, ativan, haldol, seroquel, precedex), sepsis (C diff, UTI), structural (SDH/SAH). Out of window for alcohol withdrawal.  - s/p phenobarb load, still with adequate levels, would avoid further benzos at this time to avoid obtundation and prevent worsening delirium  - Continue to hold phenobarb  - Continue to downtitrate precedex as tolerated, haldol prn for agitation if needed, most recent qtc 480s  - daily EKGs for Qtc level  - If QTc remains <500, increase seroquel to 50. Pt states he takes Z-quil daily at home.  - As per son, patient on citalopram at home for anxiety/depression. Will obtain psych consult.    Traumatic SAH/SDH  - small amount of acute SAH within the left temporal sulci and a trace amount of acute SDH along the left tentorial leaflet without shift, mass effect, or herniation  - stable on repeat CTH  - completed prophylactic keppra 500 BID for 7 days (12/9 - am of 12/16) given high risk for withdrawal seizure as well, although could be contributing to delirium  - f/u neurosurgery Dr. Fitzpatrick in 2-4 weeks after discharge    #CV  HTN  - largely normotensive in setting of sedative medication    #Pulm  - breathing comfortably and sating well on RA  - due to thickened secretions started on breathing treatments and hypertonic saline    #GI  Non-severe C diff infection  - diarrhea improving, still having loose BMs  - c/w PO vanc 125mg q6 (12/10 – 12/20)  - Stop IVF if able to tolerate PO    Alcoholic cirrhosis  - without overt signs of decompensation at this time  - c/w thiamine, folate, MVI supplementation  - outpatient hepatology follow up    #Renal  - no active issues  - monitor lytes BID given diarrhea    #Endo  - no active issues    #ID  Non-severe C diff infection  - watery diarrhea is improved  - c/w PO vanc 125mg q6 (12/10 – 12/20)  - BCx 12/11 NGTD    UA+  Febrile 12/15 PM, UCx+ for enterobacter sens to CTX  -C/w CTX x 10d (12/16-12/25)    #Heme  - stable chronic normocytic anemia and thrombocytopenia in setting of alcoholic liver cirrhosis   - no signs of active bleeding  - monitor CBC daily  - transfuse for Hb <7 or plt <100  - INR goal <1.3 per neurosurgery  - SCDs; pharmacologic DVT ppx on hold    DVT: SCDs  Diet: softs  Dispo: to floors once weaned off precedex    Full Code   55y M w/ PMH HTN (with medication nonadherence), chronic alcohol abuse (c/b prior DTs, withdrawal seizures, hematemesis, and melena), alcoholic cirrhosis (with portal hypertension), presenting with acute alcohol intoxication and traumatic SDH/SAH, admitted for severe EtOH withdrawal requiring phenobarb and precedex gtt, now with continued metabolic encephalopathy requiring precedex.     #Neuro    Metabolic Encephalopathy: DDx includes delirium, medication induced (has been on multiple agents including phenobarb, ativan, haldol, seroquel, precedex), sepsis (C diff, UTI), structural (SDH/SAH). Out of window for alcohol withdrawal.  - s/p phenobarb load, still with adequate levels, would avoid further benzos at this time to avoid obtundation and prevent worsening delirium  - Continue to hold phenobarb  - Continue to downtitrate precedex as tolerated  - Haldol prn for agitation if needed, most recent qtc 480s  - Daily EKGs for Qtc level  - If QTc remains <500, increase seroquel to 50  - As per son, patient on citalopram at home for anxiety/depression. Will obtain psych consult.    Traumatic SAH/SDH  - small amount of acute SAH within the left temporal sulci and a trace amount of acute SDH along the left tentorial leaflet without shift, mass effect, or herniation  - stable on repeat CTH  - completed prophylactic keppra 500 BID for 7 days (12/9 - am of 12/16) given high risk for withdrawal seizure as well, although could be contributing to delirium  - f/u neurosurgery Dr. Fitzpatrick in 2-4 weeks after discharge    #CV  HTN  - largely normotensive in setting of sedative medication    #Pulm  - breathing comfortably and sating well on RA  - Stop duoneb, hypertonic saline nebs    #GI  Non-severe C diff infection  - diarrhea improving, still having loose BMs  - c/w PO vanc 125mg q6 (12/10 – 12/20)  - Stop IVF if able to tolerate PO    Alcoholic cirrhosis  - without overt signs of decompensation at this time  - c/w thiamine, folate, MVI supplementation  - outpatient hepatology follow up    #Renal  - no active issues  - monitor lytes BID given diarrhea    #Endo  - no active issues    #ID  Non-severe C diff infection  - watery diarrhea is improved  - c/w PO vanc 125mg q6 (12/10 – 12/20)  - BCx 12/11 NGTD    UA+  Febrile 12/15 PM, UCx+ for enterobacter sens to CTX  -C/w CTX x 7d (12/16-12/22)    #Heme  - stable chronic normocytic anemia and thrombocytopenia in setting of alcoholic liver cirrhosis   - no signs of active bleeding  - monitor CBC daily  - transfuse for Hb <7 or plt <100  - INR goal <1.3 per neurosurgery, currently 1.32 - will monitor  - SCDs; pharmacologic DVT ppx on hold    DVT: SCDs  Diet: softs  Dispo: to floors once weaned off precedex    Full Code

## 2020-12-19 NOTE — BEHAVIORAL HEALTH ASSESSMENT NOTE - HPI (INCLUDE ILLNESS QUALITY, SEVERITY, DURATION, TIMING, CONTEXT, MODIFYING FACTORS, ASSOCIATED SIGNS AND SYMPTOMS)
Patient is a 55 M PMH HTN (noncompliant w/ meds), EtOH abuse, Cirrhosis with portal HTN, prior oxycodone abuse, GI bleeds, DTs and alcoholic withdrawal seizures presented to the ED c/o hitting his head on the toilet. Patient reportedly was getting out of his tub in the bathroom when he fell onto the back of his head. Patient stated he drank a fifth of alcohol prior to arrival. .  Patient reports drinking up to 1.5L tequilla q day. ETOH level on admission 313. Patient with 1 previous detox/rehab admission when using oxy.  Denies inpt psych admission. No hx of psychotropic medications. , however reports having support from family. Patient has a hx of seizures and DT.     On this admission he initially received phenobarbital which was discontinued due to elevated QTc (>500) and elevated phenobarb levels once QTc stabalized. The patient required frequent Ativan and Haldol PRN medications secondary to agitation. He was recently switched to Seroquel.   Psychiatry consulted for medication management.    On interview today the patient is AOx3. Per nursing staff the patient has drastically improved in his alertness and orientation over the past 24h. The patient states his mood is "ok." He was confused as to why we were asking him so many questions. He denies any depression or any current anxieties. He says that he knows his drinking is a problem and his family wants him to quit. He is amenable to considering rehab services at this time.  Patient has no SI or HI and denies SA in the past. He still remains in the MICU (due to Precedex requirement). Patient is a 55 M PMH HTN (noncompliant w/ meds), EtOH abuse, Cirrhosis with portal HTN, prior oxycodone abuse, GI bleeds, DTs and alcoholic withdrawal seizures presented to the ED c/o hitting his head on the toilet. Patient reportedly was getting out of his tub in the bathroom when he fell onto the back of his head. Patient stated he drank a fifth of alcohol prior to arrival. .  Patient reports drinking up to 1.5L tequilla q day. ETOH level on admission 313. Patient with 1 previous detox/rehab admission when using oxy.  Denies inpt psych admission. No hx of psychotropic medications. , however reports having support from family. Patient has a hx of seizures and DT.     On this admission he initially received phenobarbital which was discontinued due to elevated QTc (>500) and elevated phenobarb levels once QTc stabilized. The patient required frequent Ativan and Haldol PRN medications secondary to agitation. He was recently switched to Seroquel.   Psychiatry consulted for medication management.    On interview today the patient is AOx3. Per nursing staff the patient has drastically improved in his alertness and orientation over the past 24h. The patient states his mood is "ok." He was confused as to why we were asking him so many questions. He denies any depression or any current anxieties. He says that he knows his drinking is a problem and his family wants him to quit. He is amenable to considering rehab services at this time.  Patient has no SI or HI and denies SA in the past. He still remains in the MICU (due to Precedex requirement).

## 2020-12-19 NOTE — BEHAVIORAL HEALTH ASSESSMENT NOTE - NSBHCHARTREVIEWLAB_PSY_A_CORE FT
10.6   7.35  )-----------( 332      ( 19 Dec 2020 01:46 )             32.5   12-19    138  |  104  |  <4<L>  ----------------------------<  89  3.2<L>   |  21<L>  |  0.71    Ca    8.6      19 Dec 2020 01:46  Phos  3.7     12-19  Mg     2.3     12-19    TPro  7.2  /  Alb  3.5  /  TBili  0.5  /  DBili  x   /  AST  24  /  ALT  11  /  AlkPhos  204<H>  12-19

## 2020-12-19 NOTE — BEHAVIORAL HEALTH ASSESSMENT NOTE - RISK ASSESSMENT
Risk: male gender, substance abuse, acute withdrawal  Protective: no SI or HI at this time, no hx of SA, patient future oriented discussing going home, states he has support from family  Patient is low imminent risk at this time.    Patient requiring ongoing medical care at this time. Low Acute Suicide Risk

## 2020-12-19 NOTE — PROGRESS NOTE ADULT - SUBJECTIVE AND OBJECTIVE BOX
Hudson Givens MD  Internal Medicine, PGY2  [phone]    MICU Progress Note    Interval Events:    Meds administered:  potassium chloride  10 mEq/100 mL IVPB: 100 mL/Hr IV Intermittent (12-19 @ 06:38)  vancomycin    Solution: 125 milliGRAM(s) Oral (12-19 @ 05:32)  potassium chloride  10 mEq/100 mL IVPB: 100 mL/Hr IV Intermittent (12-19 @ 05:32)  potassium chloride    Tablet ER: 40 milliEquivalent(s) Oral (12-19 @ 05:32)  chlorhexidine 4% Liquid: 1 Application(s) Topical (12-19 @ 05:31)  potassium chloride  10 mEq/100 mL IVPB: 100 mL/Hr IV Intermittent (12-19 @ 04:14)  vancomycin    Solution: 125 milliGRAM(s) Oral (12-19 @ 01:09)  melatonin: 3 milliGRAM(s) Oral (12-18 @ 21:01)  QUEtiapine: 25 milliGRAM(s) Oral (12-18 @ 21:01)        REVIEW OF SYSTEMS:  [ ] Unable to assess ROS because ______  CONSTITUTIONAL: No fever, chills, night sweats, or fatigue  EYES: No eye pain, visual disturbances, or discharge  ENMT:  No difficulty hearing, tinnitus, vertigo; No sinus or throat pain  NECK: No pain or stiffness  BREASTS: No pain, masses, or nipple discharge  RESPIRATORY: No cough, wheezing, or hemoptysis; No shortness of breath  CARDIOVASCULAR: No chest pain, palpitations, dizziness, or leg swelling  GASTROINTESTINAL: No abdominal or epigastric pain. No nausea, vomiting, or hematemesis; No diarrhea or constipation. No melena or hematochezia.  GENITOURINARY: No dysuria, frequency, hematuria, or incontinence  NEUROLOGICAL: No headaches, memory loss, loss of strength, numbness, or tremors  SKIN: No itching, burning, rashes, or lesions   LYMPH NODES: No enlarged glands  ENDOCRINE: No heat or cold intolerance; No hair loss  MUSCULOSKELETAL: No joint pain or swelling; No muscle, back, or extremity pain  PSYCHIATRIC: No depression, anxiety, mood swings, or difficulty sleeping  HEME/LYMPH: No easy bruising, or bleeding gums  ALLERGY AND IMMUNOLOGIC: No hives or eczema    OBJECTIVE:  Vents:      12-18 @ 07:01  -  12-19 @ 07:00  --------------------------------------------------------  IN: 2683.2 mL / OUT: 3100 mL / NET: -416.8 mL      CAPILLARY BLOOD GLUCOSE          Drips:  -Precedex    Lines:    VITALS:  T(F): 98 (12-19-20 @ 04:00), Max: 100 (12-18-20 @ 16:00)  HR: 64 (12-19-20 @ 04:00) (60 - 91)  BP: 148/80 (12-19-20 @ 04:00) (106/61 - 165/87)  BP(mean): 105 (12-19-20 @ 04:00) (73 - 117)  ABP: --  ABP(mean): --  RR: 18 (12-19-20 @ 04:00) (11 - 24)  SpO2: 100% (12-19-20 @ 04:00) (97% - 100%)    PHYSICAL EXAM:  GENERAL: NAD, lying in bed comfortably  HEAD:  Atraumatic, Normocephalic  EYES: EOMI, PERRLA, conjunctiva and sclera clear  ENT: Moist mucous membranes  NECK: Supple, No JVD  CHEST/LUNG: Clear to auscultation bilaterally; No rales, rhonchi, wheezing, or rubs. Unlabored respirations  HEART: Regular rate and rhythm; No murmurs, rubs, or gallops  ABDOMEN: Bowel sounds present; Soft, Nontender, Nondistended. No hepatomegaly  EXTREMITIES:  2+ Peripheral Pulses, brisk capillary refill. No clubbing, cyanosis, or edema  NERVOUS SYSTEM:  Alert & Oriented X3, speech clear. No deficits   MSK: FROM all 4 extremities, full and equal strength  SKIN: No rashes or lesions    HOSPITAL MEDICATIONS:  Standing Meds:  albuterol/ipratropium for Nebulization 3 milliLiter(s) Nebulizer every 6 hours  cefTRIAXone   IVPB 1000 milliGRAM(s) IV Intermittent every 24 hours  chlorhexidine 4% Liquid 1 Application(s) Topical <User Schedule>  dexMEDEtomidine Infusion 0.2 MICROgram(s)/kG/Hr IV Continuous <Continuous>  dextrose 5% + lactated ringers. 1000 milliLiter(s) IV Continuous <Continuous>  folic acid 1 milliGRAM(s) Oral daily  melatonin 3 milliGRAM(s) Oral at bedtime  multivitamin 1 Tablet(s) Oral daily  QUEtiapine 25 milliGRAM(s) Oral two times a day  sodium chloride 3%  Inhalation 4 milliLiter(s) Inhalation every 6 hours  thiamine 100 milliGRAM(s) Oral daily  vancomycin    Solution 125 milliGRAM(s) Oral every 6 hours      PRN Meds:  haloperidol    Injectable 5 milliGRAM(s) IV Push every 6 hours PRN  sodium chloride 0.65% Nasal 1 Spray(s) Both Nostrils every 6 hours PRN      LABS:  CBC 12-19-20 @ 01:46                        10.6   7.35  )-----------( 332                   32.5       Hgb trend: 10.6 <-- , 12.1 <-- , 9.8 <--   WBC trend: 7.35 <-- , 8.26 <-- , 9.99 <--     CMP 12-19-20 @ 01:46    138  |  104  |  <4<L>  ----------------------------<  89  3.2<L>   |  21<L>  |  0.71    Ca    8.6      12-19-20 @ 01:46  Phos  3.7     12-19  Mg     2.3     12-19    TPro  7.2  /  Alb  3.5  /  TBili  0.5  /  DBili  x   /  AST  24  /  ALT  11  /  AlkPhos  204<H>  12-19      Serum Cr trend: 0.71 <-- , 0.71 <-- , 0.72 <-- , 0.67 <--   PT/INR - ( 19 Dec 2020 01:46 )   PT: 15.6 sec;   INR: 1.32 ratio         PTT - ( 19 Dec 2020 01:46 )  PTT:41.5 sec      Venous Blood Gas:  12-18 @ 01:07  7.36/45/26/25/36  VBG Lactate: 1.4      MICROBIOLOGY:       RADIOLOGY:  [ ] Reviewed and interpreted by me    EKG:   Hudson Givens MD  Internal Medicine, PGY2  [phone]    [facility]    Interval Events:  -Requested ativan to sleep, but precedex was increased instead, to 1  -2 loose BMs yesterday, none overnight  -Currently denies abdominal pain, nausea    Meds administered:  potassium chloride  10 mEq/100 mL IVPB: 100 mL/Hr IV Intermittent ( @ 06:38)  vancomycin    Solution: 125 milliGRAM(s) Oral ( 05:32)  potassium chloride  10 mEq/100 mL IVPB: 100 mL/Hr IV Intermittent ( 05:32)  potassium chloride    Tablet ER: 40 milliEquivalent(s) Oral ( 05:32)  chlorhexidine 4% Liquid: 1 Application(s) Topical ( 05:31)  potassium chloride  10 mEq/100 mL IVPB: 100 mL/Hr IV Intermittent ( @ 04:14)  vancomycin    Solution: 125 milliGRAM(s) Oral ( @ 01:09)  melatonin: 3 milliGRAM(s) Oral ( @ 21:01)  QUEtiapine: 25 milliGRAM(s) Oral ( @ 21:01)      OBJECTIVE:  Vents:       @ 07:01  -   @ 07:00  --------------------------------------------------------  IN: 2683.2 mL / OUT: 3100 mL / NET: -416.8 mL      CAPILLARY BLOOD GLUCOSE          Drips:  -Precedex @1    Lines:    VITALS:  T(F): 98 (20 @ 04:00), Max: 100 (20 @ 16:00)  HR: 64 (20 @ 04:00) (60 - 91)  BP: 148/80 (20 @ 04:00) (106/61 - 165/87)  BP(mean): 105 (20 @ 04:00) (73 - 117)  ABP: --  ABP(mean): --  RR: 18 (20 @ 04:00) (11 - 24)  SpO2: 100% (20 @ 04:00) (97% - 100%)    PHYSICAL EXAM:  GENERAL: NAD, lying in bed comfortably  HEAD:  Atraumatic, Normocephalic  EYES: EOMI, PERRLA, conjunctiva and sclera clear  ENT: Moist mucous membranes  NECK: Supple, No JVD  CHEST/LUNG: Clear to auscultation bilaterally; No rales, rhonchi, wheezing, or rubs. Unlabored respirations  HEART: Regular rate and rhythm; No murmurs, rubs, or gallops  ABDOMEN: Bowel sounds present; Soft, Nontender, Nondistended. No hepatomegaly  EXTREMITIES:  2+ Peripheral Pulses, brisk capillary refill. No clubbing, cyanosis, or edema  NERVOUS SYSTEM:  Alert & Oriented X3, speech clear. No deficits   MSK: FROM all 4 extremities, full and equal strength  SKIN: No rashes or lesions    HOSPITAL MEDICATIONS:  Standing Meds:  albuterol/ipratropium for Nebulization 3 milliLiter(s) Nebulizer every 6 hours  cefTRIAXone   IVPB 1000 milliGRAM(s) IV Intermittent every 24 hours  chlorhexidine 4% Liquid 1 Application(s) Topical <User Schedule>  dexMEDEtomidine Infusion 0.2 MICROgram(s)/kG/Hr IV Continuous <Continuous>  dextrose 5% + lactated ringers. 1000 milliLiter(s) IV Continuous <Continuous>  folic acid 1 milliGRAM(s) Oral daily  melatonin 3 milliGRAM(s) Oral at bedtime  multivitamin 1 Tablet(s) Oral daily  QUEtiapine 25 milliGRAM(s) Oral two times a day  sodium chloride 3%  Inhalation 4 milliLiter(s) Inhalation every 6 hours  thiamine 100 milliGRAM(s) Oral daily  vancomycin    Solution 125 milliGRAM(s) Oral every 6 hours      PRN Meds:  haloperidol    Injectable 5 milliGRAM(s) IV Push every 6 hours PRN  sodium chloride 0.65% Nasal 1 Spray(s) Both Nostrils every 6 hours PRN      LABS:  CBC 20 @ 01:46                        10.6   7.35  )-----------( 332                   32.5       Hgb trend: 10.6 <-- , 12.1 <-- , 9.8 <--   WBC trend: 7.35 <-- , 8.26 <-- , 9.99 <--     CMP 20 @ 01:46    138  |  104  |  <4<L>  ----------------------------<  89  3.2<L>   |  21<L>  |  0.71    Ca    8.6      20 @ 01:46  Phos  3.7       Mg     2.3         TPro  7.2  /  Alb  3.5  /  TBili  0.5  /  DBili  x   /  AST  24  /  ALT  11  /  AlkPhos  204<H>        Serum Cr trend: 0.71 <-- , 0.71 <-- , 0.72 <-- , 0.67 <--   PT/INR - ( 19 Dec 2020 01:46 )   PT: 15.6 sec;   INR: 1.32 ratio         PTT - ( 19 Dec 2020 01:46 )  PTT:41.5 sec      Venous Blood Gas:   @ 01:07  7.36/45/26/25/36  VBG Lactate: 1.4      Phenobarbital Level, Serum: 37.3 ug/mL (20 @ 01:46)     MICROBIOLOGY:   cCulture - Urine (20 @ 04:59)   - Amikacin: S <=16   - Amoxicillin/Clavulanic Acid: R >16/8   - Ampicillin: R 16 These ampicillin results predict results for amoxicillin   - Ampicillin/Sulbactam: R <=4/2 Enterobacter, Citrobacter, and Serratia may develop resistance during prolonged therapy (3-4 days)   - Aztreonam: S <=4   - Cefazolin: R >16   - Cefepime: S <=2   - Cefoxitin: R >16   - Ceftriaxone: S <=1 Enterobacter, Citrobacter, and Serratia may develop resistance during prolonged therapy   - Ciprofloxacin: S <=0.25   - Ertapenem: S <=0.5   - Gentamicin: S <=2   - Imipenem: S <=1   - Levofloxacin: S <=0.5   - Meropenem: S <=1   - Nitrofurantoin: I 64 Should not be used to treat pyelonephritis   - Piperacillin/Tazobactam: S <=8   - Tigecycline: S <=2   - Tobramycin: S <=2   - Trimethoprim/Sulfamethoxazole: S <=0.5/9.5   Specimen Source: .Urine Clean Catch (Midstream)   Culture Results:   >100,000 CFU/ml Enterobacter aerogenes   Organism Identification: Enterobacter aerogenes   Organism: Enterobacter aerogenes   Method Type: GINA     RADIOLOGY:  [ ] Reviewed and interpreted by me    EK/18 QTc 487

## 2020-12-19 NOTE — BEHAVIORAL HEALTH ASSESSMENT NOTE - NSBHCHARTREVIEWVS_PSY_A_CORE FT
ICU Vital Signs Last 24 Hrs  T(C): 37.6 (19 Dec 2020 16:00), Max: 37.6 (19 Dec 2020 16:00)  T(F): 99.7 (19 Dec 2020 16:00), Max: 99.7 (19 Dec 2020 16:00)  HR: 90 (19 Dec 2020 16:00) (56 - 90)  BP: 135/77 (19 Dec 2020 16:00) (106/61 - 164/78)  BP(mean): 111 (19 Dec 2020 16:00) (79 - 115)  ABP: --  ABP(mean): --  RR: 29 (19 Dec 2020 16:00) (11 - 29)  SpO2: 100% (19 Dec 2020 16:00) (97% - 100%)

## 2020-12-19 NOTE — BEHAVIORAL HEALTH ASSESSMENT NOTE - NSBHCHARTREVIEWINVESTIGATE_PSY_A_CORE FT
Ventricular Rate 69 BPM    Atrial Rate 69 BPM    P-R Interval 138 ms    QRS Duration 118 ms    Q-T Interval 436 ms    QTC Calculation(Bazett) 467 ms    P Axis 16 degrees    R Axis 29 degrees    T Axis 7 degrees

## 2020-12-19 NOTE — BEHAVIORAL HEALTH ASSESSMENT NOTE - NSBHCONSULTMEDS_PSY_A_CORE FT
Recommend continuing Seroquel 50mg qHS    Recommend d/c Seroquel 25mg qAM  Recommend starting Seroquel 25mg q6h PRN agitation

## 2020-12-19 NOTE — BEHAVIORAL HEALTH ASSESSMENT NOTE - SUMMARY
Patient is a 55 M PMH HTN (noncompliant w/ meds), EtOH abuse, Cirrhosis with portal HTN, prior oxycodone abuse, GI bleeds, DTs and alcoholic withdrawal seizures presented to the ED c/o hitting his head on the toilet. Patient reportedly was getting out of his tub in the bathroom when he fell onto the back of his head. Patient stated he drank a fifth of alcohol prior to arrival.  Patient reports drinking up to 1.5L tequilla q day. ETOH level on admission 313. Patient with 1 previous detox/rehab admission when using oxy.  Patient has a hx of seizures and DT.     On this admission he initially received phenobarbital which was discontinued due to elevated QTc (>500) and elevated phenobarb levels once QTc stabalized. The patient required frequent Ativan and Haldol PRN medications secondary to agitation. He was recently switched to Seroquel.   Psychiatry consulted for medication management. Patient is a 55 M PMH HTN (noncompliant w/ meds), EtOH abuse, Cirrhosis with portal HTN, prior oxycodone abuse, GI bleeds, DTs and alcoholic withdrawal seizures presented to the ED c/o hitting his head on the toilet. Patient reportedly was getting out of his tub in the bathroom when he fell onto the back of his head. Patient stated he drank a fifth of alcohol prior to arrival.  Patient reports drinking up to 1.5L tequilla q day. ETOH level on admission 313. Patient with 1 previous detox/rehab admission when using oxy.  Patient has a hx of seizures and DT.     On this admission he initially received phenobarbital which was discontinued due to elevated QTc (>500) and elevated phenobarb levels once QTc stabilized. The patient required frequent Ativan and Haldol PRN medications secondary to agitation. He was recently switched to Seroquel.   Psychiatry consulted for medication management.  Pt may benefit from the increased dose of Seroquel 50mg po q6pm as discussed with MICU staff.

## 2020-12-19 NOTE — BEHAVIORAL HEALTH ASSESSMENT NOTE - CASE SUMMARY
Patient is a 55 M PMH HTN (noncompliant w/ meds), EtOH abuse, Cirrhosis with portal HTN, prior oxycodone abuse, GI bleeds, DTs and alcoholic withdrawal seizures presented to the ED c/o hitting his head on the toilet. Patient reportedly was getting out of his tub in the bathroom when he fell onto the back of his head. Patient stated he drank a fifth of alcohol prior to arrival.  Patient reports drinking up to 1.5L tequilla q day. ETOH level on admission 313. Patient with 1 previous detox/rehab admission when using oxy.  Patient has a hx of seizures and DT.   On this admission he initially received phenobarbital which was discontinued due to elevated QTc (>500) and elevated phenobarb levels once QTc stabilized. The patient required frequent Ativan and Haldol PRN medications secondary to agitation. He was recently switched to Seroquel.   Psychiatry consulted for medication management.  I agree that pt may benefit from the increased dose of Seroquel 50mg po q6pm as discussed with MICU staff.

## 2020-12-19 NOTE — BEHAVIORAL HEALTH ASSESSMENT NOTE - NSBHCONSULTOBSREASON_PSY_A_CORE FT
No saftey risk at this time; May increase level of observation if pt. becomes agitated per primary team

## 2020-12-20 NOTE — PROGRESS NOTE ADULT - SUBJECTIVE AND OBJECTIVE BOX
INTERVAL HPI/OVERNIGHT EVENTS:    SUBJECTIVE: Patient seen and examined at bedside.     CONSTITUTIONAL: No weakness, fevers or chills  EYES/ENT: No visual changes;  No vertigo or throat pain   NECK: No pain or stiffness  RESPIRATORY: No cough, wheezing, hemoptysis; No shortness of breath  CARDIOVASCULAR: No chest pain or palpitations  GASTROINTESTINAL: No abdominal or epigastric pain. No nausea, vomiting, or hematemesis; No diarrhea or constipation. No melena or hematochezia.  GENITOURINARY: No dysuria, frequency or hematuria  NEUROLOGICAL: No numbness or weakness  SKIN: No itching, rashes    OBJECTIVE:    VITAL SIGNS:  ICU Vital Signs Last 24 Hrs  T(C): 37.2 (20 Dec 2020 03:00), Max: 38.3 (19 Dec 2020 19:00)  T(F): 98.9 (20 Dec 2020 03:00), Max: 100.9 (19 Dec 2020 19:00)  HR: 84 (20 Dec 2020 07:00) (56 - 102)  BP: 126/75 (20 Dec 2020 07:00) (115/74 - 148/74)  BP(mean): 94 (20 Dec 2020 07:00) (86 - 111)  ABP: --  ABP(mean): --  RR: 22 (20 Dec 2020 07:00) (11 - 29)  SpO2: 100% (20 Dec 2020 07:00) (96% - 100%)        12-19 @ 07:01  -  12-20 @ 07:00  --------------------------------------------------------  IN: 373.8 mL / OUT: 2450 mL / NET: -2076.2 mL      CAPILLARY BLOOD GLUCOSE          PHYSICAL EXAM:    General: NAD  HEENT: NC/AT; PERRL, clear conjunctiva  Neck: supple  Respiratory: CTA b/l  Cardiovascular: +S1/S2; RRR  Abdomen: soft, NT/ND; +BS x4  Extremities: WWP, 2+ peripheral pulses b/l; no LE edema  Skin: normal color and turgor; no rash  Neurological:    MEDICATIONS:  MEDICATIONS  (STANDING):  cefTRIAXone   IVPB 1000 milliGRAM(s) IV Intermittent every 24 hours  chlorhexidine 4% Liquid 1 Application(s) Topical <User Schedule>  dexMEDEtomidine Infusion 0.2 MICROgram(s)/kG/Hr (4.41 mL/Hr) IV Continuous <Continuous>  folic acid 1 milliGRAM(s) Oral daily  melatonin 3 milliGRAM(s) Oral at bedtime  multivitamin 1 Tablet(s) Oral daily  QUEtiapine 50 milliGRAM(s) Oral at bedtime  QUEtiapine 25 milliGRAM(s) Oral <User Schedule>  thiamine 100 milliGRAM(s) Oral daily  vancomycin    Solution 125 milliGRAM(s) Oral every 6 hours    MEDICATIONS  (PRN):  haloperidol    Injectable 5 milliGRAM(s) IV Push every 6 hours PRN agitation  hydrocortisone 1% Cream 1 Application(s) Topical two times a day PRN Rash and/or Itching  nystatin Powder 1 Application(s) Topical two times a day PRN Itch      ALLERGIES:  Allergies    No Known Allergies    Intolerances        LABS:                        10.6   9.29  )-----------( 445      ( 20 Dec 2020 03:24 )             31.6     12-20    137  |  103  |  <4<L>  ----------------------------<  87  4.4   |  22  |  0.72    Ca    8.5      20 Dec 2020 03:24  Phos  3.6     12-20  Mg     2.1     12-20    TPro  7.4  /  Alb  3.3  /  TBili  0.4  /  DBili  x   /  AST  55<H>  /  ALT  16  /  AlkPhos  205<H>  12-20    PT/INR - ( 20 Dec 2020 03:24 )   PT: 14.9 sec;   INR: 1.26 ratio         PTT - ( 20 Dec 2020 03:24 )  PTT:39.7 sec      RADIOLOGY & ADDITIONAL TESTS: Reviewed.    Authored by Dr. Brandon CUELLO [id number], [id number] INTERVAL HPI/OVERNIGHT EVENTS: increased quetiapine to 50 mg qhs, had fever overnight 100.9, no PRNs needed    SUBJECTIVE: Patient seen and examined at bedside. off precedex since 0400. well controlled behaviorly, not agitated, this morning complaining of headache, lightheadedness with some possible visual changes on chronic poor vision although questionable objective change in vision.     Patient denies chest pain, shortness of breath, nausea, abdominal pain, diarrhea, constipation, dysuria, leg swelling, headache,     OBJECTIVE:    VITAL SIGNS:  ICU Vital Signs Last 24 Hrs  T(C): 37.2 (20 Dec 2020 03:00), Max: 38.3 (19 Dec 2020 19:00)  T(F): 98.9 (20 Dec 2020 03:00), Max: 100.9 (19 Dec 2020 19:00)  HR: 84 (20 Dec 2020 07:00) (56 - 102)  BP: 126/75 (20 Dec 2020 07:00) (115/74 - 148/74)  BP(mean): 94 (20 Dec 2020 07:00) (86 - 111)  ABP: --  ABP(mean): --  RR: 22 (20 Dec 2020 07:00) (11 - 29)  SpO2: 100% (20 Dec 2020 07:00) (96% - 100%)        12-19 @ 07:01  -  12-20 @ 07:00  --------------------------------------------------------  IN: 373.8 mL / OUT: 2450 mL / NET: -2076.2 mL      CAPILLARY BLOOD GLUCOSE          PHYSICAL EXAM:    General: NAD  HEENT: NC/AT; PERRL, clear conjunctiva  Neck: supple, no tender lymphadenopathy  Respiratory: CTA b/l  Cardiovascular: +S1/S2; RRR  Abdomen: soft, NT/ND; +BS x4  Extremities: WWP, 2+ peripheral pulses b/l; no LE edema  Skin: normal color and turgor; minimal erythema on anterior chest  Neurological: ao 3, normal affect, no FND    MEDICATIONS:  MEDICATIONS  (STANDING):  cefTRIAXone   IVPB 1000 milliGRAM(s) IV Intermittent every 24 hours  chlorhexidine 4% Liquid 1 Application(s) Topical <User Schedule>  dexMEDEtomidine Infusion 0.2 MICROgram(s)/kG/Hr (4.41 mL/Hr) IV Continuous <Continuous>  folic acid 1 milliGRAM(s) Oral daily  melatonin 3 milliGRAM(s) Oral at bedtime  multivitamin 1 Tablet(s) Oral daily  QUEtiapine 50 milliGRAM(s) Oral at bedtime  QUEtiapine 25 milliGRAM(s) Oral <User Schedule>  thiamine 100 milliGRAM(s) Oral daily  vancomycin    Solution 125 milliGRAM(s) Oral every 6 hours    MEDICATIONS  (PRN):  haloperidol    Injectable 5 milliGRAM(s) IV Push every 6 hours PRN agitation  hydrocortisone 1% Cream 1 Application(s) Topical two times a day PRN Rash and/or Itching  nystatin Powder 1 Application(s) Topical two times a day PRN Itch      ALLERGIES:  Allergies    No Known Allergies    Intolerances        LABS:                        10.6   9.29  )-----------( 445      ( 20 Dec 2020 03:24 )             31.6     12-20    137  |  103  |  <4<L>  ----------------------------<  87  4.4   |  22  |  0.72    Ca    8.5      20 Dec 2020 03:24  Phos  3.6     12-20  Mg     2.1     12-20    TPro  7.4  /  Alb  3.3  /  TBili  0.4  /  DBili  x   /  AST  55<H>  /  ALT  16  /  AlkPhos  205<H>  12-20    PT/INR - ( 20 Dec 2020 03:24 )   PT: 14.9 sec;   INR: 1.26 ratio         PTT - ( 20 Dec 2020 03:24 )  PTT:39.7 sec      RADIOLOGY & ADDITIONAL TESTS: Reviewed.    Authored by Dr. Brandon CUELLO [id number], [phone]

## 2020-12-20 NOTE — PROGRESS NOTE ADULT - ASSESSMENT
55y M w/ PMH HTN (with medication nonadherence), chronic alcohol abuse (c/b prior DTs, withdrawal seizures, hematemesis, and melena), alcoholic cirrhosis (with portal hypertension), presenting with acute alcohol intoxication and traumatic SDH/SAH, admitted for severe EtOH withdrawal requiring phenobarb and precedex gtt, now with continued metabolic encephalopathy requiring precedex.     #Neuro    Metabolic Encephalopathy: DDx includes delirium, medication induced (has been on multiple agents including phenobarb, ativan, haldol, seroquel, precedex), sepsis (C diff, UTI), structural (SDH/SAH). Out of window for alcohol withdrawal.  - s/p phenobarb load, still with adequate levels, would avoid further benzos at this time to avoid obtundation and prevent worsening delirium  - Continue to hold phenobarb  - Continue to downtitrate precedex as tolerated  - Haldol prn for agitation if needed, most recent qtc 480s  - Daily EKGs for Qtc level  - If QTc remains <500, increase seroquel to 50  - As per son, patient on citalopram at home for anxiety/depression. Will obtain psych consult.    Traumatic SAH/SDH  - small amount of acute SAH within the left temporal sulci and a trace amount of acute SDH along the left tentorial leaflet without shift, mass effect, or herniation  - stable on repeat CTH  - completed prophylactic keppra 500 BID for 7 days (12/9 - am of 12/16) given high risk for withdrawal seizure as well, although could be contributing to delirium  - f/u neurosurgery Dr. Fitzpatrick in 2-4 weeks after discharge    #CV  HTN  - largely normotensive in setting of sedative medication    #Pulm  - breathing comfortably and sating well on RA  - Stop duoneb, hypertonic saline nebs    #GI  Non-severe C diff infection  - diarrhea improving, still having loose BMs  - c/w PO vanc 125mg q6 (12/10 – 12/20)  - Stop IVF if able to tolerate PO    Alcoholic cirrhosis  - without overt signs of decompensation at this time  - c/w thiamine, folate, MVI supplementation  - outpatient hepatology follow up    #Renal  - no active issues  - monitor lytes BID given diarrhea    #Endo  - no active issues    #ID  Non-severe C diff infection  - watery diarrhea is improved  - c/w PO vanc 125mg q6 (12/10 – 12/20)  - BCx 12/11 NGTD    UA+  Febrile 12/15 PM, UCx+ for enterobacter sens to CTX  -C/w CTX x 7d (12/16-12/22)    #Heme  - stable chronic normocytic anemia and thrombocytopenia in setting of alcoholic liver cirrhosis   - no signs of active bleeding  - monitor CBC daily  - transfuse for Hb <7 or plt <100  - INR goal <1.3 per neurosurgery, currently 1.32 - will monitor  - SCDs; pharmacologic DVT ppx on hold    DVT: SCDs  Diet: softs  Dispo: to floors once weaned off precedex    Full Code   55y M w/ PMH HTN (with medication nonadherence), chronic alcohol abuse (c/b prior DTs, withdrawal seizures, hematemesis, and melena), alcoholic cirrhosis (with portal hypertension), presenting with acute alcohol intoxication and traumatic SDH/SAH, admitted for severe EtOH withdrawal requiring phenobarb and precedex gtt, now with improved metabolic encephalopathy, off precedex. course c/b enterobacter UTI and cdiff diarrhea with intermittent fevers    #Neuro    Metabolic Encephalopathy, IMPROVED: DDx includes delirium, medication induced (has been on multiple agents including phenobarb, ativan, haldol, seroquel, precedex), sepsis (C diff, UTI), structural (SDH/SAH). Out of window for alcohol withdrawal.  - s/p phenobarb load, still with adequate levels, would avoid further benzos at this time to avoid obtundation and prevent worsening delirium  - off precedex since 12/20 AM  - Haldol prn for agitation if needed, although can tolerate oral quetiapine  - Daily EKGs for Qtc level  - If QTc remains <500, increase seroquel to 50  - As per son, patient on citalopram at home for anxiety/depression.  - psych consulted apprec recs: not clear for discharge yet, increased quetiapine to 50 qHS    Traumatic SAH/SDH  - small amount of acute SAH within the left temporal sulci and a trace amount of acute SDH along the left tentorial leaflet without shift, mass effect, or herniation  - stable on repeat CTH  - completed prophylactic keppra 500 BID for 7 days (12/9 - am of 12/16) given high risk for withdrawal seizure as well,   - f/u neurosurgery Dr. Fitzpatrick in 2-4 weeks after discharge  - patient with headache and ?vision changes , will obtain CTH to rule out structural changes    #CV  HTN  - largely normotensive in setting of sedative medication    #Pulm  - breathing comfortably and sating well on RA  - Stop duoneb, hypertonic saline nebs    #GI  Non-severe C diff infection  - diarrhea improving, still having loose BMs  - c/w PO vanc 125mg q6 (12/10 – 12/20)    Alcoholic cirrhosis  - without overt signs of decompensation at this time  - c/w thiamine, folate, MVI supplementation  - outpatient hepatology follow up    #Renal  - no active issues  - monitor lytes daily given diarrhea    #Endo  - no active issues    #ID  Non-severe C diff infection  - watery diarrhea is improved  - c/w PO vanc 125mg q6 (12/10 – 12/20)  - BCx 12/11 NGTD, resent Bld clxs on 12/20    UA+  Febrile 12/15 PM, UCx+ for enterobacter sens to CTX  -C/w CTX x 7d (12/16-12/22)    #Heme  - stable chronic normocytic anemia and thrombocytopenia in setting of alcoholic liver cirrhosis   - no signs of active bleeding  - monitor CBC daily  - transfuse for Hb <7 or plt <100  - INR goal <1.3 per neurosurgery  - SCDs; pharmacologic DVT ppx on hold  - will obtain DVT study and CTH to rule out thrombotic fevers    DVT: SCDs  Diet: softs  Dispo: possible transfer later today    Full Code

## 2020-12-20 NOTE — CHART NOTE - NSCHARTNOTEFT_GEN_A_CORE
MICU Transfer Note    Transfer from: MICU  Transfer to:  (x) Medicine    (  ) Telemetry    (  ) RCU    (  ) Palliative    (  ) Stroke Unit    (  ) _______________  Accepting physician: Dr. Anderson      MICU COURSE:  55y M w/ PMH HTN (with medication nonadherence), chronic alcohol abuse (c/b prior DTs, withdrawal seizures, hematemesis, and melena), alcoholic cirrhosis (with portal hypertension), presenting with acute alcohol intoxication and traumatic SDH/SAH, admitted for severe EtOH withdrawal requiring phenobarb and precedex gtt, now with improved metabolic encephalopathy, off precedex since 12/20 AM. course c/b enterobacter UTI on ceftriaxone until 12/22 and cdiff diarrhea now resolved after completing oral vanc course, still with intermittent fevers, pending DVT study and repeat blood culture    ASSESSMENT & PLAN:   #Neuro    Metabolic Encephalopathy, IMPROVED: DDx includes delirium, medication induced (has been on multiple agents including phenobarb, ativan, haldol, seroquel, precedex), sepsis (C diff, UTI), structural (SDH/SAH). Out of window for alcohol withdrawal.  - s/p phenobarb load, still with adequate levels, would avoid further benzos at this time to avoid obtundation and prevent worsening delirium  - off precedex since 12/20 AM  - Haldol prn for agitation if needed, although can tolerate oral quetiapine  - Daily EKGs for Qtc level  - increased seroquel to 50, with qtc < 500  - As per son, patient on citalopram at home for anxiety/depression.  - psych consulted apprec recs: not clear for discharge yet, increased quetiapine to 50 qHS, add trazodone    Traumatic SAH/SDH  - small amount of acute SAH within the left temporal sulci and a trace amount of acute SDH along the left tentorial leaflet without shift, mass effect, or herniation  - stable on repeat CTH  - completed prophylactic keppra 500 BID for 7 days (12/9 - am of 12/16) given high risk for withdrawal seizure as well,   - f/u neurosurgery Dr. Fitzpatrick in 2-4 weeks after discharge  - patient with headache and ?vision changes , repeat CTH stable with some improvement    #CV  HTN  - largely normotensive in setting of sedative medication    #Pulm  - breathing comfortably and sating well on RA  - Stop duoneb, hypertonic saline nebs    #GI  Non-severe C diff infection  - diarrhea improving, still having loose BMs  - c/w PO vanc 125mg q6 (12/10 – 12/20)    Alcoholic cirrhosis  - without overt signs of decompensation at this time  - c/w thiamine, folate, MVI supplementation  - outpatient hepatology follow up    #Renal  - no active issues  - monitor lytes daily given diarrhea    #Endo  - no active issues    #ID  Non-severe C diff infection  - watery diarrhea is improved  - c/w PO vanc 125mg q6 (12/10 – 12/20)  - BCx 12/11 NGTD, resent Bld clxs on 12/20    UA+  Febrile 12/15 PM, UCx+ for enterobacter sens to CTX  -C/w CTX x 7d (12/16-12/22)  - DVT study pending    #Heme  - stable chronic normocytic anemia and thrombocytopenia in setting of alcoholic liver cirrhosis   - no signs of active bleeding  - monitor CBC daily  - transfuse for Hb <7 or plt <100  - INR goal <1.3 per neurosurgery  - SCDs; pharmacologic DVT ppx on hold  - will obtain DVT study and CTH to rule out thrombotic fevers    DVT: SCDs  Diet: softs  Dispo: transfer to floor today    Full Code        For Follow-Up:  [] formal psych recs  [] monitor qtc  [] will need outpatient NSG follow up  [] vanc for c diff complteting today, as diarrhea resolved spoke to infection control can take off precautions  [] ceftriaxone for Enterobacter in urine although continuing to spike fevers, so f/u repeat bld clxs  [] DVT study (could not be on chemcial ppx due to brain bleed)        Vital Signs Last 24 Hrs  T(C): 37.2 (20 Dec 2020 12:00), Max: 38.3 (19 Dec 2020 19:00)  T(F): 99 (20 Dec 2020 12:00), Max: 100.9 (19 Dec 2020 19:00)  HR: 89 (20 Dec 2020 12:00) (68 - 102)  BP: 148/80 (20 Dec 2020 12:00) (115/74 - 154/74)  BP(mean): 107 (20 Dec 2020 12:00) (86 - 111)  RR: 18 (20 Dec 2020 12:00) (16 - 29)  SpO2: 100% (20 Dec 2020 12:00) (96% - 100%)  I&O's Summary    19 Dec 2020 07:01  -  20 Dec 2020 07:00  --------------------------------------------------------  IN: 373.8 mL / OUT: 2450 mL / NET: -2076.2 mL    20 Dec 2020 07:01  -  20 Dec 2020 12:58  --------------------------------------------------------  IN: 0 mL / OUT: 1150 mL / NET: -1150 mL          MEDICATIONS  (STANDING):  cefTRIAXone   IVPB 1000 milliGRAM(s) IV Intermittent every 24 hours  chlorhexidine 4% Liquid 1 Application(s) Topical <User Schedule>  dexMEDEtomidine Infusion 0.2 MICROgram(s)/kG/Hr (4.41 mL/Hr) IV Continuous <Continuous>  folic acid 1 milliGRAM(s) Oral daily  melatonin 3 milliGRAM(s) Oral at bedtime  multivitamin 1 Tablet(s) Oral daily  QUEtiapine 50 milliGRAM(s) Oral at bedtime  thiamine 100 milliGRAM(s) Oral daily  vancomycin    Solution 125 milliGRAM(s) Oral every 6 hours    MEDICATIONS  (PRN):  haloperidol    Injectable 5 milliGRAM(s) IV Push every 6 hours PRN agitation  hydrocortisone 1% Cream 1 Application(s) Topical two times a day PRN Rash and/or Itching  nystatin Powder 1 Application(s) Topical two times a day PRN Itch        LABS                                            10.6                  Neurophils% (auto):   x      (12-20 @ 03:24):    9.29 )-----------(445          Lymphocytes% (auto):  x                                             31.6                   Eosinphils% (auto):   x        Manual%: Neutrophils x    ; Lymphocytes x    ; Eosinophils x    ; Bands%: x    ; Blasts x                                    137    |  103    |  <4                  Calcium: 8.5   / iCa: x      (12-20 @ 03:24)    ----------------------------<  87        Magnesium: 2.1                              4.4     |  22     |  0.72             Phosphorous: 3.6      TPro  7.4    /  Alb  3.3    /  TBili  0.4    /  DBili  x      /  AST  55     /  ALT  16     /  AlkPhos  205    20 Dec 2020 03:24    ( 12-20 @ 03:24 )   PT: 14.9 sec;   INR: 1.26 ratio  aPTT: 39.7 sec      Authored by Dr. Brandon Downs, -[id number], [facility] [id number] MICU Transfer Note    Transfer from: MICU  Transfer to:  (x) Medicine    (  ) Telemetry    (  ) RCU    (  ) Palliative    (  ) Stroke Unit    (  ) ___3 agustin 376 d____________  Accepting physician: Dr. Anderson      MICU COURSE:  55y M w/ PMH HTN (with medication nonadherence), chronic alcohol abuse (c/b prior DTs, withdrawal seizures, hematemesis, and melena), alcoholic cirrhosis (with portal hypertension), presenting with acute alcohol intoxication and traumatic SDH/SAH, admitted for severe EtOH withdrawal requiring phenobarb and precedex gtt, now with improved metabolic encephalopathy, off precedex since 12/20 AM. course c/b enterobacter UTI on ceftriaxone until 12/22 and cdiff diarrhea now resolved after completing oral vanc course, still with intermittent fevers, pending DVT study and repeat blood culture    ASSESSMENT & PLAN:   #Neuro    Metabolic Encephalopathy, IMPROVED: DDx includes delirium, medication induced (has been on multiple agents including phenobarb, ativan, haldol, seroquel, precedex), sepsis (C diff, UTI), structural (SDH/SAH). Out of window for alcohol withdrawal.  - s/p phenobarb load, still with adequate levels, would avoid further benzos at this time to avoid obtundation and prevent worsening delirium  - off precedex since 12/20 AM  - Haldol prn for agitation if needed, although can tolerate oral quetiapine  - Daily EKGs for Qtc level  - increased seroquel to 50, with qtc < 500  - As per son, patient on citalopram at home for anxiety/depression.  - psych consulted apprec recs: not clear for discharge yet, increased quetiapine to 50 qHS, add trazodone    Traumatic SAH/SDH  - small amount of acute SAH within the left temporal sulci and a trace amount of acute SDH along the left tentorial leaflet without shift, mass effect, or herniation  - stable on repeat CTH  - completed prophylactic keppra 500 BID for 7 days (12/9 - am of 12/16) given high risk for withdrawal seizure as well,   - f/u neurosurgery Dr. Fitzpatrick in 2-4 weeks after discharge  - patient with headache and ?vision changes , repeat CTH stable with some improvement    #CV  HTN  - largely normotensive in setting of sedative medication    #Pulm  - breathing comfortably and sating well on RA  - Stop duoneb, hypertonic saline nebs    #GI  Non-severe C diff infection  - diarrhea improving, still having loose BMs  - c/w PO vanc 125mg q6 (12/10 – 12/20)    Alcoholic cirrhosis  - without overt signs of decompensation at this time  - c/w thiamine, folate, MVI supplementation  - outpatient hepatology follow up    #Renal  - no active issues  - monitor lytes daily given diarrhea    #Endo  - no active issues    #ID  Non-severe C diff infection  - watery diarrhea is improved  - c/w PO vanc 125mg q6 (12/10 – 12/20)  - BCx 12/11 NGTD, resent Bld clxs on 12/20    UA+  Febrile 12/15 PM, UCx+ for enterobacter sens to CTX  -C/w CTX x 7d (12/16-12/22)  - DVT study pending    #Heme  - stable chronic normocytic anemia and thrombocytopenia in setting of alcoholic liver cirrhosis   - no signs of active bleeding  - monitor CBC daily  - transfuse for Hb <7 or plt <100  - INR goal <1.3 per neurosurgery  - SCDs; pharmacologic DVT ppx on hold  - will obtain DVT study and CTH to rule out thrombotic fevers    DVT: SCDs  Diet: softs  Dispo: transfer to floor today    Full Code        For Follow-Up:  [] formal psych recs  [] monitor qtc  [] will need outpatient NSG follow up  [] vanc for c diff complteting today, as diarrhea resolved spoke to infection control can take off precautions  [] ceftriaxone for Enterobacter in urine although continuing to spike fevers, so f/u repeat bld clxs  [] DVT study (could not be on chemcial ppx due to brain bleed)        Vital Signs Last 24 Hrs  T(C): 37.2 (20 Dec 2020 12:00), Max: 38.3 (19 Dec 2020 19:00)  T(F): 99 (20 Dec 2020 12:00), Max: 100.9 (19 Dec 2020 19:00)  HR: 89 (20 Dec 2020 12:00) (68 - 102)  BP: 148/80 (20 Dec 2020 12:00) (115/74 - 154/74)  BP(mean): 107 (20 Dec 2020 12:00) (86 - 111)  RR: 18 (20 Dec 2020 12:00) (16 - 29)  SpO2: 100% (20 Dec 2020 12:00) (96% - 100%)  I&O's Summary    19 Dec 2020 07:01  -  20 Dec 2020 07:00  --------------------------------------------------------  IN: 373.8 mL / OUT: 2450 mL / NET: -2076.2 mL    20 Dec 2020 07:01  -  20 Dec 2020 12:58  --------------------------------------------------------  IN: 0 mL / OUT: 1150 mL / NET: -1150 mL          MEDICATIONS  (STANDING):  cefTRIAXone   IVPB 1000 milliGRAM(s) IV Intermittent every 24 hours  chlorhexidine 4% Liquid 1 Application(s) Topical <User Schedule>  dexMEDEtomidine Infusion 0.2 MICROgram(s)/kG/Hr (4.41 mL/Hr) IV Continuous <Continuous>  folic acid 1 milliGRAM(s) Oral daily  melatonin 3 milliGRAM(s) Oral at bedtime  multivitamin 1 Tablet(s) Oral daily  QUEtiapine 50 milliGRAM(s) Oral at bedtime  thiamine 100 milliGRAM(s) Oral daily  vancomycin    Solution 125 milliGRAM(s) Oral every 6 hours    MEDICATIONS  (PRN):  haloperidol    Injectable 5 milliGRAM(s) IV Push every 6 hours PRN agitation  hydrocortisone 1% Cream 1 Application(s) Topical two times a day PRN Rash and/or Itching  nystatin Powder 1 Application(s) Topical two times a day PRN Itch        LABS                                            10.6                  Neurophils% (auto):   x      (12-20 @ 03:24):    9.29 )-----------(445          Lymphocytes% (auto):  x                                             31.6                   Eosinphils% (auto):   x        Manual%: Neutrophils x    ; Lymphocytes x    ; Eosinophils x    ; Bands%: x    ; Blasts x                                    137    |  103    |  <4                  Calcium: 8.5   / iCa: x      (12-20 @ 03:24)    ----------------------------<  87        Magnesium: 2.1                              4.4     |  22     |  0.72             Phosphorous: 3.6      TPro  7.4    /  Alb  3.3    /  TBili  0.4    /  DBili  x      /  AST  55     /  ALT  16     /  AlkPhos  205    20 Dec 2020 03:24    ( 12-20 @ 03:24 )   PT: 14.9 sec;   INR: 1.26 ratio  aPTT: 39.7 sec      Authored by Dr. Brandon Downs, -[id number], [facility] [id number]

## 2020-12-20 NOTE — PROGRESS NOTE BEHAVIORAL HEALTH - SUMMARY
Patient is a 55 M PMH HTN (noncompliant w/ meds), EtOH abuse, Cirrhosis with portal HTN, prior oxycodone abuse, GI bleeds, DTs and alcoholic withdrawal seizures presented to the ED c/o hitting his head on the toilet. Patient reportedly was getting out of his tub in the bathroom when he fell onto the back of his head. Patient stated he drank a fifth of alcohol prior to arrival.  Patient reports drinking up to 1.5L tequilla q day. ETOH level on admission 313. Patient with 1 previous detox/rehab admission when using oxy.  Patient has a hx of seizures and DT.     On this admission he initially received phenobarbital which was discontinued due to elevated QTc (>500) and elevated phenobarb levels once QTc stabilized. The patient required frequent Ativan and Haldol PRN medications secondary to agitation. He was recently switched to Seroquel. Psychiatry consulted for medication management. Patient is a 55 M PMH HTN (noncompliant w/ meds), EtOH abuse, Cirrhosis with portal HTN, prior oxycodone abuse, GI bleeds, DTs and alcoholic withdrawal seizures presented to the ED c/o hitting his head on the toilet. Patient reportedly was getting out of his tub in the bathroom when he fell onto the back of his head. Patient stated he drank a fifth of alcohol prior to arrival.  Patient reports drinking up to 1.5L tequilla q day. ETOH level on admission 313. Patient with 1 previous detox/rehab admission when using oxy.  Patient has a hx of seizures and DT.     On this admission he initially received phenobarbital which was discontinued due to elevated QTc (>500) and elevated phenobarb levels once QTc stabilized. The patient required frequent Ativan and Haldol PRN medications secondary to agitation. He was recently switched to Seroquel. Psychiatry consulted for medication management.    12/20- AAOx3, denies SI/HI. Understands that he needs to stop drinking alcohol.

## 2020-12-20 NOTE — PROGRESS NOTE BEHAVIORAL HEALTH - CASE SUMMARY
Patient is a 55 M PMH HTN (noncompliant w/ meds), EtOH abuse, Cirrhosis with portal HTN, prior oxycodone abuse, GI bleeds, DTs and alcoholic withdrawal seizures presented to the ED c/o hitting his head on the toilet. Patient reportedly was getting out of his tub in the bathroom when he fell onto the back of his head. Patient stated he drank a fifth of alcohol prior to arrival.  Patient reports drinking up to 1.5L tequilla q day. ETOH level on admission 313. Patient with 1 previous detox/rehab admission when using oxy.  Patient has a hx of seizures and DT.   On this admission he initially received phenobarbital which was discontinued due to elevated QTc (>500) and elevated phenobarb levels once QTc stabilized. The patient required frequent Ativan and Haldol PRN medications secondary to agitation. He was recently switched to Seroquel. Psychiatry consulted for medication management. I agree with continuing the Seroquel 50mg po qhs.

## 2020-12-20 NOTE — CHART NOTE - NSCHARTNOTEFT_GEN_A_CORE
MAR Accept Note    Brief Hospital Course:    56 yo male with HTN, chronic EtOH abuse, withdrawal seizures, hematemesis, and melena), alcoholic cirrhosis (with portal hypertension) presenting with acute alcohol intoxication and traumatic SDH/SAH, admitted for severe EtOH withdrawal requiring phenobarb and precedex gtt, now with improved metabolic encephalopathy, off precedex since 12/20 AM. course c/b enterobacter UTI on ceftriaxone until 12/22 and cdiff diarrhea now resolved after completing oral vanc course, still with intermittent fevers, pending DVT study and repeat blood culture.    To Dos:  [ ] Psych Recs  [ ] Monitor QTc  [ ] Outpatient Neurosurgery Management  [ ] D/c contact precautions on C diff  [ ] Ceftriaxone for enterobacter in urine  [ ] DVT study

## 2020-12-21 NOTE — PROGRESS NOTE ADULT - PROBLEM SELECTOR PLAN 2
Alcoholic cirrhosis  - without overt signs of decompensation at this time  - c/w thiamine, folate, MVI supplementation  - outpatient hepatology follow up

## 2020-12-21 NOTE — PROGRESS NOTE ADULT - PROBLEM SELECTOR PLAN 6
Non-severe C diff infection  - diarrhea improving, still having loose BMs  - finished PO vanc 125mg q6 (12/10 – 12/20)  - off isolation precautiom UA+  Febrile 12/15 PM, UCx+ for enterobacter sens to CTX  -C/w CTX x 7d (12/16-12/22)

## 2020-12-21 NOTE — PROGRESS NOTE ADULT - PROBLEM SELECTOR PLAN 4
- small amount of acute SAH within the left temporal sulci and a trace amount of acute SDH along the left tentorial leaflet without shift, mass effect, or herniation  - stable on repeat CTH  - completed prophylactic keppra 500 BID for 7 days (12/9 - am of 12/16) given high risk for withdrawal seizure as well,   - rest of plan as above

## 2020-12-21 NOTE — PROGRESS NOTE ADULT - PROBLEM SELECTOR PLAN 3
- small amount of acute SAH within the left temporal sulci and a trace amount of acute SDH along the left tentorial leaflet without shift, mass effect, or herniation  - stable on repeat CTH  - completed prophylactic keppra 500 BID for 7 days (12/9 - am of 12/16) given high risk for withdrawal seizure as well,   - f/u neurosurgery Dr. Fitzpatrick in 2-4 weeks after discharge  - patient with headache and ?vision changes , repeat CTH stable with some improvement

## 2020-12-21 NOTE — PROGRESS NOTE ADULT - ATTENDING COMMENTS
Sadie Narvaez   Hospitalist    Pt is seen in Am with no complaints presented.  Pt is on TX for Enterobacter UTI, will change from Ceftriaxone to ERtapenem 1 gm daily due to propensity of Enterobacter to develop resistance during therapy    Sadie Narvaez   Hospitalist

## 2020-12-21 NOTE — PROGRESS NOTE ADULT - SUBJECTIVE AND OBJECTIVE BOX
PROGRESS NOTE: INTERNAL MEDICINE    Contact Information:  Maye Chew PGY1   Pager: [phone] 7AM-7PM    Patient Name: KWAME CARRINGTON  LOS: 12-09-20 (12d)    Patient is a 55y old  Male who presents with a chief complaint of alcohol withdrawal (21 Dec 2020 07:53)      OVERNIGHT/INTERVAL EVENTS: MICU downgrade.     SUBJECTIVE: Seen and examined at bedside, Pt sitting at edge of bed. C/o chronic insomnia unrelieved by melatonin    MEDICATIONS  (STANDING):  cefTRIAXone   IVPB 1000 milliGRAM(s) IV Intermittent every 24 hours  folic acid 1 milliGRAM(s) Oral daily  melatonin 3 milliGRAM(s) Oral at bedtime  multivitamin 1 Tablet(s) Oral daily  QUEtiapine 50 milliGRAM(s) Oral at bedtime  thiamine 100 milliGRAM(s) Oral daily    MEDICATIONS  (PRN):  haloperidol    Injectable 5 milliGRAM(s) IV Push every 6 hours PRN agitation  hydrocortisone 1% Cream 1 Application(s) Topical two times a day PRN Rash and/or Itching  nystatin Powder 1 Application(s) Topical two times a day PRN Itch  traZODone 25 milliGRAM(s) Oral at bedtime PRN insomnia    Allergies  No Known Allergies    PHYSICAL EXAM:    Vital Signs Last 24 Hrs  T(C): 37.6 (21 Dec 2020 04:54), Max: 37.6 (21 Dec 2020 04:54)  T(F): 99.7 (21 Dec 2020 04:54), Max: 99.7 (21 Dec 2020 04:54)  HR: 100 (21 Dec 2020 04:54) (73 - 107)  BP: 106/62 (21 Dec 2020 04:54) (105/66 - 167/81)  BP(mean): 115 (20 Dec 2020 18:00) (98 - 115)  RR: 18 (21 Dec 2020 04:54) (16 - 20)  SpO2: 98% (21 Dec 2020 04:54) (96% - 100%)    GENERAL: No acute distress, well-developed, middle-aged man  CHEST/LUNG: CTAB; No wheezes, rales, or rhonchi  HEART: Regular rate and rhythm; No murmurs, rubs, or gallops  ABDOMEN: Soft, non-tender, non-distended; normal bowel sounds, no organomegaly  EXTREMITIES:  2+ peripheral pulses b/l, No clubbing, cyanosis, mild 1+ pitting edema  NEUROLOGY: A&O x 3, no focal deficits  PSYCHIATRIC: appropriate demeanor, responses, interaction during exam  SKIN: No rashes or lesions, lots of tattoos      12-20-20 @ 07:01  -  12-21-20 @ 07:00  --------------------------------------------------------  IN: 960 mL / OUT: 2550 mL / NET: -1590 mL    12-21-20 @ 07:01  -  12-21-20 @ 09:50  --------------------------------------------------------  IN: 300 mL / OUT: 0 mL / NET: 300 mL        PATIENT LABORATORY DATA:    CAPILLARY BLOOD GLUCOSE                              10.5   10.25 )-----------( 495      ( 21 Dec 2020 07:45 )             31.3     12-21    138  |  100  |  <4<L>  ----------------------------<  101<H>  3.4<L>   |  22  |  0.77    Ca    8.9      21 Dec 2020 07:45  Phos  3.2     12-21  Mg     2.2     12-21    TPro  7.5  /  Alb  3.7  /  TBili  0.6  /  DBili  x   /  AST  44<H>  /  ALT  17  /  AlkPhos  193<H>  12-21    Transcutaneous Bilirubin    PT/INR - ( 20 Dec 2020 03:24 )   PT: 14.9 sec;   INR: 1.26 ratio         PTT - ( 20 Dec 2020 03:24 )  PTT:39.7 sec            Microbiology:     Culture - Blood (collected 12-20-20 @ 08:51)  Source: .Blood Blood-Peripheral  Preliminary Report (12-21-20 @ 09:01):    No growth to date.    Culture - Blood (collected 12-20-20 @ 02:36)  Source: .Blood Blood-Peripheral  Preliminary Report (12-21-20 @ 03:01):    No growth to date.    Culture - Blood (collected 12-16-20 @ 05:03)  Source: .Blood Blood-Peripheral  Final Report (12-21-20 @ 06:01):    No Growth Final    Culture - Blood (collected 12-16-20 @ 05:03)  Source: .Blood Blood-Peripheral  Final Report (12-21-20 @ 06:01):    No Growth Final    Culture - Urine (collected 12-16-20 @ 04:59)  Source: .Urine Clean Catch (Midstream)  Final Report (12-18-20 @ 03:20):    >100,000 CFU/ml Enterobacter aerogenes  Organism: Enterobacter aerogenes (12-18-20 @ 03:20)  Organism: Enterobacter aerogenes (12-18-20 @ 03:20)      -  Amikacin: S <=16      -  Amoxicillin/Clavulanic Acid: R >16/8      -  Ampicillin: R 16 These ampicillin results predict results for amoxicillin      -  Ampicillin/Sulbactam: R <=4/2 Enterobacter, Citrobacter, and Serratia may develop resistance during prolonged therapy (3-4 days)      -  Aztreonam: S <=4      -  Cefazolin: R >16      -  Cefepime: S <=2      -  Cefoxitin: R >16      -  Ceftriaxone: S <=1 Enterobacter, Citrobacter, and Serratia may develop resistance during prolonged therapy      -  Ciprofloxacin: S <=0.25      -  Ertapenem: S <=0.5      -  Gentamicin: S <=2      -  Imipenem: S <=1      -  Levofloxacin: S <=0.5      -  Meropenem: S <=1      -  Nitrofurantoin: I 64 Should not be used to treat pyelonephritis      -  Piperacillin/Tazobactam: S <=8      -  Tigecycline: S <=2      -  Tobramycin: S <=2      -  Trimethoprim/Sulfamethoxazole: S <=0.5/9.5      Method Type: GINA

## 2020-12-21 NOTE — PROGRESS NOTE ADULT - ASSESSMENT
55y M w/ PMH HTN (with medication nonadherence), chronic alcohol abuse (c/b prior DTs, withdrawal seizures, hematemesis, and melena), alcoholic cirrhosis (with portal hypertension), presenting with acute alcohol intoxication and traumatic SDH/SAH, admitted for severe EtOH withdrawal requiring phenobarb and precedex gtt, now with improved metabolic encephalopathy, off precedex. course c/b enterobacter UTI and cdiff diarrhea with intermittent fevers    #Neuro    Metabolic Encephalopathy, IMPROVED: DDx includes delirium, medication induced (has been on multiple agents including phenobarb, ativan, haldol, seroquel, precedex), sepsis (C diff, UTI), structural (SDH/SAH). Out of window for alcohol withdrawal.  - s/p phenobarb load, still with adequate levels, would avoid further benzos at this time to avoid obtundation and prevent worsening delirium  - off precedex since 12/20 AM  - Haldol prn for agitation if needed, although can tolerate oral quetiapine  - Daily EKGs for Qtc level  - increased seroquel to 50, with qtc < 500  - As per son, patient on citalopram at home for anxiety/depression.  - psych consulted apprec recs: not clear for discharge yet, increased quetiapine to 50 qHS, add trazodone    Traumatic SAH/SDH      #CV  HTN  - largely normotensive in setting of sedative medication    #Pulm  - breathing comfortably and sating well on RA  - Stop duoneb, hypertonic saline nebs    #GI      Alcoholic cirrhosis  - without overt signs of decompensation at this time  - c/w thiamine, folate, MVI supplementation  - outpatient hepatology follow up    #Renal  - no active issues  - monitor lytes daily given diarrhea    #Endo  - no active issues    #ID  Non-severe C diff infection  - watery diarrhea is improved  - c/w PO vanc 125mg q6 (12/10 – 12/20)  - BCx 12/11 NGTD, resent Bld clxs on 12/20    UA+  Febrile 12/15 PM, UCx+ for enterobacter sens to CTX  -C/w CTX x 7d (12/16-12/22)  - DVT study pending    #Heme  - stable chronic normocytic anemia and thrombocytopenia in setting of alcoholic liver cirrhosis   - no signs of active bleeding  - monitor CBC daily  - transfuse for Hb <7 or plt <100  - INR goal <1.3 per neurosurgery  - SCDs; pharmacologic DVT ppx on hold  - will obtain DVT study and CTH to rule out thrombotic fevers    DVT: SCDs  Diet: softs  Dispo: transfer to floor today    Full Code     55y M w/ PMH HTN (with medication nonadherence), chronic alcohol abuse (c/b prior DTs, withdrawal seizures, hematemesis, and melena), alcoholic cirrhosis (with portal hypertension), presenting with acute alcohol intoxication and traumatic SDH/SAH, admitted for severe EtOH withdrawal requiring phenobarb and precedex gtt, now with improved metabolic encephalopathy, off precedex. course c/b enterobacter UTI and cdiff diarrhea with intermittent fevers      Traumatic SAH/SDH      #CV  HTN      #Pulm  - breathing comfortably and sating well on RA  - Stop duoneb, hypertonic saline nebs    #GI        #Renal#Endo  - no active issues    #ID  Non-severe C diff infection  - watery diarrhea is improved  - c/w PO vanc 125mg q6 (12/10 – 12/20)  - BCx 12/11 NGTD, resent Bld clxs on 12/20    UA+  Febrile 12/15 PM, UCx+ for enterobacter sens to CTX  -C/w CTX x 7d (12/16-12/22)  - DVT study pending    #Heme  - stable chronic normocytic anemia and thrombocytopenia in setting of alcoholic liver cirrhosis   - no signs of active bleeding  - monitor CBC daily  - transfuse for Hb <7 or plt <100  - INR goal <1.3 per neurosurgery  - SCDs; pharmacologic DVT ppx on hold  - will obtain DVT study and CTH to rule out thrombotic fevers    DVT: SCDs  Diet: softs  Dispo: transfer to floor today    Full Code     55y M w/ PMH HTN (with medication nonadherence), chronic alcohol abuse (c/b prior DTs, withdrawal seizures, hematemesis, and melena), alcoholic cirrhosis (with portal hypertension), presenting with acute alcohol intoxication and traumatic SDH/SAH, admitted for severe EtOH withdrawal requiring phenobarb and precedex gtt, now with improved metabolic encephalopathy, off precedex. course c/b enterobacter UTI and cdiff diarrhea with intermittent fevers

## 2020-12-21 NOTE — PROGRESS NOTE ADULT - PROBLEM SELECTOR PLAN 5
DDx includes delirium, medication induced (has been on multiple agents including phenobarb, ativan, haldol, seroquel, precedex), sepsis (C diff, UTI), structural (SDH/SAH). Out of window for alcohol withdrawal.  - s/p phenobarb load, still with adequate levels, would avoid further benzos at this time to avoid obtundation and prevent worsening delirium  - off precedex since 12/20 AM  - Haldol prn for agitation if needed, although can tolerate oral quetiapine  - Daily EKGs for Qtc level  - increased seroquel to 50, with qtc < 500  - As per son, patient on citalopram at home for anxiety/depression.  - psych consulted apprec recs: not clear for discharge yet, increased quetiapine to 50 qHS, add trazodone Non-severe C diff infection  - diarrhea improving, still having loose BMs  - finished PO vanc 125mg q6 (12/10 – 12/20)  - off isolation precautiom Non-severe C diff infection  - diarrhea improving, still having loose BMs  - finished PO vanc 125mg q6 (12/10 – 12/20)  - off isolation precaution

## 2020-12-21 NOTE — DISCHARGE NOTE NURSING/CASE MANAGEMENT/SOCIAL WORK - PATIENT PORTAL LINK FT
You can access the FollowMyHealth Patient Portal offered by [facility] by registering at the following website: [email or web address]. By joining [employer]’s FollowMyHealth portal, you will also be able to view your health information using other applications (apps) compatible with our system.

## 2020-12-21 NOTE — PROGRESS NOTE ADULT - PROBLEM SELECTOR PLAN 1
DDx includes delirium, medication induced (has been on multiple agents including phenobarb, ativan, haldol, seroquel, precedex), sepsis (C diff, UTI), structural (SDH/SAH). Out of window for alcohol withdrawal.  - s/p phenobarb load, still with adequate levels, would avoid further benzos at this time to avoid obtundation and prevent worsening delirium  - off precedex since 12/20 AM  - Haldol prn for agitation if needed, although can tolerate oral quetiapine  - Daily EKGs for Qtc level  - increased seroquel to 50, with qtc < 500  - As per son, patient on citalopram at home for anxiety/depression.  - psych consulted apprec recs: not clear for discharge yet, increased quetiapine to 50 qHS, add trazodone

## 2020-12-21 NOTE — DISCHARGE NOTE NURSING/CASE MANAGEMENT/SOCIAL WORK - NSDCFUADDAPPT_GEN_ALL_CORE_FT
You are recommended to follow up with outpatient hepatology for alcoholic cirrhosis.   You should also follow up post discharge with neurosurgery- Dr. Fitzpatrick in 2-4 weeks after you leave [facility]. You are recommended to follow up with outpatient hepatology for alcoholic cirrhosis.   You should also follow up post discharge with neurosurgery- Dr. Fitzpatrick in 2-4 weeks after you leave [facility].    Please follow up with [facility] for Dual Diagnosis Program:   [phone] [place], [place] [address]  [phone]

## 2020-12-22 NOTE — PROGRESS NOTE ADULT - PROBLEM SELECTOR PLAN 2
Alcoholic cirrhosis  - without overt signs of decompensation at this time  - c/w thiamine, folate, MVI supplementation  - outpatient hepatology follow up DDx includes delirium, medication induced (has been on multiple agents including phenobarb, ativan, haldol, seroquel, precedex), sepsis (C diff, UTI), structural (SDH/SAH). Out of window for alcohol withdrawal.  - s/p phenobarb load, still with adequate levels, would avoid further benzos at this time to avoid obtundation and prevent worsening delirium  - off precedex since 12/20 AM  - Haldol prn for agitation if needed, although can tolerate oral quetiapine  - Daily EKGs for Qtc level  - increased seroquel to 50, with qtc < 500  - As per son, patient on citalopram at home for anxiety/depression.  - psych consulted apprec recs: not clear for discharge yet, increased quetiapine to 50 qHS, add trazodone

## 2020-12-22 NOTE — PROGRESS NOTE ADULT - PROBLEM SELECTOR PLAN 5
Non-severe C diff infection  - diarrhea improving, still having loose BMs  - finished PO vanc 125mg q6 (12/10 – 12/20)  - off isolation precaution - small amount of acute SAH within the left temporal sulci and a trace amount of acute SDH along the left tentorial leaflet without shift, mass effect, or herniation  - stable on repeat CTH  - completed prophylactic keppra 500 BID for 7 days (12/9 - am of 12/16) given high risk for withdrawal seizure as well,   - rest of plan as above

## 2020-12-22 NOTE — PROGRESS NOTE ADULT - ATTENDING COMMENTS
If no fever or any issue develop, plan is to dc home 12/23 .        Sadie Narvaez   Hospitalist   [phone]

## 2020-12-22 NOTE — DISCHARGE NOTE PROVIDER - NSDCFUADDAPPT_GEN_ALL_CORE_FT
You are recommended to follow up with outpatient hepatology for alcoholic cirrhosis.   You should also follow up post discharge with neurosurgery- Dr. Fitzpatrick in 2-4 weeks after you leave [facility]. You are recommended to follow up with outpatient hepatology for alcoholic cirrhosis.   Call below for an appointment:  [facility]  [phone]  Fax: [phone]  [address] [place] [address], [address] [place] [address]    You should also follow up post discharge with neurosurgery- Dr. Fitzpatrick in 2-4 weeks after you leave [facility].

## 2020-12-22 NOTE — PROVIDER CONTACT NOTE (OTHER) - SITUATION
clarification of whether or not pt requires contact isolation
Daily EKG for QT interval monitoring completed.

## 2020-12-22 NOTE — DISCHARGE NOTE PROVIDER - NSDCCPTREATMENT_GEN_ALL_CORE_FT
PRINCIPAL PROCEDURE  Procedure: CT head wo con  Findings and Treatment:   FINDINGS:  There is trace hemorrhage along the left tentorium, decreased when compared with 12/10/2020. Interval resolution of left temporal subarachnoid hemorrhage.  There is no acute intraparenchymal hemorrhage, vasogenic edema, mass effect, midline shift, central herniation, or hydrocephalus. Partially empty sella.  There is mild cerebral volume loss. There is mild patchy white matter hypoattenuation which is nonspecific in etiology likely microvascular disease with remote and age indeterminate lacunar infarcts. Unchanged basal ganglia calcifications/mineralization. Basal cisterns remain patent.  Bilateral maxillary sinus mucosal thickening with retention cysts/polyps. A few opacified left mastoid air cells, unchanged. Bilateral orbital proptosis correlate with thyroid function.  The calvarium is intact.  IMPRESSION:  Resolution lefttemporal subarachnoid hemorrhage, with decreased and trace left tentorial leaflet subdural hemorrhage. Redemonstration volume loss, microvascular disease, age indeterminate lacunar infarcts, no midline shift. If symptoms persist consider follow-up head CT MRI if no contraindications.

## 2020-12-22 NOTE — PROGRESS NOTE ADULT - PROBLEM SELECTOR PLAN 3
- small amount of acute SAH within the left temporal sulci and a trace amount of acute SDH along the left tentorial leaflet without shift, mass effect, or herniation  - stable on repeat CTH  - completed prophylactic keppra 500 BID for 7 days (12/9 - am of 12/16) given high risk for withdrawal seizure as well,   - f/u neurosurgery Dr. Fitzpatrick in 2-4 weeks after discharge  - patient with headache and ?vision changes , repeat CTH stable with some improvement Alcoholic cirrhosis  - without overt signs of decompensation at this time  - c/w thiamine, folate, MVI supplementation  - outpatient hepatology follow up

## 2020-12-22 NOTE — DISCHARGE NOTE PROVIDER - HOSPITAL COURSE
History of Present Illness:   Patient 55M with PMHx EtOH abuse, DTs, EtOH withdrawal, cirrhosis (w/ portal HTN), HTN, prior oxycodone abuse, GI bleeds, who presented to the ED c/o hitting his head on the toilet. Patient reportedly was getting out of his tub in the bathroom when he fell onto the back of his head. Patient stated he drank a fifth of alcohol prior to arrival. He denied LOC, neck pain, chest pain, abdominal pain, nausea, vomiting.  Note: patient was recently admitted 04/24/2020 for EtOH withdrawal.  ED COURSE:  -Tmax 99.4; ; /67, MAP 78  -CTH: Small amount of acute subarachnoid hemorrhage within the left temporal sulci and a trace amount of acute subdural hemorrhage along the left tentorial leaflet. No associated mass effect, shift of the midline structures, or herniation. Small right paramedian occipital scalp hematoma.  -CXR: clear lungs  -CIWA : 23, 20  -Received valium 10mg x1; folic acid 5mg x1; mag sulf 2g x1; KCl 40 x1; thiamine x1;  Patient seen bedside, tachycardic, dyspnea, mild tremors. Alert, AOx3, clearly intoxicated.   HOSPITAL COURSE:  Pt was admitted to MICU for severe EtOH withdrawal requiring phenobarb and precedex gtt, metabolic encephalopathy improved and patient taken off precedex 12/20 AM. course c/b enterobacter UTI on ceftriaxone 6 days ending 12/21 (switched to ertapenem d/t concern for resistance) and mild cdiff diarrhea now resolved after completing 10-day oral vanc course, still with intermittent fevers. D/t concern for thrombotic fever, Pt got DVT study (neg) and repeat CTH (w/stable/improving bleed). Pt seen by psychiatry and placed on seroquel 50 daily and trazodone 25 prn. History of Present Illness:   Patient 55M with PMHx EtOH abuse, DTs, EtOH withdrawal, cirrhosis (w/ portal HTN), HTN, prior oxycodone abuse, GI bleeds, who presented to the ED c/o hitting his head on the toilet. Patient reportedly was getting out of his tub in the bathroom when he fell onto the back of his head. Patient stated he drank a fifth of alcohol prior to arrival. He denied LOC, neck pain, chest pain, abdominal pain, nausea, vomiting.  Note: patient was recently admitted 04/24/2020 for EtOH withdrawal.  ED COURSE:  -Tmax 99.4; ; /67, MAP 78  -CTH: Small amount of acute subarachnoid hemorrhage within the left temporal sulci and a trace amount of acute subdural hemorrhage along the left tentorial leaflet. No associated mass effect, shift of the midline structures, or herniation. Small right paramedian occipital scalp hematoma.  -CXR: clear lungs  -CIWA : 23, 20  -Received valium 10mg x1; folic acid 5mg x1; mag sulf 2g x1; KCl 40 x1; thiamine x1;  Patient seen bedside, tachycardic, dyspnea, mild tremors. Alert, AOx3, clearly intoxicated.   HOSPITAL COURSE:  Pt was admitted to MICU for severe EtOH withdrawal requiring phenobarb and precedex gtt, metabolic encephalopathy improved and patient taken off precedex 12/20 AM. course c/b enterobacter UTI on ceftriaxone 6 days ending 12/21 (switched to ertapenem d/t concern for resistance) and mild cdiff diarrhea now resolved after completing 10-day oral vanc course, still with intermittent fevers. D/t concern for thrombotic fever, Pt got DVT study (neg) and repeat CTH (w/stable/improving bleed). Pt seen by psychiatry and placed on seroquel 50 daily and trazodone 25 prn. Pt declined alcohol cessation resources during SBIRT eval. Patient 55M with PMHx EtOH abuse, DTs, EtOH withdrawal, cirrhosis (w/ portal HTN), HTN, prior oxycodone abuse, GI bleeds, who presented to the ED c/o hitting his head on the toilet w/o LOC. Found to have SAH and SDH. Course c/b MICU admission for severe EtOH withdrawal requiring phenobarb and precedex gtt, metabolic encephalopathy improved and patient taken off precedex 12/20 AM. course c/b enterobacter UTI on ceftriaxone 6 days ending 12/21 (switched to ertapenem d/t concern for resistance) and mild cdiff diarrhea now resolved after completing 10-day oral vanc course, still with intermittent fevers. D/t concern for thrombotic fever, Pt got DVT study (neg) and repeat CTH (w/stable/improving bleed). Pt seen by psychiatry and placed on seroquel 50 daily and trazodone 25 prn. Pt declined alcohol cessation resources during SBIRT eval. Patient is stable to dc w/ outpt f/u with psych, pcp.

## 2020-12-22 NOTE — PROGRESS NOTE ADULT - PROBLEM SELECTOR PLAN 8
- largely normotensive in setting of sedative medication - stable chronic normocytic anemia and thrombocytopenia in setting of alcoholic liver cirrhosis   - no signs of active bleeding  - monitor CBC daily  - transfuse for Hb <7 or plt <100  - INR goal <1.3 per neurosurgery  - SCDs; pharmacologic DVT ppx on hold  - DVT study 12/20 neg and CTH 12/20 showed improvement in hemorrhages

## 2020-12-22 NOTE — PROGRESS NOTE ADULT - PROBLEM SELECTOR PLAN 4
- small amount of acute SAH within the left temporal sulci and a trace amount of acute SDH along the left tentorial leaflet without shift, mass effect, or herniation  - stable on repeat CTH  - completed prophylactic keppra 500 BID for 7 days (12/9 - am of 12/16) given high risk for withdrawal seizure as well,   - rest of plan as above - small amount of acute SAH within the left temporal sulci and a trace amount of acute SDH along the left tentorial leaflet without shift, mass effect, or herniation  - stable on repeat CTH  - completed prophylactic keppra 500 BID for 7 days (12/9 - am of 12/16) given high risk for withdrawal seizure as well,   - f/u neurosurgery Dr. Fitzpatrick in 2-4 weeks after discharge  - patient with headache and ?vision changes , repeat CTH stable with some improvement

## 2020-12-22 NOTE — DISCHARGE NOTE PROVIDER - NSDCMRMEDTOKEN_GEN_ALL_CORE_FT
folic acid 1 mg oral tablet: 1 tab(s) orally once a day  Multiple Vitamins oral tablet: 1 tab(s) orally once a day  pantoprazole 40 mg oral delayed release tablet: 1 tab(s) orally once a day   thiamine 100 mg oral tablet: 1 tab(s) orally once a day   folic acid 1 mg oral tablet: 1 tab(s) orally once a day  Multiple Vitamins oral tablet: 1 tab(s) orally once a day  pantoprazole 40 mg oral delayed release tablet: 1 tab(s) orally once a day   QUEtiapine 50 mg oral tablet: 1 tab(s) orally once a day (at bedtime)  thiamine 100 mg oral tablet: 1 tab(s) orally once a day  traZODone 50 mg oral tablet: 0.5 tab(s) orally once a day (at bedtime), As Needed   folic acid 1 mg oral tablet: 1 tab(s) orally once a day  Multiple Vitamins oral tablet: 1 tab(s) orally once a day  pantoprazole 40 mg oral delayed release tablet: 1 tab(s) orally once a day   QUEtiapine 50 mg oral tablet: 1 tab(s) orally once a day (at bedtime)  thiamine 100 mg oral tablet: 1 tab(s) orally once a day  traZODone 50 mg oral tablet: 0.5 tab(s) orally once a day (at bedtime), As Needed  traZODone 50 mg oral tablet: 0.5 tab(s) orally once a day (at bedtime), As Needed   Ativan 0.5 mg oral tablet: 1 tab(s) orally once a day MDD:0.5mg  folic acid 1 mg oral tablet: 1 tab(s) orally once a day  Multiple Vitamins oral tablet: 1 tab(s) orally once a day  pantoprazole 40 mg oral delayed release tablet: 1 tab(s) orally once a day   QUEtiapine 50 mg oral tablet: 1 tab(s) orally once a day (at bedtime)  thiamine 100 mg oral tablet: 1 tab(s) orally once a day  traZODone 50 mg oral tablet: 0.5 tab(s) orally once a day (at bedtime), As Needed

## 2020-12-22 NOTE — DISCHARGE NOTE PROVIDER - CARE PROVIDER_API CALL
Vikas Clancy  INTERNAL MEDICINE  [address] [facility], [address]  [place] [address]  Phone: [phone]  Fax: [phone]  Established Patient  Follow Up Time: 1 week    Daniel Fitzpatrick  NEUROSURGERY  [address] [place] [address], [address]  [place] [address]  Phone: [phone]  Fax: [phone]  Follow Up Time: 2 weeks

## 2020-12-22 NOTE — PROGRESS NOTE ADULT - PROBLEM SELECTOR PLAN 1
DDx includes delirium, medication induced (has been on multiple agents including phenobarb, ativan, haldol, seroquel, precedex), sepsis (C diff, UTI), structural (SDH/SAH). Out of window for alcohol withdrawal.  - s/p phenobarb load, still with adequate levels, would avoid further benzos at this time to avoid obtundation and prevent worsening delirium  - off precedex since 12/20 AM  - Haldol prn for agitation if needed, although can tolerate oral quetiapine  - Daily EKGs for Qtc level  - increased seroquel to 50, with qtc < 500  - As per son, patient on citalopram at home for anxiety/depression.  - psych consulted apprec recs: not clear for discharge yet, increased quetiapine to 50 qHS, add trazodone UA+ on admission, Pt s/p vanc and ceftriaxone. as UCx+ for enterobacter sens to CTX s/p CTX x 6d (12/16-12/21), UA neg on 12/21, mildly febrile so switched to ertapenem x 3 days.  Plan:  c/w ertapenem  f/u repeat blood and urine cultures

## 2020-12-22 NOTE — PROGRESS NOTE ADULT - SUBJECTIVE AND OBJECTIVE BOX
PROGRESS NOTE: INTERNAL MEDICINE    Contact Information:  Maye Chew PGY1   Pager: [phone] 7AM-7PM    Patient Name: KWAME CARRINGTON  LOS: 20 (13d)    Patient is a 55y old  Male who presents with a chief complaint of alcohol withdrawal (22 Dec 2020 06:32)      OVERNIGHT/INTERVAL EVENTS: No acute events overnight    SUBJECTIVE: Seen and examined at bedside. C/o chronic insomnia.     MEDICATIONS  (STANDING):  ertapenem  IVPB 1000 milliGRAM(s) IV Intermittent every 24 hours  folic acid 1 milliGRAM(s) Oral daily  melatonin 3 milliGRAM(s) Oral at bedtime  multivitamin 1 Tablet(s) Oral daily  potassium chloride    Tablet ER 40 milliEquivalent(s) Oral once  QUEtiapine 50 milliGRAM(s) Oral at bedtime  thiamine 100 milliGRAM(s) Oral daily    MEDICATIONS  (PRN):  hydrocortisone 1% Cream 1 Application(s) Topical two times a day PRN Rash and/or Itching  nystatin Powder 1 Application(s) Topical two times a day PRN Itch  traZODone 25 milliGRAM(s) Oral at bedtime PRN insomnia    Allergies  No Known Allergies      Intolerances      PHYSICAL EXAM:    Vital Signs Last 24 Hrs  T(C): 36.8 (22 Dec 2020 04:44), Max: 38 (21 Dec 2020 16:21)  T(F): 98.3 (22 Dec 2020 04:44), Max: 100.4 (21 Dec 2020 16:21)  HR: 91 (22 Dec 2020 04:44) (73 - 99)  BP: 134/82 (22 Dec 2020 04:44) (112/65 - 148/82)  BP(mean): --  RR: 18 (22 Dec 2020 04:44) (18 - 18)  SpO2: 98% (22 Dec 2020 04:44) (96% - 98%)    GENERAL: No acute distress, well-developed, middle-aged man  CHEST/LUNG: CTAB; No wheezes, rales, or rhonchi  HEART: Regular rate and rhythm; No murmurs, rubs, or gallops  ABDOMEN: Soft, non-tender, non-distended; normal bowel sounds, no organomegaly  EXTREMITIES:  2+ peripheral pulses b/l, No clubbing, cyanosis, mild 1+ pitting edema  NEUROLOGY: A&O x 3, no focal deficits  PSYCHIATRIC: appropriate demeanor, responses, interaction during exam  SKIN: No rashes or lesions, lots of tattoos    20 @ 07:01  -  20 @ 07:00  --------------------------------------------------------  IN: 1130 mL / OUT: 600 mL / NET: 530 mL    20 @ 07:01  -  20 @ 09:02  --------------------------------------------------------  IN: 300 mL / OUT: 0 mL / NET: 300 mL        PATIENT LABORATORY DATA:    CAPILLARY BLOOD GLUCOSE                              10.0   9.10  )-----------( 430      ( 22 Dec 2020 07:25 )             30.2         136  |  99  |  4<L>  ----------------------------<  89  3.5   |  20<L>  |  0.67    Ca    8.9      22 Dec 2020 07:25  Phos  3.2       Mg     2.2         TPro  7.5  /  Alb  3.7  /  TBili  0.6  /  DBili  x   /  AST  44<H>  /  ALT  17  /  AlkPhos  193<H>      PT/INR - ( 21 Dec 2020 10:13 )   PT: 14.3 sec;   INR: 1.21 ratio         PTT - ( 21 Dec 2020 10:13 )  PTT:37.9 sec    Urinalysis Basic - ( 21 Dec 2020 18:31 )    Color: Yellow / Appearance: Clear / S.016 / pH: x  Gluc: x / Ketone: Negative  / Bili: Negative / Urobili: Negative   Blood: x / Protein: 30 mg/dL / Nitrite: Negative   Leuk Esterase: Negative / RBC: 1 /hpf / WBC 2 /HPF   Sq Epi: x / Non Sq Epi: 0 /hpf / Bacteria: Negative      Microbiology:     Culture - Blood (collected 20 @ 08:51)  Source: .Blood Blood-Peripheral  Preliminary Report (20 @ 09:01):    No growth to date.    Culture - Blood (collected 20 @ 02:36)  Source: .Blood Blood-Peripheral  Preliminary Report (20 @ 03:01):    No growth to date.    Culture - Blood (collected 20 @ 05:03)  Source: .Blood Blood-Peripheral  Final Report (20 @ 06:01):    No Growth Final    Culture - Blood (collected 20 @ 05:03)  Source: .Blood Blood-Peripheral  Final Report (20 @ 06:01):    No Growth Final    Culture - Urine (collected 20 @ 04:59)  Source: .Urine Clean Catch (Midstream)  Final Report (20 @ 03:20):    >100,000 CFU/ml Enterobacter aerogenes  Organism: Enterobacter aerogenes (20 @ 03:20)  Organism: Enterobacter aerogenes (20 @ 03:20)      -  Amikacin: S <=16      -  Amoxicillin/Clavulanic Acid: R >16/8      -  Ampicillin: R 16 These ampicillin results predict results for amoxicillin      -  Ampicillin/Sulbactam: R <=4/2 Enterobacter, Citrobacter, and Serratia may develop resistance during prolonged therapy (3-4 days)      -  Aztreonam: S <=4      -  Cefazolin: R >16      -  Cefepime: S <=2      -  Cefoxitin: R >16      -  Ceftriaxone: S <=1 Enterobacter, Citrobacter, and Serratia may develop resistance during prolonged therapy      -  Ciprofloxacin: S <=0.25      -  Ertapenem: S <=0.5      -  Gentamicin: S <=2      -  Imipenem: S <=1      -  Levofloxacin: S <=0.5      -  Meropenem: S <=1      -  Nitrofurantoin: I 64 Should not be used to treat pyelonephritis      -  Piperacillin/Tazobactam: S <=8      -  Tigecycline: S <=2      -  Tobramycin: S <=2      -  Trimethoprim/Sulfamethoxazole: S <=0.5/9.5      Method Type: GINA

## 2020-12-22 NOTE — PROGRESS NOTE ADULT - PROBLEM SELECTOR PLAN 6
UA+  Febrile 12/15 PM, UCx+ for enterobacter sens to CTX  -C/w CTX x 7d (12/16-12/22) Non-severe C diff infection  - diarrhea improving, still having loose BMs  - finished PO vanc 125mg q6 (12/10 – 12/20)  - off isolation precaution

## 2020-12-22 NOTE — PROVIDER CONTACT NOTE (OTHER) - ACTION/TREATMENT ORDERED:
Team 5 aware, will come to floor to look at EKG that is in pt chart.
as per MD contact isolation not required, as compelted vancomycin,.

## 2020-12-22 NOTE — DISCHARGE NOTE PROVIDER - PROVIDER TOKENS
PROVIDER:[TOKEN:[[id number]],FOLLOWUP:[1 week],ESTABLISHEDPATIENT:[T]],PROVIDER:[TOKEN:[[id number]],FOLLOWUP:[2 weeks]]

## 2020-12-22 NOTE — PROGRESS NOTE ADULT - ASSESSMENT
55y M w/ PMH HTN (with medication nonadherence), chronic alcohol abuse (c/b prior DTs, withdrawal seizures, hematemesis, and melena), alcoholic cirrhosis (with portal hypertension), presenting with acute alcohol intoxication and traumatic SDH/SAH, admitted for severe EtOH withdrawal requiring phenobarb and precedex gtt, now with improved metabolic encephalopathy, off precedex. course c/b enterobacter UTI and cdiff diarrhea with intermittent fevers     55y M w/ PMH HTN (with medication nonadherence), chronic alcohol abuse (c/b prior DTs, withdrawal seizures, hematemesis, and melena), alcoholic cirrhosis (with portal hypertension), presenting with acute alcohol intoxication and traumatic SDH/SAH, admitted for severe EtOH withdrawal requiring phenobarb and precedex gtt, now with improved metabolic encephalopathy, off precedex. course c/b enterobacter UTI and cdiff diarrhea with intermittent fevers, s/p vanc and ceftriaxone, now on ertapenem.

## 2020-12-22 NOTE — DISCHARGE NOTE PROVIDER - NSFOLLOWUPCLINICS_GEN_ALL_ED_FT
[facility] Dept of Substance Abuse  Psychiatry Substance Abuse  [address] [place] [address]  Phone: [phone]  Fax:   Follow Up Time: 1 week

## 2020-12-22 NOTE — DISCHARGE NOTE PROVIDER - NSDCCPCAREPLAN_GEN_ALL_CORE_FT
PRINCIPAL DISCHARGE DIAGNOSIS  Diagnosis: Alcohol withdrawal syndrome, with delirium  Assessment and Plan of Treatment: You were in the ICU because your alcohol withdrawal was very severe and you required barbiturate drip. Your symptoms have resolved now. You completed prophylactic Keppra 500 BID for 7 days to reduce your risk for withdrawal seizures.      SECONDARY DISCHARGE DIAGNOSES  Diagnosis: Sepsis  Assessment and Plan of Treatment: Sepsis    Diagnosis: Alcoholic cirrhosis  Assessment and Plan of Treatment: Alcoholic cirrhosis    Diagnosis: Subarachnoid bleed  Assessment and Plan of Treatment: Due to your fall, your hit your head and suffered bleeding in the outer layers surrounding your brain. The bleed was stable with some improvement on the last CT scan we did. Please follow up with the Neurosurgeon Dr. Fitzpatrick in 2-4 weeks after discharge.    Diagnosis: Subdural hematoma  Assessment and Plan of Treatment: Due to your fall, your hit your head and suffered bleeding in the outer layers surrounding your brain. The bleed was stable with some improvement on the last CT scan we did. Please follow up with the Neurosurgeon Dr. Fitzpatrick in 2-4 weeks after discharge.     PRINCIPAL DISCHARGE DIAGNOSIS  Diagnosis: Alcohol withdrawal syndrome, with delirium  Assessment and Plan of Treatment: You were in the ICU because your alcohol withdrawal was very severe and you required barbiturate drip. Your symptoms have resolved now. You completed prophylactic Keppra 500 BID for 7 days to reduce your risk for withdrawal seizures.      SECONDARY DISCHARGE DIAGNOSES  Diagnosis: Sepsis  Assessment and Plan of Treatment: Sepsis    Diagnosis: Alcoholic cirrhosis  Assessment and Plan of Treatment: Alcoholic cirrh    Diagnosis: Subarachnoid bleed  Assessment and Plan of Treatment: Due to your fall, your hit your head and suffered bleeding in the outer layers surrounding your brain. The bleed was stable with some improvement on the last CT scan we did. Please follow up with the Neurosurgeon Dr. Fitzpatrick in 2-4 weeks after discharge.    Diagnosis: Subdural hematoma  Assessment and Plan of Treatment: Due to your fall, your hit your head and suffered bleeding in the outer layers surrounding your brain. The bleed was stable with some improvement on the last CT scan we did. Please follow up with the Neurosurgeon Dr. Fitzpatrick in 2-4 weeks after discharge.     PRINCIPAL DISCHARGE DIAGNOSIS  Diagnosis: Alcohol withdrawal syndrome, with delirium  Assessment and Plan of Treatment: You were in the ICU because your alcohol withdrawal was very severe and you required barbiturate drip. Your symptoms have resolved now. You completed prophylactic Keppra 500 BID for 7 days to reduce your risk for withdrawal seizures. Please follow up with your primary care physician Dr. Betancourt within 1 week after discharge.  Here are some resources for alcohol dependence:  •Alcoholics Anonymous   Web Address: http://www.alcoholics-anonymous.org.  •[employer] on Drug and Alcohol Information   Phone: 1-717.894.3194   Web Address: Move[employer]        SECONDARY DISCHARGE DIAGNOSES  Diagnosis: Urinary tract infection  Assessment and Plan of Treatment: Your urine culture grew a bacteria called Enterobacter. You were given 6 days of the antibiotic ceftriaxone followed by 3 days of the bacteria Ertapenem.    Diagnosis: Clostridioides difficile diarrhea  Assessment and Plan of Treatment: You had diarrhea due to a contagious bacteria called C. diff. You underwent 10 days of oral vancomycin treatment and got better.    Diagnosis: Alcoholic cirrhosis  Assessment and Plan of Treatment: You have alcoholic cirrhosis. Please take your multivitamin, folate and thiamine as directed and follow up with the hepatologist outpatient (Please see Follow Up section for address)    Diagnosis: Subarachnoid bleed  Assessment and Plan of Treatment: Due to your fall, your hit your head and suffered bleeding in the outer layers surrounding your brain. The bleed was stable with some improvement on the last CT scan we did. Please follow up with the Neurosurgeon Dr. Fitzpatrick in 2-4 weeks after discharge. Your repeat CAT scan also showed some bulging in your eyes. Please follow up with your primary care physician to get thyroid function tests outpatient.    Diagnosis: Subdural hematoma  Assessment and Plan of Treatment: Due to your fall, your hit your head and suffered bleeding in the outer layers surrounding your brain. The bleed was stable with some improvement on the last CT scan we did. Please follow up with the Neurosurgeon Dr. Fitzpatrick in 2-4 weeks after discharge.     PRINCIPAL DISCHARGE DIAGNOSIS  Diagnosis: Alcohol withdrawal syndrome, with delirium  Assessment and Plan of Treatment: You were in the ICU because your alcohol withdrawal was very severe and you required barbiturate drip. Your symptoms have resolved now. You completed prophylactic Keppra 500 BID for 7 days to reduce your risk for withdrawal seizures. We also started your vitamin supplements. Please follow up with your primary care physician Dr. Betancourt within 1 week after discharge.  Here are some resources for alcohol dependence:  •Alcoholics Anonymous   Web Address: http://www.alcoholics-anonymous.org.  •[employer] on Drug and Alcohol Information   Phone: 1-301.886.2292   Web Address: wwwMultispan        SECONDARY DISCHARGE DIAGNOSES  Diagnosis: Urinary tract infection  Assessment and Plan of Treatment: Your urine culture grew a bacteria called Enterobacter. You were given 6 days of the antibiotic ceftriaxone followed by 3 days of the bacteria Ertapenem.    Diagnosis: Clostridioides difficile diarrhea  Assessment and Plan of Treatment: You had diarrhea due to a contagious bacteria called C. diff. You underwent 10 days of oral vancomycin treatment and got better.    Diagnosis: Alcoholic cirrhosis  Assessment and Plan of Treatment: You have alcoholic cirrhosis. Please take your multivitamin, folate and thiamine as directed and follow up with the hepatologist outpatient (Please see Follow Up section for address)    Diagnosis: Subarachnoid bleed  Assessment and Plan of Treatment: Due to your fall, your hit your head and suffered bleeding in the outer layers surrounding your brain. The bleed was stable with some improvement on the last CT scan we did. Please follow up with the Neurosurgeon Dr. Fitzpatrick in 2-4 weeks after discharge. Your repeat CAT scan also showed some bulging in your eyes. Please follow up with your primary care physician to get thyroid function tests outpatient.    Diagnosis: HTN (hypertension)  Assessment and Plan of Treatment: We have been holding your amlodipine during your stay because your blood pressure has been within acceptable range. Please follow-up with your primary care doctor to discuss when to restart after discharge.    Diagnosis: Subdural hematoma  Assessment and Plan of Treatment: Due to your fall, your hit your head and suffered bleeding in the outer layers surrounding your brain. The bleed was stable with some improvement on the last CT scan we did. Please follow up with the Neurosurgeon Dr. Fitzpatrick in 2-4 weeks after discharge.

## 2020-12-22 NOTE — PROVIDER CONTACT NOTE (OTHER) - BACKGROUND
Pt A&ox4 admit for ETOH dependence.
56 y/o male admitted for alcohol dependence pt was c-diff positive 12/10/20 was placed on PO vanco from 12/10/20-12/20/20

## 2020-12-23 NOTE — PROGRESS NOTE ADULT - PROBLEM SELECTOR PLAN 8
- stable chronic normocytic anemia and thrombocytopenia in setting of alcoholic liver cirrhosis   - no signs of active bleeding  - monitor CBC daily  - transfuse for Hb <7 or plt <100  - INR goal <1.3 per neurosurgery  - SCDs; pharmacologic DVT ppx on hold  - DVT study 12/20 neg and CTH 12/20 showed improvement in hemorrhages

## 2020-12-23 NOTE — PROGRESS NOTE BEHAVIORAL HEALTH - NSBHCHARTREVIEWLAB_PSY_A_CORE FT
CBC Full  -  ( 20 Dec 2020 03:24 )  WBC Count : 9.29 K/uL  RBC Count : 3.31 M/uL  Hemoglobin : 10.6 g/dL  Hematocrit : 31.6 %  Platelet Count - Automated : 445 K/uL  Mean Cell Volume : 95.5 fl  Mean Cell Hemoglobin : 32.0 pg  Mean Cell Hemoglobin Concentration : 33.5 gm/dL  Auto Neutrophil # : x  Auto Lymphocyte # : x  Auto Monocyte # : x  Auto Eosinophil # : x  Auto Basophil # : x  Auto Neutrophil % : x  Auto Lymphocyte % : x  Auto Monocyte % : x  Auto Eosinophil % : x  Auto Basophil % : x    12-20    137  |  103  |  <4<L>  ----------------------------<  87  4.4   |  22  |  0.72    Ca    8.5      20 Dec 2020 03:24  Phos  3.6     12-20  Mg     2.1     12-20    TPro  7.4  /  Alb  3.3  /  TBili  0.4  /  DBili  x   /  AST  55<H>  /  ALT  16  /  AlkPhos  205<H>  12-20
9.8    6.80  )-----------( 341      ( 23 Dec 2020 07:30 )             30.4     12-    140  |  103  |  7   ----------------------------<  89  4.1   |  21<L>  |  0.71    Ca    9.0      23 Dec 2020 07:16  Mg     2.3     12      Urinalysis Basic - ( 21 Dec 2020 18:31 )    Color: Yellow / Appearance: Clear / S.016 / pH: x  Gluc: x / Ketone: Negative  / Bili: Negative / Urobili: Negative   Blood: x / Protein: 30 mg/dL / Nitrite: Negative   Leuk Esterase: Negative / RBC: 1 /hpf / WBC 2 /HPF   Sq Epi: x / Non Sq Epi: 0 /hpf / Bacteria: Negative

## 2020-12-23 NOTE — PROGRESS NOTE BEHAVIORAL HEALTH - NSBHCHARTREVIEWINVESTIGATE_PSY_A_CORE FT
e< from: 12 Lead ECG (12.22.20 @ 05:28) >      Ventricular Rate 80 BPM    Atrial Rate 80 BPM    P-R Interval 132 ms    QRS Duration 116 ms    Q-T Interval 380 ms    QTC Calculation(Bazett) 438 ms    < end of copied text >
Ventricular Rate 85 BPM    Atrial Rate 85 BPM    P-R Interval 102 ms    QRS Duration 104 ms    Q-T Interval 390 ms    QTC Calculation(Bazett) 464 ms    P Axis 31 degrees    R Axis 78 degrees    T Axis -22 degrees    Diagnosis Line SINUS RHYTHM WITHSHORT MD  ST & T WAVE ABNORMALITY, CONSIDER INFERIOR ISCHEMIA  ABNORMAL ECG  WHEN COMPARED WITH ECG OF 19-DEC-2020  NO SIGNIFICANT CHANGE WAS FOUND  Confirmed by CYNTHIA ENCISO MD ([id number]) on 12/20/2020 12:45:19 PM

## 2020-12-23 NOTE — PROGRESS NOTE ADULT - PROVIDER SPECIALTY LIST ADULT
MICU
Neurosurgery
MICU
Internal Medicine

## 2020-12-23 NOTE — PROGRESS NOTE BEHAVIORAL HEALTH - NSBHCONSULTMEDS_PSY_A_CORE FT
Recommend continuing Seroquel 50mg qHS  Recommend Seroquel 25mg q6h PRN agitation, trazodone 25 mg PRN for insomnia
Patient can be discharged on Seroquel 50mg p.o. at bedtime x 30 days

## 2020-12-23 NOTE — PROGRESS NOTE ADULT - PROBLEM SELECTOR PLAN 9
DVT: SCDs (pharmacologic held in s/o anemia)  Diet: softs

## 2020-12-23 NOTE — PHYSICAL THERAPY INITIAL EVALUATION ADULT - ADDITIONAL COMMENTS
Per pt, he lives in  with mother and son (available to help if needed). Has 6 steps to enter (+R rail) and one flight of steps up to bedroom. Independent PTA. Drives, +reading glasses, hearing good, R handed.

## 2020-12-23 NOTE — PROGRESS NOTE ADULT - PROBLEM SELECTOR PLAN 1
UA+ on admission, Pt s/p vanc and ceftriaxone. as UCx+ for enterobacter sens to CTX s/p CTX x 6d (12/16-12/21), UA neg on 12/21, mildly febrile so switched to ertapenem x 3 days.  Plan:  c/w ertapenem  f/u repeat blood and urine cultures UA+ on admission, Pt s/p vanc and ceftriaxone. as UCx+ for enterobacter sens to CTX s/p CTX x 6d (12/16-12/21), UA neg on 12/21, mildly febrile so switched to ertapenem x 3 days.  Plan:  c/w ertapenem, last day today 12/23, plan for dc today 12/23  repeat blood and urine cultures NGTD

## 2020-12-23 NOTE — PROGRESS NOTE BEHAVIORAL HEALTH - SUMMARY
This is a 55-y.o. CM patient with PMH HTN (noncompliant w/ meds), EtOH abuse, Cirrhosis with portal HTN, prior oxycodone abuse, GI bleeds, DTs and alcoholic withdrawal seizures presented to the ED c/o hitting his head on the toilet. Patient reportedly was getting out of his tub in the bathroom when he fell onto the back of his head. Patient stated he drank a fifth of alcohol prior to arrival.  Patient reports drinking up to 1.5L tequilla q day. ETOH level on admission 313. Patient with 1 previous detox/rehab admission when using oxy.  Patient has a hx of seizures and DT.     After the prolonged withdrawal, patient is stable for discharge, with some residual anxiety noted at discharge. Patient interested only in receiving Ativan for his anxiety.

## 2020-12-23 NOTE — PROGRESS NOTE ADULT - PROBLEM SELECTOR PLAN 7
- stable chronic normocytic anemia and thrombocytopenia in setting of alcoholic liver cirrhosis   - no signs of active bleeding  - monitor CBC daily  - transfuse for Hb <7 or plt <100  - INR goal <1.3 per neurosurgery  - SCDs; pharmacologic DVT ppx on hold  - DVT study 12/20 neg and CTH 12/20 showed improvement in hemorrhages
Ucx was positive for enterobacter, s/p 6 days ceftriaxone   repeat Urine cx NGTD
- stable chronic normocytic anemia and thrombocytopenia in setting of alcoholic liver cirrhosis   - no signs of active bleeding  - monitor CBC daily  - transfuse for Hb <7 or plt <100  - INR goal <1.3 per neurosurgery  - SCDs; pharmacologic DVT ppx on hold  - DVT study 12/20 neg and CTH 12/20 showed improvement in hemorrhages

## 2020-12-23 NOTE — PROGRESS NOTE BEHAVIORAL HEALTH - NSBHFUPINTERVALHXFT_PSY_A_CORE
Patient seen and evaluated, staff consulted, chart, labs, meds reviewed, initial evaluation appreciated. Reports no issues overnight. States there are mild improvements in mood, energy, appetite, and sleep. No SI/HI, no hopelessness, helplessness, guilt, or other major mood sx. noted or reported. No alcohol withdrawal signs noted or reported. The only issue patient has now is residual anxiety for which he demands Ativan.    Direction of care discussed with the patient. Counseling and support provided.
NAEO. This morning, pt reports feeling better. He denies any anxiety or depressive symptoms. Pt is aware that he needs to stop drinking alcohol. Denies SI/HI.

## 2020-12-23 NOTE — PROGRESS NOTE BEHAVIORAL HEALTH - RISK ASSESSMENT
Low risk of suicide at this time. Risks factors include male sex, substance abuse history. Protective factors include no current SI/HI, no hx of SA, future-oriented, supportive family.
Low risk of suicide at this time. Risks factors include male sex, substance abuse history. Protective factors include no current SI/HI, no hx of SA, future-oriented, supportive family.

## 2020-12-23 NOTE — PROGRESS NOTE ADULT - PROBLEM SELECTOR PLAN 6
Non-severe C diff infection  - diarrhea improving, still having loose BMs  - finished PO vanc 125mg q6 (12/10 – 12/20)  - off isolation precaution

## 2020-12-23 NOTE — PROGRESS NOTE ADULT - ASSESSMENT
55y M w/ PMH HTN (with medication nonadherence), chronic alcohol abuse (c/b prior DTs, withdrawal seizures, hematemesis, and melena), alcoholic cirrhosis (with portal hypertension), presenting with acute alcohol intoxication and traumatic SDH/SAH, admitted for severe EtOH withdrawal requiring phenobarb and precedex gtt, now with improved metabolic encephalopathy, off precedex. course c/b enterobacter UTI and cdiff diarrhea with intermittent fevers, s/p vanc and ceftriaxone, now on ertapenem.

## 2020-12-23 NOTE — PHYSICAL THERAPY INITIAL EVALUATION ADULT - PRECAUTIONS/LIMITATIONS, REHAB EVAL
fall precautions CT Head: Resolution left temporal subarachnoid hemorrhage, with decreased and trace left tentorial leaflet subdural hemorrhage. Redemonstration volume loss, microvascular disease, age indeterminate lacunar infarcts, no midline shift. If symptoms persist consider follow-up head CT MRI if no contraindications. B/l DVT (-). CXR: clear lungs./fall precautions

## 2020-12-23 NOTE — PHYSICAL THERAPY INITIAL EVALUATION ADULT - ACTIVE RANGE OF MOTION EXAMINATION, REHAB EVAL
audrey. upper extremity Active ROM was WNL (within normal limits)/bilateral lower extremity Active ROM was WNL (within normal limits)

## 2020-12-23 NOTE — PROGRESS NOTE ADULT - ATTENDING COMMENTS
NO fever , patient completed antibiotic therapy.  Patient will be dc today and he will need to follow up with PCP, outpatient psychiatry and etoh abstinence program.  Pt agrees with the plan.  DC time 45mns.        Sadie Narvaez   HOspitalist   [phone]

## 2020-12-23 NOTE — PROGRESS NOTE ADULT - REASON FOR ADMISSION
alcohol withdrawal

## 2020-12-23 NOTE — PROGRESS NOTE BEHAVIORAL HEALTH - PRIMARY DX
Alcohol use disorder, mild, in early remission
Alcohol withdrawal syndrome with perceptual disturbance

## 2020-12-23 NOTE — PROGRESS NOTE BEHAVIORAL HEALTH - NSBHCONSULTOBSREASON_PSY_A_CORE FT
No safety risk at this time; May increase level of observation if pt. becomes agitated per primary team
No safety risk at this time; May increase level of observation if pt. becomes agitated per primary team

## 2020-12-23 NOTE — PROGRESS NOTE ADULT - SUBJECTIVE AND OBJECTIVE BOX
PROGRESS NOTE: INTERNAL MEDICINE    Contact Information:  Maye Chew PGY1   Pager: [phone] 7AM-7PM    Patient Name: KWAME CARRINGTON  LOS: 20 (14d)    Patient is a 55y old  Male who presents with a chief complaint of alcohol withdrawal (23 Dec 2020 06:26)      OVERNIGHT/INTERVAL EVENTS: No acute events    SUBJECTIVE: c/o calf pain. ordered Tylenol and capsaicin. No other concerns.     MEDICATIONS  (STANDING):  acetaminophen   Tablet .. 650 milliGRAM(s) Oral once  ertapenem  IVPB 1000 milliGRAM(s) IV Intermittent every 24 hours  folic acid 1 milliGRAM(s) Oral daily  melatonin 3 milliGRAM(s) Oral at bedtime  multivitamin 1 Tablet(s) Oral daily  QUEtiapine 50 milliGRAM(s) Oral at bedtime  thiamine 100 milliGRAM(s) Oral daily    MEDICATIONS  (PRN):  hydrocortisone 1% Cream 1 Application(s) Topical two times a day PRN Rash and/or Itching  nystatin Powder 1 Application(s) Topical two times a day PRN Itch  traZODone 25 milliGRAM(s) Oral at bedtime PRN insomnia    Allergies  No Known Allergies    PHYSICAL EXAM:    Vital Signs Last 24 Hrs  T(C): 37.2 (23 Dec 2020 04:30), Max: 37.6 (22 Dec 2020 19:37)  T(F): 99 (23 Dec 2020 04:30), Max: 99.7 (22 Dec 2020 19:37)  HR: 90 (23 Dec 2020 04:30) (88 - 92)  BP: 137/82 (23 Dec 2020 04:30) (123/77 - 137/82)  BP(mean): --  RR: 18 (23 Dec 2020 04:30) (18 - 18)  SpO2: 99% (23 Dec 2020 04:30) (96% - 99%)    GENERAL: No acute distress, well-developed  CHEST/LUNG: CTAB; No wheezes, rales, or rhonchi  HEART: Regular rate and rhythm; No murmurs, rubs, or gallops  ABDOMEN: Soft, non-tender, non-distended; normal bowel sounds, no organomegaly  EXTREMITIES:  2+ peripheral pulses b/l, No clubbing, cyanosis, or edema  NEUROLOGY: A&O x 3, no focal deficits  PSYCHIATRIC: appropriate demeanor, responses, interaction during exam  SKIN: No rashes or lesions      20 @ 07:01  -  20 @ 07:00  --------------------------------------------------------  IN: 1060 mL / OUT: 400 mL / NET: 660 mL        PATIENT LABORATORY DATA:    CAPILLARY BLOOD GLUCOSE                              9.8    6.80  )-----------( 341      ( 23 Dec 2020 07:30 )             30.4         140  |  103  |  7   ----------------------------<  89  4.1   |  21<L>  |  0.71    Ca    9.0      23 Dec 2020 07:16  Mg     2.3           Transcutaneous Bilirubin    PT/INR - ( 21 Dec 2020 10:13 )   PT: 14.3 sec;   INR: 1.21 ratio         PTT - ( 21 Dec 2020 10:13 )  PTT:37.9 sec        Urinalysis Basic - ( 21 Dec 2020 18:31 )    Color: Yellow / Appearance: Clear / S.016 / pH: x  Gluc: x / Ketone: Negative  / Bili: Negative / Urobili: Negative   Blood: x / Protein: 30 mg/dL / Nitrite: Negative   Leuk Esterase: Negative / RBC: 1 /hpf / WBC 2 /HPF   Sq Epi: x / Non Sq Epi: 0 /hpf / Bacteria: Negative        Microbiology:     Culture - Urine (collected 20 @ 22:08)  Source: .Urine Clean Catch (Midstream)  Final Report (20 @ 17:58):    No growth    Culture - Blood (collected 20 @ 21:03)  Source: .Blood Blood-Venous  Preliminary Report (20 @ 22:03):    No growth to date.    Culture - Blood (collected 20 @ 21:03)  Source: .Blood Blood-Peripheral  Preliminary Report (20 @ 22:03):    No growth to date.    Culture - Blood (collected 20 @ 08:51)  Source: .Blood Blood-Peripheral  Preliminary Report (20 @ 09:01):    No growth to date.    Culture - Blood (collected 20 @ 02:36)  Source: .Blood Blood-Peripheral  Preliminary Report (20 @ 03:01):    No growth to date.          RADIOLOGY & ADDITIONAL TESTS:  EKG:     PENDING DIAGNOSTIC WORK UP:

## 2020-12-23 NOTE — PROGRESS NOTE ADULT - PROBLEM/PLAN-4
ED Nurse Note:



Pt taken to CT via josefina at this time.
DISPLAY PLAN FREE TEXT

## 2020-12-23 NOTE — PROGRESS NOTE BEHAVIORAL HEALTH - NSBHCONSULTFOLLOWAFTERCARE_PSY_A_CORE FT
Please have CSW provide patient with a dual-diagnosis program referral at discharge.  Alternately, patient can be referred to [facility] [phone].

## 2020-12-23 NOTE — PROGRESS NOTE ADULT - PROBLEM SELECTOR PLAN 10
Dispo: once HD stable for dc    Full Code

## 2020-12-23 NOTE — PROGRESS NOTE BEHAVIORAL HEALTH - NSBHCHARTREVIEWVS_PSY_A_CORE FT
Vital Signs Last 24 Hrs  T(C): 37.2 (20 Dec 2020 12:00), Max: 38.3 (19 Dec 2020 19:00)  T(F): 99 (20 Dec 2020 12:00), Max: 100.9 (19 Dec 2020 19:00)  HR: 96 (20 Dec 2020 14:00) (78 - 102)  BP: 137/74 (20 Dec 2020 14:00) (115/74 - 154/74)  BP(mean): 99 (20 Dec 2020 14:00) (86 - 111)  RR: 18 (20 Dec 2020 14:00) (16 - 29)  SpO2: 96% (20 Dec 2020 14:00) (96% - 100%)
Vital Signs Last 24 Hrs  T(C): 37.1 (23 Dec 2020 11:58), Max: 37.6 (22 Dec 2020 19:37)  T(F): 98.7 (23 Dec 2020 11:58), Max: 99.7 (22 Dec 2020 19:37)  HR: 75 (23 Dec 2020 11:58) (75 - 92)  BP: 148/85 (23 Dec 2020 11:58) (124/76 - 148/85)  BP(mean): --  RR: 18 (23 Dec 2020 11:58) (18 - 18)  SpO2: 99% (23 Dec 2020 11:58) (96% - 99%)  T(C): 37.1 (12-23-20 @ 11:58), Max: 37.6 (12-22-20 @ 19:37)  HR: 75 (12-23-20 @ 11:58) (75 - 92)  BP: 148/85 (12-23-20 @ 11:58) (124/76 - 148/85)  RR: 18 (12-23-20 @ 11:58) (18 - 18)  SpO2: 99% (12-23-20 @ 11:58) (96% - 99%)  Wt(kg): --

## 2020-12-23 NOTE — PROGRESS NOTE BEHAVIORAL HEALTH - AXIS III
withdrawal seizure and head trauma and prolonged delirium
withdrawal seizure and head trauma and prolonged delirium

## 2020-12-23 NOTE — PROGRESS NOTE ADULT - PROBLEM SELECTOR PLAN 2
DDx includes delirium, medication induced (has been on multiple agents including phenobarb, ativan, haldol, seroquel, precedex), sepsis (C diff, UTI), structural (SDH/SAH). Out of window for alcohol withdrawal.  - s/p phenobarb load, still with adequate levels, would avoid further benzos at this time to avoid obtundation and prevent worsening delirium  - off precedex since 12/20 AM  - Haldol prn for agitation if needed, although can tolerate oral quetiapine  - Daily EKGs for Qtc level  - increased seroquel to 50, with qtc < 500  - As per son, patient on citalopram at home for anxiety/depression.  - psych consulted apprec recs: not clear for discharge yet, increased quetiapine to 50 qHS, add trazodone DDx includes delirium, medication induced (has been on multiple agents including phenobarb, ativan, haldol, seroquel, precedex), sepsis (C diff, UTI), structural (SDH/SAH). Out of window for alcohol withdrawal.  - s/p phenobarb load, still with adequate levels, would avoid further benzos at this time to avoid obtundation and prevent worsening delirium  - off precedex since 12/20 AM  - Haldol prn for agitation if needed, although can tolerate oral quetiapine  - Daily EKGs for Qtc level  - increased seroquel to 50, with qtc < 500  - As per son, patient on citalopram at home for anxiety/depression.  - psych consulted apprec recs:   c/w seroquel 50 qHS, prn trazodone 25mg as needed

## 2021-01-01 ENCOUNTER — INPATIENT (INPATIENT)
Facility: HOSPITAL | Age: 56
LOS: 8 days | Discharge: HOME | DRG: 872 | End: 2021-02-03
Attending: HOSPITALIST | Admitting: STUDENT IN AN ORGANIZED HEALTH CARE EDUCATION/TRAINING PROGRAM
Payer: MEDICAID

## 2021-01-01 ENCOUNTER — NON-APPOINTMENT (OUTPATIENT)
Age: 56
End: 2021-01-01

## 2021-01-01 ENCOUNTER — TRANSCRIPTION ENCOUNTER (OUTPATIENT)
Age: 56
End: 2021-01-01

## 2021-01-01 VITALS
OXYGEN SATURATION: 99 % | RESPIRATION RATE: 18 BRPM | HEART RATE: 75 BPM | TEMPERATURE: 99 F | DIASTOLIC BLOOD PRESSURE: 84 MMHG | SYSTOLIC BLOOD PRESSURE: 141 MMHG

## 2021-01-01 VITALS
HEIGHT: 71 IN | OXYGEN SATURATION: 99 % | DIASTOLIC BLOOD PRESSURE: 79 MMHG | HEART RATE: 125 BPM | SYSTOLIC BLOOD PRESSURE: 126 MMHG | RESPIRATION RATE: 16 BRPM | TEMPERATURE: 99 F

## 2021-01-01 DIAGNOSIS — I10 ESSENTIAL (PRIMARY) HYPERTENSION: ICD-10-CM

## 2021-01-01 DIAGNOSIS — Z29.9 ENCOUNTER FOR PROPHYLACTIC MEASURES, UNSPECIFIED: ICD-10-CM

## 2021-01-01 DIAGNOSIS — A04.72 ENTEROCOLITIS DUE TO CLOSTRIDIUM DIFFICILE, NOT SPECIFIED AS RECURRENT: ICD-10-CM
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
## 2021-01-01 DIAGNOSIS — D69.6 THROMBOCYTOPENIA, UNSPECIFIED: ICD-10-CM

## 2021-01-01 DIAGNOSIS — F10.239 ALCOHOL DEPENDENCE WITH WITHDRAWAL, UNSPECIFIED: ICD-10-CM

## 2021-01-01 DIAGNOSIS — R17 UNSPECIFIED JAUNDICE: ICD-10-CM

## 2021-01-01 DIAGNOSIS — A41.9 SEPSIS, UNSPECIFIED ORGANISM: ICD-10-CM

## 2021-01-01 DIAGNOSIS — F10.230 ALCOHOL DEPENDENCE WITH WITHDRAWAL, UNCOMPLICATED: ICD-10-CM

## 2021-01-01 LAB
ALBUMIN SERPL ELPH-MCNC: 3.3 G/DL — SIGNIFICANT CHANGE UP (ref 3.3–5)
ALBUMIN SERPL ELPH-MCNC: 3.4 G/DL — SIGNIFICANT CHANGE UP (ref 3.3–5)
ALBUMIN SERPL ELPH-MCNC: 3.4 G/DL — SIGNIFICANT CHANGE UP (ref 3.3–5)
ALBUMIN SERPL ELPH-MCNC: 3.5 G/DL — SIGNIFICANT CHANGE UP (ref 3.3–5)
ALBUMIN SERPL ELPH-MCNC: 3.6 G/DL — SIGNIFICANT CHANGE UP (ref 3.3–5)
ALBUMIN SERPL ELPH-MCNC: 3.7 G/DL — SIGNIFICANT CHANGE UP (ref 3.3–5)
ALBUMIN SERPL ELPH-MCNC: 4.3 G/DL — SIGNIFICANT CHANGE UP (ref 3.3–5)
ALP SERPL-CCNC: 217 U/L — HIGH (ref 40–120)
ALP SERPL-CCNC: 218 U/L — HIGH (ref 40–120)
ALP SERPL-CCNC: 221 U/L — HIGH (ref 40–120)
ALP SERPL-CCNC: 224 U/L — HIGH (ref 40–120)
ALP SERPL-CCNC: 233 U/L — HIGH (ref 40–120)
ALP SERPL-CCNC: 234 U/L — HIGH (ref 40–120)
ALP SERPL-CCNC: 248 U/L — HIGH (ref 40–120)
ALT FLD-CCNC: 22 U/L — SIGNIFICANT CHANGE UP (ref 10–45)
ALT FLD-CCNC: 22 U/L — SIGNIFICANT CHANGE UP (ref 10–45)
ALT FLD-CCNC: 23 U/L — SIGNIFICANT CHANGE UP (ref 10–45)
ALT FLD-CCNC: 23 U/L — SIGNIFICANT CHANGE UP (ref 10–45)
ALT FLD-CCNC: 24 U/L — SIGNIFICANT CHANGE UP (ref 10–45)
ALT FLD-CCNC: 26 U/L — SIGNIFICANT CHANGE UP (ref 10–45)
ALT FLD-CCNC: 26 U/L — SIGNIFICANT CHANGE UP (ref 10–45)
AMPHET UR-MCNC: NEGATIVE — SIGNIFICANT CHANGE UP
ANION GAP SERPL CALC-SCNC: 12 MMOL/L — SIGNIFICANT CHANGE UP (ref 5–17)
ANION GAP SERPL CALC-SCNC: 12 MMOL/L — SIGNIFICANT CHANGE UP (ref 5–17)
ANION GAP SERPL CALC-SCNC: 13 MMOL/L — SIGNIFICANT CHANGE UP (ref 5–17)
ANION GAP SERPL CALC-SCNC: 14 MMOL/L — SIGNIFICANT CHANGE UP (ref 5–17)
ANION GAP SERPL CALC-SCNC: 15 MMOL/L — SIGNIFICANT CHANGE UP (ref 5–17)
ANION GAP SERPL CALC-SCNC: 21 MMOL/L — HIGH (ref 5–17)
APAP SERPL-MCNC: <15 UG/ML — SIGNIFICANT CHANGE UP (ref 10–30)
APPEARANCE UR: CLEAR — SIGNIFICANT CHANGE UP
APTT BLD: 28.2 SEC — SIGNIFICANT CHANGE UP (ref 27.5–35.5)
AST SERPL-CCNC: 103 U/L — HIGH (ref 10–40)
AST SERPL-CCNC: 131 U/L — HIGH (ref 10–40)
AST SERPL-CCNC: 137 U/L — HIGH (ref 10–40)
AST SERPL-CCNC: 190 U/L — HIGH (ref 10–40)
AST SERPL-CCNC: 261 U/L — HIGH (ref 10–40)
AST SERPL-CCNC: 67 U/L — HIGH (ref 10–40)
AST SERPL-CCNC: 72 U/L — HIGH (ref 10–40)
BARBITURATES, URINE.: SIGNIFICANT CHANGE UP
BASOPHILS # BLD AUTO: 0 K/UL — SIGNIFICANT CHANGE UP (ref 0–0.2)
BASOPHILS NFR BLD AUTO: 0 % — SIGNIFICANT CHANGE UP (ref 0–2)
BENZODIAZ UR-MCNC: NEGATIVE — SIGNIFICANT CHANGE UP
BILIRUB DIRECT SERPL-MCNC: 2.2 MG/DL — HIGH (ref 0–0.2)
BILIRUB INDIRECT FLD-MCNC: 1.4 MG/DL — HIGH (ref 0.2–1)
BILIRUB SERPL-MCNC: 1.3 MG/DL — HIGH (ref 0.2–1.2)
BILIRUB SERPL-MCNC: 1.3 MG/DL — HIGH (ref 0.2–1.2)
BILIRUB SERPL-MCNC: 1.4 MG/DL — HIGH (ref 0.2–1.2)
BILIRUB SERPL-MCNC: 1.7 MG/DL — HIGH (ref 0.2–1.2)
BILIRUB SERPL-MCNC: 2.2 MG/DL — HIGH (ref 0.2–1.2)
BILIRUB SERPL-MCNC: 3.3 MG/DL — HIGH (ref 0.2–1.2)
BILIRUB SERPL-MCNC: 3.6 MG/DL — HIGH (ref 0.2–1.2)
BILIRUB UR-MCNC: ABNORMAL
BLD GP AB SCN SERPL QL: NEGATIVE — SIGNIFICANT CHANGE UP
BUN SERPL-MCNC: 10 MG/DL — SIGNIFICANT CHANGE UP (ref 7–23)
BUN SERPL-MCNC: 13 MG/DL — SIGNIFICANT CHANGE UP (ref 7–23)
BUN SERPL-MCNC: 13 MG/DL — SIGNIFICANT CHANGE UP (ref 7–23)
BUN SERPL-MCNC: 14 MG/DL — SIGNIFICANT CHANGE UP (ref 7–23)
BUN SERPL-MCNC: 14 MG/DL — SIGNIFICANT CHANGE UP (ref 7–23)
BUN SERPL-MCNC: 6 MG/DL — LOW (ref 7–23)
BUN SERPL-MCNC: 8 MG/DL — SIGNIFICANT CHANGE UP (ref 7–23)
BUN SERPL-MCNC: 8 MG/DL — SIGNIFICANT CHANGE UP (ref 7–23)
C DIFF BY PCR RESULT: DETECTED
C DIFF GDH STL QL: SIGNIFICANT CHANGE UP
C DIFF GDH STL QL: SIGNIFICANT CHANGE UP
C DIFF TOX GENS STL QL NAA+PROBE: SIGNIFICANT CHANGE UP
CALCIUM SERPL-MCNC: 8.6 MG/DL — SIGNIFICANT CHANGE UP (ref 8.4–10.5)
CALCIUM SERPL-MCNC: 8.6 MG/DL — SIGNIFICANT CHANGE UP (ref 8.4–10.5)
CALCIUM SERPL-MCNC: 8.7 MG/DL — SIGNIFICANT CHANGE UP (ref 8.4–10.5)
CALCIUM SERPL-MCNC: 8.8 MG/DL — SIGNIFICANT CHANGE UP (ref 8.4–10.5)
CALCIUM SERPL-MCNC: 8.8 MG/DL — SIGNIFICANT CHANGE UP (ref 8.4–10.5)
CALCIUM SERPL-MCNC: 8.9 MG/DL — SIGNIFICANT CHANGE UP (ref 8.4–10.5)
CALCIUM SERPL-MCNC: 9.1 MG/DL — SIGNIFICANT CHANGE UP (ref 8.4–10.5)
CALCIUM SERPL-MCNC: 9.3 MG/DL — SIGNIFICANT CHANGE UP (ref 8.4–10.5)
CHLORIDE SERPL-SCNC: 100 MMOL/L — SIGNIFICANT CHANGE UP (ref 96–108)
CHLORIDE SERPL-SCNC: 104 MMOL/L — SIGNIFICANT CHANGE UP (ref 96–108)
CHLORIDE SERPL-SCNC: 87 MMOL/L — LOW (ref 96–108)
CHLORIDE SERPL-SCNC: 90 MMOL/L — LOW (ref 96–108)
CHLORIDE SERPL-SCNC: 92 MMOL/L — LOW (ref 96–108)
CHLORIDE SERPL-SCNC: 92 MMOL/L — LOW (ref 96–108)
CHLORIDE SERPL-SCNC: 94 MMOL/L — LOW (ref 96–108)
CHLORIDE SERPL-SCNC: 97 MMOL/L — SIGNIFICANT CHANGE UP (ref 96–108)
CHLORIDE SERPL-SCNC: 98 MMOL/L — SIGNIFICANT CHANGE UP (ref 96–108)
CHLORIDE SERPL-SCNC: 99 MMOL/L — SIGNIFICANT CHANGE UP (ref 96–108)
CO2 SERPL-SCNC: 20 MMOL/L — LOW (ref 22–31)
CO2 SERPL-SCNC: 20 MMOL/L — LOW (ref 22–31)
CO2 SERPL-SCNC: 21 MMOL/L — LOW (ref 22–31)
CO2 SERPL-SCNC: 23 MMOL/L — SIGNIFICANT CHANGE UP (ref 22–31)
CO2 SERPL-SCNC: 25 MMOL/L — SIGNIFICANT CHANGE UP (ref 22–31)
CO2 SERPL-SCNC: 26 MMOL/L — SIGNIFICANT CHANGE UP (ref 22–31)
CO2 SERPL-SCNC: 28 MMOL/L — SIGNIFICANT CHANGE UP (ref 22–31)
CO2 SERPL-SCNC: 31 MMOL/L — SIGNIFICANT CHANGE UP (ref 22–31)
CO2 SERPL-SCNC: 32 MMOL/L — HIGH (ref 22–31)
CO2 SERPL-SCNC: 33 MMOL/L — HIGH (ref 22–31)
COCAINE METAB.OTHER UR-MCNC: NEGATIVE — SIGNIFICANT CHANGE UP
COLOR SPEC: YELLOW — SIGNIFICANT CHANGE UP
CREAT SERPL-MCNC: 0.59 MG/DL — SIGNIFICANT CHANGE UP (ref 0.5–1.3)
CREAT SERPL-MCNC: 0.61 MG/DL — SIGNIFICANT CHANGE UP (ref 0.5–1.3)
CREAT SERPL-MCNC: 0.63 MG/DL — SIGNIFICANT CHANGE UP (ref 0.5–1.3)
CREAT SERPL-MCNC: 0.66 MG/DL — SIGNIFICANT CHANGE UP (ref 0.5–1.3)
CREAT SERPL-MCNC: 0.67 MG/DL — SIGNIFICANT CHANGE UP (ref 0.5–1.3)
CREAT SERPL-MCNC: 0.72 MG/DL — SIGNIFICANT CHANGE UP (ref 0.5–1.3)
CREAT SERPL-MCNC: 0.75 MG/DL — SIGNIFICANT CHANGE UP (ref 0.5–1.3)
CREAT SERPL-MCNC: 0.77 MG/DL — SIGNIFICANT CHANGE UP (ref 0.5–1.3)
CREATININE, URINE THERAPEUTIC: 87.8 MG/DL — SIGNIFICANT CHANGE UP
CULTURE RESULTS: NO GROWTH — SIGNIFICANT CHANGE UP
CULTURE RESULTS: SIGNIFICANT CHANGE UP
CULTURE RESULTS: SIGNIFICANT CHANGE UP
DIFF PNL FLD: NEGATIVE — SIGNIFICANT CHANGE UP
EOSINOPHIL # BLD AUTO: 0 K/UL — SIGNIFICANT CHANGE UP (ref 0–0.5)
EOSINOPHIL NFR BLD AUTO: 0 % — SIGNIFICANT CHANGE UP (ref 0–6)
ETHANOL SERPL-MCNC: SIGNIFICANT CHANGE UP MG/DL (ref 0–10)
GAS PNL BLDV: SIGNIFICANT CHANGE UP
GLUCOSE SERPL-MCNC: 105 MG/DL — HIGH (ref 70–99)
GLUCOSE SERPL-MCNC: 105 MG/DL — HIGH (ref 70–99)
GLUCOSE SERPL-MCNC: 106 MG/DL — HIGH (ref 70–99)
GLUCOSE SERPL-MCNC: 109 MG/DL — HIGH (ref 70–99)
GLUCOSE SERPL-MCNC: 111 MG/DL — HIGH (ref 70–99)
GLUCOSE SERPL-MCNC: 122 MG/DL — HIGH (ref 70–99)
GLUCOSE SERPL-MCNC: 152 MG/DL — HIGH (ref 70–99)
GLUCOSE SERPL-MCNC: 178 MG/DL — HIGH (ref 70–99)
GLUCOSE SERPL-MCNC: 96 MG/DL — SIGNIFICANT CHANGE UP (ref 70–99)
GLUCOSE SERPL-MCNC: 99 MG/DL — SIGNIFICANT CHANGE UP (ref 70–99)
GLUCOSE UR QL: NEGATIVE — SIGNIFICANT CHANGE UP
HCT VFR BLD CALC: 28.7 % — LOW (ref 39–50)
HCT VFR BLD CALC: 28.9 % — LOW (ref 39–50)
HCT VFR BLD CALC: 29.1 % — LOW (ref 39–50)
HCT VFR BLD CALC: 29.5 % — LOW (ref 39–50)
HCT VFR BLD CALC: 29.8 % — LOW (ref 39–50)
HCT VFR BLD CALC: 30.3 % — LOW (ref 39–50)
HGB BLD-MCNC: 10.2 G/DL — LOW (ref 13–17)
HGB BLD-MCNC: 10.3 G/DL — LOW (ref 13–17)
HGB BLD-MCNC: 9.2 G/DL — LOW (ref 13–17)
HGB BLD-MCNC: 9.4 G/DL — LOW (ref 13–17)
HGB BLD-MCNC: 9.6 G/DL — LOW (ref 13–17)
HGB BLD-MCNC: 9.8 G/DL — LOW (ref 13–17)
INR BLD: 1.21 RATIO — HIGH (ref 0.88–1.16)
INR BLD: 1.22 RATIO — HIGH (ref 0.88–1.16)
INR BLD: 1.34 RATIO — HIGH (ref 0.88–1.16)
KETONES UR-MCNC: NEGATIVE — SIGNIFICANT CHANGE UP
LACTATE BLDV-MCNC: 2.3 MMOL/L — HIGH (ref 0.7–2)
LEUKOCYTE ESTERASE UR-ACNC: NEGATIVE — SIGNIFICANT CHANGE UP
LIDOCAIN IGE QN: 109 U/L — HIGH (ref 7–60)
LYMPHOCYTES # BLD AUTO: 0.32 K/UL — LOW (ref 1–3.3)
LYMPHOCYTES # BLD AUTO: 7 % — LOW (ref 13–44)
MAGNESIUM SERPL-MCNC: 1.5 MG/DL — LOW (ref 1.6–2.6)
MAGNESIUM SERPL-MCNC: 1.7 MG/DL — SIGNIFICANT CHANGE UP (ref 1.6–2.6)
MAGNESIUM SERPL-MCNC: 1.8 MG/DL — SIGNIFICANT CHANGE UP (ref 1.6–2.6)
MAGNESIUM SERPL-MCNC: 2.1 MG/DL — SIGNIFICANT CHANGE UP (ref 1.6–2.6)
MCHC RBC-ENTMCNC: 32.1 GM/DL — SIGNIFICANT CHANGE UP (ref 32–36)
MCHC RBC-ENTMCNC: 32.2 PG — SIGNIFICANT CHANGE UP (ref 27–34)
MCHC RBC-ENTMCNC: 32.4 PG — SIGNIFICANT CHANGE UP (ref 27–34)
MCHC RBC-ENTMCNC: 32.5 GM/DL — SIGNIFICANT CHANGE UP (ref 32–36)
MCHC RBC-ENTMCNC: 32.9 PG — SIGNIFICANT CHANGE UP (ref 27–34)
MCHC RBC-ENTMCNC: 33 GM/DL — SIGNIFICANT CHANGE UP (ref 32–36)
MCHC RBC-ENTMCNC: 33 PG — SIGNIFICANT CHANGE UP (ref 27–34)
MCHC RBC-ENTMCNC: 33.1 PG — SIGNIFICANT CHANGE UP (ref 27–34)
MCHC RBC-ENTMCNC: 33.1 PG — SIGNIFICANT CHANGE UP (ref 27–34)
MCHC RBC-ENTMCNC: 33.2 GM/DL — SIGNIFICANT CHANGE UP (ref 32–36)
MCHC RBC-ENTMCNC: 33.7 GM/DL — SIGNIFICANT CHANGE UP (ref 32–36)
MCHC RBC-ENTMCNC: 34.6 GM/DL — SIGNIFICANT CHANGE UP (ref 32–36)
MCV RBC AUTO: 100.3 FL — HIGH (ref 80–100)
MCV RBC AUTO: 100.3 FL — HIGH (ref 80–100)
MCV RBC AUTO: 101 FL — HIGH (ref 80–100)
MCV RBC AUTO: 93.7 FL — SIGNIFICANT CHANGE UP (ref 80–100)
MCV RBC AUTO: 98.1 FL — SIGNIFICANT CHANGE UP (ref 80–100)
MCV RBC AUTO: 99.7 FL — SIGNIFICANT CHANGE UP (ref 80–100)
METHADONE UR-MCNC: NEGATIVE — SIGNIFICANT CHANGE UP
METHAQUALONE UR QL: NEGATIVE — SIGNIFICANT CHANGE UP
METHAQUALONE UR-MCNC: NEGATIVE — SIGNIFICANT CHANGE UP
MONOCYTES # BLD AUTO: 0.41 K/UL — SIGNIFICANT CHANGE UP (ref 0–0.9)
MONOCYTES NFR BLD AUTO: 9 % — SIGNIFICANT CHANGE UP (ref 2–14)
NEUTROPHILS # BLD AUTO: 3.82 K/UL — SIGNIFICANT CHANGE UP (ref 1.8–7.4)
NEUTROPHILS NFR BLD AUTO: 83 % — HIGH (ref 43–77)
NITRITE UR-MCNC: NEGATIVE — SIGNIFICANT CHANGE UP
NRBC # BLD: 0 /100 WBCS — SIGNIFICANT CHANGE UP (ref 0–0)
OB PNL STL: NEGATIVE — SIGNIFICANT CHANGE UP
OPIATES UR-MCNC: NEGATIVE — SIGNIFICANT CHANGE UP
PCP UR-MCNC: NEGATIVE — SIGNIFICANT CHANGE UP
PH UR: 8.5 — HIGH (ref 5–8)
PHOSPHATE SERPL-MCNC: 1.5 MG/DL — LOW (ref 2.5–4.5)
PHOSPHATE SERPL-MCNC: 2 MG/DL — LOW (ref 2.5–4.5)
PHOSPHATE SERPL-MCNC: 2 MG/DL — LOW (ref 2.5–4.5)
PHOSPHATE SERPL-MCNC: 2.5 MG/DL — SIGNIFICANT CHANGE UP (ref 2.5–4.5)
PHOSPHATE SERPL-MCNC: 2.7 MG/DL — SIGNIFICANT CHANGE UP (ref 2.5–4.5)
PHOSPHATE SERPL-MCNC: 3.4 MG/DL — SIGNIFICANT CHANGE UP (ref 2.5–4.5)
PLATELET # BLD AUTO: 109 K/UL — LOW (ref 150–400)
PLATELET # BLD AUTO: 150 K/UL — SIGNIFICANT CHANGE UP (ref 150–400)
PLATELET # BLD AUTO: 42 K/UL — LOW (ref 150–400)
PLATELET # BLD AUTO: 44 K/UL — LOW (ref 150–400)
PLATELET # BLD AUTO: 51 K/UL — LOW (ref 150–400)
PLATELET # BLD AUTO: 71 K/UL — LOW (ref 150–400)
POTASSIUM SERPL-MCNC: 2.7 MMOL/L — CRITICAL LOW (ref 3.5–5.3)
POTASSIUM SERPL-MCNC: 2.8 MMOL/L — CRITICAL LOW (ref 3.5–5.3)
POTASSIUM SERPL-MCNC: 2.9 MMOL/L — CRITICAL LOW (ref 3.5–5.3)
POTASSIUM SERPL-MCNC: 2.9 MMOL/L — CRITICAL LOW (ref 3.5–5.3)
POTASSIUM SERPL-MCNC: 3.1 MMOL/L — LOW (ref 3.5–5.3)
POTASSIUM SERPL-MCNC: 3.3 MMOL/L — LOW (ref 3.5–5.3)
POTASSIUM SERPL-MCNC: 3.3 MMOL/L — LOW (ref 3.5–5.3)
POTASSIUM SERPL-MCNC: 3.5 MMOL/L — SIGNIFICANT CHANGE UP (ref 3.5–5.3)
POTASSIUM SERPL-MCNC: 3.5 MMOL/L — SIGNIFICANT CHANGE UP (ref 3.5–5.3)
POTASSIUM SERPL-MCNC: 4 MMOL/L — SIGNIFICANT CHANGE UP (ref 3.5–5.3)
POTASSIUM SERPL-MCNC: 4 MMOL/L — SIGNIFICANT CHANGE UP (ref 3.5–5.3)
POTASSIUM SERPL-SCNC: 2.7 MMOL/L — CRITICAL LOW (ref 3.5–5.3)
POTASSIUM SERPL-SCNC: 2.8 MMOL/L — CRITICAL LOW (ref 3.5–5.3)
POTASSIUM SERPL-SCNC: 2.9 MMOL/L — CRITICAL LOW (ref 3.5–5.3)
POTASSIUM SERPL-SCNC: 2.9 MMOL/L — CRITICAL LOW (ref 3.5–5.3)
POTASSIUM SERPL-SCNC: 3.1 MMOL/L — LOW (ref 3.5–5.3)
POTASSIUM SERPL-SCNC: 3.3 MMOL/L — LOW (ref 3.5–5.3)
POTASSIUM SERPL-SCNC: 3.3 MMOL/L — LOW (ref 3.5–5.3)
POTASSIUM SERPL-SCNC: 3.5 MMOL/L — SIGNIFICANT CHANGE UP (ref 3.5–5.3)
POTASSIUM SERPL-SCNC: 3.5 MMOL/L — SIGNIFICANT CHANGE UP (ref 3.5–5.3)
POTASSIUM SERPL-SCNC: 4 MMOL/L — SIGNIFICANT CHANGE UP (ref 3.5–5.3)
POTASSIUM SERPL-SCNC: 4 MMOL/L — SIGNIFICANT CHANGE UP (ref 3.5–5.3)
PROPOXYPH UR QL: NEGATIVE — SIGNIFICANT CHANGE UP
PROT SERPL-MCNC: 6.5 G/DL — SIGNIFICANT CHANGE UP (ref 6–8.3)
PROT SERPL-MCNC: 6.9 G/DL — SIGNIFICANT CHANGE UP (ref 6–8.3)
PROT SERPL-MCNC: 7 G/DL — SIGNIFICANT CHANGE UP (ref 6–8.3)
PROT SERPL-MCNC: 7 G/DL — SIGNIFICANT CHANGE UP (ref 6–8.3)
PROT SERPL-MCNC: 7.4 G/DL — SIGNIFICANT CHANGE UP (ref 6–8.3)
PROT SERPL-MCNC: 7.8 G/DL — SIGNIFICANT CHANGE UP (ref 6–8.3)
PROT SERPL-MCNC: 8.5 G/DL — HIGH (ref 6–8.3)
PROT UR-MCNC: 100 — SIGNIFICANT CHANGE UP
PROTHROM AB SERPL-ACNC: 14.3 SEC — HIGH (ref 10.6–13.6)
PROTHROM AB SERPL-ACNC: 14.4 SEC — HIGH (ref 10.6–13.6)
PROTHROM AB SERPL-ACNC: 15.6 SEC — HIGH (ref 10.6–13.6)
RBC # BLD: 2.86 M/UL — LOW (ref 4.2–5.8)
RBC # BLD: 2.86 M/UL — LOW (ref 4.2–5.8)
RBC # BLD: 2.9 M/UL — LOW (ref 4.2–5.8)
RBC # BLD: 2.96 M/UL — LOW (ref 4.2–5.8)
RBC # BLD: 3.09 M/UL — LOW (ref 4.2–5.8)
RBC # BLD: 3.18 M/UL — LOW (ref 4.2–5.8)
RBC # FLD: 16.8 % — HIGH (ref 10.3–14.5)
RBC # FLD: 17.1 % — HIGH (ref 10.3–14.5)
RBC # FLD: 17.1 % — HIGH (ref 10.3–14.5)
RBC # FLD: 17.7 % — HIGH (ref 10.3–14.5)
RBC # FLD: 17.9 % — HIGH (ref 10.3–14.5)
RBC # FLD: 18 % — HIGH (ref 10.3–14.5)
RH IG SCN BLD-IMP: POSITIVE — SIGNIFICANT CHANGE UP
SALICYLATES SERPL-MCNC: <2 MG/DL — LOW (ref 15–30)
SARS-COV-2 IGG SERPL QL IA: NEGATIVE — SIGNIFICANT CHANGE UP
SARS-COV-2 IGM SERPL IA-ACNC: <0.1 INDEX — SIGNIFICANT CHANGE UP
SARS-COV-2 RNA SPEC QL NAA+PROBE: SIGNIFICANT CHANGE UP
SODIUM SERPL-SCNC: 132 MMOL/L — LOW (ref 135–145)
SODIUM SERPL-SCNC: 132 MMOL/L — LOW (ref 135–145)
SODIUM SERPL-SCNC: 133 MMOL/L — LOW (ref 135–145)
SODIUM SERPL-SCNC: 135 MMOL/L — SIGNIFICANT CHANGE UP (ref 135–145)
SODIUM SERPL-SCNC: 136 MMOL/L — SIGNIFICANT CHANGE UP (ref 135–145)
SODIUM SERPL-SCNC: 136 MMOL/L — SIGNIFICANT CHANGE UP (ref 135–145)
SODIUM SERPL-SCNC: 137 MMOL/L — SIGNIFICANT CHANGE UP (ref 135–145)
SODIUM SERPL-SCNC: 137 MMOL/L — SIGNIFICANT CHANGE UP (ref 135–145)
SODIUM SERPL-SCNC: 138 MMOL/L — SIGNIFICANT CHANGE UP (ref 135–145)
SODIUM SERPL-SCNC: 139 MMOL/L — SIGNIFICANT CHANGE UP (ref 135–145)
SP GR SPEC: >1.05 (ref 1.01–1.02)
SPECIMEN SOURCE: SIGNIFICANT CHANGE UP
THC UR QL: NEGATIVE — SIGNIFICANT CHANGE UP
UROBILINOGEN FLD QL: ABNORMAL
WBC # BLD: 3.74 K/UL — LOW (ref 3.8–10.5)
WBC # BLD: 4.13 K/UL — SIGNIFICANT CHANGE UP (ref 3.8–10.5)
WBC # BLD: 4.2 K/UL — SIGNIFICANT CHANGE UP (ref 3.8–10.5)
WBC # BLD: 4.55 K/UL — SIGNIFICANT CHANGE UP (ref 3.8–10.5)
WBC # BLD: 5.86 K/UL — SIGNIFICANT CHANGE UP (ref 3.8–10.5)
WBC # BLD: 7.49 K/UL — SIGNIFICANT CHANGE UP (ref 3.8–10.5)
WBC # FLD AUTO: 3.74 K/UL — LOW (ref 3.8–10.5)
WBC # FLD AUTO: 4.13 K/UL — SIGNIFICANT CHANGE UP (ref 3.8–10.5)
WBC # FLD AUTO: 4.2 K/UL — SIGNIFICANT CHANGE UP (ref 3.8–10.5)
WBC # FLD AUTO: 4.55 K/UL — SIGNIFICANT CHANGE UP (ref 3.8–10.5)
WBC # FLD AUTO: 5.86 K/UL — SIGNIFICANT CHANGE UP (ref 3.8–10.5)
WBC # FLD AUTO: 7.49 K/UL — SIGNIFICANT CHANGE UP (ref 3.8–10.5)

## 2021-01-01 PROCEDURE — 87040 BLOOD CULTURE FOR BACTERIA: CPT

## 2021-01-01 PROCEDURE — 99233 SBSQ HOSP IP/OBS HIGH 50: CPT

## 2021-01-01 PROCEDURE — 87493 C DIFF AMPLIFIED PROBE: CPT

## 2021-01-01 PROCEDURE — 76705 ECHO EXAM OF ABDOMEN: CPT | Mod: 26,59

## 2021-01-01 PROCEDURE — 71045 X-RAY EXAM CHEST 1 VIEW: CPT | Mod: 26

## 2021-01-01 PROCEDURE — 99239 HOSP IP/OBS DSCHRG MGMT >30: CPT

## 2021-01-01 PROCEDURE — 80048 BASIC METABOLIC PNL TOTAL CA: CPT

## 2021-01-01 PROCEDURE — 82330 ASSAY OF CALCIUM: CPT

## 2021-01-01 PROCEDURE — 97116 GAIT TRAINING THERAPY: CPT

## 2021-01-01 PROCEDURE — 93975 VASCULAR STUDY: CPT

## 2021-01-01 PROCEDURE — 87086 URINE CULTURE/COLONY COUNT: CPT

## 2021-01-01 PROCEDURE — 84295 ASSAY OF SERUM SODIUM: CPT

## 2021-01-01 PROCEDURE — 97530 THERAPEUTIC ACTIVITIES: CPT

## 2021-01-01 PROCEDURE — 85025 COMPLETE CBC W/AUTO DIFF WBC: CPT

## 2021-01-01 PROCEDURE — 86900 BLOOD TYPING SEROLOGIC ABO: CPT

## 2021-01-01 PROCEDURE — 84100 ASSAY OF PHOSPHORUS: CPT

## 2021-01-01 PROCEDURE — 76377 3D RENDER W/INTRP POSTPROCES: CPT | Mod: 26

## 2021-01-01 PROCEDURE — 97162 PT EVAL MOD COMPLEX 30 MIN: CPT

## 2021-01-01 PROCEDURE — 85027 COMPLETE CBC AUTOMATED: CPT

## 2021-01-01 PROCEDURE — 70486 CT MAXILLOFACIAL W/O DYE: CPT | Mod: 26,MA

## 2021-01-01 PROCEDURE — 85014 HEMATOCRIT: CPT

## 2021-01-01 PROCEDURE — U0005: CPT

## 2021-01-01 PROCEDURE — 82272 OCCULT BLD FECES 1-3 TESTS: CPT

## 2021-01-01 PROCEDURE — 99291 CRITICAL CARE FIRST HOUR: CPT | Mod: 25

## 2021-01-01 PROCEDURE — 86850 RBC ANTIBODY SCREEN: CPT

## 2021-01-01 PROCEDURE — 76705 ECHO EXAM OF ABDOMEN: CPT

## 2021-01-01 PROCEDURE — 70450 CT HEAD/BRAIN W/O DYE: CPT

## 2021-01-01 PROCEDURE — 73030 X-RAY EXAM OF SHOULDER: CPT

## 2021-01-01 PROCEDURE — 96374 THER/PROPH/DIAG INJ IV PUSH: CPT | Mod: XU

## 2021-01-01 PROCEDURE — 99223 1ST HOSP IP/OBS HIGH 75: CPT

## 2021-01-01 PROCEDURE — 80076 HEPATIC FUNCTION PANEL: CPT

## 2021-01-01 PROCEDURE — 93975 VASCULAR STUDY: CPT | Mod: 26

## 2021-01-01 PROCEDURE — 97110 THERAPEUTIC EXERCISES: CPT

## 2021-01-01 PROCEDURE — 83735 ASSAY OF MAGNESIUM: CPT

## 2021-01-01 PROCEDURE — 96376 TX/PRO/DX INJ SAME DRUG ADON: CPT | Mod: XU

## 2021-01-01 PROCEDURE — 99232 SBSQ HOSP IP/OBS MODERATE 35: CPT | Mod: GC

## 2021-01-01 PROCEDURE — 71260 CT THORAX DX C+: CPT | Mod: 26,ME

## 2021-01-01 PROCEDURE — 93010 ELECTROCARDIOGRAM REPORT: CPT

## 2021-01-01 PROCEDURE — 76377 3D RENDER W/INTRP POSTPROCES: CPT

## 2021-01-01 PROCEDURE — 70450 CT HEAD/BRAIN W/O DYE: CPT | Mod: 26,MA

## 2021-01-01 PROCEDURE — 99291 CRITICAL CARE FIRST HOUR: CPT

## 2021-01-01 PROCEDURE — 84132 ASSAY OF SERUM POTASSIUM: CPT

## 2021-01-01 PROCEDURE — 99232 SBSQ HOSP IP/OBS MODERATE 35: CPT

## 2021-01-01 PROCEDURE — 82803 BLOOD GASES ANY COMBINATION: CPT

## 2021-01-01 PROCEDURE — 85610 PROTHROMBIN TIME: CPT

## 2021-01-01 PROCEDURE — 73030 X-RAY EXAM OF SHOULDER: CPT | Mod: 26,LT

## 2021-01-01 PROCEDURE — 83690 ASSAY OF LIPASE: CPT

## 2021-01-01 PROCEDURE — 82947 ASSAY GLUCOSE BLOOD QUANT: CPT

## 2021-01-01 PROCEDURE — 87449 NOS EACH ORGANISM AG IA: CPT

## 2021-01-01 PROCEDURE — 70486 CT MAXILLOFACIAL W/O DYE: CPT

## 2021-01-01 PROCEDURE — 80307 DRUG TEST PRSMV CHEM ANLYZR: CPT

## 2021-01-01 PROCEDURE — 82248 BILIRUBIN DIRECT: CPT

## 2021-01-01 PROCEDURE — 83605 ASSAY OF LACTIC ACID: CPT

## 2021-01-01 PROCEDURE — 85018 HEMOGLOBIN: CPT

## 2021-01-01 PROCEDURE — 86769 SARS-COV-2 COVID-19 ANTIBODY: CPT

## 2021-01-01 PROCEDURE — 93005 ELECTROCARDIOGRAM TRACING: CPT

## 2021-01-01 PROCEDURE — 96375 TX/PRO/DX INJ NEW DRUG ADDON: CPT | Mod: XU

## 2021-01-01 PROCEDURE — G1004: CPT

## 2021-01-01 PROCEDURE — 82435 ASSAY OF BLOOD CHLORIDE: CPT

## 2021-01-01 PROCEDURE — 86901 BLOOD TYPING SEROLOGIC RH(D): CPT

## 2021-01-01 PROCEDURE — 71260 CT THORAX DX C+: CPT

## 2021-01-01 PROCEDURE — U0003: CPT

## 2021-01-01 PROCEDURE — 80053 COMPREHEN METABOLIC PANEL: CPT

## 2021-01-01 PROCEDURE — 71045 X-RAY EXAM CHEST 1 VIEW: CPT

## 2021-01-01 PROCEDURE — 74177 CT ABD & PELVIS W/CONTRAST: CPT | Mod: 26,MA

## 2021-01-01 PROCEDURE — 87324 CLOSTRIDIUM AG IA: CPT

## 2021-01-01 PROCEDURE — 85730 THROMBOPLASTIN TIME PARTIAL: CPT

## 2021-01-01 PROCEDURE — 74177 CT ABD & PELVIS W/CONTRAST: CPT

## 2021-01-01 RX ORDER — BACLOFEN 100 %
1 POWDER (GRAM) MISCELLANEOUS
Qty: 21 | Refills: 0
Start: 2021-01-01 | End: 2021-01-01

## 2021-01-01 RX ORDER — SACCHAROMYCES BOULARDII 250 MG
500 POWDER IN PACKET (EA) ORAL
Refills: 0 | Status: DISCONTINUED | OUTPATIENT
Start: 2021-01-01 | End: 2021-01-01

## 2021-01-01 RX ORDER — POTASSIUM PHOSPHATE, MONOBASIC POTASSIUM PHOSPHATE, DIBASIC 236; 224 MG/ML; MG/ML
15 INJECTION, SOLUTION INTRAVENOUS ONCE
Refills: 0 | Status: COMPLETED | OUTPATIENT
Start: 2021-01-01 | End: 2021-01-01

## 2021-01-01 RX ORDER — SODIUM CHLORIDE 9 MG/ML
1000 INJECTION INTRAMUSCULAR; INTRAVENOUS; SUBCUTANEOUS ONCE
Refills: 0 | Status: COMPLETED | OUTPATIENT
Start: 2021-01-01 | End: 2021-01-01

## 2021-01-01 RX ORDER — POTASSIUM CHLORIDE 20 MEQ
20 PACKET (EA) ORAL
Refills: 0 | Status: COMPLETED | OUTPATIENT
Start: 2021-01-01 | End: 2021-01-01

## 2021-01-01 RX ORDER — POTASSIUM CHLORIDE 20 MEQ
10 PACKET (EA) ORAL
Refills: 0 | Status: COMPLETED | OUTPATIENT
Start: 2021-01-01 | End: 2021-01-01

## 2021-01-01 RX ORDER — SODIUM CHLORIDE 9 MG/ML
1000 INJECTION, SOLUTION INTRAVENOUS ONCE
Refills: 0 | Status: COMPLETED | OUTPATIENT
Start: 2021-01-01 | End: 2021-01-01

## 2021-01-01 RX ORDER — FOLIC ACID 0.8 MG
1 TABLET ORAL
Qty: 0 | Refills: 0 | DISCHARGE
Start: 2021-01-01

## 2021-01-01 RX ORDER — KETOROLAC TROMETHAMINE 30 MG/ML
15 SYRINGE (ML) INJECTION ONCE
Refills: 0 | Status: DISCONTINUED | OUTPATIENT
Start: 2021-01-01 | End: 2021-01-01

## 2021-01-01 RX ORDER — SODIUM CHLORIDE 9 MG/ML
500 INJECTION, SOLUTION INTRAVENOUS ONCE
Refills: 0 | Status: COMPLETED | OUTPATIENT
Start: 2021-01-01 | End: 2021-01-01

## 2021-01-01 RX ORDER — FOLIC ACID 0.8 MG
1 TABLET ORAL DAILY
Refills: 0 | Status: DISCONTINUED | OUTPATIENT
Start: 2021-01-01 | End: 2021-01-01

## 2021-01-01 RX ORDER — ACETAMINOPHEN 500 MG
650 TABLET ORAL ONCE
Refills: 0 | Status: COMPLETED | OUTPATIENT
Start: 2021-01-01 | End: 2021-01-01

## 2021-01-01 RX ORDER — POTASSIUM CHLORIDE 20 MEQ
20 PACKET (EA) ORAL
Refills: 0 | Status: DISCONTINUED | OUTPATIENT
Start: 2021-01-01 | End: 2021-01-01

## 2021-01-01 RX ORDER — MAGNESIUM SULFATE 500 MG/ML
2 VIAL (ML) INJECTION ONCE
Refills: 0 | Status: COMPLETED | OUTPATIENT
Start: 2021-01-01 | End: 2021-01-01

## 2021-01-01 RX ORDER — ONDANSETRON 8 MG/1
4 TABLET, FILM COATED ORAL ONCE
Refills: 0 | Status: COMPLETED | OUTPATIENT
Start: 2021-01-01 | End: 2021-01-01

## 2021-01-01 RX ORDER — SODIUM CHLORIDE 9 MG/ML
1000 INJECTION, SOLUTION INTRAVENOUS
Refills: 0 | Status: DISCONTINUED | OUTPATIENT
Start: 2021-01-01 | End: 2021-01-01

## 2021-01-01 RX ORDER — PIPERACILLIN AND TAZOBACTAM 4; .5 G/20ML; G/20ML
3.38 INJECTION, POWDER, LYOPHILIZED, FOR SOLUTION INTRAVENOUS ONCE
Refills: 0 | Status: COMPLETED | OUTPATIENT
Start: 2021-01-01 | End: 2021-01-01

## 2021-01-01 RX ORDER — TRAZODONE HCL 50 MG
0.5 TABLET ORAL
Qty: 0 | Refills: 0 | DISCHARGE

## 2021-01-01 RX ORDER — PANTOPRAZOLE SODIUM 20 MG/1
80 TABLET, DELAYED RELEASE ORAL ONCE
Refills: 0 | Status: COMPLETED | OUTPATIENT
Start: 2021-01-01 | End: 2021-01-01

## 2021-01-01 RX ORDER — HEPARIN SODIUM 5000 [USP'U]/ML
5000 INJECTION INTRAVENOUS; SUBCUTANEOUS EVERY 12 HOURS
Refills: 0 | Status: DISCONTINUED | OUTPATIENT
Start: 2021-01-01 | End: 2021-01-01

## 2021-01-01 RX ORDER — VANCOMYCIN HCL 1 G
1 VIAL (EA) INTRAVENOUS
Qty: 55 | Refills: 0
Start: 2021-01-01 | End: 2021-01-01

## 2021-01-01 RX ORDER — LANOLIN ALCOHOL/MO/W.PET/CERES
5 CREAM (GRAM) TOPICAL ONCE
Refills: 0 | Status: COMPLETED | OUTPATIENT
Start: 2021-01-01 | End: 2021-01-01

## 2021-01-01 RX ORDER — ACETAMINOPHEN 500 MG
2 TABLET ORAL
Qty: 0 | Refills: 0 | DISCHARGE
Start: 2021-01-01

## 2021-01-01 RX ORDER — PIPERACILLIN AND TAZOBACTAM 4; .5 G/20ML; G/20ML
3.38 INJECTION, POWDER, LYOPHILIZED, FOR SOLUTION INTRAVENOUS EVERY 8 HOURS
Refills: 0 | Status: DISCONTINUED | OUTPATIENT
Start: 2021-01-01 | End: 2021-01-01

## 2021-01-01 RX ORDER — POTASSIUM CHLORIDE 20 MEQ
40 PACKET (EA) ORAL EVERY 4 HOURS
Refills: 0 | Status: COMPLETED | OUTPATIENT
Start: 2021-01-01 | End: 2021-01-01

## 2021-01-01 RX ORDER — SACCHAROMYCES BOULARDII 250 MG
2 POWDER IN PACKET (EA) ORAL
Qty: 28 | Refills: 0
Start: 2021-01-01 | End: 2021-01-01

## 2021-01-01 RX ORDER — POTASSIUM CHLORIDE 20 MEQ
40 PACKET (EA) ORAL ONCE
Refills: 0 | Status: COMPLETED | OUTPATIENT
Start: 2021-01-01 | End: 2021-01-01

## 2021-01-01 RX ORDER — THIAMINE MONONITRATE (VIT B1) 100 MG
100 TABLET ORAL ONCE
Refills: 0 | Status: COMPLETED | OUTPATIENT
Start: 2021-01-01 | End: 2021-01-01

## 2021-01-01 RX ORDER — FOLIC ACID 0.8 MG
1 TABLET ORAL ONCE
Refills: 0 | Status: COMPLETED | OUTPATIENT
Start: 2021-01-01 | End: 2021-01-01

## 2021-01-01 RX ORDER — BACLOFEN 100 %
10 POWDER (GRAM) MISCELLANEOUS THREE TIMES A DAY
Refills: 0 | Status: DISCONTINUED | OUTPATIENT
Start: 2021-01-01 | End: 2021-01-01

## 2021-01-01 RX ORDER — VANCOMYCIN HCL 1 G
125 VIAL (EA) INTRAVENOUS EVERY 6 HOURS
Refills: 0 | Status: DISCONTINUED | OUTPATIENT
Start: 2021-01-01 | End: 2021-01-01

## 2021-01-01 RX ORDER — THIAMINE MONONITRATE (VIT B1) 100 MG
1 TABLET ORAL
Qty: 0 | Refills: 0 | DISCHARGE
Start: 2021-01-01

## 2021-01-01 RX ORDER — THIAMINE MONONITRATE (VIT B1) 100 MG
100 TABLET ORAL DAILY
Refills: 0 | Status: DISCONTINUED | OUTPATIENT
Start: 2021-01-01 | End: 2021-01-01

## 2021-01-01 RX ORDER — ACETAMINOPHEN 500 MG
650 TABLET ORAL EVERY 6 HOURS
Refills: 0 | Status: DISCONTINUED | OUTPATIENT
Start: 2021-01-01 | End: 2021-01-01

## 2021-01-01 RX ADMIN — Medication 500 MILLIGRAM(S): at 05:57

## 2021-01-01 RX ADMIN — Medication 1 MILLIGRAM(S): at 12:25

## 2021-01-01 RX ADMIN — Medication 10 MILLIGRAM(S): at 22:30

## 2021-01-01 RX ADMIN — Medication 10 MILLIGRAM(S): at 14:46

## 2021-01-01 RX ADMIN — Medication 500 MILLIGRAM(S): at 17:04

## 2021-01-01 RX ADMIN — Medication 1 MILLIGRAM(S): at 01:52

## 2021-01-01 RX ADMIN — Medication 500 MILLIGRAM(S): at 17:11

## 2021-01-01 RX ADMIN — PIPERACILLIN AND TAZOBACTAM 25 GRAM(S): 4; .5 INJECTION, POWDER, LYOPHILIZED, FOR SOLUTION INTRAVENOUS at 22:21

## 2021-01-01 RX ADMIN — Medication 15 MILLIGRAM(S): at 03:09

## 2021-01-01 RX ADMIN — Medication 125 MILLIGRAM(S): at 23:45

## 2021-01-01 RX ADMIN — Medication 0.5 MILLIGRAM(S): at 15:28

## 2021-01-01 RX ADMIN — HEPARIN SODIUM 5000 UNIT(S): 5000 INJECTION INTRAVENOUS; SUBCUTANEOUS at 17:23

## 2021-01-01 RX ADMIN — Medication 1 MILLIGRAM(S): at 05:56

## 2021-01-01 RX ADMIN — Medication 125 MILLIGRAM(S): at 11:57

## 2021-01-01 RX ADMIN — Medication 125 MILLIGRAM(S): at 05:57

## 2021-01-01 RX ADMIN — Medication 40 MILLIEQUIVALENT(S): at 10:35

## 2021-01-01 RX ADMIN — Medication 10 MILLIGRAM(S): at 22:31

## 2021-01-01 RX ADMIN — Medication 1 MILLIGRAM(S): at 11:38

## 2021-01-01 RX ADMIN — Medication 125 MILLIGRAM(S): at 18:04

## 2021-01-01 RX ADMIN — Medication 0.5 MILLIGRAM(S): at 09:14

## 2021-01-01 RX ADMIN — Medication 1 TABLET(S): at 12:30

## 2021-01-01 RX ADMIN — Medication 1 MILLIGRAM(S): at 15:13

## 2021-01-01 RX ADMIN — Medication 1.5 MILLIGRAM(S): at 06:08

## 2021-01-01 RX ADMIN — Medication 1 MILLIGRAM(S): at 10:14

## 2021-01-01 RX ADMIN — Medication 10 MILLIGRAM(S): at 12:09

## 2021-01-01 RX ADMIN — Medication 100 MILLIEQUIVALENT(S): at 01:10

## 2021-01-01 RX ADMIN — Medication 100 MILLIGRAM(S): at 12:25

## 2021-01-01 RX ADMIN — Medication 20 MILLIEQUIVALENT(S): at 13:40

## 2021-01-01 RX ADMIN — Medication 2 MILLIGRAM(S): at 09:38

## 2021-01-01 RX ADMIN — Medication 500 MILLIGRAM(S): at 17:23

## 2021-01-01 RX ADMIN — Medication 10 MILLIGRAM(S): at 14:05

## 2021-01-01 RX ADMIN — Medication 50 GRAM(S): at 23:14

## 2021-01-01 RX ADMIN — Medication 100 MILLIEQUIVALENT(S): at 02:29

## 2021-01-01 RX ADMIN — Medication 125 MILLIGRAM(S): at 12:09

## 2021-01-01 RX ADMIN — Medication 1 TABLET(S): at 12:11

## 2021-01-01 RX ADMIN — Medication 1 MILLIGRAM(S): at 11:57

## 2021-01-01 RX ADMIN — Medication 125 MILLIGRAM(S): at 17:04

## 2021-01-01 RX ADMIN — Medication 1 TABLET(S): at 12:09

## 2021-01-01 RX ADMIN — Medication 650 MILLIGRAM(S): at 17:44

## 2021-01-01 RX ADMIN — SODIUM CHLORIDE 500 MILLILITER(S): 9 INJECTION, SOLUTION INTRAVENOUS at 13:14

## 2021-01-01 RX ADMIN — Medication 125 MILLIGRAM(S): at 00:01

## 2021-01-01 RX ADMIN — Medication 125 MILLIGRAM(S): at 17:43

## 2021-01-01 RX ADMIN — Medication 125 MILLIGRAM(S): at 05:33

## 2021-01-01 RX ADMIN — Medication 100 MILLIEQUIVALENT(S): at 11:41

## 2021-01-01 RX ADMIN — Medication 2 MILLIGRAM(S): at 18:05

## 2021-01-01 RX ADMIN — SODIUM CHLORIDE 1000 MILLILITER(S): 9 INJECTION INTRAMUSCULAR; INTRAVENOUS; SUBCUTANEOUS at 00:00

## 2021-01-01 RX ADMIN — Medication 20 MILLIEQUIVALENT(S): at 23:14

## 2021-01-01 RX ADMIN — Medication 125 MILLIGRAM(S): at 12:11

## 2021-01-01 RX ADMIN — Medication 100 MILLIEQUIVALENT(S): at 12:24

## 2021-01-01 RX ADMIN — Medication 500 MILLIGRAM(S): at 04:55

## 2021-01-01 RX ADMIN — Medication 0.5 MILLIGRAM(S): at 17:42

## 2021-01-01 RX ADMIN — Medication 50 GRAM(S): at 22:29

## 2021-01-01 RX ADMIN — Medication 125 MILLIGRAM(S): at 17:44

## 2021-01-01 RX ADMIN — Medication 125 MILLIGRAM(S): at 17:23

## 2021-01-01 RX ADMIN — Medication 1 MILLIGRAM(S): at 12:09

## 2021-01-01 RX ADMIN — Medication 500 MILLIGRAM(S): at 05:33

## 2021-01-01 RX ADMIN — Medication 100 MILLIEQUIVALENT(S): at 12:45

## 2021-01-01 RX ADMIN — Medication 20 MILLIEQUIVALENT(S): at 12:24

## 2021-01-01 RX ADMIN — Medication 100 MILLIGRAM(S): at 11:38

## 2021-01-01 RX ADMIN — Medication 100 MILLIGRAM(S): at 22:31

## 2021-01-01 RX ADMIN — POTASSIUM PHOSPHATE, MONOBASIC POTASSIUM PHOSPHATE, DIBASIC 62.5 MILLIMOLE(S): 236; 224 INJECTION, SOLUTION INTRAVENOUS at 09:58

## 2021-01-01 RX ADMIN — HEPARIN SODIUM 5000 UNIT(S): 5000 INJECTION INTRAVENOUS; SUBCUTANEOUS at 05:33

## 2021-01-01 RX ADMIN — Medication 0.5 MILLIGRAM(S): at 21:47

## 2021-01-01 RX ADMIN — Medication 5 MILLIGRAM(S): at 23:45

## 2021-01-01 RX ADMIN — Medication 1.5 MILLIGRAM(S): at 22:20

## 2021-01-01 RX ADMIN — Medication 100 MILLIEQUIVALENT(S): at 23:55

## 2021-01-01 RX ADMIN — Medication 2 MILLIGRAM(S): at 05:50

## 2021-01-01 RX ADMIN — Medication 100 MILLIEQUIVALENT(S): at 12:31

## 2021-01-01 RX ADMIN — Medication 1 TABLET(S): at 11:37

## 2021-01-01 RX ADMIN — Medication 40 MILLIEQUIVALENT(S): at 09:59

## 2021-01-01 RX ADMIN — Medication 1 TABLET(S): at 12:25

## 2021-01-01 RX ADMIN — Medication 100 MILLIEQUIVALENT(S): at 08:23

## 2021-01-01 RX ADMIN — Medication 100 MILLIEQUIVALENT(S): at 13:40

## 2021-01-01 RX ADMIN — Medication 1 TABLET(S): at 13:12

## 2021-01-01 RX ADMIN — Medication 20 MILLIEQUIVALENT(S): at 10:11

## 2021-01-01 RX ADMIN — Medication 1 MILLIGRAM(S): at 12:11

## 2021-01-01 RX ADMIN — Medication 1 MILLIGRAM(S): at 01:09

## 2021-01-01 RX ADMIN — SODIUM CHLORIDE 100 MILLILITER(S): 9 INJECTION, SOLUTION INTRAVENOUS at 05:57

## 2021-01-01 RX ADMIN — Medication 10 MILLIGRAM(S): at 21:49

## 2021-01-01 RX ADMIN — Medication 500 MILLIGRAM(S): at 18:04

## 2021-01-01 RX ADMIN — Medication 20 MILLIEQUIVALENT(S): at 01:12

## 2021-01-01 RX ADMIN — Medication 125 MILLIGRAM(S): at 18:38

## 2021-01-01 RX ADMIN — Medication 125 MILLIGRAM(S): at 05:03

## 2021-01-01 RX ADMIN — HEPARIN SODIUM 5000 UNIT(S): 5000 INJECTION INTRAVENOUS; SUBCUTANEOUS at 17:45

## 2021-01-01 RX ADMIN — Medication 500 MILLIGRAM(S): at 05:03

## 2021-01-01 RX ADMIN — HEPARIN SODIUM 5000 UNIT(S): 5000 INJECTION INTRAVENOUS; SUBCUTANEOUS at 05:42

## 2021-01-01 RX ADMIN — Medication 1 TABLET(S): at 11:39

## 2021-01-01 RX ADMIN — Medication 650 MILLIGRAM(S): at 11:39

## 2021-01-01 RX ADMIN — Medication 650 MILLIGRAM(S): at 05:47

## 2021-01-01 RX ADMIN — Medication 0.5 MILLIGRAM(S): at 18:04

## 2021-01-01 RX ADMIN — Medication 500 MILLIGRAM(S): at 17:43

## 2021-01-01 RX ADMIN — Medication 1 TABLET(S): at 10:15

## 2021-01-01 RX ADMIN — Medication 1.5 MILLIGRAM(S): at 14:36

## 2021-01-01 RX ADMIN — PIPERACILLIN AND TAZOBACTAM 200 GRAM(S): 4; .5 INJECTION, POWDER, LYOPHILIZED, FOR SOLUTION INTRAVENOUS at 18:37

## 2021-01-01 RX ADMIN — Medication 0.5 MILLIGRAM(S): at 05:34

## 2021-01-01 RX ADMIN — Medication 650 MILLIGRAM(S): at 05:33

## 2021-01-01 RX ADMIN — SODIUM CHLORIDE 1000 MILLILITER(S): 9 INJECTION, SOLUTION INTRAVENOUS at 10:09

## 2021-01-01 RX ADMIN — Medication 40 MILLIEQUIVALENT(S): at 14:05

## 2021-01-01 RX ADMIN — Medication 0.5 MILLIGRAM(S): at 06:08

## 2021-01-01 RX ADMIN — Medication 2 MILLIGRAM(S): at 23:02

## 2021-01-01 RX ADMIN — Medication 100 MILLIGRAM(S): at 12:09

## 2021-01-01 RX ADMIN — Medication 10 MILLIGRAM(S): at 05:39

## 2021-01-01 RX ADMIN — Medication 125 MILLIGRAM(S): at 04:54

## 2021-01-01 RX ADMIN — Medication 50 MILLIEQUIVALENT(S): at 23:56

## 2021-01-01 RX ADMIN — Medication 100 MILLIGRAM(S): at 12:11

## 2021-01-01 RX ADMIN — Medication 100 MILLIGRAM(S): at 12:30

## 2021-01-01 RX ADMIN — Medication 20 MILLIEQUIVALENT(S): at 11:38

## 2021-01-01 RX ADMIN — Medication 100 MILLIGRAM(S): at 11:39

## 2021-01-01 RX ADMIN — Medication 100 MILLIGRAM(S): at 13:11

## 2021-01-01 RX ADMIN — Medication 1 MILLIGRAM(S): at 22:00

## 2021-01-01 RX ADMIN — Medication 10 MILLIGRAM(S): at 05:33

## 2021-01-01 RX ADMIN — Medication 0.5 MILLIGRAM(S): at 13:11

## 2021-01-01 RX ADMIN — SODIUM CHLORIDE 1000 MILLILITER(S): 9 INJECTION, SOLUTION INTRAVENOUS at 22:10

## 2021-01-01 RX ADMIN — Medication 1.5 MILLIGRAM(S): at 02:05

## 2021-01-01 RX ADMIN — Medication 125 MILLIGRAM(S): at 00:10

## 2021-01-01 RX ADMIN — Medication 100 MILLIGRAM(S): at 10:15

## 2021-01-01 RX ADMIN — Medication 1 MILLIGRAM(S): at 11:39

## 2021-01-01 RX ADMIN — Medication 125 MILLIGRAM(S): at 13:11

## 2021-01-01 RX ADMIN — Medication 125 MILLIGRAM(S): at 05:39

## 2021-01-01 RX ADMIN — Medication 40 MILLIEQUIVALENT(S): at 19:55

## 2021-01-01 RX ADMIN — PIPERACILLIN AND TAZOBACTAM 25 GRAM(S): 4; .5 INJECTION, POWDER, LYOPHILIZED, FOR SOLUTION INTRAVENOUS at 05:04

## 2021-01-01 RX ADMIN — Medication 0.5 MILLIGRAM(S): at 09:01

## 2021-01-01 RX ADMIN — Medication 100 MILLIEQUIVALENT(S): at 10:10

## 2021-01-01 RX ADMIN — Medication 500 MILLIGRAM(S): at 17:44

## 2021-01-01 RX ADMIN — Medication 50 MILLIEQUIVALENT(S): at 01:01

## 2021-01-01 RX ADMIN — Medication 500 MILLIGRAM(S): at 05:42

## 2021-01-01 RX ADMIN — Medication 1 TABLET(S): at 11:57

## 2021-01-01 RX ADMIN — Medication 650 MILLIGRAM(S): at 18:44

## 2021-01-01 RX ADMIN — Medication 650 MILLIGRAM(S): at 09:50

## 2021-01-01 RX ADMIN — Medication 0.5 MILLIGRAM(S): at 02:13

## 2021-01-01 RX ADMIN — Medication 10 MILLIGRAM(S): at 21:47

## 2021-01-01 RX ADMIN — Medication 1 MILLIGRAM(S): at 12:30

## 2021-01-01 RX ADMIN — Medication 125 MILLIGRAM(S): at 10:15

## 2021-01-01 RX ADMIN — Medication 2 MILLIGRAM(S): at 22:37

## 2021-01-01 RX ADMIN — Medication 50 MILLIEQUIVALENT(S): at 02:06

## 2021-01-01 RX ADMIN — HEPARIN SODIUM 5000 UNIT(S): 5000 INJECTION INTRAVENOUS; SUBCUTANEOUS at 17:42

## 2021-01-01 RX ADMIN — Medication 2 MILLIGRAM(S): at 13:52

## 2021-01-01 RX ADMIN — Medication 500 MILLIGRAM(S): at 05:39

## 2021-01-01 RX ADMIN — POTASSIUM PHOSPHATE, MONOBASIC POTASSIUM PHOSPHATE, DIBASIC 62.5 MILLIMOLE(S): 236; 224 INJECTION, SOLUTION INTRAVENOUS at 18:53

## 2021-01-01 RX ADMIN — Medication 0.5 MILLIGRAM(S): at 22:29

## 2021-01-01 RX ADMIN — Medication 1.5 MILLIGRAM(S): at 10:34

## 2021-01-01 RX ADMIN — Medication 125 MILLIGRAM(S): at 22:31

## 2021-01-01 RX ADMIN — Medication 125 MILLIGRAM(S): at 00:00

## 2021-01-01 RX ADMIN — Medication 650 MILLIGRAM(S): at 13:41

## 2021-01-01 RX ADMIN — Medication 2 MILLIGRAM(S): at 22:13

## 2021-01-01 RX ADMIN — Medication 100 MILLIEQUIVALENT(S): at 10:56

## 2021-01-01 RX ADMIN — Medication 40 MILLIEQUIVALENT(S): at 17:44

## 2021-01-01 RX ADMIN — Medication 650 MILLIGRAM(S): at 12:30

## 2021-01-01 RX ADMIN — Medication 100 MILLIEQUIVALENT(S): at 15:30

## 2021-01-01 RX ADMIN — Medication 10 MILLIGRAM(S): at 12:12

## 2021-01-01 RX ADMIN — Medication 125 MILLIGRAM(S): at 17:11

## 2021-01-01 RX ADMIN — Medication 10 MILLIGRAM(S): at 04:54

## 2021-01-01 RX ADMIN — Medication 20 MILLIEQUIVALENT(S): at 13:51

## 2021-01-01 RX ADMIN — Medication 125 MILLIGRAM(S): at 12:30

## 2021-01-01 RX ADMIN — Medication 2 MILLIGRAM(S): at 03:12

## 2021-01-01 RX ADMIN — Medication 0.5 MILLIGRAM(S): at 17:05

## 2021-01-01 RX ADMIN — Medication 1 MILLIGRAM(S): at 19:12

## 2021-01-01 RX ADMIN — Medication 2 MILLIGRAM(S): at 02:06

## 2021-01-01 RX ADMIN — Medication 0.5 MILLIGRAM(S): at 05:14

## 2021-01-01 RX ADMIN — SODIUM CHLORIDE 100 MILLILITER(S): 9 INJECTION, SOLUTION INTRAVENOUS at 10:10

## 2021-01-01 RX ADMIN — SODIUM CHLORIDE 1000 MILLILITER(S): 9 INJECTION, SOLUTION INTRAVENOUS at 03:00

## 2021-01-01 RX ADMIN — Medication 10 MILLIGRAM(S): at 05:42

## 2021-01-01 RX ADMIN — Medication 125 MILLIGRAM(S): at 05:42

## 2021-01-01 RX ADMIN — Medication 1 MILLIGRAM(S): at 10:15

## 2021-01-01 RX ADMIN — Medication 650 MILLIGRAM(S): at 10:35

## 2021-01-01 RX ADMIN — Medication 10 MILLIGRAM(S): at 22:24

## 2021-01-01 RX ADMIN — ONDANSETRON 4 MILLIGRAM(S): 8 TABLET, FILM COATED ORAL at 22:12

## 2021-01-01 RX ADMIN — Medication 100 MILLIGRAM(S): at 11:57

## 2021-01-01 RX ADMIN — Medication 125 MILLIGRAM(S): at 11:39

## 2021-01-01 RX ADMIN — Medication 125 MILLIGRAM(S): at 00:15

## 2021-01-01 RX ADMIN — Medication 1.5 MILLIGRAM(S): at 18:52

## 2021-01-01 RX ADMIN — HEPARIN SODIUM 5000 UNIT(S): 5000 INJECTION INTRAVENOUS; SUBCUTANEOUS at 04:55

## 2021-01-01 RX ADMIN — Medication 650 MILLIGRAM(S): at 23:45

## 2021-01-01 RX ADMIN — POTASSIUM PHOSPHATE, MONOBASIC POTASSIUM PHOSPHATE, DIBASIC 62.5 MILLIMOLE(S): 236; 224 INJECTION, SOLUTION INTRAVENOUS at 13:21

## 2021-01-01 RX ADMIN — Medication 40 MILLIEQUIVALENT(S): at 13:12

## 2021-01-01 RX ADMIN — Medication 10 MILLIGRAM(S): at 05:14

## 2021-01-01 RX ADMIN — Medication 500 MILLIGRAM(S): at 05:14

## 2021-01-01 RX ADMIN — Medication 1 MILLIGRAM(S): at 13:11

## 2021-01-01 RX ADMIN — Medication 125 MILLIGRAM(S): at 05:14

## 2021-01-01 RX ADMIN — PANTOPRAZOLE SODIUM 80 MILLIGRAM(S): 20 TABLET, DELAYED RELEASE ORAL at 03:09

## 2021-01-01 RX ADMIN — Medication 125 MILLIGRAM(S): at 23:25

## 2021-01-25 NOTE — ED PROVIDER NOTE - ATTENDING CONTRIBUTION TO CARE
Attending MD Segura:  I personally have seen and examined this patient.  Resident note reviewed and agree on plan of care and except where noted.  See HPI, PE, and MDM for details.

## 2021-01-25 NOTE — ED PROVIDER NOTE - PHYSICAL EXAMINATION
Gen: AAOx3, ill appearing  Head: NCAT  HEENT: Bruising around the right orbit, scabbed over laceration on left eyebrow. oral mucosa moist. Right eye has subconjunctival hematoma  Lung: CTAB, no respiratory distress, speaking in full sentences  CV: tachycardic, no murmurs, rubs or gallops  Abd:  distended, epigastric tenderness, no guarding  MSK: no visible deformities  Neuro: tremulous, cn2-12 grossly intact, moving all extremities  Skin: Warm, well perfused. Right shoulder bruise 3dvx3km, left chest wall bruise ~[id number]  Psych: normal affect.   ~Sushil Berto PGY3

## 2021-01-25 NOTE — ED PROVIDER NOTE - CARE PLAN
Principal Discharge DX:	Alcohol withdrawal syndrome without complication   Principal Discharge DX:	Alcohol withdrawal syndrome without complication  Secondary Diagnosis:	Hypokalemia  Secondary Diagnosis:	Hypomagnesemia  Secondary Diagnosis:	Closed head injury, initial encounter

## 2021-01-25 NOTE — ED PROVIDER NOTE - CLINICAL SUMMARY MEDICAL DECISION MAKING FREE TEXT BOX
Attending MD Segura:   55M with ho etoh abuse presenting with nausea/vomiting after a fall 3 days ago with head strike. Exam notable for tachycardia, GCS 15, R jazmyn-orbital ecchymosis with associated subconjunctival hemorrhage, left anterior chest wall ecchymosis, tremulous. Plan for CT head, max-face, CT torso to r/o traumatic injury, treat for active etoh WD with IV benzos, screening labs, ECG, IV fluids, thiamine/folate       *The above represents an initial assessment/impression. Please refer to progress notes for potential changes in patient clinical course*

## 2021-01-25 NOTE — ED PROVIDER NOTE - PROGRESS NOTE DETAILS
Tom QUINTANA: Received sign out from my colleague Dr. Segura pt presents with a cc of c.f etoh w draw s/p ativan x2 pending ct, noted w possible proctitis on CT will admit to medicine will discuss w inpt team +/- abx.

## 2021-01-25 NOTE — ED PROVIDER NOTE - NS ED ROS FT
Gen: No fever, No chills  Eyes: left eye blurry vision  ENT: No congestion, sore throat  Resp: No cough. No SOB  Cardiovascular: No chest pain. No palpitations  GI: + abdominal pain, nausea/vomiting  :  No change in urine output or frequency; no dysuria  MS: No joint. No muscle pain  Skin: right eye and shoulder bruising, left sided chest wall bruising  Neuro: +tremors No headache; No numbness or weakness  Remainder negative, except as per the HPI

## 2021-01-25 NOTE — ED PROVIDER NOTE - OBJECTIVE STATEMENT
55y male with PMH of EtOH abuse, DTs, EtOH withdrawal, cirrhosis (w/ portal HTN), HTN, prior oxycodone abuse, GI bleeds presenting with nausea, coffee ground emesis and alcohol withdrawal. Last drink was yesterday evening. Since he woke up this morning he has had nausea and vomiting - describes vomiting as sometimes yellow, sometimes black. States he feel 3 days ago, has bruising of the right eye, right shoulder and left chest.

## 2021-01-25 NOTE — ED ADULT NURSE NOTE - OBJECTIVE STATEMENT
54 yo male AAOX3 with hx of alcohol withdrawal presents to ED via EMS for withdrawals, nausea/vomiting and dizziness.... 56 yo male AAOX3 with hx of alcohol withdrawal and cirrhosis presents to ED via EMS for withdrawals, nausea/vomiting and dizziness. Patient reports falling on Friday when they tripped over a step, hit right side of face, denies LOC and blood thinner use. Right orbital area bruised, swollen and c/o blurry vision. Bruising noted to b/l shoulders, and left side chest, +ROMx4 extremities, +tenderness to touch to bruised areas. No open wounds or lacerations noted. Non labored respirations, no use of accessory muscles, pulse ox 96% on room air. Patient on cardiac monitor, tachycardic to the 110s, EKG completed. C/o intermittent palpitations, no chest pain or numbness/tingling. Abdomen soft, non tender and non distended. No fevers, chills, SOB. Patient reports drinking 1 pint of tequila every day, has been admitted before for withdrawals. Safety measures maintained with bed in low positions and side rails up.

## 2021-01-26 NOTE — DISCHARGE NOTE NURSING/CASE MANAGEMENT/SOCIAL WORK - PATIENT PORTAL LINK FT
You can access the FollowMyHealth Patient Portal offered by [facility] by registering at the following website: [email or web address]. By joining [facility]’s FollowMyHealth portal, you will also be able to view your health information using other applications (apps) compatible with our system.

## 2021-01-26 NOTE — H&P ADULT - ASSESSMENT
55M with HTN (with medication nonadherence), chronic alcohol abuse (c/b prior DTs, withdrawal seizures, hematemesis, and melena), alcoholic cirrhosis (with portal hypertension), recent admission in 12/2020 for severe EtOH withdrawal requiring phenobarb and precedex gtt, presents with nausea/vomiting after EtOH use and fall

## 2021-01-26 NOTE — H&P ADULT - NSHPPHYSICALEXAM_GEN_ALL_CORE
Vital Signs Last 24 Hrs  T(C): 37.5 (26 Jan 2021 05:05), Max: 38 (26 Jan 2021 02:20)  T(F): 99.5 (26 Jan 2021 05:05), Max: 100.4 (26 Jan 2021 02:20)  HR: 110 (26 Jan 2021 05:05) (101 - 128)  BP: 134/77 (26 Jan 2021 05:05) (116/72 - 156/98)  BP(mean): 88 (26 Jan 2021 03:00) (80 - 92)  RR: 20 (26 Jan 2021 05:05) (16 - 22)  SpO2: 94% (26 Jan 2021 05:05) (94% - 100%)  CAPILLARY BLOOD GLUCOSE        I&O's Summary    25 Jan 2021 07:01  -  26 Jan 2021 07:00  --------------------------------------------------------  IN: 120 mL / OUT: 200 mL / NET: -80 mL        PHYSICAL EXAM:  GENERAL: NAD, well-developed  HEAD:  ecchymosis around R eye; Atraumatic, Normocephalic  EYES: EOMI, PERRLA, conjunctiva and sclera clear  MOUTH: no oral thrush  NECK: Supple, No JVD  CHEST/LUNG: Clear to auscultation bilaterally; No wheeze  HEART: Regular rate and rhythm; No murmurs, rubs, or gallops  ABDOMEN: Soft, Nontender, Nondistended; Bowel sounds present  EXTREMITIES:  2+ Peripheral Pulses, No clubbing, cyanosis, or edema  NEUROLOGY: (+) mild tremors; AAOx3, non-focal  SKIN: No rashes or lesions

## 2021-01-26 NOTE — H&P ADULT - ATTENDING COMMENTS
Kristan Valadez MD  Division of [facility] Medicine  Cell: [phone]  Office: [phone]

## 2021-01-26 NOTE — H&P ADULT - PROBLEM SELECTOR PLAN 1
High risk given hx of DTs, withdrawal seizures  - start on ativan taper regimen  - symptom triggered CIWA   - currently CIWA 5-6  - start folic acid, multivitamin, thiamine  -  consult

## 2021-01-26 NOTE — H&P ADULT - PROBLEM SELECTOR PLAN 2
plt 42k, likely in setting of EtOH abuse and liver cirrhosis  - continue to monitor closely  - no signs of active bleed at this time

## 2021-01-26 NOTE — H&P ADULT - NSHPLABSRESULTS_GEN_ALL_CORE
LABS:   personally reviewed                        10.3   4.55  )-----------( 42       ( 25 Jan 2021 22:25 )             29.8     01-25    139  |  87<L>  |  14  ----------------------------<  178<H>  3.5   |  31  |  0.59    Ca    9.3      25 Jan 2021 22:25  Phos  2.5     01-25  Mg     1.5     01-25    TPro  8.5<H>  /  Alb  4.3  /  TBili  3.3<H>  /  DBili  1.4<H>  /  AST  131<H>  /  ALT  22  /  AlkPhos  224<H>  01-25    PT/INR - ( 25 Jan 2021 22:25 )   PT: 14.4 sec;   INR: 1.21 ratio         PTT - ( 25 Jan 2021 22:25 )  PTT:28.2 sec  Urinalysis Basic - ( 26 Jan 2021 06:22 )    Color: Yellow / Appearance: Clear / SG: >1.050 / pH: x  Gluc: x / Ketone: Negative  / Bili: Small / Urobili: >8mg/dL   Blood: x / Protein: 100 / Nitrite: Negative   Leuk Esterase: Negative / RBC: x / WBC x   Sq Epi: x / Non Sq Epi: x / Bacteria: x      CT chest/abd/pelv:    Mild diffuse thickening of the esophagus suggests esophagitis.  Age-indeterminate fractures of the left lateral ninth and 10th ribs. No pneumothorax or hemothorax.  Enlarged fatty cirrhotic liver. Stigmata of portal hypertension. Mild volume of abdominal ascites. Please note that annual surveillance MRI is recommended in cirrhotic patients to screen for hepatocellular carcinoma.  Right colonicthickening suggests hepatic colopathy.  Possible proctitis.  Possible cystitis. Correlate with urinalysis.    < from: CT Maxillofacial No Cont (01.25.21 @ 23:35) >    Head CT: No evidence for intracranial hemorrhage, mass effect, or displaced calvarial fracture. Chronic atrophy.    Maxillofacial CT: No acute maxillofacial bone fracture. Mild right lateral periorbital soft tissue swelling. Intact orbits. No radiopaque foreign body. Recommend dental consultation for dental cavities seen involving the right maxillary second and third molar teeth.    < end of copied text >    ECG personally reviewed: sinus tachy 125bpm; no st elevation; QTc 462

## 2021-01-26 NOTE — H&P ADULT - HISTORY OF PRESENT ILLNESS
55M with HTN (with medication nonadherence), chronic alcohol abuse (c/b prior DTs, withdrawal seizures, hematemesis, and melena), alcoholic cirrhosis (with portal hypertension), recent admission in 12/2020 for severe EtOH withdrawal requiring phenobarb and precedex gtt, presents with nausea/vomiting after EtOH use and fall. He states that since discharge, he has been drinking 4-5 bottles of tequilla everyday and last drink was Sunday night (1/24). He woke up on Monday with severe nausea and started having NBNB emesis so couldn't drink anymore and came to ED. Of note, he fell 3 days ago on the steps while he was intoxicated. Denies fever/chills, chest pain, palpitation, diarrhea, dysuria.

## 2021-01-27 NOTE — PHYSICAL THERAPY INITIAL EVALUATION ADULT - PRECAUTIONS/LIMITATIONS, REHAB EVAL
55M with HTN (with medication nonadherence), chronic alcohol abuse (c/b prior DTs, withdrawal seizures, hematemesis, and melena), alcoholic cirrhosis (with portal hypertension), recent admission in 12/2020 for severe EtOH withdrawal requiring phenobarb and precedex gtt, presents with nausea/vomiting after EtOH use and fall. He states that since discharge, he has been drinking 4-5 bottles of tequilla everyday and last drink was Sunday night (1/24). He woke up on Monday with severe nausea and started having NBNB emesis so couldn't drink anymore and came to ED. Of note, he fell 3 days ago on the steps while he was intoxicated./fall precautions

## 2021-01-27 NOTE — PHYSICAL THERAPY INITIAL EVALUATION ADULT - IMPAIRMENTS CONTRIBUTING IMPAIRED BED MOBILITY, REHAB EVAL
decreased endurance , log roll R / L mod of 1/cognition/impaired postural control/decreased strength

## 2021-01-27 NOTE — PHYSICAL THERAPY INITIAL EVALUATION ADULT - PLANNED THERAPY INTERVENTIONS, PT EVAL
stair train GOAL: pt will be able to negotiate a flight of steps with HR with supervision in 2-3 weeks/balance training/bed mobility training/strengthening/transfer training

## 2021-01-27 NOTE — PHYSICAL THERAPY INITIAL EVALUATION ADULT - ADDITIONAL COMMENTS
pt lives in house with 22y.o son and mother with 5 steps to enter with HR and 12 steps insdie with HR ; pt used no AD PTA ; report to SW drink 1 L of tequila /day PTA was receptive to SW for resources for substance abuse ; pt occupation  ; pt did not ID caregiver ; has Daughter Emely list as emergency contact [phone];

## 2021-01-27 NOTE — PHYSICAL THERAPY INITIAL EVALUATION ADULT - PERTINENT HX OF CURRENT PROBLEM, REHAB EVAL
Multiple loose BM's r/o C Diff (indeterminate) ; CXR w/o pthx ;  ; plt 44; Na 133 ; K 2.9 ; lactate 7.4 ; ativan taper

## 2021-01-27 NOTE — PHYSICAL THERAPY INITIAL EVALUATION ADULT - GENERAL OBSERVATIONS, REHAB EVAL
pt received in bed semi sidelying R all siderails up HOb 30 bed alarm active ; pt resting but arousable lethargy , bed eval completed , pt with multiple loose BM's per rn Jeanette in to see pt , pt also with fever 102 per rn ; pt soil self and need bed pan PT assisted pt and Rn turning pt remove soiled pad rn place new pad and bed pan ; bed alarm reactivated all siderails up HOb 30 call bell in reach and pt shown how to use when done rn Jeanette in room; pt report did not sleep much last

## 2021-01-28 NOTE — CONSULT NOTE ADULT - SUBJECTIVE AND OBJECTIVE BOX
Patient is a 55y old  Male who presents with a chief complaint of EtOH withdrawal (27 Jan 2021 13:07)    HPI:  55M with HTN (with medication nonadherence), chronic alcohol abuse (c/b prior DTs, withdrawal seizures, hematemesis, and melena), alcoholic cirrhosis (with portal hypertension), recent admission in 12/2020 for severe EtOH withdrawal requiring phenobarb and precedex gtt, presents with nausea/vomiting after EtOH use and fall. He states that since discharge, he has been drinking 4-5 bottles of tequilla everyday and last drink was Sunday night (1/24). He woke up on Monday with severe nausea and started having NBNB emesis so couldn't drink anymore and came to ED. Of note, he fell 3 days ago on the steps while he was intoxicated. Denies fever/chills, chest pain, palpitation, diarrhea, dysuria. (26 Jan 2021 08:49)       prior hospital charts reviewed [  ]  primary team notes reviewed [  ]  other consultant notes reviewed [  ]    PAST MEDICAL & SURGICAL HISTORY:  Sciatica    HTN (hypertension)    ETOH abuse    Grief reaction    S/P Repair of Inguinal Hernia        Allergies  No Known Allergies    ANTIMICROBIALS (past 90 days)  MEDICATIONS  (STANDING):  piperacillin/tazobactam IVPB.   200 mL/Hr IV Intermittent (01-27-21 @ 18:37)    piperacillin/tazobactam IVPB..   25 mL/Hr IV Intermittent (01-28-21 @ 05:04)   25 mL/Hr IV Intermittent (01-27-21 @ 22:21)    vancomycin    Solution   125 milliGRAM(s) Oral (01-28-21 @ 05:03)   125 milliGRAM(s) Oral (01-27-21 @ 23:25)   125 milliGRAM(s) Oral (01-27-21 @ 18:38)      piperacillin/tazobactam IVPB.. 3.375 every 8 hours  vancomycin    Solution 125 every 6 hours    OTHER MEDS: MEDICATIONS  (STANDING):  acetaminophen   Tablet .. 650 every 6 hours PRN  LORazepam   Injectable 2 every 1 hour PRN  LORazepam   Injectable    LORazepam   Injectable 1 every 4 hours    SOCIAL HISTORY:       FAMILY HISTORY:  FH: dementia  mother      REVIEW OF SYSTEMS  [  ] ROS unobtainable because:    [  ] All other systems negative except as noted below:	    Constitutional:  [ ] fever [ ] chills  [ ] weight loss  [ ] weakness  Skin:  [ ] rash [ ] phlebitis	  Eyes: [ ] icterus [ ] pain  [ ] discharge	  ENMT: [ ] sore throat  [ ] thrush [ ] ulcers [ ] exudates  Respiratory: [ ] dyspnea [ ] hemoptysis [ ] cough [ ] sputum	  Cardiovascular:  [ ] chest pain [ ] palpitations [ ] edema	  Gastrointestinal:  [ ] nausea [ ] vomiting [ ] diarrhea [ ] constipation [ ] pain	  Genitourinary:  [ ] dysuria [ ] frequency [ ] hematuria [ ] discharge [ ] flank pain  [ ] incontinence  Musculoskeletal:  [ ] myalgias [ ] arthralgias [ ] arthritis  [ ] back pain  Neurological:  [ ] headache [ ] seizures  [ ] confusion/altered mental status  Psychiatric:  [ ] anxiety [ ] depression	  Hematology/Lymphatics:  [ ] lymphadenopathy  Endocrine:  [ ] adrenal [ ] thyroid  Allergic/Immunologic:	 [ ] transplant [ ] seasonal    Vital Signs Last 24 Hrs  T(F): 98.6 (01-28-21 @ 04:27), Max: 102.3 (01-27-21 @ 13:23)  Vital Signs Last 24 Hrs  HR: 118 (01-28-21 @ 04:27) (118 - 123)  BP: 140/84 (01-28-21 @ 04:27) (105/68 - 140/84)  RR: 18 (01-28-21 @ 04:27)  SpO2: 96% (01-28-21 @ 04:27) (95% - 96%)  Wt(kg): --    PHYSICAL EXAM:  Constitutional: non-toxic, no distress  HEAD/EYES: anicteric, no conjunctival injection  ENT:  supple, no thrush  Cardiovascular:   normal S1, S2, no murmur, no edema  Respiratory:  clear BS bilaterally, no wheezes, no rales  GI:  soft, non-tender, normal bowel sounds  :  no ng, no CVA tenderness  Musculoskeletal:  no synovitis, normal ROM  Neurologic: awake and alert, normal strength, no focal findings  Skin:  no rash, no erythema, no phlebitis  Heme/Onc: no lymphadenopathy   Psychiatric:  awake, alert, appropriate mood                          9.8    5.86  )-----------( 51       ( 28 Jan 2021 07:12 )             29.5   01-28    136  |  97  |  8   ----------------------------<  106<H>  3.3<L>   |  25  |  0.66    Ca    8.9      28 Jan 2021 07:12  Phos  2.0     01-28  Mg     1.8     01-28    TPro  6.5  /  Alb  3.3  /  TBili  2.2<H>  /  DBili  x   /  AST  190<H>  /  ALT  22  /  AlkPhos  221<H>  01-27    MICROBIOLOGY:  Culture - Blood (collected 26 Jan 2021 12:56)  Source: .Blood Blood-Peripheral  Preliminary Report (27 Jan 2021 13:02):    No growth to date.    Culture - Blood (collected 26 Jan 2021 12:56)  Source: .Blood Blood-Peripheral  Preliminary Report (27 Jan 2021 13:02):    No growth to date.      C Diff by PCR Result: Detected (01-27 @ 00:35)    C Diff by PCR Result: Detected (01-27-21 @ 00:35)  Clostridium difficile GDH Toxins A&amp;B, EIA:   Indeterminate (01-26-21 @ 18:08)  Clostridium difficile GDH Interpretation: This specimen is positive for C. Difficile glutamate dehydrogenase (GDH)  antigen and negative for C. Difficile Toxins A & B, by EIA.  GDH is a  highly sensitive screening marker for C. Difficile that is produced in  large amounts by all C. Difficilestrains, both toxigenic and  nontoxigenic.  Specimens that are GDH positive and toxin negative will be  tested further by PCR to detect toxin gene sequences associated with  toxin producing C. Difficle. (01-26-21 @ 18:08)    RADIOLOGY:  imaging below personally reviewed and agree with findings    < from: CT Abdomen and Pelvis w/ IV Cont (01.25.21 @ 23:40) >  INTERPRETATION:  CT of the chest, abdomen and pelvis    Indication: Fall 3 days ago, with coffee ground emesis, vomiting, weakness    Technique: Axial images were obtained from the thoracic inlet through pubic symphysis with IV contrast.  90 cc of Omnipaque 350 was demonstrated intravenously without complication and 10 cc was discarded.  Reformatted coronal and sagittal images were submitted.    Comparison: Abdominal CT of October 18, 2019    FINDINGS:    The visualized neck, axilla and subcutaneous tissues are unremarkable.    The tracheobronchial tree is patent centrally.  There is no mediastinal hematoma.  The great vessels are not enlarged. Coronary atherosclerosis. There is no significant mediastinal or hilar adenopathy. Mild thickening of the esophagus suggests esophagitis.    The heart is not enlarged. Pericardial calcification. There is nopericardial effusion.    Lungs: Evaluation of the lungs is limited by respiratory motion artifact. Atelectatic changes at the lung bases.    Pleura: Unremarkable.  There is no free air or focal collection.  Small amount of free fluid.    Liver: Cirrhotic fatty enlarged liver.  Spleen: Enlarged, measuring 17.4 x 5.5 cm.  Gallbladder: Unremarkable.  Biliary tree: Unremarkable.  Adrenal glands: Normal.  Pancreas: Normal.  Kidneys: Normal.    Bowel:  There is no small bowel obstruction. There is worse:  Lower the right colon is thickened, which may be seen with hepatic colopathy. There is scattered left colonic diverticulosis. The sigmoid colon is mildly tortuous. The rectum is mildly thickened suggesting mild proctitis. Diverticulosis.    Bladder: Although the urinary bladder is underdistended, there is wall thickening raising the possibility of cystitis.  Pelvic organs: The prostate is not enlarged.    Redemonstration of prominent portacaval lymph nodes measuring up to 2.6 x 1.6 cm.    Vasculature: The aorta is not dilated. Minor atherosclerotic vascular calcification. Patent paraumbilical vein consistent with portal hypertension.    Retroperitoneum: There is no mass.  Bones: Age-indeterminate fractures of the left lateral ninth and 10th ribs. Chronic degenerative changes of the spine.  Subcutaneous tissues: Unremarkable.    IMPRESSION:    Mild diffuse thickening of the esophagus suggests esophagitis.  Age-indeterminate fractures of the left lateral ninth and 10th ribs. No pneumothorax or hemothorax.  Enlarged fatty cirrhotic liver. Stigmata of portal hypertension. Mild volume of abdominal ascites. Please note that annual surveillance MRI is recommended in cirrhotic patients to screen for hepatocellular carcinoma.  Right colonicthickening suggests hepatic colopathy.  Possible proctitis.  Possible cystitis. Correlate with urinalysis.    < end of copied text >      < from: Xray Chest 1 View- PORTABLE-Urgent (Xray Chest 1 View- PORTABLE-Urgent .) (01.25.21 @ 22:18) >  COMPARISON: Chest radiograph from 12/17/2020.    FINDINGS:    The heart size is normal.    The lungs are clear. No pleural effusions or pneumothorax.    Degenerative changes of the bones.    IMPRESSION:    No pneumothorax as questioned.    < end of copied text > Patient is a 55y old  Male who presents with a chief complaint of EtOH withdrawal (27 Jan 2021 13:07)    HPI:  55M with HTN (with medication nonadherence), chronic alcohol abuse (c/b prior DTs, withdrawal seizures, hematemesis, and melena), alcoholic cirrhosis (with portal hypertension), recent admission in 12/2020 for severe EtOH withdrawal requiring phenobarb and precedex gtt, presents with nausea/vomiting after EtOH use and fall. He states that since discharge, he has been drinking 4-5 bottles of tequilla everyday and last drink was Sunday night (1/24). He woke up on Monday with severe nausea and started having NBNB emesis so couldn't drink anymore and came to ED. Of note, he fell 3 days ago on the steps while he was intoxicated. Denies fever/chills, chest pain, palpitation, diarrhea, dysuria. (26 Jan 2021 08:49)         PAST MEDICAL & SURGICAL HISTORY:  Sciatica    HTN (hypertension)    ETOH abuse    Grief reaction    S/P Repair of Inguinal Hernia        Allergies  No Known Allergies    ANTIMICROBIALS (past 90 days)  MEDICATIONS  (STANDING):  piperacillin/tazobactam IVPB.   200 mL/Hr IV Intermittent (01-27-21 @ 18:37)    piperacillin/tazobactam IVPB..   25 mL/Hr IV Intermittent (01-28-21 @ 05:04)   25 mL/Hr IV Intermittent (01-27-21 @ 22:21)    vancomycin    Solution   125 milliGRAM(s) Oral (01-28-21 @ 05:03)   125 milliGRAM(s) Oral (01-27-21 @ 23:25)   125 milliGRAM(s) Oral (01-27-21 @ 18:38)      piperacillin/tazobactam IVPB.. 3.375 every 8 hours  vancomycin    Solution 125 every 6 hours    OTHER MEDS: MEDICATIONS  (STANDING):  acetaminophen   Tablet .. 650 every 6 hours PRN  LORazepam   Injectable 2 every 1 hour PRN  LORazepam   Injectable    LORazepam   Injectable 1 every 4 hours    SOCIAL HISTORY:     lives with brother and son, no smoking, drinks daily, no recent travel    FAMILY HISTORY:  FH: dementia  mother      REVIEW OF SYSTEMS  [  ] ROS unobtainable because:    [  ] All other systems negative except as noted below:	    Constitutional:  [ ] fever [ ] chills  [ ] weight loss  [ ] weakness  Skin:  [ ] rash [ ] phlebitis	  Eyes: [ ] icterus [ ] pain  [ ] discharge	  ENMT: [ ] sore throat  [ ] thrush [ ] ulcers [ ] exudates  Respiratory: [ ] dyspnea [ ] hemoptysis [ ] cough [ ] sputum	  Cardiovascular:  [ ] chest pain [ ] palpitations [ ] edema	  Gastrointestinal:  [ x] nausea [ ] vomiting [x] diarrhea [ ] constipation [ ] pain	  Genitourinary:  [ ] dysuria [ ] frequency [ ] hematuria [ ] discharge [ ] flank pain  [ ] incontinence  Musculoskeletal:  [ ] myalgias [ ] arthralgias [ ] arthritis  [ ] back pain  Neurological:  [ ] headache [ ] seizures  [ ] confusion/altered mental status  Psychiatric:  [ ] anxiety [ ] depression	  Hematology/Lymphatics:  [ ] lymphadenopathy  Endocrine:  [ ] adrenal [ ] thyroid  Allergic/Immunologic:	 [ ] transplant [ ] seasonal    Vital Signs Last 24 Hrs  T(F): 98.6 (01-28-21 @ 04:27), Max: 102.3 (01-27-21 @ 13:23)  Vital Signs Last 24 Hrs  HR: 118 (01-28-21 @ 04:27) (118 - 123)  BP: 140/84 (01-28-21 @ 04:27) (105/68 - 140/84)  RR: 18 (01-28-21 @ 04:27)  SpO2: 96% (01-28-21 @ 04:27) (95% - 96%)  Wt(kg): --    PHYSICAL EXAM:  Constitutional: non-toxic, no distress  HEAD/EYES: R periorbital ecchymosis  ENT:  supple, no thrush  Cardiovascular:  tachy normal S1, S2,  Respiratory:  clear BS bilaterally, no wheezes, no rales  GI:  soft, non-tender, normal bowel sounds  :  no ng, no CVA tenderness  Musculoskeletal:  no synovitis, normal ROM  Neurologic: awake and alert x 3 but slow to respond  Skin:  no rash, no erythema, no phlebitis  Heme/Onc: no lymphadenopathy   Psychiatric:  awake, alert, appropriate mood                          9.8    5.86  )-----------( 51       ( 28 Jan 2021 07:12 )             29.5   01-28    136  |  97  |  8   ----------------------------<  106<H>  3.3<L>   |  25  |  0.66    Ca    8.9      28 Jan 2021 07:12  Phos  2.0     01-28  Mg     1.8     01-28    TPro  6.5  /  Alb  3.3  /  TBili  2.2<H>  /  DBili  x   /  AST  190<H>  /  ALT  22  /  AlkPhos  221<H>  01-27    MICROBIOLOGY:  Culture - Blood (collected 26 Jan 2021 12:56)  Source: .Blood Blood-Peripheral  Preliminary Report (27 Jan 2021 13:02):    No growth to date.    Culture - Blood (collected 26 Jan 2021 12:56)  Source: .Blood Blood-Peripheral  Preliminary Report (27 Jan 2021 13:02):    No growth to date.      C Diff by PCR Result: Detected (01-27 @ 00:35)    C Diff by PCR Result: Detected (01-27-21 @ 00:35)  Clostridium difficile GDH Toxins A&amp;B, EIA:   Indeterminate (01-26-21 @ 18:08)  Clostridium difficile GDH Interpretation: This specimen is positive for C. Difficile glutamate dehydrogenase (GDH)  antigen and negative for C. Difficile Toxins A & B, by EIA.  GDH is a  highly sensitive screening marker for C. Difficile that is produced in  large amounts by all C. Difficilestrains, both toxigenic and  nontoxigenic.  Specimens that are GDH positive and toxin negative will be  tested further by PCR to detect toxin gene sequences associated with  toxin producing C. Difficle. (01-26-21 @ 18:08)    RADIOLOGY:  imaging below personally reviewed and agree with findings    < from: CT Abdomen and Pelvis w/ IV Cont (01.25.21 @ 23:40) >  INTERPRETATION:  CT of the chest, abdomen and pelvis    Indication: Fall 3 days ago, with coffee ground emesis, vomiting, weakness    Technique: Axial images were obtained from the thoracic inlet through pubic symphysis with IV contrast.  90 cc of Omnipaque 350 was demonstrated intravenously without complication and 10 cc was discarded.  Reformatted coronal and sagittal images were submitted.    Comparison: Abdominal CT of October 18, 2019    FINDINGS:    The visualized neck, axilla and subcutaneous tissues are unremarkable.    The tracheobronchial tree is patent centrally.  There is no mediastinal hematoma.  The great vessels are not enlarged. Coronary atherosclerosis. There is no significant mediastinal or hilar adenopathy. Mild thickening of the esophagus suggests esophagitis.    The heart is not enlarged. Pericardial calcification. There is nopericardial effusion.    Lungs: Evaluation of the lungs is limited by respiratory motion artifact. Atelectatic changes at the lung bases.    Pleura: Unremarkable.  There is no free air or focal collection.  Small amount of free fluid.    Liver: Cirrhotic fatty enlarged liver.  Spleen: Enlarged, measuring 17.4 x 5.5 cm.  Gallbladder: Unremarkable.  Biliary tree: Unremarkable.  Adrenal glands: Normal.  Pancreas: Normal.  Kidneys: Normal.    Bowel:  There is no small bowel obstruction. There is worse:  Lower the right colon is thickened, which may be seen with hepatic colopathy. There is scattered left colonic diverticulosis. The sigmoid colon is mildly tortuous. The rectum is mildly thickened suggesting mild proctitis. Diverticulosis.    Bladder: Although the urinary bladder is underdistended, there is wall thickening raising the possibility of cystitis.  Pelvic organs: The prostate is not enlarged.    Redemonstration of prominent portacaval lymph nodes measuring up to 2.6 x 1.6 cm.    Vasculature: The aorta is not dilated. Minor atherosclerotic vascular calcification. Patent paraumbilical vein consistent with portal hypertension.    Retroperitoneum: There is no mass.  Bones: Age-indeterminate fractures of the left lateral ninth and 10th ribs. Chronic degenerative changes of the spine.  Subcutaneous tissues: Unremarkable.    IMPRESSION:    Mild diffuse thickening of the esophagus suggests esophagitis.  Age-indeterminate fractures of the left lateral ninth and 10th ribs. No pneumothorax or hemothorax.  Enlarged fatty cirrhotic liver. Stigmata of portal hypertension. Mild volume of abdominal ascites. Please note that annual surveillance MRI is recommended in cirrhotic patients to screen for hepatocellular carcinoma.  Right colonicthickening suggests hepatic colopathy.  Possible proctitis.  Possible cystitis. Correlate with urinalysis.    < end of copied text >      < from: Xray Chest 1 View- PORTABLE-Urgent (Xray Chest 1 View- PORTABLE-Urgent .) (01.25.21 @ 22:18) >  COMPARISON: Chest radiograph from 12/17/2020.    FINDINGS:    The heart size is normal.    The lungs are clear. No pleural effusions or pneumothorax.    Degenerative changes of the bones.    IMPRESSION:    No pneumothorax as questioned.    < end of copied text > HPI:  55M with HTN (with medication nonadherence), chronic alcohol abuse (c/b prior DTs, withdrawal seizures, hematemesis, and melena), alcoholic cirrhosis (with portal hypertension), recent admission in 12/2020 for severe EtOH withdrawal requiring phenobarb and precedex gtt, presents with nausea/vomiting after EtOH use and fall. He states that since discharge, he has been drinking 4-5 bottles of tequilla everyday and last drink was Sunday night (1/24). He woke up on Monday with severe nausea and started having NBNB emesis so couldn't drink anymore and came to ED. Of note, he fell 3 days ago on the steps while he was intoxicated.     During prior 12/2020 admission, patient was diagnosed w/ c diff and treated with 10 days of PO vanc. Patient also received ceftriaxone and ertapenem during same admission for  infection. Since Saturday (1/23), pt has been experiencing watery diarrhea at increasing frequency w/ 2 episodes per day at onset, but 5 episodes today. Patient also reported fever w/ Tmax 102.3 yesterday, but now within normal limits. No reported sick contacts or recent travel. Denies chills, chest pain, palpitations, cough, and dysuria.     prior hospital charts reviewed [ X ]  primary team notes reviewed [ X ]  other consultant notes reviewed [ X ]    PAST MEDICAL & SURGICAL HISTORY:  Sciatica    HTN (hypertension)    ETOH abuse    Grief reaction    S/P Repair of Inguinal Hernia      Allergies  No Known Allergies    ANTIMICROBIALS (past 90 days)  MEDICATIONS  (STANDING):  piperacillin/tazobactam IVPB.   200 mL/Hr IV Intermittent (01-27-21 @ 18:37)    piperacillin/tazobactam IVPB..   25 mL/Hr IV Intermittent (01-28-21 @ 05:04)   25 mL/Hr IV Intermittent (01-27-21 @ 22:21)    vancomycin    Solution   125 milliGRAM(s) Oral (01-28-21 @ 05:03)   125 milliGRAM(s) Oral (01-27-21 @ 23:25)   125 milliGRAM(s) Oral (01-27-21 @ 18:38)      piperacillin/tazobactam IVPB.. 3.375 every 8 hours  vancomycin    Solution 125 every 6 hours    OTHER MEDS: MEDICATIONS  (STANDING):  acetaminophen   Tablet .. 650 every 6 hours PRN  LORazepam   Injectable 2 every 1 hour PRN  LORazepam   Injectable    LORazepam   Injectable 1 every 4 hours    SOCIAL HISTORY:       FAMILY HISTORY:  FH: dementia  mother    REVIEW OF SYSTEMS  [  ] ROS unobtainable because:    [ X ] All other systems negative except as noted below:	    Constitutional:  [ ] fever [ ] chills  [ ] weight loss  [ ] weakness  Skin:  [ ] rash [ ] phlebitis [X] bruise (over right eye)	  Eyes: [ ] icterus [ ] pain  [ ] discharge	  ENMT: [ ] sore throat  [ ] thrush [ ] ulcers [ ] exudates  Respiratory: [ ] dyspnea [ ] hemoptysis [ ] cough [ ] sputum	  Cardiovascular:  [ ] chest pain [ ] palpitations [ ] edema	  Gastrointestinal:  [X] nausea [ ] vomiting [X] diarrhea [ ] constipation [ ] pain	  Genitourinary:  [ ] dysuria [ ] frequency [ ] hematuria [ ] discharge [ ] flank pain  [ ] incontinence  Musculoskeletal:  [ ] myalgias [ ] arthralgias [ ] arthritis  [ ] back pain  Neurological:  [ ] headache [ ] seizures  [ ] confusion/altered mental status  Psychiatric:  [ ] anxiety [ ] depression	  Hematology/Lymphatics:  [ ] lymphadenopathy  Endocrine:  [ ] adrenal [ ] thyroid  Allergic/Immunologic:	 [ ] transplant [ ] seasonal    Vital Signs Last 24 Hrs  T(F): 98.6 (01-28-21 @ 04:27), Max: 102.3 (01-27-21 @ 13:23)  Vital Signs Last 24 Hrs  HR: 118 (01-28-21 @ 04:27) (118 - 123)  BP: 140/84 (01-28-21 @ 04:27) (105/68 - 140/84)  RR: 18 (01-28-21 @ 04:27)  SpO2: 96% (01-28-21 @ 04:27) (95% - 96%)  Wt(kg): --    PHYSICAL EXAM:  Constitutional: non-toxic, no distress  HEAD/EYES: R periorbital ecchymosis  ENT:  supple, no thrush  Cardiovascular:  tachycardic, normal S1, S2, no murmur, no edema  Respiratory:  clear BS bilaterally, no wheezes, no rales  GI:  soft, distended, non-tender, normal bowel sounds  :  no ng, no CVA tenderness  Musculoskeletal:  no synovitis, normal ROM  Neurologic: A&Ox3, but slow to respond  Skin:  no rash, no erythema, no phlebitis  Heme/Onc: no lymphadenopathy   Psychiatric:  awake, alert, appropriate mood                          9.8    5.86  )-----------( 51       ( 28 Jan 2021 07:12 )             29.5   01-28    136  |  97  |  8   ----------------------------<  106<H>  3.3<L>   |  25  |  0.66    Ca    8.9      28 Jan 2021 07:12  Phos  2.0     01-28  Mg     1.8     01-28    TPro  6.5  /  Alb  3.3  /  TBili  2.2<H>  /  DBili  x   /  AST  190<H>  /  ALT  22  /  AlkPhos  221<H>  01-27    MICROBIOLOGY:  Culture - Blood (collected 26 Jan 2021 12:56)  Source: .Blood Blood-Peripheral  Preliminary Report (27 Jan 2021 13:02):    No growth to date.    Culture - Blood (collected 26 Jan 2021 12:56)  Source: .Blood Blood-Peripheral  Preliminary Report (27 Jan 2021 13:02):    No growth to date.      C Diff by PCR Result: Detected (01-27 @ 00:35)    C Diff by PCR Result: Detected (01-27-21 @ 00:35)  Clostridium difficile GDH Toxins A&amp;B, EIA:   Indeterminate (01-26-21 @ 18:08)  Clostridium difficile GDH Interpretation: This specimen is positive for C. Difficile glutamate dehydrogenase (GDH)  antigen and negative for C. Difficile Toxins A & B, by EIA.  GDH is a  highly sensitive screening marker for C. Difficile that is produced in  large amounts by all C. Difficilestrains, both toxigenic and  nontoxigenic.  Specimens that are GDH positive and toxin negative will be  tested further by PCR to detect toxin gene sequences associated with  toxin producing C. Difficle. (01-26-21 @ 18:08)    RADIOLOGY:  imaging below personally reviewed and agree with findings    < from: CT Abdomen and Pelvis w/ IV Cont (01.25.21 @ 23:40) >  INTERPRETATION:  CT of the chest, abdomen and pelvis    Indication: Fall 3 days ago, with coffee ground emesis, vomiting, weakness    Technique: Axial images were obtained from the thoracic inlet through pubic symphysis with IV contrast.  90 cc of Omnipaque 350 was demonstrated intravenously without complication and 10 cc was discarded.  Reformatted coronal and sagittal images were submitted.    Comparison: Abdominal CT of October 18, 2019    FINDINGS:    The visualized neck, axilla and subcutaneous tissues are unremarkable.    The tracheobronchial tree is patent centrally.  There is no mediastinal hematoma.  The great vessels are not enlarged. Coronary atherosclerosis. There is no significant mediastinal or hilar adenopathy. Mild thickening of the esophagus suggests esophagitis.    The heart is not enlarged. Pericardial calcification. There is nopericardial effusion.    Lungs: Evaluation of the lungs is limited by respiratory motion artifact. Atelectatic changes at the lung bases.    Pleura: Unremarkable.  There is no free air or focal collection.  Small amount of free fluid.    Liver: Cirrhotic fatty enlarged liver.  Spleen: Enlarged, measuring 17.4 x 5.5 cm.  Gallbladder: Unremarkable.  Biliary tree: Unremarkable.  Adrenal glands: Normal.  Pancreas: Normal.  Kidneys: Normal.    Bowel:  There is no small bowel obstruction. There is worse:  Lower the right colon is thickened, which may be seen with hepatic colopathy. There is scattered left colonic diverticulosis. The sigmoid colon is mildly tortuous. The rectum is mildly thickened suggesting mild proctitis. Diverticulosis.    Bladder: Although the urinary bladder is underdistended, there is wall thickening raising the possibility of cystitis.  Pelvic organs: The prostate is not enlarged.    Redemonstration of prominent portacaval lymph nodes measuring up to 2.6 x 1.6 cm.    Vasculature: The aorta is not dilated. Minor atherosclerotic vascular calcification. Patent paraumbilical vein consistent with portal hypertension.    Retroperitoneum: There is no mass.  Bones: Age-indeterminate fractures of the left lateral ninth and 10th ribs. Chronic degenerative changes of the spine.  Subcutaneous tissues: Unremarkable.    IMPRESSION:    Mild diffuse thickening of the esophagus suggests esophagitis.  Age-indeterminate fractures of the left lateral ninth and 10th ribs. No pneumothorax or hemothorax.  Enlarged fatty cirrhotic liver. Stigmata of portal hypertension. Mild volume of abdominal ascites. Please note that annual surveillance MRI is recommended in cirrhotic patients to screen for hepatocellular carcinoma.  Right colonicthickening suggests hepatic colopathy.  Possible proctitis.  Possible cystitis. Correlate with urinalysis.    < end of copied text >    < from: CT Head No Cont (01.25.21 @ 23:35) >  EXAM:  CT MAXILLOFACIAL                          EXAM:  CT 3D RECONSTRUCT LUCAS FAROOQ                          EXAM:  CT BRAIN                            PROCEDURE DATE:  01/25/2021        INTERPRETATION:  CLINICAL INFORMATION: Status post closed head injury. Right eye swelling and pain.    TECHNIQUE: Noncontrast CT scan of the head and maxillofacial were performed. The head CT portion was performed in axial images with sagittal and coronal reformations. The maxillofacial was performed withthin section axial images with sagittal and coronal reformations. 3-D reconstruction of the maxillofacial was performed.    COMPARISON: CT head 12/20/2020.    FINDINGS:    HEAD:    No acute intracranial hemorrhage, extra-axial fluid collection, hydrocephalus, mass effect or midline shift.    Mild cerebral volume loss with prominence of ventricles and sulci, proper for patient's age.    The calvarium is intact without acute displaced fracture.    MAXILLOFACIAL:    Soft tissue swelling is noted in the right frontal extracalvarial and periorbital region and overlying the right maxilla.    No acute facial bone fractures are visualized. The temporomandibular joints are intact. No septal hematoma is seen. Opposing cavity seen in the right maxillarysecond and third molars.    Mild mucosal thickening of the bilateral maxillary and ethmoid sinuses. Bilateral maxillary sinus mucosal polyp versus retention cysts. Bilateral mastoid air cells are clear.    The globes are symmetric in size and contour. Extraocular muscles are not enlarged or deviated. No radiopaque orbital foreign bodies are visualized. The retrobulbar fat is preserved.    IMPRESSION:    Head CT: No evidence for intracranial hemorrhage, mass effect, or displaced calvarial fracture. Chronic atrophy.    Maxillofacial CT: No acute maxillofacial bone fracture. Mild right lateral periorbital soft tissue swelling. Intact orbits. No radiopaque foreign body. Recommend dental consultation for dental cavities seen involving the right maxillary second and third molar teeth.    < end of copied text >    < from: Xray Chest 1 View- PORTABLE-Urgent (Xray Chest 1 View- PORTABLE-Urgent .) (01.25.21 @ 22:18) >  COMPARISON: Chest radiograph from 12/17/2020.    FINDINGS:    The heart size is normal.    The lungs are clear. No pleural effusions or pneumothorax.    Degenerative changes of the bones.    IMPRESSION:    No pneumothorax as questioned.    < end of copied text >

## 2021-01-28 NOTE — CONSULT NOTE ADULT - ASSESSMENT
55 m with HTN, alcoholic cirrhosis, portal HTN, prior DT and seizures, GIB, recent admission  12/2020 for severe EtOH withdrawal requiring phenobarb and precedex gtt, also treated for C-diff and UTI now p/w nausea, vomiting, diarrhea after ETOH use and fall  febrile to 102.3, tachy, WBC normal  c-diff GDH positive, toxin negative but PCR positive  chest/abd Ct: Mild diffuse thickening of the esophagus suggests esophagitis, Enlarged fatty cirrhotic liver, portal hypertension. Mild volume of abdominal ascites., Right colonic thickening suggests hepatic colopathy. Possible proctitis. Possible cystitis. Correlate with urinalysis.    fever, tachycardia, diarrhea, recurrent C-diff  alcohol cirrhosis with portal HTN, thrombocytopenia  * blood cx negative  * f/u urine cx  * no more antibiotics  * c/w PO vanco 125 q 6, will need a tapering course as this is a recurrence  * monitor the WBC and renal function  * cirrhosis and alcohol withdrawal management as per the primary team     55 m with HTN, alcoholic cirrhosis, portal HTN, prior DT and seizures, GIB, recent admission  12/2020 for severe EtOH withdrawal requiring phenobarb and precedex gtt, also treated for C-diff and UTI now p/w nausea, vomiting, diarrhea after ETOH use and fall  febrile to 102.3, tachy, WBC normal  c-diff GDH positive, toxin negative but PCR positive  chest/abd Ct: Mild diffuse thickening of the esophagus suggests esophagitis, Enlarged fatty cirrhotic liver, portal hypertension. Mild volume of abdominal ascites., Right colonic thickening suggests hepatic colopathy. Possible proctitis. Possible cystitis. Correlate with urinalysis.    fever, tachycardia, diarrhea, recurrent C-diff  alcohol cirrhosis with portal HTN, thrombocytopenia  * blood cx negative  * f/u urine cx  * no more antibiotics  * c/w PO vanco 125 q 6, will need a tapering course as this is a recurrence (typical vanco taper includes 125mg PO q6h for 10-14 days, then BID for 1 week, then QD for 1 week, and finally every 2 or 3 days for 2-8 weeks)  * monitor the WBC and renal function  * cirrhosis and alcohol withdrawal management as per the primary team

## 2021-01-29 NOTE — CHART NOTE - NSCHARTNOTEFT_GEN_A_CORE
Received notice from [facility] that they were able to reach pt's son Werner. I spoke to Werner after he spoke to patient- patient is roughly back to his mental baseline, was emotional over his frustrations with alcohol dependence. I discussed with Werner that we will start baclofen 10mg po tid for alcohol dependence and f/u final PT recommendations for dc. Werner provided me with pt's other son's number Paco, who lives with patient- however when trying this number [phone] it does not connect. Also obtained pt's daughter (Emely) number and left  to discuss further updates. Plan  - start baclofen 10mg po tid with hold parameters, to be titrated further as oP. Emailed pt's PCP Dr. Clancy for further f/u  - PT eval pending for dispo recommendations  - recommend reattempting to reach Emely/Paco again tomorrow to discuss dispo plans/. Unsafe for patient to be discharged without family supervision.  discussed with floor CECELIA Villa

## 2021-01-29 NOTE — DISCHARGE NOTE PROVIDER - NSDCFUADDINST_GEN_ALL_CORE_FT
Vanco should be taken as follows:  125 mg by mouth every 6 hours for 7 days  125 mg by mouth every 12 hours for 7 days  125 mg by mouth every day for 7 days  125 mg by mouth every 3 days for between 2-8 weeks Vanco should be taken as follows:  125 mg by mouth every 6 hours for 7 days  125 mg by mouth every 12 hours for 7 days  125 mg by mouth every day for 7 days  125 mg by mouth every 3 days for 2weeks

## 2021-01-29 NOTE — DISCHARGE NOTE PROVIDER - CARE PROVIDER_API CALL
Primary Care Provider,   Phone: (   )    -  Fax: (   )    -  Follow Up Time:    Vikas Clancy)  Internal Medicine  [address], [address]  [place] [address]  Phone: [phone]  Fax: [phone]  Established Patient  Follow Up Time: 1 week    Marleni Freeman)  Psychiatry  [address] [place] [address]  Phone: [phone]  Fax: [phone]  Established Patient  Follow Up Time: 1 week

## 2021-01-29 NOTE — DISCHARGE NOTE PROVIDER - NSDCMRMEDTOKEN_GEN_ALL_CORE_FT
acetaminophen 325 mg oral tablet: 2 tab(s) orally every 6 hours, As needed, Mild Pain (1 - 3), Moderate Pain (4 - 6)  baclofen 10 mg oral tablet: 1 tab(s) orally 3 times a day  folic acid 1 mg oral tablet: 1 tab(s) orally once a day  Multiple Vitamins oral tablet: 1 tab(s) orally once a day  out patinet Physical therapy: PT as out patient  3 times a week for 2 weeks, then reevaluate  saccharomyces boulardii lyo 250 mg oral capsule: 2 cap(s) orally 2 times a day  thiamine 100 mg oral tablet: 1 tab(s) orally once a day  vancomycin 125 mg oral capsule: 1 tab(s) orally every 6 hours

## 2021-01-29 NOTE — DISCHARGE NOTE PROVIDER - CARE PROVIDERS DIRECT ADDRESSES
,DirectAddress_Unknown ,[email or web address],DirectAddress_Unknown

## 2021-01-29 NOTE — DISCHARGE NOTE PROVIDER - HOSPITAL COURSE
55M with HTN (with medication nonadherence), chronic alcohol abuse (c/b prior DTs, withdrawal seizures, hematemesis, and melena), alcoholic cirrhosis (with portal hypertension), recent admission in 12/2020 for severe EtOH withdrawal requiring phenobarb and Precedex gtt, presents with nausea/vomiting after EtOH use and fall. He states that since discharge, he has been drinking 4-5 bottles of tequilla everyday and last drink was Sunday night (1/24). He woke up on Monday with severe nausea and started having NBNB emesis so couldn't drink anymore and came to ED. Of note, he fell 3 days ago on the steps while he was intoxicated. Denies fever/chills, chest pain, palpitation, diarrhea, dysuria.    Problem/Plan - 1:  ·  Problem: Alcohol withdrawal.  Plan: High risk given hx of DTs, withdrawal seizures  - start on ativan taper regimen -  now completed  - admission CIWA 5-6 - currently CIWA 0-2 =resolved  - start folic acid, multivitamin, thiamine    Problem/Plan - 2:  ·  Problem: Clostridium difficile colitis.  Plan: Recurrent:   per ID, c/w PO Vanco taper 125 q 6, 14 days, then BID for 1 week, then QD for 1 week, and finally every 2 or 3 days for 2 weeks)  -wbc wnl  -hypokalemia: aggressive repletion and monitor.     Problem/Plan - 3:  ·  Problem: Elevated bilirubin.  Plan: improving, likely due to alc use  -RUQ sono negative, small volume ascites but unlikely SBP.     Problem/Plan - 2:  ·  Problem: Thrombocytopenia.  Plan: plt 42k, likely in setting of EtOH abuse and liver cirrhosis  - continue to monitor closely  - no signs of active bleed at this time.   - Resolved - likely due to sepsis    Problem/Plan - 4:  ·  Problem: Fall.  Plan: s/p mechanical fall while intoxicated  - right eye ecchymosis   - CT head/face without fractures.  - continue with Tylenol as needed   - PT  - home with home PT    Problem/Plan - 5:  ·  Problem: HTN (hypertension).  Plan: not compliant with meds as outpatient  - monitor BPs closely.     Problem/Plan - 6:  ·  Problem: Prophylactic measure.  Plan: DVT ppx: SCDs, once plt improves >50k, start chemical ppx  Dispo: SW consult.     Vital Signs Last 24 Hrs  T(C): 36.6 (02-02-21 @ 09:37), Max: 37.1 (02-01-21 @ 13:34)  T(F): 97.9 (02-02-21 @ 09:37), Max: 98.7 (02-01-21 @ 13:34)  HR: 88 (02-02-21 @ 09:37) (82 - 95)  BP: 136/71 (02-02-21 @ 09:37) (124/79 - 136/71)  RR: 18 (02-02-21 @ 09:37) (18 - 18)  SpO2: 96% (02-02-21 @ 09:37) (96% - 100%) 55M with HTN (with medication nonadherence), chronic alcohol abuse (c/b prior DTs, withdrawal seizures, hematemesis, and melena), alcoholic cirrhosis (with portal hypertension), recent admission in 12/2020 for severe EtOH withdrawal requiring phenobarb and precedex gtt, presents with nausea/vomiting after EtOH use and fall, being treated with ativan taper for withdrawal, found also to have sepsis 2/2 recurrent cdif     Problem/Plan - 1:  ·  Problem: Alcohol withdrawal.  Plan: High risk given hx of DTs, withdrawal seizures, CIWA now 0-2. Resolved.  - completed ativan taper 2/1  - ativan prn  - folic acid, multivitamin, thiamine  - SW consult- attempted to call family members today, no answer   -baclofen 10mg TID starting by previous provider.      Problem/Plan - 2:  ·  Problem: Clostridium difficile colitis.  Plan: diarrhea resolved  Recurrent thus per ID, c/w PO vanco taper 125 q 6, 14 days, then BID for 1 week, then QD for 1 week, and finally every 2 or 3 days for 2 weeks)  -wbc wnl  -hypokalemia: resolved s/p repletion.      Problem/Plan - 3:  ·  Problem: Sepsis.  Plan: #sepsis - tachycardic, febrile on 1/27, likely due to recurrent cdif, no other s/s of infection  - blood cultures NGTD. negative  - HD stable  -per ID no antibiotic's as treat cdif with taper as above.      Problem/Plan - 4:  ·  Problem: Elevated bilirubin.  Plan: improving, likely due to alc use  -RUQ sono negative, small volume ascites but unlikely SBP, no pain.      Problem/Plan - 5:  ·  Problem: Thrombocytopenia.  Plan: resolved likely due to sepsis on admission, alcohol abuse.      Problem/Plan - 6:  Problem: Fall. Plan: s/p mechanical fall while intoxicated. +R eye ecchymosis  - CT head/face without fractures  - tylneol prn pain/fever  - PT rec home PT.     Problem/Plan - 7:  ·  Problem: HTN (hypertension).  Plan: not compliant with meds as outpatient  - monitor BPs.      Problem/Plan - 8:  ·  Problem: Prophylactic measure.  Plan: DVT ppx: HSQ  Dispo: medically stable for discharge home today pending SW input for safe d/c plan.  Patient say he lives with son but he doesn't have his number, rather he has a different son number which I called with no answer left  for call back [phone]. SW will assist. PCP and psych f/u needed  spent 45 min on discharge time.    55M with HTN (with medication nonadherence), chronic alcohol abuse (c/b prior DTs, withdrawal seizures, hematemesis, and melena), alcoholic cirrhosis (with portal hypertension), recent admission in 12/2020 for severe EtOH withdrawal requiring phenobarb and precedex gtt, presents with nausea/vomiting after EtOH use and fall, being treated with ativan taper for withdrawal, found also to have sepsis 2/2 recurrent cdif     Problem/Plan - 1:  ·  Problem: Alcohol withdrawal.  Plan: High risk given hx of DTs, withdrawal seizures, CIWA now 0-2. Resolved.  - completed ativan taper 2/1  - ativan prn  - folic acid, multivitamin, thiamine  - SW consult- son willing to take home and assist  -baclofen 10mg TID starting by previous provider, outpatient psych followup, refusing alc rehab.      Problem/Plan - 2:  ·  Problem: Clostridium difficile colitis.  Plan: diarrhea resolved  Recurrent thus per ID, c/w PO vanco taper 125 q 6, 14 days, then BID for 1 week, then QD for 1 week, and finally every 2 or 3 days for 2 weeks) (explained to son)  -wbc wnl  -hypokalemia: resolved s/p repletion.      Problem/Plan - 3:  ·  Problem: Sepsis.  Plan: #sepsis - tachycardic, febrile on 1/27, likely due to recurrent cdif, no other s/s of infection  - blood cultures NGTD. negative  - HD stable  -per ID no antibiotic's as treat cdif with taper as above.      Problem/Plan - 4:  ·  Problem: Elevated bilirubin.  Plan: improving, likely due to alc use  -RUQ sono negative, small volume ascites but unlikely SBP, no pain.      Problem/Plan - 5:  ·  Problem: Thrombocytopenia.  Plan: resolved likely due to sepsis on admission, alcohol abuse.      Problem/Plan - 6:  Problem: Fall. Plan: s/p mechanical fall while intoxicated. +R eye ecchymosis  - CT head/face without fractures  - tylneol prn pain/fever  - PT rec home PT.     Problem/Plan - 7:  ·  Problem: HTN (hypertension).  Plan: not compliant with meds as outpatient  - monitor BPs.      Problem/Plan - 8:  ·  Problem: Prophylactic measure.  Plan: DVT ppx: HSQ  Dispo: medically stable for discharge home today with son, outpatient pcp and psych followup (got 24 hr notice on 2/2)  spent 40 min on d/c time.

## 2021-01-29 NOTE — DISCHARGE NOTE PROVIDER - NSDCCPCAREPLAN_GEN_ALL_CORE_FT
PRINCIPAL DISCHARGE DIAGNOSIS  Diagnosis: Alcohol withdrawal syndrome without complication  Assessment and Plan of Treatment: resolved with benxo taper  outpatient psyuch and PCP f/ui  avoid alcohol intake      SECONDARY DISCHARGE DIAGNOSES  Diagnosis: Clostridium difficile diarrhea  Assessment and Plan of Treatment: continue vancomycin taper for 6 weeks as prescribed  followup with PCP    Diagnosis: Closed head injury, initial encounter  Assessment and Plan of Treatment: eye hematoma, monitor pcp followup     PRINCIPAL DISCHARGE DIAGNOSIS  Diagnosis: Alcohol withdrawal syndrome without complication  Assessment and Plan of Treatment: resolved with benxo taper  outpatient psych and PCP f/ui  avoid alcohol intake      SECONDARY DISCHARGE DIAGNOSES  Diagnosis: Clostridium difficile diarrhea  Assessment and Plan of Treatment: continue vancomycin taper for 6 weeks as prescribed  followup with PCP    Diagnosis: Closed head injury, initial encounter  Assessment and Plan of Treatment: eye hematoma, monitor pcp followup     PRINCIPAL DISCHARGE DIAGNOSIS  Diagnosis: Alcohol withdrawal syndrome without complication  Assessment and Plan of Treatment: resolved with benxo taper  outpatient psych and PCP f/u  on baclofen prevention, do not drink alcohol while taking baclofen  avoid alcohol intake      SECONDARY DISCHARGE DIAGNOSES  Diagnosis: Clostridium difficile diarrhea  Assessment and Plan of Treatment: continue vancomycin taper for 6 weeks as prescribed  125mg every 6 hour for 14 days until 2/10/2021, then twice a day for 1 week, then once daily for 1 week, and finally every 2 or 3 days for 2 weeks  followup with PCP    Diagnosis: Closed head injury, initial encounter  Assessment and Plan of Treatment: eye hematoma, monitor pcp followup

## 2021-01-29 NOTE — DISCHARGE NOTE PROVIDER - PROVIDER TOKENS
FREE:[LAST:[Primary Care Provider],PHONE:[(   )    -],FAX:[(   )    -]] PROVIDER:[TOKEN:[[id number]],FOLLOWUP:[1 week],ESTABLISHEDPATIENT:[T]],PROVIDER:[TOKEN:[[id number]],FOLLOWUP:[1 week],ESTABLISHEDPATIENT:[T]]

## 2021-01-31 NOTE — PROGRESS NOTE ADULT - ATTENDING COMMENTS
Preeti Ly DO  [facility]  [phone]
Preeti Ly DO  [facility]  [phone]

## 2021-02-01 NOTE — PROVIDER CONTACT NOTE (CRITICAL VALUE NOTIFICATION) - BACKGROUND
Pt admitted with ETOH
Pt presented w/ ETOH  withdrawal- CIWA; current score 2. PMHx EtOH abuse, DTs, EtOH withdrawal, cirrhosis (w/ portal HTN), HTN, prior oxycodone abuse
Pt has hx of fall, HTN, thrombocytopenia

## 2021-02-01 NOTE — PROVIDER CONTACT NOTE (CRITICAL VALUE NOTIFICATION) - ASSESSMENT
ciwa score =3,  calm and cooperative. Multiple episode of loose bowel movement.
Pt is A&Ox4- VS as per flowsheet. CIWA documentation as per flowsheet. No acute distress noted at this time.
Pt is A/Ox2.

## 2021-02-01 NOTE — PROVIDER CONTACT NOTE (CRITICAL VALUE NOTIFICATION) - ACTION/TREATMENT ORDERED:
BMP Pending endorsed to next shift
potassium 20 meq POx3, KCL 10 meq IVx3.
Potassium 10 [id number] x3 doses given
As per Ayah NP- pending orders for supplementation. No immediate interventions needed at this time.

## 2021-02-03 NOTE — PROGRESS NOTE ADULT - ASSESSMENT
55 m with HTN, alcoholic cirrhosis, portal HTN, prior DT and seizures, GIB, recent admission  12/2020 for severe EtOH withdrawal requiring phenobarb and precedex gtt, also treated for C-diff and UTI now p/w nausea, vomiting, diarrhea after ETOH use and fall  febrile to 102.3, tachy, WBC normal  c-diff GDH positive, toxin negative but PCR positive  chest/abd Ct: Mild diffuse thickening of the esophagus suggests esophagitis, Enlarged fatty cirrhotic liver, portal hypertension. Mild volume of abdominal ascites., Right colonic thickening suggests hepatic colopathy. Possible proctitis. Possible cystitis. Correlate with urinalysis.    fever, tachycardia, diarrhea, recurrent C-diff  alcohol cirrhosis with portal HTN, thrombocytopenia  * blood and urine cx negative, fever and diarrhea also improved  * no more antibiotics  * c/w PO vanco 125 q 6, will need a tapering course as this is a recurrence => 125mg PO q6h for 14 days, then BID for 1 week, then QD for 1 week, and finally every 2 or 3 days for 2 weeks)  * monitor the WBC and renal function  * cirrhosis and alcohol withdrawal management as per the primary team    The above assessment and plan was discussed with the primary team    Paradise Thomas MD  Pager [phone]  After 5pm and on weekends call [phone]
55 m with HTN, alcoholic cirrhosis, portal HTN, prior DT and seizures, GIB, recent admission  12/2020 for severe EtOH withdrawal requiring phenobarb and precedex gtt, also treated for C-diff and UTI now p/w nausea, vomiting, diarrhea after ETOH use and fall  febrile to 102.3, tachy, WBC normal  c-diff GDH positive, toxin negative but PCR positive  chest/abd Ct: Mild diffuse thickening of the esophagus suggests esophagitis, Enlarged fatty cirrhotic liver, portal hypertension. Mild volume of abdominal ascites., Right colonic thickening suggests hepatic colopathy. Possible proctitis. Possible cystitis. Correlate with urinalysis.    fever, tachycardia, diarrhea, recurrent C-diff  alcohol cirrhosis with portal HTN, thrombocytopenia  * blood and urine cx negative, fever and diarrhea also improved  * no more antibiotics  * c/w PO vanco 125 q 6, will need a tapering course as this is a recurrence => 125mg PO q6h for 14 days, then BID for 1 week, then QD for 1 week, and finally every 2 or 3 days for 2 weeks)  * monitor the WBC and renal function  * cirrhosis and alcohol withdrawal management as per the primary team  * will sign off, please call with questions    The above assessment and plan was discussed with the primary team    Paradise Thomas MD  Pager [phone]  After 5pm and on weekends call [phone]    
55M with HTN (with medication nonadherence), chronic alcohol abuse (c/b prior DTs, withdrawal seizures, hematemesis, and melena), alcoholic cirrhosis (with portal hypertension), recent admission in 12/2020 for severe EtOH withdrawal requiring phenobarb and precedex gtt, presents with nausea/vomiting after EtOH use and fall, being treated with ativan taper for withdrawal, found also to have sepsis 2/2 recurrent cdif
55M with HTN (with medication nonadherence), chronic alcohol abuse (c/b prior DTs, withdrawal seizures, hematemesis, and melena), alcoholic cirrhosis (with portal hypertension), recent admission in 12/2020 for severe EtOH withdrawal requiring phenobarb and precedex gtt, presents with nausea/vomiting after EtOH use and fall
55M with HTN (with medication nonadherence), chronic alcohol abuse (c/b prior DTs, withdrawal seizures, hematemesis, and melena), alcoholic cirrhosis (with portal hypertension), recent admission in 12/2020 for severe EtOH withdrawal requiring phenobarb and precedex gtt, presents with nausea/vomiting after EtOH use and fall, being treated with ativan taper for withdrawal, found also to have sepsis 2/2 recurrent cdif
55M with HTN (with medication nonadherence), chronic alcohol abuse (c/b prior DTs, withdrawal seizures, hematemesis, and melena), alcoholic cirrhosis (with portal hypertension), recent admission in 12/2020 for severe EtOH withdrawal requiring phenobarb and precedex gtt, presents with nausea/vomiting after EtOH use and fall, being treated with ativan taper for withdrawal, found also to have sepsis 2/2 recurrent cdif

## 2021-02-03 NOTE — PROGRESS NOTE ADULT - PROBLEM SELECTOR PROBLEM 5
Thrombocytopenia
Thrombocytopenia
Prophylactic measure
Thrombocytopenia

## 2021-02-03 NOTE — PROGRESS NOTE ADULT - PROBLEM SELECTOR PROBLEM 2
Thrombocytopenia
Clostridium difficile colitis
Thrombocytopenia
Thrombocytopenia
Clostridium difficile colitis
Thrombocytopenia
Thrombocytopenia
Clostridium difficile colitis

## 2021-02-03 NOTE — PROGRESS NOTE ADULT - PROBLEM SELECTOR PLAN 3
#sepsis - tachycardic, febrile on 1/27, likely due to recurrent cdif, no other s/s of infection  - blood cultures NGTD. negative  - HD stable  -per ID no antibiotic's as treat cdif with taper as above
#sepsis - tachycardic, febrile on 1/27, likely due to recurrent cdif, no other s/s of infection  - blood cultures NGTD. negative  - HD stable  -per ID no antibiotic's as treat cdif with taper
s/p mechanical fall while intoxicated. +R eye ecchymosis  - CT head/face without fractures  - tylneol prn pain/fever  - PT eval in progress
#sepsis - tachycardic, febrile on 1/27, likely due to recurrent cdif, no other s/s of infection  - blood cultures NGTD. negative  - HD stable  -per ID no antibiotic's as treat cdif with taper as above
s/p mechanical fall while intoxicated. +R eye ecchymosis  - CT head/face without fractures  - tylneol prn pain/fever
s/p mechanical fall while intoxicated. +R eye ecchymosis  - CT head/face without fractures  - tylneol prn pain/fever  - PT eval in progress for dispo planning
s/p mechanical fall while intoxicated. +R eye ecchymosis  - CT head/face without fractures  - tylneol prn pain/fever  - PT eval
s/p mechanical fall while intoxicated. +R eye ecchymosis  - CT head/face without fractures  - tylneol prn pain/fever  - PT eval in progress

## 2021-02-03 NOTE — PROGRESS NOTE ADULT - PROBLEM SELECTOR PROBLEM 4
HTN (hypertension)
Elevated bilirubin
HTN (hypertension)
Elevated bilirubin
HTN (hypertension)
Elevated bilirubin

## 2021-02-03 NOTE — PROGRESS NOTE ADULT - PROBLEM SELECTOR PLAN 5
DVT ppx: SCDs, once plt improves >50k, start chemical ppx  Dispo: SW consult
DVT ppx: thrombocytopenia improved, will start low dose heparin subq given overall immobility     Multiple attempts to reach family at the phone numbers in inpatient and outpatient charts to attest to mental status and discuss dispo- none go through, SW also unable to find any other numbers, no phone at bedside. Family reached 1/29, no answer today, will continue to attempt dispo planning. Otherwise awaiting final PT recs.
resolved likely due to sepsis on admission, alcohol abuse
DVT ppx: SCDs, once plt improves >50k, start chemical ppx    Multiple attempts to reach family at the phone numbers in inpatient and outpatient charts to attest to mental status and discuss dispo- none go through, SW also unable to find any other numbers, no phone at bedside. Will need to continue trying and SW assistance recruited. Otherwise awaiting final PT recs.
DVT ppx: SCDs, once plt improves >50k, start chemical ppx    Multiple attempts to reach family at the phone numbers in inpatient and outpatient charts to attest to mental status and discuss dispo- none go through, SW also unable to find any other numbers, no phone at bedside. Family reached 1/29, no answer today, will continue to attempt dispo planning. Otherwise awaiting final PT recs.
resolved likely due to sepsis on admission, alcohol abuse
DVT ppx: SCDs, once plt improves >50k, start chemical ppx  Dispo: SW consult
resolved likely due to sepsis on admission, alcohol abuse

## 2021-02-03 NOTE — PROGRESS NOTE ADULT - PROBLEM SELECTOR PLAN 7
not compliant with meds as outpatient  - monitor BPs
not compliant with meds as outpatient  - monitor BPs closely
not compliant with meds as outpatient  - monitor BPs

## 2021-02-03 NOTE — PROGRESS NOTE ADULT - PROBLEM SELECTOR PLAN 6
s/p mechanical fall while intoxicated. +R eye ecchymosis  - CT head/face without fractures  - tylneol prn pain/fever  - PT rec home PT

## 2021-02-03 NOTE — PROGRESS NOTE ADULT - PROBLEM SELECTOR PROBLEM 1
Alcohol withdrawal

## 2021-02-03 NOTE — PROGRESS NOTE ADULT - REASON FOR ADMISSION
EtOH withdrawal

## 2021-02-03 NOTE — PROGRESS NOTE ADULT - PROBLEM SELECTOR PLAN 4
improving, likely due to alc use  -RUQ sono negative, small volume ascites but unlikely SBP, no pain
not compliant with meds as outpatient  - monitor BPs closely
improving, likely due to alc use  -RUQ sono negative, small volume ascites but unlikely SBP
not compliant with meds as outpatient  - monitor BPs closely
not compliant with meds as outpatient  - monitor BPs closely
improving, likely due to alc use  -RUQ sono negative, small volume ascites but unlikely SBP, no pain

## 2021-02-03 NOTE — PROGRESS NOTE ADULT - PROBLEM SELECTOR PLAN 8
DVT ppx: HSQ  Dispo: medically stable for discharge home today with son, outpatient pcp and psych followup (got 24 hr notice on 2/2)  spent 40 min on d/c time
DVT ppx: HSQ  Dispo: medically stable for discharge home today pending SW input for safe d/c plan.  Patient say he lives with son but he doesn't have his number, rather he has a different son number which I called with no answer left  for call back [phone]. SW will assist  spent 45 min on discharge time
DVT ppx: HSQ  Dispo: improvement in K and Awaiting SW to assist with safe d/c home, family not reachable and unlikely able to car for self

## 2021-02-03 NOTE — PROGRESS NOTE ADULT - PROBLEM SELECTOR PLAN 2
diarrhea resolved  Recurrent thus per ID, c/w PO vanco taper 125 q 6, 14 days, then BID for 1 week, then QD for 1 week, and finally every 2 or 3 days for 2 weeks) (explained to son)  -wbc wnl  -hypokalemia: resolved s/p repletion
plt 42k on admission, likely toxic effect of etoh vs.  sepsis- now improving  - continue to monitor closely- improving  - no signs of active bleed at this time  - management of sepsis as below    #sepsis - tachycardic, febrile on 1/27, UA negative, no cough. Does have distended abd and ongoing diarrhea. Source- likely recurrent cdif , sepsis resolved  - blood cultures pending, UA negative  - continue IVF, check lactate  - f/u cdif PCR- but would treat empirically for now with vanco 125mg po q6 hours, will need taper on discharge per ID  - probiotic sacharomyces boulardii 500mg po bid  - ascites is small, low suspicion for sbp as no tenderness- hold zosyn for now  - ID consulted
plt 42k on admission, likely toxic effect of etoh vs.  sepsis- now improving  - continue to monitor closely- improving  - no signs of active bleed at this time  - management of sepsis as below    #sepsis - tachycardic, febrile on 1/27, UA negative, no cough. Does have distended abd and ongoing diarrhea. Source- likely recurrent cdif , sepsis resolved  - blood cultures pending, UA negative  - continue IVF, check lactate  - f/u cdif PCR- but would treat empirically for now with vanco 125mg po q6 hours, will need taper on discharge per ID  - probiotic sacharomyces boulardii 500mg po bid  - ascites is small, low suspicion for sbp as no tenderness- hold zosyn for now  - ID following
diarrhea resolved  Recurrent thus per ID, c/w PO vanco taper 125 q 6, 14 days, then BID for 1 week, then QD for 1 week, and finally every 2 or 3 days for 2 weeks)  -wbc wnl  -hypokalemia: resolved s/p repletion
plt 42k, likely toxic effect of etoh vs. evolving sepsis  - continue to monitor closely  - no signs of active bleed at this time  - management of sepsis as below    #sepsis - tachycardic, febrile on 1/27, UA negative, no cough. Does have distended abd and ongoing diarrhea. Source- likely recurrent cdif vs. other intraabd infection  - blood cultures pending, UA negative  - continue IVF, check lactate  - f/u cdif PCR- but would treat empirically for now with vanco 125mg po q6 hours  - probiotic sacharomyces boulardii 500mg po bid  - empiric zosyn for possible sbp  - f/u dx paracentesis
plt 42k on admission, likely toxic effect of etoh vs.  sepsis- now improving  - continue to monitor closely- improving  - no signs of active bleed at this time  - management of sepsis as below    #sepsis - tachycardic, febrile on 1/27, UA negative, no cough. Does have distended abd and ongoing diarrhea. Source- likely recurrent cdif , sepsis resolved  - blood cultures pending, UA negative  - continue IVF, check lactate  - f/u cdif PCR- but would treat empirically for now with vanco 125mg po q6 hours, will need taper on discharge per ID  - probiotic sacharomyces boulardii 500mg po bid  - ascites is small, low suspicion for sbp as no tenderness- hold zosyn for now  - ID consulted
plt 42k, likely toxic effect of etoh vs.  sepsis  - continue to monitor closely- improving  - no signs of active bleed at this time  - management of sepsis as below    #sepsis - tachycardic, febrile on 1/27, UA negative, no cough. Does have distended abd and ongoing diarrhea. Source- likely recurrent cdif vs. other intraabd infection  - blood cultures pending, UA negative  - continue IVF, check lactate  - f/u cdif PCR- but would treat empirically for now with vanco 125mg po q6 hours  - probiotic sacharomyces boulardii 500mg po bid  - ascites is small, low suspicion for sbp as no tenderness- hold zosyn for now  - ID consult
Recurrent:   per ID, c/w PO vanco taper 125 q 6, 14 days, then BID for 1 week, then QD for 1 week, and finally every 2 or 3 days for 2 weeks)  -wbc wnl  -hypokalemia: aggressive repletion and monitor

## 2021-02-03 NOTE — PROGRESS NOTE ADULT - PROBLEM SELECTOR PLAN 1
High risk given hx of DTs, withdrawal seizures, CIWA now 0-2. Resolved.  - completed ativan taper today  - ativan prn symptom triggered CIWA   - folic acid, multivitamin, thiamine  -  consult- attempted to call family members today, no answer   -baclofen 10mg TID starting by previous provider
High risk given hx of DTs, withdrawal seizures  - continue ativan taper  - symptom triggered CIWA   - currently CIWA 2-4  - start folic acid, multivitamin, thiamine  - SW consult    #transaminitis with increased bili- likely mild alc hep, improving  - continue monitoring CMP daily
High risk given hx of DTs, withdrawal seizures, CIWA now 0-2. REsolved.  - completing ativan taper  - symptom triggered CIWA   - folic acid, multivitamin, thiamine  - SW consult- attempted to call family members today, no answer     #increased bili- likely mild alc hep, improving  - continue monitoring CMP daily
High risk given hx of DTs, withdrawal seizures, CIWA now 0-2. REsolved.  - completing ativan taper  - symptom triggered CIWA   - folic acid, multivitamin, thiamine  - SW consult- unable to locate any family, all phone numbers listed in [facility] do not connect anywhere    #increased bili- likely mild alc hep, improving  - continue monitoring CMP daily
High risk given hx of DTs, withdrawal seizures  - continue ativan taper  - symptom triggered CIWA   - currently CIWA 5-6  - start folic acid, multivitamin, thiamine  - SW consult
High risk given hx of DTs, withdrawal seizures, CIWA now 0-2. Resolved.  - completed ativan taper 2/1  - ativan prn  - folic acid, multivitamin, thiamine  - SW consult- son willing to take home and assist  -baclofen 10mg TID starting by previous provider, outpatient psych followup, refusing alc rehab
High risk given hx of DTs, withdrawal seizures, CIWA now 0-2. Rssolved.  - completing ativan taper 2/1  - symptom triggered CIWA   - folic acid, multivitamin, thiamine  - SW consult- attempted to call family members today, no answer     #increased bili- likely mild alc hep, improving  - continue monitoring CMP daily
High risk given hx of DTs, withdrawal seizures, CIWA now 0-2. Resolved.  - completed ativan taper 2/1  - ativan prn  - folic acid, multivitamin, thiamine  -  consult- attempted to call family members today, no answer   -baclofen 10mg TID starting by previous provider

## 2021-02-03 NOTE — PROGRESS NOTE ADULT - SUBJECTIVE AND OBJECTIVE BOX
Follow Up:  recurrent C-diff    Interval History: pt afebrile and diarrhea has imporved    ROS:      All other systems negative    Constitutional: no fever, no chills  Cardiovascular:  no chest pain, no palpitation  Respiratory:  no SOB, no cough  GI:  no abd pain, no vomiting  urinary: no dysuria, no hematuria, no flank pain  musculoskeletal:  no joint pain, no joint swelling  skin:  no rash  neurology:  no headache, no seizure      Allergies  No Known Allergies        ANTIMICROBIALS:  vancomycin    Solution 125 every 6 hours      OTHER MEDS:  acetaminophen   Tablet .. 650 milliGRAM(s) Oral every 6 hours PRN  baclofen 10 milliGRAM(s) Oral three times a day  folic acid 1 milliGRAM(s) Oral daily  heparin   Injectable 5000 Unit(s) SubCutaneous every 12 hours  LORazepam   Injectable 2 milliGRAM(s) IV Push every 1 hour PRN  multivitamin 1 Tablet(s) Oral daily  potassium chloride    Tablet ER 40 milliEquivalent(s) Oral every 4 hours  saccharomyces boulardii 500 milliGRAM(s) Oral two times a day  thiamine 100 milliGRAM(s) Oral daily      Vital Signs Last 24 Hrs  T(C): 37.1 (01 Feb 2021 13:34), Max: 37.2 (01 Feb 2021 05:25)  T(F): 98.7 (01 Feb 2021 13:34), Max: 99 (01 Feb 2021 05:25)  HR: 87 (01 Feb 2021 13:34) (76 - 92)  BP: 126/82 (01 Feb 2021 13:34) (123/79 - 135/86)  BP(mean): --  RR: 18 (01 Feb 2021 13:34) (16 - 18)  SpO2: 97% (01 Feb 2021 13:34) (96% - 100%)    Physical Exam:  General:    NAD,  non toxic, sitting in bed  Eye: R periorbital ecchymosis  Cardio:     regular S1, S2,  no murmur  Respiratory:    clear b/l,    no wheezing  abd:     soft,   BS +,   no tenderness  :   no CVAT,  no suprapubic tenderness,   no  ng  Musculoskeletal:   no joint swelling  vascular: no phlebitis  Skin:    no rash                          9.6    4.13  )-----------( 150      ( 31 Jan 2021 05:59 )             29.1       02-01    132<L>  |  98  |  6<L>  ----------------------------<  152<H>  2.8<LL>   |  20<L>  |  0.72    Ca    8.6      01 Feb 2021 06:11    TPro  7.0  /  Alb  3.5  /  TBili  1.3<H>  /  DBili  x   /  AST  72<H>  /  ALT  23  /  AlkPhos  218<H>  02-01          MICROBIOLOGY:  v  .Urine Clean Catch (Midstream)  01-28-21   No growth  --  --      .Blood Blood-Peripheral  01-26-21   No Growth Final  --  --          C Diff by PCR Result: Detected (01-27 @ 00:35)    C Diff by PCR Result: Detected (01-27-21 @ 00:35)  Clostridium difficile GDH Toxins A&amp;B, EIA:   Indeterminate (01-26-21 @ 18:08)  Clostridium difficile GDH Interpretation: This specimen is positive for C. Difficile glutamate dehydrogenase (GDH)      RADIOLOGY:  Images independently visualized and reviewed personally, findings as below  < from: Xray Shoulder 2 Views, Left (01.28.21 @ 12:34) >  IMPRESSION:  No acute fracture or dislocation. Mild spurring at the inferior glenoid. There is moderate acromioclavicular arthrosis.      < end of copied text >  < from: CT Chest w/ IV Cont (01.25.21 @ 23:40) >  IMPRESSION:    Mild diffuse thickening of the esophagus suggests esophagitis.  Age-indeterminate fractures of the left lateral ninth and 10th ribs. No pneumothorax or hemothorax.  Enlarged fatty cirrhotic liver. Stigmata of portal hypertension. Mild volume of abdominal ascites. Please note that annual surveillance MRI is recommended in cirrhotic patients to screen for hepatocellular carcinoma.  Right colonicthickening suggests hepatic colopathy.  Possible proctitis.  Possible cystitis. Correlate with urinalysis.    < end of copied text >  
  Follow Up:  recurrent C-diff    Interval History: pt afebrile now and diarrhea has imporved    ROS:      All other systems negative    Constitutional: no fever, no chills  Cardiovascular:  no chest pain, no palpitation  Respiratory:  no SOB, no cough  GI:  no abd pain, no vomiting, no more diarrhea, only one today and formed as per the patient  urinary: no dysuria, no hematuria, no flank pain  musculoskeletal:  no joint pain, no joint swelling  skin:  no rash  neurology:  no headache, no seizure      Allergies  No Known Allergies        ANTIMICROBIALS:  vancomycin    Solution 125 every 6 hours      OTHER MEDS:  acetaminophen   Tablet .. 650 milliGRAM(s) Oral every 6 hours PRN  folic acid 1 milliGRAM(s) Oral daily  lactated ringers. 1000 milliLiter(s) IV Continuous <Continuous>  LORazepam   Injectable 2 milliGRAM(s) IV Push every 1 hour PRN  LORazepam   Injectable   IV Push   LORazepam   Injectable 0.5 milliGRAM(s) IV Push every 4 hours  multivitamin 1 Tablet(s) Oral daily  potassium chloride    Tablet ER 40 milliEquivalent(s) Oral every 4 hours  saccharomyces boulardii 500 milliGRAM(s) Oral two times a day  thiamine 100 milliGRAM(s) Oral daily      Vital Signs Last 24 Hrs  T(C): 37.3 (29 Jan 2021 08:53), Max: 37.6 (28 Jan 2021 17:39)  T(F): 99.2 (29 Jan 2021 08:53), Max: 99.7 (28 Jan 2021 17:39)  HR: 106 (29 Jan 2021 08:53) (106 - 118)  BP: 147/90 (29 Jan 2021 08:53) (126/78 - 147/90)  BP(mean): --  RR: 18 (29 Jan 2021 08:53) (17 - 18)  SpO2: 97% (29 Jan 2021 08:53) (96% - 98%)    Physical Exam:  General:    NAD,  non toxic, eating breakfast  Eye: R periorbital ecchymosis  Cardio:     regular S1, S2,  no murmur  Respiratory:    clear b/l,    no wheezing  abd:     soft,   BS +,   no tenderness  :   no CVAT,  no suprapubic tenderness,   no  ng  Musculoskeletal:   no joint swelling  vascular: no phlebitis  Skin:    no rash                          9.2    4.20  )-----------( 71       ( 29 Jan 2021 06:53 )             28.7       01-29    137  |  100  |  6<L>  ----------------------------<  96  3.1<L>   |  23  |  0.63    Ca    8.7      29 Jan 2021 06:53  Phos  2.7     01-29  Mg     1.8     01-29    TPro  6.9  /  Alb  3.4  /  TBili  1.7<H>  /  DBili  x   /  AST  137<H>  /  ALT  26  /  AlkPhos  233<H>  01-29          MICROBIOLOGY:  v  .Urine Clean Catch (Midstream)  01-28-21   No growth  --  --      .Blood Blood-Peripheral  01-26-21   No growth to date.  --  --          C Diff by PCR Result: Detected (01-27 @ 00:35)    C Diff by PCR Result: Detected (01-27-21 @ 00:35)  Clostridium difficile GDH Toxins A&amp;B, EIA:   Indeterminate (01-26-21 @ 18:08)  Clostridium difficile GDH Interpretation: This specimen is positive for C. Difficile glutamate dehydrogenase (GDH)  antigen and negative for C. Difficile Toxins A & B, by EIA.  GDH is a  highly sensitive screening marker for C. Difficile that is produced in  large amounts by all C. Difficilestrains, both toxigenic and  nontoxigenic.  Specimens that are GDH positive and toxin negative will be  tested further by PCR to detect toxin gene sequences associated with  toxin producing C. Difficle. (01-26-21 @ 18:08)      RADIOLOGY:  Images independently visualized and reviewed personally, findings as below  < from: Xray Shoulder 2 Views, Left (01.28.21 @ 12:34) >  IMPRESSION:  No acute fracture or dislocation. Mild spurring at the inferior glenoid. There is moderate acromioclavicular arthrosis.          < end of copied text >  < from: CT Abdomen and Pelvis w/ IV Cont (01.25.21 @ 23:40) >  IMPRESSION:    Mild diffuse thickening of the esophagus suggests esophagitis.  Age-indeterminate fractures of the left lateral ninth and 10th ribs. No pneumothorax or hemothorax.  Enlarged fatty cirrhotic liver. Stigmata of portal hypertension. Mild volume of abdominal ascites. Please note that annual surveillance MRI is recommended in cirrhotic patients to screen for hepatocellular carcinoma.  Right colonicthickening suggests hepatic colopathy.  Possible proctitis.  Possible cystitis. Correlate with urinalysis.      
Kole Blue MD  Division of Hospital Medicine  Pager: [phone]  If no response or off-hours, page [phone]  -------------------------------------    Patient is a 55y old  Male who presents with a chief complaint of EtOH withdrawal (26 Jan 2021 08:49)      SUBJECTIVE / OVERNIGHT EVENTS: none acute, noted to have fever of 102.3 this afternoon  ADDITIONAL REVIEW OF SYSTEMS: pt reports HA, anxiety and L shoulder pain. Otherwise notes ongoing diarrhea, no abd pain, no n/v, no cough, no rashes, ros otherwise negative.     MEDICATIONS  (STANDING):  folic acid 1 milliGRAM(s) Oral daily  lactated ringers. 1000 milliLiter(s) (100 mL/Hr) IV Continuous <Continuous>  LORazepam   Injectable   IV Push   LORazepam   Injectable 1.5 milliGRAM(s) IV Push every 4 hours  multivitamin 1 Tablet(s) Oral daily  potassium chloride    Tablet ER 20 milliEquivalent(s) Oral every 2 hours  potassium chloride  10 mEq/100 mL IVPB 10 milliEquivalent(s) IV Intermittent every 1 hour  thiamine 100 milliGRAM(s) Oral daily    MEDICATIONS  (PRN):  acetaminophen   Tablet .. 650 milliGRAM(s) Oral every 6 hours PRN Mild Pain (1 - 3), Moderate Pain (4 - 6)  LORazepam   Injectable 2 milliGRAM(s) IV Push every 1 hour PRN CIWA-Ar score 8 or greater      CAPILLARY BLOOD GLUCOSE        I&O's Summary    26 Jan 2021 07:01  -  27 Jan 2021 07:00  --------------------------------------------------------  IN: 1400 mL / OUT: 600 mL / NET: 800 mL    27 Jan 2021 07:01  -  27 Jan 2021 13:08  --------------------------------------------------------  IN: 120 mL / OUT: 0 mL / NET: 120 mL        PHYSICAL EXAM:  Vital Signs Last 24 Hrs  T(C): 37.5 (27 Jan 2021 04:02), Max: 38 (26 Jan 2021 18:19)  T(F): 99.5 (27 Jan 2021 04:02), Max: 100.4 (26 Jan 2021 18:19)  HR: 117 (27 Jan 2021 04:02) (104 - 127)  BP: 120/72 (27 Jan 2021 04:02) (120/72 - 147/90)  BP(mean): --  RR: 18 (27 Jan 2021 04:02) (18 - 20)  SpO2: 92% (27 Jan 2021 04:02) (92% - 98%)  CONSTITUTIONAL: NAD, well-developed, well-groomed  EYES: +R periorbital ecchymosis  ENMT: Moist oral mucosa, no pharyngeal injection or exudates; normal dentition  NECK: Supple, no palpable masses; no thyromegaly  RESPIRATORY: Normal respiratory effort; lungs are clear to auscultation bilaterally  CARDIOVASCULAR: Regular rate and rhythm, normal S1 and S2, no murmur/rub/gallop; No lower extremity edema; Peripheral pulses are 2+ bilaterally  ABDOMEN: +distended, Nontender to palpation, normoactive bowel sounds, no rebound/guarding; No hepatosplenomegaly  MUSCLOSKELETAL:  Normal gait; no clubbing or cyanosis of digits; no joint swelling or tenderness to palpation  PSYCH: A+O to person, place, and time; affect appropriate  NEUROLOGY: CN 2-12 are intact and symmetric; no gross sensory deficits;   SKIN: No rashes; no palpable lesions    LABS:                        10.2   7.49  )-----------( 44       ( 27 Jan 2021 07:33 )             30.3     01-27    133<L>  |  92<L>  |  13  ----------------------------<  105<H>  2.9<LL>   |  28  |  0.67    Ca    8.6      27 Jan 2021 07:33  Phos  2.0     01-27  Mg     2.1     01-27    TPro  6.5  /  Alb  3.3  /  TBili  2.2<H>  /  DBili  x   /  AST  190<H>  /  ALT  22  /  AlkPhos  221<H>  01-27    PT/INR - ( 25 Jan 2021 22:25 )   PT: 14.4 sec;   INR: 1.21 ratio         PTT - ( 25 Jan 2021 22:25 )  PTT:28.2 sec      Urinalysis Basic - ( 26 Jan 2021 06:22 )    Color: Yellow / Appearance: Clear / SG: >1.050 / pH: x  Gluc: x / Ketone: Negative  / Bili: Small / Urobili: >8mg/dL   Blood: x / Protein: 100 / Nitrite: Negative   Leuk Esterase: Negative / RBC: x / WBC x   Sq Epi: x / Non Sq Epi: x / Bacteria: x        Culture - Blood (collected 26 Jan 2021 12:56)  Source: .Blood Blood-Peripheral  Preliminary Report (27 Jan 2021 13:02):    No growth to date.    Culture - Blood (collected 26 Jan 2021 12:56)  Source: .Blood Blood-Peripheral  Preliminary Report (27 Jan 2021 13:02):    No growth to date.        RADIOLOGY & ADDITIONAL TESTS:  Results Reviewed:   Imaging Personally Reviewed:  Electrocardiogram Personally Reviewed:    COORDINATION OF CARE:  Care Discussed with Consultants/Other Providers [Y/N]:  Prior or Outpatient Records Reviewed [Y/N]:  
Kole Blue MD  Division of Hospital Medicine  Pager: [phone]  If no response or off-hours, page [phone]  -------------------------------------    Patient is a 55y old  Male who presents with a chief complaint of EtOH withdrawal (28 Jan 2021 11:21)      SUBJECTIVE / OVERNIGHT EVENTS: none acute  ADDITIONAL REVIEW OF SYSTEMS: pt seen working with PT, unsteady but no orthostasis, no aches/pains reported, no fevers/chills, still having diarrhea, somewhat confused but orientable.     MEDICATIONS  (STANDING):  folic acid 1 milliGRAM(s) Oral daily  lactated ringers Bolus 1000 milliLiter(s) IV Bolus once  lactated ringers. 1000 milliLiter(s) (100 mL/Hr) IV Continuous <Continuous>  LORazepam   Injectable   IV Push   LORazepam   Injectable 1 milliGRAM(s) IV Push every 4 hours  multivitamin 1 Tablet(s) Oral daily  saccharomyces boulardii 500 milliGRAM(s) Oral two times a day  thiamine 100 milliGRAM(s) Oral daily  vancomycin    Solution 125 milliGRAM(s) Oral every 6 hours    MEDICATIONS  (PRN):  acetaminophen   Tablet .. 650 milliGRAM(s) Oral every 6 hours PRN Mild Pain (1 - 3), Moderate Pain (4 - 6)  LORazepam   Injectable 2 milliGRAM(s) IV Push every 1 hour PRN CIWA-Ar score 8 or greater      CAPILLARY BLOOD GLUCOSE        I&O's Summary    27 Jan 2021 07:01  -  28 Jan 2021 07:00  --------------------------------------------------------  IN: 2570 mL / OUT: 1050 mL / NET: 1520 mL    28 Jan 2021 07:01  -  28 Jan 2021 12:53  --------------------------------------------------------  IN: 100 mL / OUT: 0 mL / NET: 100 mL        PHYSICAL EXAM:  Vital Signs Last 24 Hrs  T(C): 37 (28 Jan 2021 04:27), Max: 39.1 (27 Jan 2021 13:23)  T(F): 98.6 (28 Jan 2021 04:27), Max: 102.3 (27 Jan 2021 13:23)  HR: 118 (28 Jan 2021 04:27) (118 - 123)  BP: 140/84 (28 Jan 2021 04:27) (105/68 - 140/84)  BP(mean): --  RR: 18 (28 Jan 2021 04:27) (18 - 18)  SpO2: 96% (28 Jan 2021 04:27) (95% - 96%)  CONSTITUTIONAL: NAD, well-developed, well-groomed  EYES: PERRLA; +R periorbital ecchymosis  ENMT: Moist oral mucosa, no pharyngeal injection or exudates; normal dentition  NECK: Supple, no palpable masses; no thyromegaly  RESPIRATORY: Normal respiratory effort; lungs are clear to auscultation bilaterally  CARDIOVASCULAR: Regular rate and rhythm, normal S1 and S2, no murmur/rub/gallop; No lower extremity edema; Peripheral pulses are 2+ bilaterally  ABDOMEN: Nontender to palpation, normoactive bowel sounds, no rebound/guarding; No hepatosplenomegaly  MUSCLOSKELETAL:  Normal gait; no clubbing or cyanosis of digits; no joint swelling or tenderness to palpation  PSYCH: A+O to person, place, affect wnl  NEUROLOGY: CN 2-12 are intact and symmetric; no gross sensory deficits;   SKIN: No rashes; no palpable lesions    LABS:                        9.8    5.86  )-----------( 51       ( 28 Jan 2021 07:12 )             29.5     01-28    136  |  97  |  8   ----------------------------<  106<H>  3.3<L>   |  25  |  0.66    Ca    8.9      28 Jan 2021 07:12  Phos  2.0     01-28  Mg     1.8     01-28    TPro  6.5  /  Alb  3.3  /  TBili  2.2<H>  /  DBili  x   /  AST  190<H>  /  ALT  22  /  AlkPhos  221<H>  01-27              Culture - Blood (collected 26 Jan 2021 12:56)  Source: .Blood Blood-Peripheral  Preliminary Report (27 Jan 2021 13:02):    No growth to date.    Culture - Blood (collected 26 Jan 2021 12:56)  Source: .Blood Blood-Peripheral  Preliminary Report (27 Jan 2021 13:02):    No growth to date.        RADIOLOGY & ADDITIONAL TESTS:  Results Reviewed:   Imaging Personally Reviewed:  Electrocardiogram Personally Reviewed:    COORDINATION OF CARE:  Care Discussed with Consultants/Other Providers [Y/N]:  Prior or Outpatient Records Reviewed [Y/N]:    
  Patient is a 55y old  Male who presents with a chief complaint of EtOH withdrawal (01 Feb 2021 16:46)      SUBJECTIVE / OVERNIGHT EVENTS: No ON events. Feels well, wants to go home. Denies cp, sob, abdominal pain, diarrhea, n/v.       ADDITIONAL REVIEW OF SYSTEMS: Negative except for above    MEDICATIONS  (STANDING):  baclofen 10 milliGRAM(s) Oral three times a day  folic acid 1 milliGRAM(s) Oral daily  heparin   Injectable 5000 Unit(s) SubCutaneous every 12 hours  multivitamin 1 Tablet(s) Oral daily  saccharomyces boulardii 500 milliGRAM(s) Oral two times a day  thiamine 100 milliGRAM(s) Oral daily  vancomycin    Solution 125 milliGRAM(s) Oral every 6 hours    MEDICATIONS  (PRN):  acetaminophen   Tablet .. 650 milliGRAM(s) Oral every 6 hours PRN Mild Pain (1 - 3), Moderate Pain (4 - 6)  LORazepam   Injectable 2 milliGRAM(s) IV Push every 1 hour PRN CIWA-Ar score 8 or greater      CAPILLARY BLOOD GLUCOSE        I&O's Summary    01 Feb 2021 07:01  -  02 Feb 2021 07:00  --------------------------------------------------------  IN: 400 mL / OUT: 0 mL / NET: 400 mL    02 Feb 2021 07:01  -  02 Feb 2021 12:56  --------------------------------------------------------  IN: 180 mL / OUT: 0 mL / NET: 180 mL        PHYSICAL EXAM:  Vital Signs Last 24 Hrs  T(C): 36.6 (02 Feb 2021 09:37), Max: 37.1 (01 Feb 2021 13:34)  T(F): 97.9 (02 Feb 2021 09:37), Max: 98.7 (01 Feb 2021 13:34)  HR: 88 (02 Feb 2021 09:37) (82 - 95)  BP: 136/71 (02 Feb 2021 09:37) (124/79 - 136/71)  BP(mean): --  RR: 18 (02 Feb 2021 09:37) (18 - 18)  SpO2: 96% (02 Feb 2021 09:37) (96% - 100%)    PHYSICAL EXAM:  PHYSICAL EXAM:  GENERAL: NAD, well-developed  HEAD:  Atraumatic, Normocephalic  EYES: ecchymosis around eye  NECK: Supple, No JVD  CHEST/LUNG: Clear to auscultation bilaterally;  HEART: Regular rate and rhythm;  ABDOMEN: Soft, Nontender, Nondistended; Bowel sounds present  EXTREMITIES:  2+ Peripheral Pulses, No clubbing, cyanosis, or edema  PSYCH: AAOx3          LABS:    02-02    138  |  104  |  8   ----------------------------<  105<H>  4.0   |  20<L>  |  0.75    Ca    8.8      02 Feb 2021 06:07    TPro  7.8  /  Alb  3.6  /  TBili  1.3<H>  /  DBili  x   /  AST  67<H>  /  ALT  23  /  AlkPhos  217<H>  02-02    PT/INR - ( 01 Feb 2021 09:30 )   PT: 14.3 sec;   INR: 1.22 ratio                     RADIOLOGY & ADDITIONAL TESTS:    Imaging Personally Reviewed:    Electrocardiogram Personally Reviewed:    COORDINATION OF CARE:  Care Discussed with Consultants/Other Providers [Y/N]:  Prior or Outpatient Records Reviewed [Y/N]:  
[facility] Division of Hospital Medicine  Preeti Ly DO pager [phone]    Patient is a 55y old  Male who presents with a chief complaint of EtOH withdrawal (29 Jan 2021 15:24)      SUBJECTIVE / OVERNIGHT EVENTS:  ADDITIONAL REVIEW OF SYSTEMS:    MEDICATIONS  (STANDING):  baclofen 10 milliGRAM(s) Oral three times a day  folic acid 1 milliGRAM(s) Oral daily  lactated ringers. 1000 milliLiter(s) (100 mL/Hr) IV Continuous <Continuous>  LORazepam   Injectable   IV Push   LORazepam   Injectable 0.5 milliGRAM(s) IV Push every 12 hours  multivitamin 1 Tablet(s) Oral daily  saccharomyces boulardii 500 milliGRAM(s) Oral two times a day  thiamine 100 milliGRAM(s) Oral daily  vancomycin    Solution 125 milliGRAM(s) Oral every 6 hours    MEDICATIONS  (PRN):  acetaminophen   Tablet .. 650 milliGRAM(s) Oral every 6 hours PRN Mild Pain (1 - 3), Moderate Pain (4 - 6)  LORazepam   Injectable 2 milliGRAM(s) IV Push every 1 hour PRN CIWA-Ar score 8 or greater      CAPILLARY BLOOD GLUCOSE        I&O's Summary    29 Jan 2021 07:01  -  30 Jan 2021 07:00  --------------------------------------------------------  IN: 1687 mL / OUT: 6475 mL / NET: -4788 mL    30 Jan 2021 07:01  -  30 Jan 2021 16:45  --------------------------------------------------------  IN: 480 mL / OUT: 2800 mL / NET: -2320 mL        PHYSICAL EXAM:  Vital Signs Last 24 Hrs  T(C): 37 (30 Jan 2021 12:30), Max: 37.1 (30 Jan 2021 01:30)  T(F): 98.6 (30 Jan 2021 12:30), Max: 98.7 (30 Jan 2021 01:30)  HR: 102 (30 Jan 2021 12:30) (100 - 110)  BP: 151/92 (30 Jan 2021 12:30) (133/87 - 152/89)  BP(mean): --  RR: 16 (30 Jan 2021 12:30) (16 - 18)  SpO2: 98% (30 Jan 2021 12:30) (96% - 100%)  CONSTITUTIONAL: NAD, well-developed, well-groomed  EYES: PERRLA; conjunctiva and sclera clear  ENMT: Moist oral mucosa, no pharyngeal injection or exudates; normal dentition  NECK: Supple, no palpable masses; no thyromegaly  RESPIRATORY: Normal respiratory effort; lungs are clear to auscultation bilaterally  CARDIOVASCULAR: Regular rate and rhythm, normal S1 and S2, no murmur/rub/gallop; No lower extremity edema; Peripheral pulses are 2+ bilaterally  ABDOMEN: Nontender to palpation, normoactive bowel sounds, no rebound/guarding; No hepatosplenomegaly  MUSCULOSKELETAL:  Normal gait; no clubbing or cyanosis of digits; no joint swelling or tenderness to palpation  PSYCH: A+O to person, place, and time; affect appropriate  NEUROLOGY: CN 2-12 are intact and symmetric; no gross sensory deficits   SKIN: No rashes; no palpable lesions    LABS:                        9.4    3.74  )-----------( 109      ( 30 Jan 2021 06:10 )             28.9     01-30    132<L>  |  99  |  6<L>  ----------------------------<  99  3.5   |  21<L>  |  0.61    Ca    9.1      30 Jan 2021 06:10  Phos  3.4     01-30  Mg     1.8     01-30    TPro  7.0  /  Alb  3.4  /  TBili  1.4<H>  /  DBili  x   /  AST  103<H>  /  ALT  24  /  AlkPhos  234<H>  01-30    PT/INR - ( 29 Jan 2021 08:35 )   PT: 15.6 sec;   INR: 1.34 ratio                   Culture - Urine (collected 28 Jan 2021 10:14)  Source: .Urine Clean Catch (Midstream)  Final Report (29 Jan 2021 09:23):    No growth        RADIOLOGY & ADDITIONAL TESTS:  Results Reviewed:   Imaging Personally Reviewed:  Electrocardiogram Personally Reviewed:    COORDINATION OF CARE:  Care Discussed with Consultants/Other Providers [Y/N]:  Prior or Outpatient Records Reviewed [Y/N]:  
[facility] Division of Hospital Medicine  Preeti Ly DO pager [phone]    Patient is a 55y old  Male who presents with a chief complaint of EtOH withdrawal (30 Jan 2021 16:45)      SUBJECTIVE / OVERNIGHT EVENTS:  ADDITIONAL REVIEW OF SYSTEMS:    MEDICATIONS  (STANDING):  baclofen 10 milliGRAM(s) Oral three times a day  folic acid 1 milliGRAM(s) Oral daily  lactated ringers. 1000 milliLiter(s) (100 mL/Hr) IV Continuous <Continuous>  LORazepam   Injectable   IV Push   LORazepam   Injectable 0.5 milliGRAM(s) IV Push every 12 hours  multivitamin 1 Tablet(s) Oral daily  saccharomyces boulardii 500 milliGRAM(s) Oral two times a day  thiamine 100 milliGRAM(s) Oral daily  vancomycin    Solution 125 milliGRAM(s) Oral every 6 hours    MEDICATIONS  (PRN):  acetaminophen   Tablet .. 650 milliGRAM(s) Oral every 6 hours PRN Mild Pain (1 - 3), Moderate Pain (4 - 6)  LORazepam   Injectable 2 milliGRAM(s) IV Push every 1 hour PRN CIWA-Ar score 8 or greater      CAPILLARY BLOOD GLUCOSE        I&O's Summary    30 Jan 2021 07:01  -  31 Jan 2021 07:00  --------------------------------------------------------  IN: 4920 mL / OUT: 7900 mL / NET: -2980 mL    31 Jan 2021 07:01  -  31 Jan 2021 15:21  --------------------------------------------------------  IN: 360 mL / OUT: 1000 mL / NET: -640 mL        PHYSICAL EXAM:  Vital Signs Last 24 Hrs  T(C): 37.2 (31 Jan 2021 12:30), Max: 37.2 (31 Jan 2021 01:25)  T(F): 99 (31 Jan 2021 12:30), Max: 99 (31 Jan 2021 05:05)  HR: 97 (31 Jan 2021 12:30) (94 - 106)  BP: 144/83 (31 Jan 2021 12:30) (126/80 - 145/84)  BP(mean): --  RR: 16 (31 Jan 2021 12:30) (16 - 18)  SpO2: 98% (31 Jan 2021 12:30) (98% - 100%)  CONSTITUTIONAL: NAD, well-developed, well-groomed  EYES: PERRLA; conjunctiva and sclera clear  ENMT: Moist oral mucosa, no pharyngeal injection or exudates; normal dentition  NECK: Supple, no palpable masses; no thyromegaly  RESPIRATORY: Normal respiratory effort; lungs are clear to auscultation bilaterally  CARDIOVASCULAR: Regular rate and rhythm, normal S1 and S2, no murmur/rub/gallop; No lower extremity edema; Peripheral pulses are 2+ bilaterally  ABDOMEN: Nontender to palpation, normoactive bowel sounds, no rebound/guarding; No hepatosplenomegaly  MUSCULOSKELETAL:  Normal gait; no clubbing or cyanosis of digits; no joint swelling or tenderness to palpation  PSYCH: A+O to person, place, and time; affect appropriate  NEUROLOGY: CN 2-12 are intact and symmetric; no gross sensory deficits   SKIN: No rashes; no palpable lesions    LABS:                        9.6    4.13  )-----------( 150      ( 31 Jan 2021 05:59 )             29.1     01-30    132<L>  |  99  |  6<L>  ----------------------------<  99  3.5   |  21<L>  |  0.61    Ca    9.1      30 Jan 2021 06:10  Phos  3.4     01-30  Mg     1.8     01-30    TPro  7.0  /  Alb  3.4  /  TBili  1.4<H>  /  DBili  x   /  AST  103<H>  /  ALT  24  /  AlkPhos  234<H>  01-30                RADIOLOGY & ADDITIONAL TESTS:  Results Reviewed:   Imaging Personally Reviewed:  Electrocardiogram Personally Reviewed:    COORDINATION OF CARE:  Care Discussed with Consultants/Other Providers [Y/N]:  Prior or Outpatient Records Reviewed [Y/N]:  
Kole Blue MD  Division of Hospital Medicine  Pager: [phone]  If no response or off-hours, page [phone]  -------------------------------------    Patient is a 55y old  Male who presents with a chief complaint of EtOH withdrawal (29 Jan 2021 12:50)      SUBJECTIVE / OVERNIGHT EVENTS: none acute  ADDITIONAL REVIEW OF SYSTEMS: pt feels better, diarrhea improved, no abd pain, no fevers/chills. Amenable to trying medication for alcohol cessation. ros otherwise negative.     MEDICATIONS  (STANDING):  folic acid 1 milliGRAM(s) Oral daily  lactated ringers. 1000 milliLiter(s) (100 mL/Hr) IV Continuous <Continuous>  LORazepam   Injectable   IV Push   LORazepam   Injectable 0.5 milliGRAM(s) IV Push every 4 hours  multivitamin 1 Tablet(s) Oral daily  saccharomyces boulardii 500 milliGRAM(s) Oral two times a day  thiamine 100 milliGRAM(s) Oral daily  vancomycin    Solution 125 milliGRAM(s) Oral every 6 hours    MEDICATIONS  (PRN):  acetaminophen   Tablet .. 650 milliGRAM(s) Oral every 6 hours PRN Mild Pain (1 - 3), Moderate Pain (4 - 6)  LORazepam   Injectable 2 milliGRAM(s) IV Push every 1 hour PRN NOEL-Ar score 8 or greater      CAPILLARY BLOOD GLUCOSE        I&O's Summary    28 Jan 2021 07:01  -  29 Jan 2021 07:00  --------------------------------------------------------  IN: 2120 mL / OUT: 2400 mL / NET: -280 mL    29 Jan 2021 07:01  -  29 Jan 2021 15:24  --------------------------------------------------------  IN: 440 mL / OUT: 1575 mL / NET: -1135 mL        PHYSICAL EXAM:  Vital Signs Last 24 Hrs  T(C): 36.8 (29 Jan 2021 13:05), Max: 37.6 (28 Jan 2021 17:39)  T(F): 98.3 (29 Jan 2021 13:05), Max: 99.7 (28 Jan 2021 17:39)  HR: 111 (29 Jan 2021 13:05) (106 - 118)  BP: 125/82 (29 Jan 2021 13:05) (125/82 - 147/90)  BP(mean): --  RR: 18 (29 Jan 2021 13:05) (17 - 18)  SpO2: 99% (29 Jan 2021 13:05) (96% - 99%)  CONSTITUTIONAL: NAD, well-developed, well-groomed  EYES: +R periorbital ecchymosis  ENMT: Moist oral mucosa, no pharyngeal injection or exudates; normal dentition  NECK: Supple, no palpable masses; no thyromegaly  RESPIRATORY: Normal respiratory effort; lungs are clear to auscultation bilaterally  CARDIOVASCULAR: Regular rate and rhythm, normal S1 and S2, no murmur/rub/gallop; No lower extremity edema; Peripheral pulses are 2+ bilaterally  ABDOMEN: Nontender to palpation, normoactive bowel sounds, no rebound/guarding; No hepatosplenomegaly  MUSCLOSKELETAL:  Normal gait; no clubbing or cyanosis of digits; no joint swelling or tenderness to palpation  PSYCH: A+O to person and place, knows its 2021. affect otherwise normal, CIWA 0-2  NEUROLOGY: CN 2-12 are intact and symmetric; no gross sensory deficits; no tremor/asterexis  SKIN: No rashes; no palpable lesions    LABS:                        9.2    4.20  )-----------( 71       ( 29 Jan 2021 06:53 )             28.7     01-29    137  |  100  |  6<L>  ----------------------------<  96  3.1<L>   |  23  |  0.63    Ca    8.7      29 Jan 2021 06:53  Phos  2.7     01-29  Mg     1.8     01-29    TPro  6.9  /  Alb  3.4  /  TBili  1.7<H>  /  DBili  x   /  AST  137<H>  /  ALT  26  /  AlkPhos  233<H>  01-29    PT/INR - ( 29 Jan 2021 08:35 )   PT: 15.6 sec;   INR: 1.34 ratio                   Culture - Urine (collected 28 Jan 2021 10:14)  Source: .Urine Clean Catch (Midstream)  Final Report (29 Jan 2021 09:23):    No growth        RADIOLOGY & ADDITIONAL TESTS:  Results Reviewed:   Imaging Personally Reviewed:  Electrocardiogram Personally Reviewed:    COORDINATION OF CARE:  Care Discussed with Consultants/Other Providers [Y/N]:  Prior or Outpatient Records Reviewed [Y/N]:  
  Patient is a 55y old  Male who presents with a chief complaint of EtOH withdrawal (02 Feb 2021 12:56)      SUBJECTIVE / OVERNIGHT EVENTS: No On events or complaints Was discharged yesterday but son refused to accept him despite transport being set up. got 24 hour notice. Today son is accepting and going to pick him up        ADDITIONAL REVIEW OF SYSTEMS: Negative except for above    MEDICATIONS  (STANDING):  baclofen 10 milliGRAM(s) Oral three times a day  folic acid 1 milliGRAM(s) Oral daily  heparin   Injectable 5000 Unit(s) SubCutaneous every 12 hours  multivitamin 1 Tablet(s) Oral daily  saccharomyces boulardii 500 milliGRAM(s) Oral two times a day  thiamine 100 milliGRAM(s) Oral daily  vancomycin    Solution 125 milliGRAM(s) Oral every 6 hours    MEDICATIONS  (PRN):  acetaminophen   Tablet .. 650 milliGRAM(s) Oral every 6 hours PRN Mild Pain (1 - 3), Moderate Pain (4 - 6)      CAPILLARY BLOOD GLUCOSE        I&O's Summary    02 Feb 2021 07:01  -  03 Feb 2021 07:00  --------------------------------------------------------  IN: 2260 mL / OUT: 0 mL / NET: 2260 mL    03 Feb 2021 07:01  -  03 Feb 2021 13:28  --------------------------------------------------------  IN: 780 mL / OUT: 0 mL / NET: 780 mL        PHYSICAL EXAM:  Vital Signs Last 24 Hrs  T(C): 37.3 (03 Feb 2021 05:48), Max: 37.3 (03 Feb 2021 05:48)  T(F): 99.1 (03 Feb 2021 05:48), Max: 99.1 (03 Feb 2021 05:48)  HR: 75 (03 Feb 2021 05:48) (74 - 89)  BP: 141/84 (03 Feb 2021 05:48) (135/76 - 141/84)  BP(mean): --  RR: 18 (03 Feb 2021 05:48) (18 - 18)  SpO2: 99% (03 Feb 2021 05:48) (96% - 99%)      PHYSICAL EXAM:  GENERAL: NAD, well-developed  HEAD:  NCAT  EYES: ecchymosis around eye  NECK: Supple, No JVD  CHEST/LUNG: Clear to auscultation bilaterally;  HEART: Regular rate and rhythm;  ABDOMEN: Soft, Nontender, Nondistended; Bowel sounds present  EXTREMITIES:  2+ Peripheral Pulses, No clubbing, cyanosis, or edema  PSYCH: AAOx3        LABS:    02-02    138  |  104  |  8   ----------------------------<  105<H>  4.0   |  20<L>  |  0.75    Ca    8.8      02 Feb 2021 06:07    TPro  7.8  /  Alb  3.6  /  TBili  1.3<H>  /  DBili  x   /  AST  67<H>  /  ALT  23  /  AlkPhos  217<H>  02-02                RADIOLOGY & ADDITIONAL TESTS:    Imaging Personally Reviewed:    Electrocardiogram Personally Reviewed:    COORDINATION OF CARE:  Care Discussed with Consultants/Other Providers [Y/N]:  Prior or Outpatient Records Reviewed [Y/N]:  
  Patient is a 55y old  Male who presents with a chief complaint of EtOH withdrawal (31 Jan 2021 15:21)      SUBJECTIVE / OVERNIGHT EVENTS: No on events. Denies cp, sob, palpitations, dizzinesss        ADDITIONAL REVIEW OF SYSTEMS: Negative except for above    MEDICATIONS  (STANDING):  baclofen 10 milliGRAM(s) Oral three times a day  folic acid 1 milliGRAM(s) Oral daily  heparin   Injectable 5000 Unit(s) SubCutaneous every 12 hours  lactated ringers. 1000 milliLiter(s) (100 mL/Hr) IV Continuous <Continuous>  multivitamin 1 Tablet(s) Oral daily  potassium chloride    Tablet ER 40 milliEquivalent(s) Oral every 4 hours  saccharomyces boulardii 500 milliGRAM(s) Oral two times a day  thiamine 100 milliGRAM(s) Oral daily  vancomycin    Solution 125 milliGRAM(s) Oral every 6 hours    MEDICATIONS  (PRN):  acetaminophen   Tablet .. 650 milliGRAM(s) Oral every 6 hours PRN Mild Pain (1 - 3), Moderate Pain (4 - 6)  LORazepam   Injectable 2 milliGRAM(s) IV Push every 1 hour PRN CIWA-Ar score 8 or greater      CAPILLARY BLOOD GLUCOSE        I&O's Summary    31 Jan 2021 07:01  -  01 Feb 2021 07:00  --------------------------------------------------------  IN: 2100 mL / OUT: 7300 mL / NET: -5200 mL        PHYSICAL EXAM:  Vital Signs Last 24 Hrs  T(C): 37.1 (01 Feb 2021 13:34), Max: 37.2 (01 Feb 2021 05:25)  T(F): 98.7 (01 Feb 2021 13:34), Max: 99 (01 Feb 2021 05:25)  HR: 87 (01 Feb 2021 13:34) (76 - 92)  BP: 126/82 (01 Feb 2021 13:34) (123/79 - 135/86)  BP(mean): --  RR: 18 (01 Feb 2021 13:34) (16 - 18)  SpO2: 97% (01 Feb 2021 13:34) (96% - 100%)    PHYSICAL EXAM:  GENERAL: NAD, well-developed  HEAD:  Atraumatic, Normocephalic  EYES: eccymosis around eye  NECK: Supple, No JVD  CHEST/LUNG: Clear to auscultation bilaterally;  HEART: Regular rate and rhythm;  ABDOMEN: Soft, Nontender, Nondistended; Bowel sounds present  EXTREMITIES:  2+ Peripheral Pulses, No clubbing, cyanosis, or edema  PSYCH: AAOx3  NEUROLOGY: non-focal        LABS:                        9.6    4.13  )-----------( 150      ( 31 Jan 2021 05:59 )             29.1     02-01    132<L>  |  98  |  6<L>  ----------------------------<  152<H>  2.8<LL>   |  20<L>  |  0.72    Ca    8.6      01 Feb 2021 06:11    TPro  7.0  /  Alb  3.5  /  TBili  1.3<H>  /  DBili  x   /  AST  72<H>  /  ALT  23  /  AlkPhos  218<H>  02-01    PT/INR - ( 01 Feb 2021 09:30 )   PT: 14.3 sec;   INR: 1.22 ratio                     RADIOLOGY & ADDITIONAL TESTS:    Imaging Personally Reviewed:    Electrocardiogram Personally Reviewed:    COORDINATION OF CARE:  Care Discussed with Consultants/Other Providers [Y/N]:  Prior or Outpatient Records Reviewed [Y/N]:

## 2021-02-03 NOTE — PROGRESS NOTE ADULT - PROVIDER SPECIALTY LIST ADULT
Infectious Disease
Infectious Disease
Hospitalist

## 2021-08-03 NOTE — PROGRESS NOTE ADULT - PROBLEM SELECTOR PLAN 3
Seen by hepatology team during his previous admission in 10/2019. He has no hospitalization for hepatic encephalopathy.   -Patient should have MRI with IV contrast as outpatient to assess for HCC as outpatient with hepatology followup .  This patient last had an upper endoscopy in 2009 which found normal esophagus, normal duodenum and hiatal hernia.   -The patient should have repeat endoscopy arranged with GI/hepatology team.  -MELD score of 9. with patient

## 2021-11-16 NOTE — ED ADULT NURSE NOTE - CAS EDN DISCHARGE INTERVENTIONS
Arm band on/IV intact Paramedian Forehead Flap Text: A decision was made to reconstruct the defect utilizing an interpolation axial flap and a staged reconstruction.  A telfa template was made of the defect.  This telfa template was then used to outline the paramedian forehead pedicle flap.  The donor area for the pedicle flap was then injected with anesthesia.  The flap was excised through the skin and subcutaneous tissue down to the layer of the underlying musculature.  The pedicle flap was carefully excised within this deep plane to maintain its blood supply.  The edges of the donor site were undermined.   The donor site was closed in a primary fashion.  The pedicle was then rotated into position and sutured.  Once the tube was sutured into place, adequate blood supply was confirmed with blanching and refill.  The pedicle was then wrapped with xeroform gauze and dressed appropriately with a telfa and gauze bandage to ensure continued blood supply and protect the attached pedicle.

## 2022-03-18 NOTE — PHYSICAL THERAPY INITIAL EVALUATION ADULT - PERTINENT HX OF CURRENT PROBLEM, REHAB EVAL
55y M w/ PMH HTN (with medication nonadherence), chronic alcohol abuse (c/b prior DTs, withdrawal seizures, hematemesis, and melena), alcoholic cirrhosis (with portal hypertension), presenting with acute alcohol intoxication and traumatic SDH/SAH, admitted for severe EtOH withdrawal requiring phenobarb and precedex gtt, now with improved metabolic encephalopathy, off precedex. course c/b enterobacter UTI and cdiff diarrhea with intermittent fevers, s/p vanc and ceftriaxone, now on ertapenem. cardiovascular/respiratory

## 2022-10-03 NOTE — CHART NOTE - NSCHARTNOTEFT_GEN_A_CORE
Psychiatry Chart Note:  Patient seen in person by this writer on 4/28, Case discussed with Dr. Sears. Please see Behavioral Health initial consult note written on 4/27 for full recommendations. Rinvoq Counseling: I discussed with the patient the risks of Rinvoq therapy including but not limited to upper respiratory tract infections, shingles, cold sores, bronchitis, nausea, cough, fever, acne, and headache. Live vaccines should be avoided.  This medication has been linked to serious infections; higher rate of mortality; malignancy and lymphoproliferative disorders; major adverse cardiovascular events; thrombosis; thrombocytopenia, anemia, and neutropenia; lipid elevations; liver enzyme elevations; and gastrointestinal perforations.

## 2022-10-07 NOTE — H&P ADULT - PROBLEM/PLAN-1
DISPLAY PLAN FREE TEXT Niacinamide Counseling: I recommended taking niacin or niacinamide, also know as vitamin B3, twice daily. Recent evidence suggests that taking vitamin B3 (500 mg twice daily) can reduce the risk of actinic keratoses and non-melanoma skin cancers. Side effects of vitamin B3 include flushing and headache.

## 2022-11-15 NOTE — PATIENT PROFILE ADULT - NSTRANSFERBELONGINGSRESP_GEN_A_NUR
Post Injection Instructions     Please do not do anything strenuous over the next two days (if you had a knee injection do not walk more than 2 city blocks, do not attend any aerobic classes, do not run, no heavy lifting, no prolong standing). You may resume your day to day activities after your injection. You may experience some mild amount of swelling after the procedure. Please ice your joint that was injected at least 5-6 times a day (15 minutes) for two days after (this will help prevent worsening pain that sometimes occurs after an injection). Only take tylenol if needed for pain for the first few days. Watch for signs of infection which include redness, warmth, worsening pain, fevers or chills. If you develop any of these signs call the office immediately at [phone]    Everyone responds differently to injections, but you can expect your peak effects a few weeks after your last injection. Jadiel Mccoy.  Zulay Lozano MD  Physical Medicine and Rehabilitation/Sports Medicine  [facility] yes

## 2023-01-31 NOTE — ED ADULT NURSE NOTE - TEMPLATE
Body Location Override (Optional - Billing Will Still Be Based On Selected Body Map Location If Applicable): right lateral chest Detail Level: Detailed Depth Of Biopsy: dermis Was A Bandage Applied: Yes Size Of Lesion In Cm: 0 Biopsy Type: H and E Biopsy Method: 10 blade Anesthesia Type: 1% lidocaine with epinephrine 1:100,000 buffered with 8.4% sodium bicarbonate (1:9 ratio) Anesthesia Volume In Cc: 0.5 Hemostasis: Electrocautery Wound Care: Petrolatum Dressing: bandage Destruction After The Procedure: No Type Of Destruction Used: Curettage Curettage Text: The wound bed was treated with curettage after the biopsy was performed. Cryotherapy Text: The wound bed was treated with cryotherapy after the biopsy was performed. Electrodesiccation Text: The wound bed was treated with electrodesiccation after the biopsy was performed. Electrodesiccation And Curettage Text: The wound bed was treated with electrodesiccation and curettage after the biopsy was performed. Silver Nitrate Text: The wound bed was treated with silver nitrate after the biopsy was performed. Lab: [id number] Lab Facility: [id number] Consent: Written consent was obtained and risks were reviewed including but not limited to scarring, infection, bleeding, scabbing, incomplete removal, nerve damage and allergy to anesthesia. Post-Care Instructions: I reviewed with the patient in detail post-care instructions. Patient is to keep the biopsy site dry overnight, and then apply bacitracin twice daily until healed. Patient may apply hydrogen peroxide soaks to remove any crusting. Notification Instructions: Patient will be notified of biopsy results. However, patient instructed to call the office if not contacted within 2 weeks. Billing Type: Third-Party Bill Information: Selecting Yes will display possible errors in your note based on the variables you have selected. This validation is only offered as a suggestion for you. PLEASE NOTE THAT THE VALIDATION TEXT WILL BE REMOVED WHEN YOU FINALIZE YOUR NOTE. IF YOU WANT TO FAX A PRELIMINARY NOTE YOU WILL NEED TO TOGGLE THIS TO 'NO' IF YOU DO NOT WANT IT IN YOUR FAXED NOTE. Abdominal Pain, N/V/D

## 2023-03-03 NOTE — BEHAVIORAL HEALTH ASSESSMENT NOTE - DOMICILE TYPE
Private Residence Fluconazole Counseling:  Patient counseled regarding adverse effects of fluconazole including but not limited to headache, diarrhea, nausea, upset stomach, liver function test abnormalities, taste disturbance, and stomach pain.  There is a rare possibility of liver failure that can occur when taking fluconazole.  The patient understands that monitoring of LFTs and kidney function test may be required, especially at baseline. The patient verbalized understanding of the proper use and possible adverse effects of fluconazole.  All of the patient's questions and concerns were addressed.

## 2023-03-20 NOTE — DISCHARGE NOTE NURSING/CASE MANAGEMENT/SOCIAL WORK - NSDCPEFALRISK_GEN_ALL_CORE
----- Message from Miriam Maher DO sent at 3/20/2023  2:59 PM EDT -----  Please call patient with x-ray results. Let him know-- no fracture. Arthritis is seen which is chronic. Follow up as planned. Patient information on fall and injury prevention

## 2023-07-11 NOTE — ED PROVIDER NOTE - NS ED MD DISPO ISOLATION TYPES
Patient nauseated, Zofran given PO.  Medication for indigestion also  given, given saltines and ginger-jama.  Patient continues to watch TV in the dayroom.  Will continue to monitor.   None

## 2023-07-20 NOTE — H&P ADULT - PROBLEM SELECTOR PLAN 3
s/p mechanical fall while intoxicated  - CT head/face without fractures [Fever] : no fever [Chills] : no chills [Night Sweats] : no night sweats [Earache] : no earache [Nasal Discharge] : no nasal discharge [Sore Throat] : no sore throat [Chest Pain] : no chest pain [Palpitations] : no palpitations [Lower Ext Edema] : no lower extremity edema [Shortness Of Breath] : no shortness of breath [Wheezing] : no wheezing [Cough] : no cough [Dyspnea on Exertion] : not dyspnea on exertion [Nausea] : no nausea [Constipation] : no constipation [Diarrhea] : no diarrhea [Vomiting] : no vomiting [Melena] : no melena [Dysuria] : no dysuria [Itching] : no itching [Skin Rash] : no skin rash [Headache] : no headache [Suicidal] : not suicidal [Depression] : no depression [Easy Bleeding] : no easy bleeding [Easy Bruising] : no easy bruising [Swollen Glands] : no swollen glands

## 2023-10-01 PROBLEM — M54.16 LUMBAR RADICULAR PAIN: Status: ACTIVE | Noted: 2017-10-17

## 2023-10-24 NOTE — ED ADULT NURSE NOTE - SCORE
Medication history reviewed with PT at bedside.    Med rec is complete per PT reporting.    Allergies reviewed.     Patient denies any outpatient antibiotics in the last 30 days.     Patient is not taking anticoagulants.    Preferred pharmacy for this visit  [facility] on [address] ([phone]).    Dolores Madrigal PhT           10

## 2023-12-12 NOTE — ED ADULT NURSE NOTE - NS ED NURSE LEVEL OF CONSCIOUSNESS MENTAL STATUS
No phone number for Dean provided.  Called [facility]   Left a voicemail trying to reach Dean  Contact information left.  DAQUAN   Awake

## 2024-02-01 NOTE — BEHAVIORAL HEALTH ASSESSMENT NOTE - NS ED [facility]
Critical access hospital  Progress Note  Name: Jaun Loza III I  MRN: [id number]  Unit/Bed#: -01 I Date of Admission: 1/29/2024   Date of Service: 2/1/2024 I Hospital Day: 3    Assessment/Plan   Hyperphosphatemia  Assessment & Plan  Phos 10.4 on admission  Likely secondary to SHREYAS and severe hypovolemia  Will order potassium phosphate IV as now pt has hypophosphatemia       SHREYAS (acute kidney injury) ([facility])  Assessment & Plan  Baseline Cr 0.9 in care everywhere  Cr on admission 1.94  UA bland  Likely prerenal 2/2 severe DKA, intravascular dehydration   Aggressive IVF resuscitation   Creatinine is 0.77  Resolved    Lactic acidosis  Assessment & Plan  Lactic 7.8 on admission -- trend   In the setting of severe DKA -- do not suspect infectious etiology at this time  Continue IVF resuscitation per DKA protocol    SIRS (systemic inflammatory response syndrome) ([facility])  Assessment & Plan  SIRS: tachycardia, tachypnea, leukocytosis  SIRS likely secondary to DKA, unlikely infection  UA is negative.  Chest x-ray is unremarkable  Monitor off abx  Leukocytosis is improving  Trend fever curve and WBC count    Hyponatremia  Assessment & Plan  Pseudohyponatremia in the setting of marked hyperglycemia  Resolved    N&V (nausea and vomiting)  Assessment & Plan  Pt reports 2 days of N/V/D   Denies abdominal pain  Likely secondary to severe DKA  On Reglan  Antiemetics as needed  Improving.  Currently on clear liquid diet.  Will advance as tolerated.    Hx of seizure disorder  Assessment & Plan  Pt reports seizures as a child but has not had one in many years. Not on AED outpatient  Seizure precautions    * DKA, type 1 ([facility])  Assessment & Plan  Lab Results   Component Value Date    HGBA1C 9.0 (H) 01/29/2024       Recent Labs     01/31/24  1458 01/31/24  1639 01/31/24  2236 02/01/24  0609   POCGLU 127 163* 192* 267*         Blood Sugar Average: Last 72 hrs:  (P) [id number]  Hx of T1DM on SQ insulin  regimen  Pt reports running out of insulin needles 2 days ago. He has since had progressive N/V/D and polydipsia  DKA POA AEB: pH 6.98, CO2 7, AG 38, , BHB 5.2  Initiate DKA protocol  IVF per protocol; transition to dextrose-containing IVF when BG < 250  DKA insulin gtt; transition to non-DKA insulin gtt   Insulin drip is discontinued and patient started on Lantus insulin by endocrinology  On Humalog with meals and sliding scale  Q1h BG  Endocrinology on board  Clear liquid diet.  Will advance as tolerated  Trend BMP  Replete electrolytes PRN with goal K>4 and Mag>2  Will need to ensure refill of insulin needles prior to d/c             Labs & Imaging: I have personally reviewed pertinent reports.      VTE Prophylaxis: in place.    Code Status:   Level 1 - Full Code    Patient Centered Rounds: I have performed bedside rounds with nursing staff today.    Mobility:   Basic Mobility Inpatient Raw Score: 24  JH-HLM Goal: 8: Walk 250 feet or more  JH-HLM Achieved: 8: Walk 250 feet ot more  HLM Goal achieved. Continue to encourage appropriate mobility.    Discussions with Specialists or Other Care Team Provider: Endocrinology    Education and Discussions with Family / Patient: Patient stated he will update his family    Total Time Spent on Date of Encounter in care of patient: 35 mins. This time was spent on one or more of the following: performing physical exam; counseling and coordination of care; obtaining or reviewing history; documenting in the medical record; reviewing/ordering tests, medications or procedures; communicating with other healthcare professionals and discussing with patient's family/caregivers.    Current Length of Stay: 3 day(s)    Current Patient Status: Inpatient   Certification Statement: The patient will continue to require additional inpatient hospital stay due to see my assessment and plan.     Subjective:   Patient is seen and examined at bedside.  Patient nausea is improving.  No other  "complaint.  Afebrile  All other ROS are negative.    Objective:    Vitals: Blood pressure (!) 160/110, pulse 96, temperature 98 °F (36.7 °C), temperature source Oral, resp. rate 12, height 5' 6\" (1.676 m), weight 61.5 kg (135 lb 9.3 oz), SpO2 98%.,Body mass index is 21.88 kg/m².  SPO2 RA Rest      Flowsheet Row ED to Hosp-Admission (Current) from 1/29/2024 in [facility] Intensive Care Unit   SpO2 98 %   SpO2 Activity At Rest   O2 Device None (Room air)   O2 Flow Rate --          I&O:   Intake/Output Summary (Last 24 hours) at 2/1/2024 0723  Last data filed at 2/1/2024 0620  Gross per 24 hour   Intake 2300.1 ml   Output 2480 ml   Net -179.9 ml       Physical Exam:    General- Alert, lying comfortably in bed. Not in any acute distress.  Neck- Supple, No JVD  CVS- regular, S1 and S2 normal  Chest- Bilateral Air entry, No rhochi, crackles or wheezing present.  Abdomen- soft, nontender, not distended, no guarding or rigidity, BS+  Extremities-  No pedal edema, No calf tenderness                         Normal ROM in all extremities.  CNS-   Alert, awake and orientedx3. No focal deficits present.    Invasive Devices       Peripheral Intravenous Line  Duration             Peripheral IV 01/30/24 Right;Ventral (anterior) Forearm 1 day                          Social History  reviewed  History reviewed. No pertinent family history. reviewed    Meds:  Current Facility-Administered Medications   Medication Dose Route Frequency Provider Last Rate Last Admin    acetaminophen (TYLENOL) tablet 650 mg  650 mg Oral Q6H PRN Olivia Maya PA-C   650 mg at 01/30/24 0747    heparin (porcine) subcutaneous injection 5,000 Units  5,000 Units Subcutaneous Q8H [facility] Olivia Maya PA-C   5,000 Units at 02/01/24 0545    insulin glargine (LANTUS) subcutaneous injection 15 Units 0.15 mL  15 Units Subcutaneous Daily With Breakfast Terri Melendrez MD   15 Units at 01/31/24 1430    insulin lispro (HumaLOG) 100 units/mL " subcutaneous injection 2 Units  2 Units Subcutaneous TID With Meals Terri Melendrez MD        insulin lispro (HumaLOG) 100 units/mL subcutaneous injection 2-12 Units  2-12 Units Subcutaneous TID  Armando Salinas MD        metoclopramide (REGLAN) injection 10 mg  10 mg Intravenous Q6H PRN Aramndo Salinas MD   10 mg at 01/31/24 2348    multi-electrolyte (PLASMALYTE-A/ISOLYTE-S PH 7.4) IV solution  75 mL/hr Intravenous Continuous Armando Salinas MD        potassium phosphates 12 mmol in sodium chloride 0.9 % 250 mL infusion  12 mmol Intravenous Once Armando Salinas MD        potassium-sodium phosphateS (K-PHOS,PHOSPHA 250) -250 mg tablet 1 tablet  1 tablet Oral TID With Meals Armando Salinas MD        potassium-sodium phosphateS (K-PHOS,PHOSPHA 250) -250 mg tablet 2 tablet  2 tablet Oral Once Olivia Maya PA-C        sodium chloride (PF) 0.9 % injection 3 mL  3 mL Intravenous Q1H PRN Olivia Maya PA-C        trimethobenzamide (TIGAN) IM injection 200 mg  200 mg Intramuscular Q6H PRN REBECA Tillman   200 mg at 01/31/24 1232      Medications Prior to Admission   Medication    insulin NPH-insulin regular (NovoLIN 70/30) 100 units/mL subcutaneous injection       Labs:  Results from last 7 days   Lab Units 02/01/24  0443 01/31/24  0455 01/30/24  0747   WBC Thousand/uL 13.95* 18.68* 28.81*   HEMOGLOBIN g/dL 13.0 11.8* 14.7   HEMATOCRIT % 38.4 35.8* 42.5   PLATELETS Thousands/uL 182 189 274   NEUTROS PCT % 74  --  84*   LYMPHS PCT % 18  --  8*   MONOS PCT % 7  --  7   EOS PCT % 0  --  0     Results from last 7 days   Lab Units 02/01/24  0443 01/31/24  2346 01/31/24  1725 01/29/24  1942 01/29/24  1650 01/29/24  1637   POTASSIUM mmol/L 3.7 3.9 3.6   < > 5.0  5.0  5.0  --    CHLORIDE mmol/L 97 99 99   < > 88*  88*  88*  --    CO2 mmol/L 20* 19* 22   < > 8*  7*  7*  --    CO2, I-STAT mmol/L  --   --   --   --   --  8*   BUN mg/dL 10 10 9   < > 35*  36*  36*  --    CREATININE mg/dL 0.77  "0.75 0.75   < > 1.94*  1.94*  1.94*  --    CALCIUM mg/dL 8.4 8.3* 8.5   < > 10.1  10.1  10.1  --    ALK PHOS U/L 67  --   --   --  90  --    ALT U/L 15  --   --   --  19  --    AST U/L 37  --   --   --  19  --    GLUCOSE, ISTAT mg/dl  --   --   --   --   --  >600*    < > = values in this interval not displayed.     No results found for: \"TROPONINI\", \"CKMB\", \"CKTOTAL\"      No results found for: \"BLOODCX\", \"URINECX\", \"WOUNDCULT\", \"SPUTUMCULTUR\"      Imaging:  No results found for this or any previous visit.    No results found for this or any previous visit.      Last 24 Hours Medication List:   Current Facility-Administered Medications   Medication Dose Route Frequency Provider Last Rate    acetaminophen  650 mg Oral Q6H PRN Olivia Maya PA-C      heparin (porcine)  5,000 Units Subcutaneous Q8H [facility] Olivia Maya PA-C      insulin glargine  15 Units Subcutaneous Daily With Breakfast Terri Melendrez MD      insulin lispro  2 Units Subcutaneous TID With Meals Terri Melendrez MD      insulin lispro  2-12 Units Subcutaneous TID AC Armando Salinas MD      metoclopramide  10 mg Intravenous Q6H PRN Armando Salinas MD      multi-electrolyte  75 mL/hr Intravenous Continuous Armando Salinas MD      potassium phosphate  12 mmol Intravenous Once Armando Salinas MD      potassium-sodium phosphateS  1 tablet Oral TID With Meals Armando Salinas MD      potassium-sodium phosphateS  2 tablet Oral Once Olivia Maya PA-C      sodium chloride (PF)  3 mL Intravenous Q1H PRN Olivia Maya PA-C      trimethobenzamide  200 mg Intramuscular Q6H PRN REBECA Tillman          Today, Patient Was Seen By: Armando Salinas MD    ** Please Note: Dictation voice to text software may have been used in the creation of this document. **          " Yes

## 2024-04-26 NOTE — ED ADULT NURSE NOTE - NSFALLRSKPASTHIST_ED_ALL_ED
Patient requesting notation that she is doing everything she is supposed to do and prefers no further follow-ups  
no

## 2024-06-24 NOTE — PROGRESS NOTE ADULT - PROBLEM SELECTOR PROBLEM 2
Health Maintenance       Pneumococcal Vaccine 0-64 (1 of 2 - PCV)  Never done    Diabetes Eye Exam (Yearly)  Never done    Diabetes Foot Exam (Yearly)  Never done    DTaP/Tdap/Td Vaccine (1 - Tdap)  Never done    COVID-19 Vaccine (3 - 2023-24 season)  Overdue since 9/1/2023    Colorectal Cancer Screen (View Topic Details)  Ordered on 11/27/2023    Diabetes A1C (Every 6 Months)  Ordered on 6/24/2024    Hepatitis B Vaccine (1 of 3 - 19+ 3-dose series)  Postponed until 11/27/2024           Following review of the above:  Patient is not proceeding with: COVID-19, Dtap/Tdap/Td, and Pneumococcal    Note: Refer to final orders and clinician documentation.         Emesis

## 2024-07-31 NOTE — ED ADULT NURSE NOTE - IDEAL BODY WEIGHT(KG)
Patient has rheumatology appointment scheduled 08/15/24 with [facility] and sees pain management through [facility] as well. Jefferson will wait to be further seen with [facility].     Patient also asking that renewal for handicap parking pass be completed. Expires November 2024. Non-triage - please print form from [place] [employer] and complete.    78

## 2025-06-13 NOTE — PROGRESS NOTE BEHAVIORAL HEALTH - CASE SUMMARY
5E CCU Pt evaluated for follow-up via telehealth platform in light of ongoing COIVD-19 pandemic and to mitigate further infection risk.  Limitations of tele-psychiatric evaluation were discussed and patient consented to tele-psychiatric eval.  Attempted to speak with patient today via Zoom Video call with help of psychiatry telepresenter. Patient AAOX3, calm, cooperative, polite , thought process more linear, he denies acute psychotic sxs, adamantly denies SI and HI. Patient is future oriented, treatment seeking, motivated to go to outpt. substance abuse treatment program , also interested in AA meetings. Recommend Ativan taper as written above with Ativan PRN as per CIWA. Please provide outpt. referrals as above.

## 2025-08-02 NOTE — PATIENT PROFILE ADULT - FALL HARM RISK CONCLUSION
Reason For Consult  osteomyelitis of right 3rd toe    History Of Present Illness  Morro Seo is a 58 y.o. male presenting with an ulcer on the end of the right 3rd toe. He is a Type 2 Diabetic with diminished feeling in his feet. alluding opened into and ulcer without him realizing until his toe became red and swollen. .     Past Medical History  He has a past medical history of Anxiety, CHB (complete heart block), COPD (chronic obstructive pulmonary disease) (Multi), Diabetes mellitus (Multi), HLD (hyperlipidemia), HTN (hypertension), NAVYA (obstructive sleep apnea), and Restless leg syndrome (1986).    Surgical History  He has a past surgical history that includes Cardiac catheterization (N/A, 10/24/2024); pacemaker placement; Leg Surgery; Knee surgery; Foot surgery; Tonsillectomy; and Adenoidectomy.     Social History  He reports that he quit smoking about 9 months ago. His smoking use included cigarettes. He started smoking about 40 years ago. He has a 80.2 pack-year smoking history. He has never used smokeless tobacco. He reports current alcohol use of about 1.0 - 2.0 standard drink of alcohol per week. He reports that he does not currently use drugs.    Family History  Family History   Problem Relation Name Age of Onset   • Heart attack Mother     • Hypertension Mother     • Coronary artery disease Father     • Heart attack Father     • Hypertension Father     • Diabetes Sister     • Diabetes Brother     • Lung cancer Maternal Grandfather     • Lung cancer Paternal Grandfather     • Depression Mother Roberts 20 - 29   • COPD Father Pops 50 - 59   • Early natural death Father Pops 50 - 59   • Early natural death Sister Livier Maier 60 - 69        Allergies  Jardiance [empagliflozin], Penicillins, and Sulfamethoxazole-trimethoprim    Review of Systems  awake. alert. fully oriented  denies current fever, chills  denies nausea or vomitting     Physical Exam  right 3rd toe is swollen. erythema sound the distal end of  "the toe. open ulcer. palpable pathologic fracture of the distal phalange of the toe. no purulence. no lymphangitis or ascending cellulitis. infection appears limited to distal toe.   deformity of the 1st toe, shortened from past surgery. thick callus under 1st toe. metatarsal joint is immobile. 2nd and 3rd toe are considerably longer than 1st.   patent pedal pulses. adequate capillary refill.   no wounds on left foot       Last Recorded Vitals  Blood pressure 110/67, pulse 70, temperature 35.9 °C (96.6 °F), temperature source Temporal, resp. rate 18, height 1.778 m (5' 10\"), weight 130 kg (286 lb 13.1 oz), SpO2 97%.    Relevant Results      Scheduled medications  acarbose, 50 mg, oral, TID  ARIPiprazole, 5 mg, oral, Daily  atorvastatin, 80 mg, oral, Nightly  buPROPion SR, 150 mg, oral, BID  enoxaparin, 40 mg, subcutaneous, Daily  fluticasone furoate-vilanteroL, 1 puff, inhalation, Daily  gabapentin, 100 mg, oral, BID  insulin lispro, 0-5 Units, subcutaneous, TID AC  losartan, 25 mg, oral, Daily  pantoprazole, 40 mg, oral, Daily  potassium chloride CR, 10 mEq, oral, Daily  sennosides-docusate sodium, 1 tablet, oral, Nightly  sertraline, 50 mg, oral, Daily  spironolactone, 25 mg, oral, Daily  tiotropium, 2 puff, inhalation, Daily  torsemide, 20 mg, oral, Daily    Continuous medications     PRN medications  PRN medications: acetaminophen, albuterol, dextrose, dextrose, glucagon, glucagon  Results for orders placed or performed during the hospital encounter of 07/31/25 (from the past 24 hours)   POCT GLUCOSE   Result Value Ref Range    POCT Glucose 166 (H) 74 - 99 mg/dL   POCT GLUCOSE   Result Value Ref Range    POCT Glucose 178 (H) 74 - 99 mg/dL   POCT GLUCOSE   Result Value Ref Range    POCT Glucose 156 (H) 74 - 99 mg/dL   POCT GLUCOSE   Result Value Ref Range    POCT Glucose 144 (H) 74 - 99 mg/dL     *Note: Due to a large number of results and/or encounters for the requested time period, some results have not been " displayed. A complete set of results can be found in Results Review.        Assessment/Plan     osteomyelitis of the distal phalange of right 3rd toe  -recommended partial amputation of the toe. The infection appears isolated and should be effectively treated with partial amputation. This will also help distribute weight more evenly considering the toe deformities. Second toe eventually will need shortened to avoid similar problem, but that can be done as outpatient since it is not currently infected.  -surgery set for Saturday morning. NPO after midnight  -expect up to 48 hour stay afterwards for culture results to guide antibiotic stewardship and recovery.     Maykel Hirsch DPM     Fall with Harm Risk